# Patient Record
Sex: FEMALE | Race: WHITE | NOT HISPANIC OR LATINO | Employment: OTHER | URBAN - NONMETROPOLITAN AREA
[De-identification: names, ages, dates, MRNs, and addresses within clinical notes are randomized per-mention and may not be internally consistent; named-entity substitution may affect disease eponyms.]

---

## 2017-04-19 ENCOUNTER — OFFICE VISIT (OUTPATIENT)
Dept: GASTROENTEROLOGY | Facility: CLINIC | Age: 72
End: 2017-04-19

## 2017-04-19 VITALS
BODY MASS INDEX: 26.8 KG/M2 | DIASTOLIC BLOOD PRESSURE: 80 MMHG | WEIGHT: 157 LBS | OXYGEN SATURATION: 95 % | HEART RATE: 75 BPM | HEIGHT: 64 IN | SYSTOLIC BLOOD PRESSURE: 122 MMHG

## 2017-04-19 DIAGNOSIS — E11.9 CONTROLLED TYPE 2 DIABETES MELLITUS WITHOUT COMPLICATION, WITHOUT LONG-TERM CURRENT USE OF INSULIN (HCC): ICD-10-CM

## 2017-04-19 DIAGNOSIS — Z12.11 ENCOUNTER FOR SCREENING FOR MALIGNANT NEOPLASM OF COLON: ICD-10-CM

## 2017-04-19 DIAGNOSIS — Z86.010 HX OF COLONIC POLYPS: Primary | ICD-10-CM

## 2017-04-19 DIAGNOSIS — K21.9 GASTROESOPHAGEAL REFLUX DISEASE, ESOPHAGITIS PRESENCE NOT SPECIFIED: ICD-10-CM

## 2017-04-19 PROBLEM — Z86.0100 HX OF COLONIC POLYPS: Status: ACTIVE | Noted: 2017-04-19

## 2017-04-19 PROCEDURE — 99202 OFFICE O/P NEW SF 15 MIN: CPT | Performed by: CLINICAL NURSE SPECIALIST

## 2017-04-19 NOTE — PROGRESS NOTES
Becky Smiley  1945 4/19/2017  Chief Complaint   Patient presents with   • Colonoscopy     New patient ref by Dr. SANGEETA Holm former Dr. Sommer patient     Subjective   HPI  Becky Smiley is a 71 y.o. female who presents as a referral for preventative maintenance. She has no complaints of nausea or vomiting. No change in bowels. No wt loss. No BRBPR. No melena. There is a negative family hx for colon cancer. No abdominal pain.She has a long hx of reflux for years stable with protonix. She has no nausea or vomiting. She takes 2 TUMS per day for calcium. She has a hx of polyp at the cardia noted by Kemal with 3 yr recall.   Past Medical History:   Diagnosis Date   • Asthma    • Diabetes mellitus     Type 2   • GERD (gastroesophageal reflux disease)    • HH (hiatus hernia)    • Hx of colonic polyps    • Insomnia    • Vitamin D deficiency      Past Surgical History:   Procedure Laterality Date   • CATARACT EXTRACTION     • CHOLECYSTECTOMY     • COLONOSCOPY  05/26/2011    Isolated area of ischemic colitis, Diverticulosis repeat exam in 5 years Dr. Sommer   • ENDOSCOPY  09/18/2013    Benign hyperplastic polyp with moderate chronic active inflammation stomach, Mild gastritis repeat exam 3 years Dr. Christie   • HYSTERECTOMY     • TUMOR EXCISION      Throat benign   • UPPER GASTROINTESTINAL ENDOSCOPY  09/17/2013    small cardia polyp (benign hyperplastic),mild gastritis     Outpatient Prescriptions Marked as Taking for the 4/19/17 encounter (Office Visit) with CAROL Ramirez   Medication Sig Dispense Refill   • albuterol (PROVENTIL HFA;VENTOLIN HFA) 108 (90 BASE) MCG/ACT inhaler Inhale 2 puffs Every 4 (Four) Hours As Needed for Wheezing.     • aspirin 81 MG EC tablet Take 81 mg by mouth Daily.     • atorvastatin (LIPITOR) 80 MG tablet Take 80 mg by mouth Daily.     • ezetimibe (ZETIA) 10 MG tablet Take 10 mg by mouth Daily.     • Fluticasone Propionate, Inhal, (FLOVENT DISKUS)  100 MCG/BLIST aerosol powder  Inhale.     • glyBURIDE (DIAbeta) 5 MG tablet Take 5 mg by mouth 2 (Two) Times a Day With Meals.     • griseofulvin (GRIFULVIN V) 500 MG tablet Take 500 mg by mouth Daily.     • mometasone-formoterol (DULERA 100) 100-5 MCG/ACT inhaler Inhale 2 puffs 2 (Two) Times a Day.     • pantoprazole (PROTONIX) 40 MG EC tablet Take 40 mg by mouth Daily.     • tiotropium (SPIRIVA) 18 MCG per inhalation capsule Place 1 capsule into inhaler and inhale Daily.     • vitamin D (ERGOCALCIFEROL) 09946 UNITS capsule capsule Take 50,000 Units by mouth 1 (One) Time Per Week.       Allergies   Allergen Reactions   • Augmentin [Amoxicillin-Pot Clavulanate]    • Ciprofloxacin Hcl    • Codeine    • Metformin And Related    • Sulfa Antibiotics      Social History     Social History   • Marital status:      Spouse name: N/A   • Number of children: N/A   • Years of education: N/A     Occupational History   • Not on file.     Social History Main Topics   • Smoking status: Never Smoker   • Smokeless tobacco: Not on file   • Alcohol use No   • Drug use: Not on file   • Sexual activity: Not on file     Other Topics Concern   • Not on file     Social History Narrative     Family History   Problem Relation Age of Onset   • Colon cancer Neg Hx    • Colon polyps Neg Hx      Health Maintenance   Topic Date Due   • TDAP/TD VACCINES (1 - Tdap) 11/12/1964   • PNEUMOCOCCAL VACCINES (65+ LOW/MEDIUM RISK) (1 of 2 - PCV13) 11/12/2010   • INFLUENZA VACCINE  08/01/2016   • HEPATITIS C SCREENING  04/07/2017   • COLONOSCOPY  04/07/2017   • ZOSTER VACCINE  04/07/2017   • MAMMOGRAM  04/19/2017   • HEMOGLOBIN A1C  04/19/2017   • URINE MICROALBUMIN  04/19/2017   • DIABETIC FOOT EXAM  04/19/2017   • DIABETIC EYE EXAM  04/19/2017       REVIEW OF SYSTEMS  General: well appearing, no fever chills or sweats, no unexplained wt loss  HEENT: no acute visual or hearing disturbances  Cardiovascular: No chest pain or palpitations  Pulmonary:  "No shortness of breath, coughing, wheezing or hemoptysis  : No burning, urgency, hematuria, or dysuria  Musculoskeletal: No joint pain or stiffness  Peripheral: no edema  Skin: No lesions or rashes  Neuro: No dizziness, headaches, stroke, syncope  Endocrine: No hot or cold intolerances  Hematological: No blood dyscrasias    Objective   Vitals:    04/19/17 1026   BP: 122/80   Pulse: 75   SpO2: 95%   Weight: 157 lb (71.2 kg)   Height: 64\" (162.6 cm)     Body mass index is 26.95 kg/(m^2).    PHYSICAL EXAM  General: age appropriate well nourished well appearing, no acute distress  Head: normocephalic and atraumatic  Global assessment-supple  Neck-No JVD noted, no lymphadenopathy  Pulmonary-clear to auscultation bilaterally, normal respiratory effort  Cardiovascular-normal rate and rhythm, normal heart sounds, S1 and S2 noted  Abdomen-soft, non tender, non distended, normal bowel sounds all 4 quadrants, no hepatosplenomegaly noted  Extremities-No clubbing cyanosis or edema  Neuro-Non focal, converses appropriately, awake, alert, oriented    Assessment/Plan     Becky was seen today for colonoscopy.    Diagnoses and all orders for this visit:    Hx of colonic polyps  Comments:  2003 hyperplastic ascending    Controlled type 2 diabetes mellitus without complication, without long-term current use of insulin    Encounter for screening for malignant neoplasm of colon  -     Case Request; Standing  -     Implement Anesthesia Orders Day of Procedure; Standing  -     Obtain Informed Consent; Standing  -     Verify Informed Consent; Standing  -     Verify bowel prep was successful; Standing  -     Case Request    Gastroesophageal reflux disease, esophagitis presence not specified  Comments:  polyp in the cardia last Endo 9/17/2013 Kemal suggested 3 year recall.   Orders:  -     Case Request; Standing  -     Implement Anesthesia Orders Day of Procedure; Standing  -     Obtain Informed Consent; Standing  -     Verify " Informed Consent; Standing  -     Case Request        COLONOSCOPY WITH ANESTHESIA (N/A)  Body mass index is 26.95 kg/(m^2).    Patient instructions on prep prior to procedure provided to the patient.    All risks, benefits, alternatives, and indications of colonoscopy procedure have been discussed with the patient. Risks to include perforation of the colon requiring possible surgery or colostomy, risk of bleeding from biopsies or removal of colon tissue, possibility of missing a colon polyp or cancer, or adverse drug reaction.  Benefits to include the diagnosis and management of disease of the colon and rectum. Alternatives to include barium enema, radiographic evaluation, lab testing or no intervention. Pt verbalizes understanding and agrees.

## 2017-06-01 ENCOUNTER — ANESTHESIA EVENT (OUTPATIENT)
Dept: GASTROENTEROLOGY | Facility: HOSPITAL | Age: 72
End: 2017-06-01

## 2017-06-01 ENCOUNTER — HOSPITAL ENCOUNTER (OUTPATIENT)
Facility: HOSPITAL | Age: 72
Setting detail: HOSPITAL OUTPATIENT SURGERY
Discharge: HOME | End: 2017-06-01
Attending: INTERNAL MEDICINE | Admitting: INTERNAL MEDICINE

## 2017-06-01 ENCOUNTER — ANESTHESIA (OUTPATIENT)
Dept: GASTROENTEROLOGY | Facility: HOSPITAL | Age: 72
End: 2017-06-01

## 2017-06-01 VITALS
DIASTOLIC BLOOD PRESSURE: 66 MMHG | SYSTOLIC BLOOD PRESSURE: 129 MMHG | WEIGHT: 156 LBS | OXYGEN SATURATION: 93 % | RESPIRATION RATE: 16 BRPM | BODY MASS INDEX: 26.63 KG/M2 | HEIGHT: 64 IN | HEART RATE: 70 BPM | TEMPERATURE: 97.9 F

## 2017-06-01 DIAGNOSIS — K21.9 GASTROESOPHAGEAL REFLUX DISEASE, ESOPHAGITIS PRESENCE NOT SPECIFIED: ICD-10-CM

## 2017-06-01 PROCEDURE — 43239 EGD BIOPSY SINGLE/MULTIPLE: CPT | Performed by: INTERNAL MEDICINE

## 2017-06-01 PROCEDURE — 88305 TISSUE EXAM BY PATHOLOGIST: CPT | Performed by: INTERNAL MEDICINE

## 2017-06-01 PROCEDURE — 25010000002 PROPOFOL 10 MG/ML EMULSION: Performed by: NURSE ANESTHETIST, CERTIFIED REGISTERED

## 2017-06-01 RX ORDER — LIDOCAINE HYDROCHLORIDE 20 MG/ML
INJECTION, SOLUTION INFILTRATION; PERINEURAL AS NEEDED
Status: DISCONTINUED | OUTPATIENT
Start: 2017-06-01 | End: 2017-06-01 | Stop reason: SURG

## 2017-06-01 RX ORDER — SODIUM CHLORIDE 9 MG/ML
100 INJECTION, SOLUTION INTRAVENOUS CONTINUOUS
Status: DISCONTINUED | OUTPATIENT
Start: 2017-06-01 | End: 2017-06-01 | Stop reason: HOSPADM

## 2017-06-01 RX ORDER — PROPOFOL 10 MG/ML
VIAL (ML) INTRAVENOUS AS NEEDED
Status: DISCONTINUED | OUTPATIENT
Start: 2017-06-01 | End: 2017-06-01 | Stop reason: SURG

## 2017-06-01 RX ORDER — CETIRIZINE HYDROCHLORIDE 10 MG/1
10 TABLET ORAL DAILY
COMMUNITY

## 2017-06-01 RX ORDER — SODIUM CHLORIDE 0.9 % (FLUSH) 0.9 %
1-10 SYRINGE (ML) INJECTION AS NEEDED
Status: DISCONTINUED | OUTPATIENT
Start: 2017-06-01 | End: 2017-06-01 | Stop reason: HOSPADM

## 2017-06-01 RX ADMIN — PROPOFOL 100 MG: 10 INJECTION, EMULSION INTRAVENOUS at 09:29

## 2017-06-01 RX ADMIN — LIDOCAINE HYDROCHLORIDE 100 MG: 20 INJECTION, SOLUTION INFILTRATION; PERINEURAL at 09:19

## 2017-06-01 RX ADMIN — LIDOCAINE HYDROCHLORIDE 1 ML: 10 INJECTION, SOLUTION EPIDURAL; INFILTRATION; INTRACAUDAL; PERINEURAL at 08:54

## 2017-06-01 RX ADMIN — SODIUM CHLORIDE 100 ML/HR: 9 INJECTION, SOLUTION INTRAVENOUS at 08:54

## 2017-06-01 NOTE — ANESTHESIA POSTPROCEDURE EVALUATION
Patient: Becky HOUSTON Whipple    Procedure Summary     Date Anesthesia Start Anesthesia Stop Room / Location    06/01/17 0917 0937 [facility] ENDOSCOPY 4 / [facility]       Procedure Diagnosis Surgeon Provider    ESOPHAGOGASTRODUODENOSCOPY WITH ANESTHESIA (N/A Esophagus) Gastroesophageal reflux disease, esophagitis presence not specified  (Gastroesophageal reflux disease, esophagitis presence not specified [K21.9]) MD Blanco Chávez CRNA          Anesthesia Type: general  Last vitals  BP      Temp      Pulse     Resp      SpO2        Post Anesthesia Care and Evaluation    Patient location during evaluation: PHASE II  Patient participation: complete - patient participated  Level of consciousness: awake and alert  Pain score: 0  Pain management: adequate  Airway patency: patent  Anesthetic complications: No anesthetic complications    Cardiovascular status: acceptable, hemodynamically stable and stable  Respiratory status: acceptable  Hydration status: acceptable

## 2017-06-01 NOTE — ANESTHESIA PREPROCEDURE EVALUATION
Anesthesia Evaluation     Patient summary reviewed and Nursing notes reviewed   NPO Solid Status: > 8 hours  NPO Liquid Status: > 8 hours     Airway   Mallampati: II  TM distance: >3 FB  Neck ROM: full  no difficulty expected  Dental - normal exam     Pulmonary - normal exam   (+) asthma,   Cardiovascular - negative cardio ROS and normal exam        Neuro/Psych  (+) TIA,    GI/Hepatic/Renal/Endo    (+)  hiatal hernia, GERD well controlled, diabetes mellitus type 2 well controlled,     Musculoskeletal     Abdominal    Substance History      OB/GYN          Other                                        Anesthesia Plan    ASA 2     intravenous induction   Anesthetic plan and risks discussed with patient.    Plan discussed with CRNA.

## 2017-06-01 NOTE — H&P
[facility]  Pre Procedure History & Physical    Chief Complaint:   Gastric polyps    Subjective     HPI:   Here for upper endoscopy.  Apparently had hyperplastic gastric polyp on her last exam was suggested 3 year follow-up.  Patient takes Protonix daily    Past Medical History:   Past Medical History:   Diagnosis Date   • Asthma    • Diabetes mellitus     Type 2   • GERD (gastroesophageal reflux disease)    • HH (hiatus hernia)    • Hx of colonic polyps    • Insomnia    • Vitamin D deficiency        Past Surgical History:  [unfilled]    Family History:  Family History   Problem Relation Age of Onset   • Colon cancer Neg Hx    • Colon polyps Neg Hx        Social History:   reports that she has never smoked. She does not have any smokeless tobacco history on file. She reports that she does not drink alcohol.    Medications:   Prior to Admission medications    Medication Sig Start Date End Date Taking? Authorizing Provider   aspirin 81 MG EC tablet Take 81 mg by mouth Daily.   Yes Historical Provider, MD   atorvastatin (LIPITOR) 80 MG tablet Take 80 mg by mouth Daily.   Yes Historical Provider, MD   cetirizine (zyrTEC) 10 MG tablet Take 10 mg by mouth Daily.   Yes Historical Provider, MD   ezetimibe (ZETIA) 10 MG tablet Take 10 mg by mouth Daily.   Yes Historical Provider, MD   glyBURIDE (DIAbeta) 5 MG tablet Take 5 mg by mouth 2 (Two) Times a Day With Meals.   Yes Historical Provider, MD   Metaxalone (SKELAXIN PO) Take  by mouth.   Yes Historical Provider, MD   pantoprazole (PROTONIX) 40 MG EC tablet Take 40 mg by mouth Daily.   Yes Historical Provider, MD   vitamin D (ERGOCALCIFEROL) 90378 UNITS capsule capsule Take 50,000 Units by mouth 1 (One) Time Per Week.    Historical Provider, MD   albuterol (PROVENTIL HFA;VENTOLIN HFA) 108 (90 BASE) MCG/ACT inhaler Inhale 2 puffs Every 4 (Four) Hours As Needed for Wheezing.  6/1/17  Historical Provider, MD   Fluticasone Propionate, Inhal, (FLOVENT DISKUS) 100  "MCG/BLIST aerosol powder  Inhale.  6/1/17  Historical Provider, MD   griseofulvin (GRIFULVIN V) 500 MG tablet Take 500 mg by mouth Daily.  6/1/17  Historical Provider, MD   mometasone-formoterol (DULERA 100) 100-5 MCG/ACT inhaler Inhale 2 puffs 2 (Two) Times a Day.  6/1/17  Historical Provider, MD   tiotropium (SPIRIVA) 18 MCG per inhalation capsule Place 1 capsule into inhaler and inhale Daily.  6/1/17  Historical Provider, MD       Allergies:  Ciprofloxacin hcl; Codeine; Metformin and related; Augmentin [amoxicillin-pot clavulanate]; and Sulfa antibiotics    Objective     Blood pressure 156/83, pulse 65, temperature 97.9 °F (36.6 °C), temperature source Temporal Artery , resp. rate 18, height 64\" (162.6 cm), weight 156 lb (70.8 kg), SpO2 98 %.    Physical Exam   Constitutional: Pt is oriented to person, place, and in no distress.   HENT: Mouth/Throat: Oropharynx is clear.   Cardiovascular: Normal rate, regular rhythm.    Pulmonary/Chest: Effort normal. No respiratory distress. No  wheezes.   Abdominal: Soft. Non-distended.  Skin: Skin is warm and dry.   Psychiatric: Mood, memory, affect and judgment appear normal.     Assessment/Plan     Diagnosis:  Gastric polyp    Anticipated Surgical Procedure:    Proceed with endoscopy as scheduled    The risks, benefits, and alternatives of this procedure have been discussed with the patient or the responsible party- the patient understands and agrees to proceed.    EMR Dragon/transcription disclaimer: Much of this encounter note is an electronic transcription/translation of spoken language to printed text.  The electronic translation of spoken language may permit erroneous, or at times, nonsensical words or phrases to be inadvertently transcribed.  Although I have reviewed the note for such errors, some may still exist.    Faraz Cardenas MD  9:22 AM  6/1/2017    "

## 2017-06-01 NOTE — PLAN OF CARE
Problem: Patient Care Overview (Adult)  Goal: Plan of Care Review  Outcome: Outcome(s) achieved Date Met:  06/01/17 06/01/17 0940   Coping/Psychosocial Response Interventions   Plan Of Care Reviewed With patient   Patient Care Overview   Progress improving   Outcome Evaluation   Outcome Summary/Follow up Plan D/C CRITERIA MET

## 2017-06-01 NOTE — PLAN OF CARE
Problem: Patient Care Overview (Adult)  Goal: Plan of Care Review  Outcome: Ongoing (interventions implemented as appropriate)    06/01/17 0982   Coping/Psychosocial Response Interventions   Plan Of Care Reviewed With patient   Patient Care Overview   Progress no change   Outcome Evaluation   Outcome Summary/Follow up Plan pt tolerated procedure well.

## 2017-06-01 NOTE — PLAN OF CARE
Problem: Patient Care Overview (Adult)  Goal: Adult Individualization and Mutuality  Outcome: Ongoing (interventions implemented as appropriate)    06/01/17 0831   Individualization   Patient Specific Preferences none

## 2017-06-02 LAB
CYTO UR: NORMAL
LAB AP CASE REPORT: NORMAL
LAB AP CLINICAL INFORMATION: NORMAL
Lab: NORMAL
PATH REPORT.FINAL DX SPEC: NORMAL
PATH REPORT.GROSS SPEC: NORMAL

## 2017-06-07 ENCOUNTER — TELEPHONE (OUTPATIENT)
Dept: GASTROENTEROLOGY | Facility: CLINIC | Age: 72
End: 2017-06-07

## 2017-06-15 ENCOUNTER — ANESTHESIA EVENT (OUTPATIENT)
Dept: GASTROENTEROLOGY | Facility: HOSPITAL | Age: 72
End: 2017-06-15

## 2017-06-16 ENCOUNTER — HOSPITAL ENCOUNTER (OUTPATIENT)
Facility: HOSPITAL | Age: 72
Setting detail: HOSPITAL OUTPATIENT SURGERY
Discharge: HOME | End: 2017-06-16
Attending: INTERNAL MEDICINE | Admitting: INTERNAL MEDICINE

## 2017-06-16 ENCOUNTER — ANESTHESIA (OUTPATIENT)
Dept: GASTROENTEROLOGY | Facility: HOSPITAL | Age: 72
End: 2017-06-16

## 2017-06-16 VITALS
TEMPERATURE: 98.1 F | OXYGEN SATURATION: 97 % | RESPIRATION RATE: 16 BRPM | DIASTOLIC BLOOD PRESSURE: 69 MMHG | SYSTOLIC BLOOD PRESSURE: 124 MMHG | BODY MASS INDEX: 26.46 KG/M2 | HEIGHT: 64 IN | HEART RATE: 73 BPM | WEIGHT: 155 LBS

## 2017-06-16 DIAGNOSIS — Z12.11 ENCOUNTER FOR SCREENING FOR MALIGNANT NEOPLASM OF COLON: ICD-10-CM

## 2017-06-16 LAB — GLUCOSE BLDC GLUCOMTR-MCNC: 127 MG/DL (ref 70–130)

## 2017-06-16 PROCEDURE — 43239 EGD BIOPSY SINGLE/MULTIPLE: CPT | Performed by: INTERNAL MEDICINE

## 2017-06-16 PROCEDURE — 88305 TISSUE EXAM BY PATHOLOGIST: CPT | Performed by: INTERNAL MEDICINE

## 2017-06-16 PROCEDURE — 45385 COLONOSCOPY W/LESION REMOVAL: CPT | Performed by: INTERNAL MEDICINE

## 2017-06-16 PROCEDURE — 25010000002 PROPOFOL 10 MG/ML EMULSION: Performed by: NURSE ANESTHETIST, CERTIFIED REGISTERED

## 2017-06-16 PROCEDURE — 82962 GLUCOSE BLOOD TEST: CPT

## 2017-06-16 RX ORDER — SODIUM CHLORIDE 0.9 % (FLUSH) 0.9 %
1-10 SYRINGE (ML) INJECTION AS NEEDED
Status: DISCONTINUED | OUTPATIENT
Start: 2017-06-16 | End: 2017-06-16 | Stop reason: HOSPADM

## 2017-06-16 RX ORDER — SODIUM CHLORIDE 9 MG/ML
9 INJECTION, SOLUTION INTRAVENOUS CONTINUOUS PRN
Status: DISCONTINUED | OUTPATIENT
Start: 2017-06-16 | End: 2017-06-16 | Stop reason: HOSPADM

## 2017-06-16 RX ORDER — PROPOFOL 10 MG/ML
VIAL (ML) INTRAVENOUS AS NEEDED
Status: DISCONTINUED | OUTPATIENT
Start: 2017-06-16 | End: 2017-06-16 | Stop reason: SURG

## 2017-06-16 RX ADMIN — PROPOFOL 100 MG: 10 INJECTION, EMULSION INTRAVENOUS at 09:38

## 2017-06-16 RX ADMIN — PROPOFOL 100 MG: 10 INJECTION, EMULSION INTRAVENOUS at 09:28

## 2017-06-16 RX ADMIN — LIDOCAINE HYDROCHLORIDE 0.5 ML: 10 INJECTION, SOLUTION EPIDURAL; INFILTRATION; INTRACAUDAL; PERINEURAL at 07:51

## 2017-06-16 RX ADMIN — PROPOFOL 100 MG: 10 INJECTION, EMULSION INTRAVENOUS at 09:33

## 2017-06-16 RX ADMIN — SODIUM CHLORIDE 9 ML/HR: 9 INJECTION, SOLUTION INTRAVENOUS at 07:51

## 2017-06-16 NOTE — PLAN OF CARE
Problem: GI Endoscopy (Adult)  Goal: Signs and Symptoms of Listed Potential Problems Will be Absent or Manageable (GI Endoscopy)  Outcome: Outcome(s) achieved Date Met:  06/16/17

## 2017-06-16 NOTE — PLAN OF CARE
Problem: Patient Care Overview (Adult)  Goal: Plan of Care Review    06/16/17 0953   Coping/Psychosocial Response Interventions   Plan Of Care Reviewed With patient   Patient Care Overview   Progress no change   Outcome Evaluation   Outcome Summary/Follow up Plan pt tolerated procedure well.

## 2017-06-16 NOTE — ANESTHESIA POSTPROCEDURE EVALUATION
Patient: Becky Malikipple    Procedure Summary     Date Anesthesia Start Anesthesia Stop Room / Location    06/16/17 0926 0949  [facility] 6 /  [facility]       Procedure Diagnosis Surgeon Provider    COLONOSCOPY WITH ANESTHESIA (N/A ) Encounter for screening for malignant neoplasm of colon  (Encounter for screening for malignant neoplasm of colon [Z12.11]) MD Beltran Chávez CRNA          Anesthesia Type: MAC  Last vitals  /80 (06/16/17 0952)    Temp      Pulse 73 (06/16/17 0952)   Resp 10 (06/16/17 0952)    SpO2 97 % (06/16/17 0952)      Post Anesthesia Care and Evaluation    Patient location during evaluation: PHASE II  Patient participation: complete - patient participated  Level of consciousness: awake and alert  Pain management: adequate  Airway patency: patent  Anesthetic complications: No anesthetic complications    Cardiovascular status: acceptable  Respiratory status: acceptable  Hydration status: acceptable

## 2017-06-16 NOTE — PLAN OF CARE
Problem: Patient Care Overview (Adult)  Goal: Plan of Care Review  Outcome: Outcome(s) achieved Date Met:  06/16/17 06/16/17 1009   Coping/Psychosocial Response Interventions   Plan Of Care Reviewed With patient;spouse   Patient Care Overview   Progress improving   Outcome Evaluation   Outcome Summary/Follow up Plan D/C CRITERIA MET

## 2017-06-16 NOTE — ANESTHESIA PREPROCEDURE EVALUATION
Anesthesia Evaluation     Patient summary reviewed and Nursing notes reviewed   NPO Solid Status: > 8 hours  NPO Liquid Status: > 8 hours     Airway   Mallampati: II  TM distance: >3 FB  Neck ROM: full  Dental - normal exam     Pulmonary - normal exam   (+) asthma,   Cardiovascular - negative cardio ROS and normal exam        Neuro/Psych  (+) TIA,    GI/Hepatic/Renal/Endo    (+)  hiatal hernia, GERD well controlled, diabetes mellitus type 2 well controlled,     Musculoskeletal     Abdominal    Substance History      OB/GYN          Other                                      Anesthesia Plan    ASA 3     general     intravenous induction     Plan discussed with CRNA.

## 2018-03-13 ENCOUNTER — OFFICE VISIT (OUTPATIENT)
Dept: UROLOGY | Facility: CLINIC | Age: 73
End: 2018-03-13

## 2018-03-13 VITALS — WEIGHT: 157 LBS | HEIGHT: 66 IN | TEMPERATURE: 97.3 F | BODY MASS INDEX: 25.23 KG/M2

## 2018-03-13 DIAGNOSIS — R30.0 DYSURIA: ICD-10-CM

## 2018-03-13 DIAGNOSIS — N30.10 INTERSTITIAL CYSTITIS: ICD-10-CM

## 2018-03-13 DIAGNOSIS — N39.0 RECURRENT UTI: Primary | ICD-10-CM

## 2018-03-13 LAB
BILIRUB BLD-MCNC: NEGATIVE MG/DL
CLARITY, POC: CLEAR
COLOR UR: YELLOW
GLUCOSE UR STRIP-MCNC: ABNORMAL MG/DL
KETONES UR QL: NEGATIVE
LEUKOCYTE EST, POC: NEGATIVE
NITRITE UR-MCNC: NEGATIVE MG/ML
PH UR: 5 [PH] (ref 5–8)
PROT UR STRIP-MCNC: NEGATIVE MG/DL
RBC # UR STRIP: NEGATIVE /UL
SP GR UR: 1.02 (ref 1–1.03)
UROBILINOGEN UR QL: NORMAL

## 2018-03-13 PROCEDURE — 81001 URINALYSIS AUTO W/SCOPE: CPT | Performed by: UROLOGY

## 2018-03-13 PROCEDURE — 99204 OFFICE O/P NEW MOD 45 MIN: CPT | Performed by: UROLOGY

## 2018-03-13 RX ORDER — TRIMETHOPRIM 100 MG/1
100 TABLET ORAL ONCE
Qty: 90 TABLET | Refills: 5 | Status: SHIPPED | OUTPATIENT
Start: 2018-03-13 | End: 2018-03-13

## 2018-05-09 ENCOUNTER — PROCEDURE VISIT (OUTPATIENT)
Dept: UROLOGY | Facility: CLINIC | Age: 73
End: 2018-05-09

## 2018-05-09 VITALS
WEIGHT: 148.2 LBS | HEIGHT: 65 IN | TEMPERATURE: 98.3 F | SYSTOLIC BLOOD PRESSURE: 118 MMHG | BODY MASS INDEX: 24.69 KG/M2 | DIASTOLIC BLOOD PRESSURE: 64 MMHG

## 2018-05-09 DIAGNOSIS — N39.0 RECURRENT UTI: Primary | ICD-10-CM

## 2018-05-09 LAB
BILIRUB BLD-MCNC: NEGATIVE MG/DL
CLARITY, POC: CLEAR
COLOR UR: YELLOW
GLUCOSE UR STRIP-MCNC: ABNORMAL MG/DL
KETONES UR QL: ABNORMAL
LEUKOCYTE EST, POC: ABNORMAL
NITRITE UR-MCNC: POSITIVE MG/ML
PH UR: 5 [PH] (ref 5–8)
PROT UR STRIP-MCNC: ABNORMAL MG/DL
RBC # UR STRIP: ABNORMAL /UL
SP GR UR: 1 (ref 1–1.03)
UROBILINOGEN UR QL: ABNORMAL

## 2018-05-09 PROCEDURE — 87077 CULTURE AEROBIC IDENTIFY: CPT | Performed by: UROLOGY

## 2018-05-09 PROCEDURE — 81003 URINALYSIS AUTO W/O SCOPE: CPT | Performed by: UROLOGY

## 2018-05-09 PROCEDURE — 51701 INSERT BLADDER CATHETER: CPT | Performed by: UROLOGY

## 2018-05-09 PROCEDURE — 87186 SC STD MICRODIL/AGAR DIL: CPT | Performed by: UROLOGY

## 2018-05-09 PROCEDURE — 87086 URINE CULTURE/COLONY COUNT: CPT | Performed by: UROLOGY

## 2018-05-09 NOTE — PROGRESS NOTES
"Patient of Dr. Ferrell is here today complaining of burning when urinating. The patient denies any fever, nausea,vomiting, or chills. Catheterized urine specimen obtained using sterile technique and a Female Speci-Cath Kit * FR 6.5\". UA Auto Dip ran and urine sent for C&S. The patient tolerated the procedure well with no complications.     Dr. Casarez was in the office at the time of procedure. Patient was advised that we will call with results. Patient verbalized understanding.     "

## 2018-05-11 ENCOUNTER — TELEPHONE (OUTPATIENT)
Dept: UROLOGY | Facility: CLINIC | Age: 73
End: 2018-05-11

## 2018-05-11 LAB
BACTERIA SPEC AEROBE CULT: ABNORMAL
BACTERIA SPEC AEROBE CULT: ABNORMAL

## 2018-05-11 NOTE — TELEPHONE ENCOUNTER
Patient called about culture. Per Dr Vance'rs note on patient's urine culture he needs this addressed by physician. Patient would like to know today so she can start medication.

## 2018-05-14 ENCOUNTER — INFUSION (OUTPATIENT)
Dept: ONCOLOGY | Facility: HOSPITAL | Age: 73
End: 2018-05-14

## 2018-05-14 VITALS
WEIGHT: 151 LBS | OXYGEN SATURATION: 100 % | HEIGHT: 65 IN | TEMPERATURE: 98 F | RESPIRATION RATE: 18 BRPM | BODY MASS INDEX: 25.16 KG/M2 | SYSTOLIC BLOOD PRESSURE: 153 MMHG | HEART RATE: 95 BPM | DIASTOLIC BLOOD PRESSURE: 79 MMHG

## 2018-05-14 DIAGNOSIS — N39.0 RECURRENT UTI: Primary | ICD-10-CM

## 2018-05-14 PROCEDURE — 96374 THER/PROPH/DIAG INJ IV PUSH: CPT

## 2018-05-14 PROCEDURE — 25010000002 CEFTRIAXONE PER 250 MG: Performed by: UROLOGY

## 2018-05-14 RX ORDER — 0.9 % SODIUM CHLORIDE 0.9 %
10 VIAL (ML) INJECTION EVERY 24 HOURS
Status: CANCELLED
Start: 2018-05-14

## 2018-05-14 RX ORDER — SALIVA STIMULANT COMB. NO.7
GEL (GRAM) MUCOUS MEMBRANE AS NEEDED
COMMUNITY
End: 2018-07-10 | Stop reason: ALTCHOICE

## 2018-05-14 RX ORDER — 0.9 % SODIUM CHLORIDE 0.9 %
10 VIAL (ML) INJECTION EVERY 24 HOURS
Status: DISCONTINUED | OUTPATIENT
Start: 2018-05-14 | End: 2018-05-14 | Stop reason: HOSPADM

## 2018-05-14 RX ADMIN — CEFTRIAXONE SODIUM 1 G: 1 INJECTION, POWDER, FOR SOLUTION INTRAMUSCULAR; INTRAVENOUS at 14:04

## 2018-05-15 ENCOUNTER — INFUSION (OUTPATIENT)
Dept: ONCOLOGY | Facility: HOSPITAL | Age: 73
End: 2018-05-15

## 2018-05-15 VITALS
BODY MASS INDEX: 24.99 KG/M2 | WEIGHT: 150 LBS | HEART RATE: 71 BPM | DIASTOLIC BLOOD PRESSURE: 78 MMHG | RESPIRATION RATE: 20 BRPM | TEMPERATURE: 98.3 F | HEIGHT: 65 IN | OXYGEN SATURATION: 98 % | SYSTOLIC BLOOD PRESSURE: 157 MMHG

## 2018-05-15 DIAGNOSIS — N39.0 RECURRENT UTI: Primary | ICD-10-CM

## 2018-05-15 PROCEDURE — 25010000002 CEFTRIAXONE PER 250 MG: Performed by: UROLOGY

## 2018-05-15 PROCEDURE — 96374 THER/PROPH/DIAG INJ IV PUSH: CPT

## 2018-05-15 RX ORDER — 0.9 % SODIUM CHLORIDE 0.9 %
10 VIAL (ML) INJECTION EVERY 24 HOURS
Status: DISCONTINUED | OUTPATIENT
Start: 2018-05-15 | End: 2018-05-15 | Stop reason: HOSPADM

## 2018-05-15 RX ORDER — 0.9 % SODIUM CHLORIDE 0.9 %
10 VIAL (ML) INJECTION EVERY 24 HOURS
Status: CANCELLED
Start: 2018-05-15

## 2018-05-15 RX ADMIN — CEFTRIAXONE SODIUM 1 G: 1 INJECTION, POWDER, FOR SOLUTION INTRAMUSCULAR; INTRAVENOUS at 10:14

## 2018-05-16 ENCOUNTER — INFUSION (OUTPATIENT)
Dept: ONCOLOGY | Facility: HOSPITAL | Age: 73
End: 2018-05-16

## 2018-05-16 VITALS
SYSTOLIC BLOOD PRESSURE: 128 MMHG | BODY MASS INDEX: 25.33 KG/M2 | DIASTOLIC BLOOD PRESSURE: 86 MMHG | OXYGEN SATURATION: 99 % | HEIGHT: 65 IN | HEART RATE: 87 BPM | RESPIRATION RATE: 20 BRPM | WEIGHT: 152 LBS | TEMPERATURE: 97.7 F

## 2018-05-16 DIAGNOSIS — N39.0 RECURRENT UTI: Primary | ICD-10-CM

## 2018-05-16 PROCEDURE — 96374 THER/PROPH/DIAG INJ IV PUSH: CPT

## 2018-05-16 PROCEDURE — 25010000002 CEFTRIAXONE PER 250 MG: Performed by: UROLOGY

## 2018-05-16 RX ORDER — 0.9 % SODIUM CHLORIDE 0.9 %
10 VIAL (ML) INJECTION EVERY 24 HOURS
Status: CANCELLED
Start: 2018-05-16

## 2018-05-16 RX ADMIN — CEFTRIAXONE SODIUM 1 G: 1 INJECTION, POWDER, FOR SOLUTION INTRAMUSCULAR; INTRAVENOUS at 10:02

## 2018-05-17 ENCOUNTER — INFUSION (OUTPATIENT)
Dept: ONCOLOGY | Facility: HOSPITAL | Age: 73
End: 2018-05-17

## 2018-05-17 VITALS
RESPIRATION RATE: 20 BRPM | WEIGHT: 150 LBS | TEMPERATURE: 97.9 F | DIASTOLIC BLOOD PRESSURE: 88 MMHG | SYSTOLIC BLOOD PRESSURE: 140 MMHG | HEIGHT: 65 IN | OXYGEN SATURATION: 98 % | HEART RATE: 78 BPM | BODY MASS INDEX: 24.99 KG/M2

## 2018-05-17 DIAGNOSIS — N39.0 RECURRENT UTI: Primary | ICD-10-CM

## 2018-05-17 PROCEDURE — 25010000002 CEFTRIAXONE PER 250 MG: Performed by: UROLOGY

## 2018-05-17 PROCEDURE — 96374 THER/PROPH/DIAG INJ IV PUSH: CPT

## 2018-05-17 RX ORDER — 0.9 % SODIUM CHLORIDE 0.9 %
10 VIAL (ML) INJECTION EVERY 24 HOURS
Status: CANCELLED
Start: 2018-05-17

## 2018-05-17 RX ORDER — 0.9 % SODIUM CHLORIDE 0.9 %
10 VIAL (ML) INJECTION EVERY 24 HOURS
Status: DISCONTINUED | OUTPATIENT
Start: 2018-05-17 | End: 2018-05-17 | Stop reason: HOSPADM

## 2018-05-17 RX ADMIN — CEFTRIAXONE SODIUM 1 G: 1 INJECTION, POWDER, FOR SOLUTION INTRAMUSCULAR; INTRAVENOUS at 10:30

## 2018-07-10 ENCOUNTER — OFFICE VISIT (OUTPATIENT)
Dept: UROLOGY | Facility: CLINIC | Age: 73
End: 2018-07-10

## 2018-07-10 VITALS — WEIGHT: 148.4 LBS | TEMPERATURE: 97.9 F | HEIGHT: 65 IN | BODY MASS INDEX: 24.72 KG/M2

## 2018-07-10 DIAGNOSIS — E11.9 CONTROLLED TYPE 2 DIABETES MELLITUS WITHOUT COMPLICATION, WITHOUT LONG-TERM CURRENT USE OF INSULIN (HCC): ICD-10-CM

## 2018-07-10 DIAGNOSIS — N39.0 RECURRENT UTI: Primary | ICD-10-CM

## 2018-07-10 DIAGNOSIS — N30.10 INTERSTITIAL CYSTITIS: ICD-10-CM

## 2018-07-10 DIAGNOSIS — R30.0 DYSURIA: ICD-10-CM

## 2018-07-10 PROCEDURE — 99214 OFFICE O/P EST MOD 30 MIN: CPT | Performed by: UROLOGY

## 2018-07-10 RX ORDER — SITAGLIPTIN 25 MG/1
25 TABLET, FILM COATED ORAL 2 TIMES DAILY
COMMUNITY
Start: 2018-06-29 | End: 2020-07-01 | Stop reason: DRUGHIGH

## 2018-07-10 RX ORDER — NITROFURANTOIN 25; 75 MG/1; MG/1
100 CAPSULE ORAL NIGHTLY
Qty: 90 CAPSULE | Refills: 5 | Status: SHIPPED | OUTPATIENT
Start: 2018-07-10 | End: 2020-04-02 | Stop reason: SDUPTHER

## 2018-07-10 RX ORDER — NITROFURANTOIN 25; 75 MG/1; MG/1
CAPSULE ORAL
Refills: 0 | COMMUNITY
Start: 2018-07-06 | End: 2018-07-10 | Stop reason: SDUPTHER

## 2018-07-10 RX ORDER — PHENAZOPYRIDINE HYDROCHLORIDE 200 MG/1
200 TABLET, FILM COATED ORAL 2 TIMES DAILY PRN
COMMUNITY
End: 2022-11-28

## 2018-07-10 NOTE — PROGRESS NOTES
Subjective    Ms. Smiley is 72 y.o. female    CHIEF COMPLAINT: Recurrent urinary tract infections    The patient came over today for follow-up apparently recently she been on some recurring she had a recurrent urinary tract infection a culture was obtained by Dr. Darwin Holm we called over to his office and got the result and that showed enterococcus that was sensitive to Macrobid and she was placed on Macrobid 100 twice a day.    Since we have seen her she had been on low-dose Bactrim and is actually had taking care of thanks for until this most recent infection.    She denies any gross materia there is no flank pain she did have some dysuria associated with it but nothing at this point in time and her symptoms pretty much have resolved her urinalysis today is clear    Once again her interstitial cystitis symptoms seem to be pretty much stable in between these infections      History of Present Illness      The following portions of the patient's history were reviewed and updated as appropriate: allergies, current medications, past family history, past medical history, past social history, past surgical history and problem list.    Review of Systems   Constitutional: Negative for chills and fever.   Gastrointestinal: Negative for abdominal pain, anal bleeding and blood in stool.   Genitourinary: Positive for frequency. Negative for difficulty urinating, flank pain, hematuria and urgency.   Psychiatric/Behavioral: Negative for agitation and behavioral problems.         Current Outpatient Prescriptions:   •  aspirin 81 MG EC tablet, Take 81 mg by mouth Daily., Disp: , Rfl:   •  atorvastatin (LIPITOR) 80 MG tablet, Take 80 mg by mouth Daily., Disp: , Rfl:   •  cetirizine (zyrTEC) 10 MG tablet, Take 10 mg by mouth Daily., Disp: , Rfl:   •  ezetimibe (ZETIA) 10 MG tablet, Take 10 mg by mouth Daily., Disp: , Rfl:   •  glyBURIDE (DIAbeta) 5 MG tablet, Take 10 mg by mouth 2 (Two) Times a Day With Meals., Disp: , Rfl:   •   JANUVIA 25 MG tablet, , Disp: , Rfl:   •  nitrofurantoin, macrocrystal-monohydrate, (MACROBID) 100 MG capsule, Take 1 capsule by mouth Every Night., Disp: 90 capsule, Rfl: 5  •  pantoprazole (PROTONIX) 40 MG EC tablet, Take 40 mg by mouth Daily., Disp: , Rfl:   •  phenazopyridine (PYRIDIUM) 200 MG tablet, Take 200 mg by mouth 2 (Two) Times a Day As Needed for bladder spasms., Disp: , Rfl:   •  vitamin D (ERGOCALCIFEROL) 92801 UNITS capsule capsule, Take 50,000 Units by mouth 1 (One) Time Per Week., Disp: , Rfl:     Past Medical History:   Diagnosis Date   • Asthma    • Diabetes mellitus (CMS/HCC)     Type 2   • GERD (gastroesophageal reflux disease)    • HH (hiatus hernia)    • Hx of colonic polyps    • Insomnia    • Vitamin D deficiency        Past Surgical History:   Procedure Laterality Date   • CATARACT EXTRACTION     • CHOLECYSTECTOMY     • COLONOSCOPY  05/26/2011    Isolated area of ischemic colitis, Diverticulosis repeat exam in 5 years Dr. Sommer   • COLONOSCOPY N/A 6/16/2017    Procedure: COLONOSCOPY WITH ANESTHESIA;  Surgeon: Faraz Cardenas MD;  Location: [facility] ENDOSCOPY;  Service:    • ENDOSCOPY  09/18/2013    Benign hyperplastic polyp with moderate chronic active inflammation stomach, Mild gastritis repeat exam 3 years Dr. Christie   • ENDOSCOPY N/A 6/1/2017    Procedure: ESOPHAGOGASTRODUODENOSCOPY WITH ANESTHESIA;  Surgeon: Faraz Cardenas MD;  Location: [facility] ENDOSCOPY;  Service:    • HYSTERECTOMY     • KNEE SURGERY Left     baker's cyst    • TUMOR EXCISION      Throat benign   • UPPER GASTROINTESTINAL ENDOSCOPY  09/17/2013    small cardia polyp (benign hyperplastic),mild gastritis       Social History     Social History   • Marital status:      Social History Main Topics   • Smoking status: Never Smoker   • Smokeless tobacco: Never Used   • Alcohol use No   • Drug use: No   • Sexual activity: Defer     Other Topics Concern   • Not on file       Family History   Problem Relation Age of  "Onset   • Colon cancer Neg Hx    • Colon polyps Neg Hx        Objective    Temp 97.9 °F (36.6 °C)   Ht 165.1 cm (65\")   Wt 67.3 kg (148 lb 6.4 oz)   BMI 24.70 kg/m²     Physical Exam      Procedure visit on 05/09/2018   Component Date Value Ref Range Status   • Color 05/09/2018 Yellow  Yellow, Straw, Dark Yellow, Debra Final   • Clarity, UA 05/09/2018 Clear  Clear Final   • Glucose, UA 05/09/2018 250 mg/dL* Negative, 1000 mg/dL (3+) mg/dL Final   • Bilirubin 05/09/2018 Negative  Negative Final   • Ketones, UA 05/09/2018 Trace* Negative Final   • Specific Gravity  05/09/2018 1.005  1.005 - 1.030 Final   • Blood, UA 05/09/2018 Moderate* Negative Final   • pH, Urine 05/09/2018 5.0  5.0 - 8.0 Final   • Protein, POC 05/09/2018 100 mg/dL* Negative mg/dL Final   • Urobilinogen, UA 05/09/2018 4 E.U./dL * Normal Final   • Leukocytes 05/09/2018 Large (3+)* Negative Final   • Nitrite, UA 05/09/2018 Positive* Negative Final   • Urine Culture 05/09/2018 >100,000 CFU/mL Enterobacter cloacae complex*  Final       Results for orders placed or performed in visit on 05/09/18   Urine Culture - Urine, Urine, Clean Catch   Result Value Ref Range    Urine Culture      Urine Culture >100,000 CFU/mL Enterobacter cloacae complex (A)        Susceptibility    Enterobacter cloacae complex - BROCK     Cefazolin >=64 Resistant ug/ml     Cefepime <=1 Susceptible ug/ml     Ceftriaxone <=1 Susceptible ug/ml     Gentamicin <=1 Susceptible ug/ml     Levofloxacin <=0.12 Susceptible ug/ml     Meropenem <=0.25 Susceptible ug/ml     Nitrofurantoin 128 Resistant ug/ml     Trimethoprim + Sulfamethoxazole >=320 Resistant ug/ml   POC Urinalysis Dipstick, Automated   Result Value Ref Range    Color Yellow Yellow, Straw, Dark Yellow, Debra    Clarity, UA Clear Clear    Glucose,  mg/dL (A) Negative, 1000 mg/dL (3+) mg/dL    Bilirubin Negative Negative    Ketones, UA Trace (A) Negative    Specific Gravity  1.005 1.005 - 1.030    Blood, UA Moderate (A) " Negative    pH, Urine 5.0 5.0 - 8.0    Protein,  mg/dL (A) Negative mg/dL    Urobilinogen, UA 4 E.U./dL  (A) Normal    Leukocytes Large (3+) (A) Negative    Nitrite, UA Positive (A) Negative       Assessment and Plan    Becky was seen today for urinary tract infection.    Diagnoses and all orders for this visit:    Recurrent UTI  -     Cancel: POC Urinalysis Dipstick, Multipro  -     nitrofurantoin, macrocrystal-monohydrate, (MACROBID) 100 MG capsule; Take 1 capsule by mouth Every Night.    Interstitial cystitis    Dysuria    Controlled type 2 diabetes mellitus without complication, without long-term current use of insulin (CMS/[facility])    Plan--the patient has had a recurrent urinary tract infection worsening again with enterococcus.    What we decided to do was to go ahead and place her on low-dose Macrobid 100 once a day now for about 3-4 months if she continues to have breakthrough infections and then we will need to schedule her for cystoscopy.    Once again if she has no breakthrough infection due to need to do a culture but we can do a clean catch culture and not a culture at her request

## 2018-10-18 ENCOUNTER — NURSE TRIAGE (OUTPATIENT)
Dept: CALL CENTER | Facility: HOSPITAL | Age: 73
End: 2018-10-18

## 2018-10-18 NOTE — TELEPHONE ENCOUNTER
"Asking about information and recipes for diabetes. Discussed diabetes.org website. No further questions. Instructed to call with any questions or further needs. Discussed speaking with PCP regarding order for diabetes class.     Reason for Disposition  • Health Information question, no triage required and triager able to answer question    Additional Information  • Negative: [1] Caller is not with the adult (patient) AND [2] reporting urgent symptoms  • Negative: Lab result questions  • Negative: Medication questions  • Negative: Caller cannot be reached by phone  • Negative: Caller has already spoken to PCP or another triager  • Negative: RN needs further essential information from caller in order to complete triage  • Negative: Requesting regular office appointment  • Negative: [1] Caller requesting NON-URGENT health information AND [2] PCP's office is the best resource    Answer Assessment - Initial Assessment Questions  1. REASON FOR CALL or QUESTION: \"What is your reason for calling today?\" or \"How can I best help you?\" or \"What question do you have that I can help answer?\"      Diabetes questions    Protocols used: INFORMATION ONLY CALL-ADULT-      "

## 2018-11-13 ENCOUNTER — OFFICE VISIT (OUTPATIENT)
Dept: UROLOGY | Facility: CLINIC | Age: 73
End: 2018-11-13

## 2018-11-13 VITALS — WEIGHT: 150 LBS | HEIGHT: 66 IN | TEMPERATURE: 97.8 F | BODY MASS INDEX: 24.11 KG/M2

## 2018-11-13 DIAGNOSIS — N39.0 RECURRENT UTI: ICD-10-CM

## 2018-11-13 DIAGNOSIS — N30.10 INTERSTITIAL CYSTITIS: Primary | ICD-10-CM

## 2018-11-13 PROCEDURE — 99213 OFFICE O/P EST LOW 20 MIN: CPT | Performed by: UROLOGY

## 2018-11-13 NOTE — PROGRESS NOTES
Subjective    Ms. Smiley is 73 y.o. female    CHIEF COMPLAINT: Acute chronic cystitis    Patient comes back today for follow-up of chronic cystitis last time we saw her report on low-dose Macrobid active done very well with that she is had no breakthrough infection she is had no gross materia no flank pain no real burning stinging urgency etc.    She also has a history of interstitial cystitis but this is not seen bothering her now at all she is having no side effects from Macrobid whatsoever      History of Present Illness      The following portions of the patient's history were reviewed and updated as appropriate: allergies, current medications, past family history, past medical history, past social history, past surgical history and problem list.    Review of Systems   Constitutional: Negative for chills and fever.   Gastrointestinal: Negative for abdominal pain, anal bleeding and blood in stool.   Genitourinary: Negative for decreased urine volume, difficulty urinating, dyspareunia, dysuria, enuresis, flank pain, frequency, genital sores, hematuria, menstrual problem, pelvic pain, urgency, vaginal bleeding, vaginal discharge and vaginal pain.   Psychiatric/Behavioral: Negative for agitation and behavioral problems.         Current Outpatient Medications:   •  aspirin 81 MG EC tablet, Take 81 mg by mouth Daily., Disp: , Rfl:   •  atorvastatin (LIPITOR) 80 MG tablet, Take 80 mg by mouth Daily., Disp: , Rfl:   •  cetirizine (zyrTEC) 10 MG tablet, Take 10 mg by mouth Daily., Disp: , Rfl:   •  ezetimibe (ZETIA) 10 MG tablet, Take 10 mg by mouth Daily., Disp: , Rfl:   •  glyBURIDE (DIAbeta) 5 MG tablet, Take 10 mg by mouth 2 (Two) Times a Day With Meals., Disp: , Rfl:   •  JANUVIA 25 MG tablet, , Disp: , Rfl:   •  nitrofurantoin, macrocrystal-monohydrate, (MACROBID) 100 MG capsule, Take 1 capsule by mouth Every Night., Disp: 90 capsule, Rfl: 5  •  pantoprazole (PROTONIX) 40 MG EC tablet, Take 40 mg by mouth Daily.,  "Disp: , Rfl:   •  phenazopyridine (PYRIDIUM) 200 MG tablet, Take 200 mg by mouth 2 (Two) Times a Day As Needed for bladder spasms., Disp: , Rfl:   •  vitamin D (ERGOCALCIFEROL) 97726 UNITS capsule capsule, Take 50,000 Units by mouth 1 (One) Time Per Week., Disp: , Rfl:     Past Medical History:   Diagnosis Date   • Asthma    • Diabetes mellitus (CMS/HCC)     Type 2   • GERD (gastroesophageal reflux disease)    • HH (hiatus hernia)    • Hx of colonic polyps    • Insomnia    • Vitamin D deficiency        Past Surgical History:   Procedure Laterality Date   • CATARACT EXTRACTION     • CHOLECYSTECTOMY     • COLONOSCOPY  05/26/2011    Isolated area of ischemic colitis, Diverticulosis repeat exam in 5 years Dr. Sommer   • ENDOSCOPY  09/18/2013    Benign hyperplastic polyp with moderate chronic active inflammation stomach, Mild gastritis repeat exam 3 years Dr. Christie   • HYSTERECTOMY     • KNEE SURGERY Left     baker's cyst    • TUMOR EXCISION      Throat benign   • UPPER GASTROINTESTINAL ENDOSCOPY  09/17/2013    small cardia polyp (benign hyperplastic),mild gastritis       Social History     Socioeconomic History   • Marital status:      Spouse name: Not on file   • Number of children: Not on file   • Years of education: Not on file   • Highest education level: Not on file   Tobacco Use   • Smoking status: Never Smoker   • Smokeless tobacco: Never Used   Substance and Sexual Activity   • Alcohol use: No   • Drug use: No   • Sexual activity: Defer       Family History   Problem Relation Age of Onset   • Colon cancer Neg Hx    • Colon polyps Neg Hx        Objective    Temp 97.8 °F (36.6 °C)   Ht 167.6 cm (66\")   Wt 68 kg (150 lb)   BMI 24.21 kg/m²     Physical Exam      Procedure visit on 05/09/2018   Component Date Value Ref Range Status   • Color 05/09/2018 Yellow  Yellow, Straw, Dark Yellow, Debra Final   • Clarity, UA 05/09/2018 Clear  Clear Final   • Glucose, UA 05/09/2018 250 mg/dL* Negative, 1000 " mg/dL (3+) mg/dL Final   • Bilirubin 05/09/2018 Negative  Negative Final   • Ketones, UA 05/09/2018 Trace* Negative Final   • Specific Gravity  05/09/2018 1.005  1.005 - 1.030 Final   • Blood, UA 05/09/2018 Moderate* Negative Final   • pH, Urine 05/09/2018 5.0  5.0 - 8.0 Final   • Protein, POC 05/09/2018 100 mg/dL* Negative mg/dL Final   • Urobilinogen, UA 05/09/2018 4 E.U./dL * Normal Final   • Leukocytes 05/09/2018 Large (3+)* Negative Final   • Nitrite, UA 05/09/2018 Positive* Negative Final   • Urine Culture 05/09/2018 >100,000 CFU/mL Enterobacter cloacae complex*  Final       Results for orders placed or performed in visit on 05/09/18   Urine Culture - Urine, Urine, Clean Catch   Result Value Ref Range    Urine Culture      Urine Culture >100,000 CFU/mL Enterobacter cloacae complex (A)        Susceptibility    Enterobacter cloacae complex - BROCK     Cefazolin >=64 Resistant ug/ml     Cefepime <=1 Susceptible ug/ml     Ceftriaxone <=1 Susceptible ug/ml     Gentamicin <=1 Susceptible ug/ml     Levofloxacin <=0.12 Susceptible ug/ml     Meropenem <=0.25 Susceptible ug/ml     Nitrofurantoin 128 Resistant ug/ml     Trimethoprim + Sulfamethoxazole >=320 Resistant ug/ml   POC Urinalysis Dipstick, Automated   Result Value Ref Range    Color Yellow Yellow, Straw, Dark Yellow, Debra    Clarity, UA Clear Clear    Glucose,  mg/dL (A) Negative, 1000 mg/dL (3+) mg/dL    Bilirubin Negative Negative    Ketones, UA Trace (A) Negative    Specific Gravity  1.005 1.005 - 1.030    Blood, UA Moderate (A) Negative    pH, Urine 5.0 5.0 - 8.0    Protein,  mg/dL (A) Negative mg/dL    Urobilinogen, UA 4 E.U./dL  (A) Normal    Leukocytes Large (3+) (A) Negative    Nitrite, UA Positive (A) Negative       Assessment and Plan    Diagnoses and all orders for this visit:    Interstitial cystitis  -     Cancel: POC Urinalysis Dipstick, Multipro    Recurrent UTI    Plan--the patient is doing well most Macrobid we discussed the right  or wrong when to stop I told him really no absolute.    However since she is doing well with this were going to go ahead and have her start weaning herself off may be taken every other day for a little bit and then once or twice a week and then stop if she should have recurrent infection she will start again.    Risk of the see her back on a when necessary basis unless she starts having recurrent problems

## 2020-01-02 ENCOUNTER — HOSPITAL ENCOUNTER (OUTPATIENT)
Dept: GENERAL RADIOLOGY | Facility: HOSPITAL | Age: 75
Discharge: HOME | End: 2020-01-02
Admitting: INTERNAL MEDICINE

## 2020-01-02 ENCOUNTER — TRANSCRIBE ORDERS (OUTPATIENT)
Dept: GENERAL RADIOLOGY | Facility: HOSPITAL | Age: 75
End: 2020-01-02

## 2020-01-02 DIAGNOSIS — J45.901 CHRONIC OBSTRUCTIVE ASTHMA WITH EXACERBATION (HCC): ICD-10-CM

## 2020-01-02 DIAGNOSIS — J45.901 CHRONIC OBSTRUCTIVE ASTHMA WITH EXACERBATION (HCC): Primary | ICD-10-CM

## 2020-01-02 DIAGNOSIS — J44.1 CHRONIC OBSTRUCTIVE ASTHMA WITH EXACERBATION (HCC): Primary | ICD-10-CM

## 2020-01-02 DIAGNOSIS — J44.1 CHRONIC OBSTRUCTIVE ASTHMA WITH EXACERBATION (HCC): ICD-10-CM

## 2020-01-02 PROCEDURE — 71046 X-RAY EXAM CHEST 2 VIEWS: CPT

## 2020-02-13 NOTE — TELEPHONE ENCOUNTER
Pt needs refill on Vitamin D 50,000iu q/week  Sent to [facility].  They said they faxed this yesterday and today.  I accidentally put under Edward Holm but she is going to be seeing Dr Kern

## 2020-02-14 RX ORDER — ERGOCALCIFEROL 1.25 MG/1
50000 CAPSULE ORAL WEEKLY
Qty: 12 CAPSULE | Refills: 3 | Status: SHIPPED | OUTPATIENT
Start: 2020-02-14 | End: 2021-01-18

## 2020-02-24 RX ORDER — PANTOPRAZOLE SODIUM 40 MG/1
40 TABLET, DELAYED RELEASE ORAL DAILY
Qty: 90 TABLET | Refills: 3 | Status: SHIPPED | OUTPATIENT
Start: 2020-02-24

## 2020-02-25 ENCOUNTER — TELEPHONE (OUTPATIENT)
Dept: INTERNAL MEDICINE | Facility: CLINIC | Age: 75
End: 2020-02-25

## 2020-02-25 RX ORDER — ATORVASTATIN CALCIUM 80 MG/1
80 TABLET, FILM COATED ORAL DAILY
Qty: 90 TABLET | Refills: 3 | Status: SHIPPED | OUTPATIENT
Start: 2020-02-25

## 2020-03-04 ENCOUNTER — LAB (OUTPATIENT)
Dept: INTERNAL MEDICINE | Facility: CLINIC | Age: 75
End: 2020-03-04

## 2020-03-04 DIAGNOSIS — E11.9 CONTROLLED TYPE 2 DIABETES MELLITUS WITHOUT COMPLICATION, WITHOUT LONG-TERM CURRENT USE OF INSULIN (HCC): Primary | ICD-10-CM

## 2020-03-04 DIAGNOSIS — I10 ESSENTIAL HYPERTENSION: ICD-10-CM

## 2020-03-04 DIAGNOSIS — Z79.899 DRUG THERAPY: ICD-10-CM

## 2020-03-04 DIAGNOSIS — E78.00 HYPERCHOLESTEREMIA: ICD-10-CM

## 2020-03-04 DIAGNOSIS — E55.9 VITAMIN D DEFICIENCY: ICD-10-CM

## 2020-03-05 LAB
25(OH)D3+25(OH)D2 SERPL-MCNC: 48.9 NG/ML (ref 30–100)
ALBUMIN SERPL-MCNC: 4.4 G/DL (ref 3.5–5.2)
ALBUMIN/GLOB SERPL: 2.4 G/DL
ALP SERPL-CCNC: 96 U/L (ref 39–117)
ALT SERPL-CCNC: 31 U/L (ref 1–33)
APPEARANCE UR: ABNORMAL
AST SERPL-CCNC: 21 U/L (ref 1–32)
BACTERIA #/AREA URNS HPF: ABNORMAL /HPF
BASOPHILS # BLD AUTO: 0.03 10*3/MM3 (ref 0–0.2)
BASOPHILS NFR BLD AUTO: 0.6 % (ref 0–1.5)
BILIRUB SERPL-MCNC: 0.6 MG/DL (ref 0.2–1.2)
BILIRUB UR QL STRIP: NEGATIVE
BUN SERPL-MCNC: 22 MG/DL (ref 8–23)
BUN/CREAT SERPL: 21 (ref 7–25)
CALCIUM SERPL-MCNC: 9.3 MG/DL (ref 8.6–10.5)
CASTS URNS MICRO: ABNORMAL
CHLORIDE SERPL-SCNC: 99 MMOL/L (ref 98–107)
CHOLEST SERPL-MCNC: 146 MG/DL (ref 0–200)
CO2 SERPL-SCNC: 24.5 MMOL/L (ref 22–29)
COLOR UR: YELLOW
CREAT SERPL-MCNC: 1.05 MG/DL (ref 0.57–1)
EOSINOPHIL # BLD AUTO: 0.13 10*3/MM3 (ref 0–0.4)
EOSINOPHIL NFR BLD AUTO: 2.7 % (ref 0.3–6.2)
EPI CELLS #/AREA URNS HPF: ABNORMAL /HPF
ERYTHROCYTE [DISTWIDTH] IN BLOOD BY AUTOMATED COUNT: 12.6 % (ref 12.3–15.4)
GLOBULIN SER CALC-MCNC: 1.8 GM/DL
GLUCOSE SERPL-MCNC: 201 MG/DL (ref 65–99)
GLUCOSE UR QL: ABNORMAL
HBA1C MFR BLD: 8.7 % (ref 4.8–5.6)
HCT VFR BLD AUTO: 40 % (ref 34–46.6)
HDLC SERPL-MCNC: 35 MG/DL (ref 40–60)
HGB BLD-MCNC: 13.7 G/DL (ref 12–15.9)
HGB UR QL STRIP: NEGATIVE
IMM GRANULOCYTES # BLD AUTO: 0.01 10*3/MM3 (ref 0–0.05)
IMM GRANULOCYTES NFR BLD AUTO: 0.2 % (ref 0–0.5)
KETONES UR QL STRIP: NEGATIVE
LDLC SERPL CALC-MCNC: 82 MG/DL (ref 0–100)
LEUKOCYTE ESTERASE UR QL STRIP: ABNORMAL
LYMPHOCYTES # BLD AUTO: 1.57 10*3/MM3 (ref 0.7–3.1)
LYMPHOCYTES NFR BLD AUTO: 33.1 % (ref 19.6–45.3)
MCH RBC QN AUTO: 29.5 PG (ref 26.6–33)
MCHC RBC AUTO-ENTMCNC: 34.3 G/DL (ref 31.5–35.7)
MCV RBC AUTO: 86 FL (ref 79–97)
MONOCYTES # BLD AUTO: 0.42 10*3/MM3 (ref 0.1–0.9)
MONOCYTES NFR BLD AUTO: 8.9 % (ref 5–12)
NEUTROPHILS # BLD AUTO: 2.58 10*3/MM3 (ref 1.7–7)
NEUTROPHILS NFR BLD AUTO: 54.5 % (ref 42.7–76)
NITRITE UR QL STRIP: NEGATIVE
NRBC BLD AUTO-RTO: 0 /100 WBC (ref 0–0.2)
PH UR STRIP: <=5 [PH] (ref 5–8)
PLATELET # BLD AUTO: 186 10*3/MM3 (ref 140–450)
POTASSIUM SERPL-SCNC: 4.7 MMOL/L (ref 3.5–5.2)
PROT SERPL-MCNC: 6.2 G/DL (ref 6–8.5)
PROT UR QL STRIP: NEGATIVE
RBC # BLD AUTO: 4.65 10*6/MM3 (ref 3.77–5.28)
RBC #/AREA URNS HPF: ABNORMAL /HPF
SODIUM SERPL-SCNC: 136 MMOL/L (ref 136–145)
SP GR UR: ABNORMAL (ref 1–1.03)
TRIGL SERPL-MCNC: 143 MG/DL (ref 0–150)
TSH SERPL DL<=0.005 MIU/L-ACNC: 0.88 UIU/ML (ref 0.27–4.2)
URATE SERPL-MCNC: 4.6 MG/DL (ref 2.4–5.7)
UROBILINOGEN UR STRIP-MCNC: ABNORMAL MG/DL
VLDLC SERPL CALC-MCNC: 28.6 MG/DL
WBC # BLD AUTO: 4.74 10*3/MM3 (ref 3.4–10.8)
WBC #/AREA URNS HPF: ABNORMAL /HPF

## 2020-03-09 ENCOUNTER — OFFICE VISIT (OUTPATIENT)
Dept: INTERNAL MEDICINE | Facility: CLINIC | Age: 75
End: 2020-03-09

## 2020-03-09 ENCOUNTER — TELEPHONE (OUTPATIENT)
Dept: INTERNAL MEDICINE | Facility: CLINIC | Age: 75
End: 2020-03-09

## 2020-03-09 VITALS
DIASTOLIC BLOOD PRESSURE: 74 MMHG | OXYGEN SATURATION: 96 % | WEIGHT: 149 LBS | HEIGHT: 65 IN | RESPIRATION RATE: 18 BRPM | SYSTOLIC BLOOD PRESSURE: 112 MMHG | BODY MASS INDEX: 24.83 KG/M2 | HEART RATE: 80 BPM

## 2020-03-09 DIAGNOSIS — B35.1 ONYCHOMYCOSIS: ICD-10-CM

## 2020-03-09 DIAGNOSIS — E11.65 TYPE 2 DIABETES MELLITUS WITH HYPERGLYCEMIA, WITHOUT LONG-TERM CURRENT USE OF INSULIN (HCC): Primary | ICD-10-CM

## 2020-03-09 PROCEDURE — 99214 OFFICE O/P EST MOD 30 MIN: CPT | Performed by: NURSE PRACTITIONER

## 2020-03-09 RX ORDER — ALBUTEROL SULFATE 1.25 MG/3ML
3 SOLUTION RESPIRATORY (INHALATION) AS NEEDED
COMMUNITY
Start: 2017-11-28

## 2020-03-09 RX ORDER — GRISEOFULVIN 500 MG/1
500 TABLET ORAL DAILY
Qty: 14 TABLET | Refills: 0 | Status: SHIPPED | OUTPATIENT
Start: 2020-03-09 | End: 2020-03-23

## 2020-03-09 NOTE — PROGRESS NOTES
"Subjective   Becky Smiley is a 74 y.o. female.   Chief Complaint   Patient presents with   • Annual Exam     Yearly exam and pt denies any problems.  Labs done.       Patient presents to the clinic today for yearly exam and review of lab results. We discussed in depth on lab results today. Patient's diabetes management is moderate in severity and requiring extra investigation of diet, exercise, and compliance with medical therapy. Today her HgbA1C is elevated from 7.2 last visit to 8.7 this visit. She reports that she had to take a large dose of steroid pack in January related to acute bronchitis with exacerbation of asthma. However, she also admits \" my diet could be better\",  \" I do love to eat mashed potatoes\", and \" when my family comes over, I cook food they like to eat and I eat it as well, but it is not everyday\". The patient states that per her glucometer at home, she is running between 140-190. However, her A1C reveals a different control. Patient denies symptoms of hyperglycemia and denies episodes of hypoglycemia. She is concerned about her blood sugar and is open to trying additional methods to reduce her blood sugar as appropriate. Patient denies skipping or missing glyburide and januvia dosing, indicating her compliance with medicine therapy is compliant, but non compliant with diet and exercise requirements.  In addition, patient complains of Onychomycosis in bilateral toes, was only able to observe fungus on left foot related to walking boot present on the right foot related to stress fracture. She states that the severity is moderate as of the last couple of months. Patient had tried multiple topical antifungals and Lamisil oral without successful resolution of symptoms. She is requesting to restart previous prescription by Dr. Carpio.       The following portions of the patient's history were reviewed and updated as appropriate: allergies, current medications, past family history, past medical " history, past social history, past surgical history and problem list.    Review of Systems   Constitutional: Negative for activity change, appetite change, fatigue, fever, unexpected weight gain and unexpected weight loss.   HENT: Negative for swollen glands, trouble swallowing and voice change.    Eyes: Negative for blurred vision and visual disturbance.   Respiratory: Negative for cough and shortness of breath.    Cardiovascular: Negative for chest pain, palpitations and leg swelling.   Gastrointestinal: Negative for abdominal pain, constipation, diarrhea, nausea, vomiting and indigestion.   Endocrine: Negative for cold intolerance, heat intolerance, polydipsia, polyphagia and polyuria.   Genitourinary: Negative for decreased urine volume, dysuria, frequency, hematuria, urgency and urinary incontinence.   Musculoskeletal: Negative for arthralgias, back pain, joint swelling and neck pain.   Skin: Negative for color change, rash and skin lesions.   Neurological: Negative for dizziness, weakness, headache, memory problem and confusion.   Hematological: Does not bruise/bleed easily.   Psychiatric/Behavioral: Positive for stress. Negative for agitation, hallucinations, sleep disturbance, suicidal ideas and depressed mood. The patient is not nervous/anxious.        Objective   Past Medical History:   Diagnosis Date   • Asthma    • Diabetes mellitus (CMS/HCC)     Type 2   • GERD (gastroesophageal reflux disease)    • HH (hiatus hernia)    • Hx of colonic polyps    • Insomnia    • Vitamin D deficiency       Past Surgical History:   Procedure Laterality Date   • CATARACT EXTRACTION     • CHOLECYSTECTOMY     • COLONOSCOPY  05/26/2011    Isolated area of ischemic colitis, Diverticulosis repeat exam in 5 years Dr. Sommer   • COLONOSCOPY N/A 6/16/2017    Procedure: COLONOSCOPY WITH ANESTHESIA;  Surgeon: Faraz Cardenas MD;  Location: [facility] ENDOSCOPY;  Service:    • ENDOSCOPY  09/18/2013    Benign hyperplastic polyp with  moderate chronic active inflammation stomach, Mild gastritis repeat exam 3 years Dr. Christie   • ENDOSCOPY N/A 6/1/2017    Procedure: ESOPHAGOGASTRODUODENOSCOPY WITH ANESTHESIA;  Surgeon: Faraz Cardenas MD;  Location: [facility] ENDOSCOPY;  Service:    • HYSTERECTOMY     • KNEE SURGERY Left     baker's cyst    • TUMOR EXCISION      Throat benign   • UPPER GASTROINTESTINAL ENDOSCOPY  09/17/2013    small cardia polyp (benign hyperplastic),mild gastritis        Current Outpatient Medications:   •  albuterol (ACCUNEB) 1.25 MG/3ML nebulizer solution, Inhale 3 mL As Needed., Disp: , Rfl:   •  aspirin 81 MG EC tablet, Take 81 mg by mouth Daily., Disp: , Rfl:   •  atorvastatin (LIPITOR) 80 MG tablet, Take 1 tablet by mouth Daily., Disp: 90 tablet, Rfl: 3  •  cetirizine (zyrTEC) 10 MG tablet, Take 10 mg by mouth Daily., Disp: , Rfl:   •  ezetimibe (ZETIA) 10 MG tablet, Take 10 mg by mouth Daily., Disp: , Rfl:   •  glyBURIDE (DIAbeta) 5 MG tablet, Take 10 mg by mouth 2 (Two) Times a Day With Meals. Takes two tablets 2 times daily., Disp: , Rfl:   •  JANUVIA 25 MG tablet, 25 mg 2 (Two) Times a Day., Disp: , Rfl:   •  nitrofurantoin, macrocrystal-monohydrate, (MACROBID) 100 MG capsule, Take 1 capsule by mouth Every Night. (Patient taking differently: Take 100 mg by mouth 2 (Two) Times a Week.), Disp: 90 capsule, Rfl: 5  •  pantoprazole (PROTONIX) 40 MG EC tablet, Take 1 tablet by mouth Daily., Disp: 90 tablet, Rfl: 3  •  phenazopyridine (PYRIDIUM) 200 MG tablet, Take 200 mg by mouth 2 (Two) Times a Day As Needed for bladder spasms., Disp: , Rfl:   •  vitamin D (ERGOCALCIFEROL) 1.25 MG (23759 UT) capsule capsule, Take 1 capsule by mouth 1 (One) Time Per Week., Disp: 12 capsule, Rfl: 3  •  Dulaglutide (TRULICITY) 0.75 MG/0.5ML solution pen-injector, Inject 0.75 mg under the skin into the appropriate area as directed 1 (One) Time Per Week., Disp: 4 pen, Rfl: 5  •  griseofulvin (GRIFULVIN V) 500 MG tablet, Take 1 tablet by mouth  Daily for 14 days., Disp: 14 tablet, Rfl: 0     Vitals:    03/09/20 0841   BP: 112/74   Pulse: 80   Resp: 18   SpO2: 96%         03/09/20  0841   Weight: 67.6 kg (149 lb)     Patient's Body mass index is 24.79 kg/m². BMI is within normal parameters. No follow-up required..      Physical Exam   Constitutional: She is oriented to person, place, and time. She appears well-developed and well-nourished.   HENT:   Head: Normocephalic and atraumatic.   Right Ear: Hearing, tympanic membrane, external ear and ear canal normal.   Left Ear: Hearing, tympanic membrane, external ear and ear canal normal.   Nose: No mucosal edema, rhinorrhea, sinus tenderness or congestion. Right sinus exhibits no maxillary sinus tenderness and no frontal sinus tenderness. Left sinus exhibits no maxillary sinus tenderness and no frontal sinus tenderness.   Mouth/Throat: Uvula is midline, oropharynx is clear and moist and mucous membranes are normal. Tonsils are 0 on the right. Tonsils are 0 on the left.   Eyes: Pupils are equal, round, and reactive to light. Conjunctivae, EOM and lids are normal. Lids are everted and swept, no foreign bodies found.   Neck: Normal range of motion. Neck supple. Carotid bruit is not present. No thyroid mass and no thyromegaly present.   Cardiovascular: Normal rate, regular rhythm, normal heart sounds and intact distal pulses.   Pulses:       Carotid pulses are 2+ on the right side, and 2+ on the left side.       Radial pulses are 2+ on the right side, and 2+ on the left side.        Popliteal pulses are 2+ on the right side, and 2+ on the left side.        Dorsalis pedis pulses are 2+ on the left side.        Posterior tibial pulses are 2+ on the left side.   Patient had a walking boot on right foot and did not want to take it off to assess pulses (DP and PT)   Pulmonary/Chest: Effort normal and breath sounds normal. No accessory muscle usage. No tachypnea. No respiratory distress. She has no decreased breath  sounds. She exhibits no mass and no tenderness. Right breast exhibits no inverted nipple, no mass, no nipple discharge, no skin change and no tenderness. Left breast exhibits no inverted nipple, no mass, no nipple discharge, no skin change and no tenderness.   Abdominal: Soft. Bowel sounds are normal. There is no tenderness.   Genitourinary:   Genitourinary Comments: Deferred exam   Musculoskeletal:        Cervical back: She exhibits decreased range of motion. She exhibits no tenderness.        Thoracic back: She exhibits normal range of motion and no tenderness.        Lumbar back: She exhibits normal range of motion and no tenderness.        Lymphadenopathy:        Head (right side): No submental, no submandibular, no tonsillar, no preauricular, no posterior auricular and no occipital adenopathy present.        Head (left side): No submental, no submandibular, no tonsillar, no preauricular, no posterior auricular and no occipital adenopathy present.     She has no cervical adenopathy.     She has no axillary adenopathy.   Neurological: She is alert and oriented to person, place, and time. She has normal strength. No cranial nerve deficit or sensory deficit. She displays a negative Romberg sign.   Reflex Scores:       Tricep reflexes are 2+ on the right side and 2+ on the left side.       Bicep reflexes are 2+ on the right side and 2+ on the left side.       Patellar reflexes are 2+ on the right side and 2+ on the left side.  Skin: Skin is warm and dry. No lesion and no rash noted.   Psychiatric: She has a normal mood and affect. Her speech is normal and behavior is normal. Judgment and thought content normal. Cognition and memory are normal.             Assessment/Plan   Diagnoses and all orders for this visit:    1. Type 2 diabetes mellitus with hyperglycemia, without long-term current use of insulin (CMS/[facility]) (Primary)  -     Dulaglutide (TRULICITY) 0.75 MG/0.5ML solution pen-injector; Inject 0.75 mg under the  skin into the appropriate area as directed 1 (One) Time Per Week.  Dispense: 4 pen; Refill: 5    2. Onychomycosis  -     griseofulvin (GRIFULVIN V) 500 MG tablet; Take 1 tablet by mouth Daily for 14 days.  Dispense: 14 tablet; Refill: 0        Patient HgbA1C was elevated from 7.2 to 8.7. Patient advised to increase frequency of blood sugar monitoring to before breakfast and before supper. Patient advised to bring in readings to next visit in 1 month. Patient advised on the diet and exercise recommendations from the ADA guidelines and recommendation to see a nutritionist at [facility] to help create meal plan for her that would work well with cooking for her family as well. Patient refused referral at this time. Will add Trulicity once weekly to regimen. We will see the patient in 1 month and complete a POCT glucose and review over her results. If glucose is elevated with increase trulicity dosing and continue to encourage weight loss and diet management.    Patient requesting treatment of onychomycosis of bilateral toes. Patient was previously on griseofulvin for 1 month, will trial 14 day, 500 mg and see if nails improve. Educated the patient on the side effects of continued use of this medicine,she states that she understands the risks and would like to proceed with treatment.

## 2020-03-09 NOTE — TELEPHONE ENCOUNTER
Pt requested a rx for Acu-check glucose check monitor sent to:    [facility] - [place], [place] - [address] - [phone]  - [phone]   [phone]    Becky stated that she currently uses and would like a script after her establish care appt with Zoila Kern today but forgot to mention.

## 2020-03-09 NOTE — TELEPHONE ENCOUNTER
Patient was informed to continue current medication regimen per provider. Due to Trulicity being high will work on getting PA.

## 2020-03-09 NOTE — TELEPHONE ENCOUNTER
PT CALLED IN REGARDS TO Dulaglutide (TRULICITY) 0.75 MG/0.5ML solution pen-injector    PT STATED THAT AFTER INSURANCE, THIS MEDICATION WOULD COST $300 AND SHE CANNOT AFFORD THAT.    PT WOULD LIKE TO KNOW IF THERE IS A CHEAPER ALTERNATIVE.    PT CAN BE REACHED [phone]

## 2020-03-09 NOTE — PATIENT INSTRUCTIONS
Diabetes Mellitus and Nutrition, Adult  When you have diabetes (diabetes mellitus), it is very important to have healthy eating habits because your blood sugar (glucose) levels are greatly affected by what you eat and drink. Eating healthy foods in the appropriate amounts, at about the same times every day, can help you:  · Control your blood glucose.  · Lower your risk of heart disease.  · Improve your blood pressure.  · Reach or maintain a healthy weight.  Every person with diabetes is different, and each person has different needs for a meal plan. Your health care provider may recommend that you work with a diet and nutrition specialist (dietitian) to make a meal plan that is best for you. Your meal plan may vary depending on factors such as:  · The calories you need.  · The medicines you take.  · Your weight.  · Your blood glucose, blood pressure, and cholesterol levels.  · Your activity level.  · Other health conditions you have, such as heart or kidney disease.  How do carbohydrates affect me?  Carbohydrates, also called carbs, affect your blood glucose level more than any other type of food. Eating carbs naturally raises the amount of glucose in your blood. Carb counting is a method for keeping track of how many carbs you eat. Counting carbs is important to keep your blood glucose at a healthy level, especially if you use insulin or take certain oral diabetes medicines.  It is important to know how many carbs you can safely have in each meal. This is different for every person. Your dietitian can help you calculate how many carbs you should have at each meal and for each snack.  Foods that contain carbs include:  · Bread, cereal, rice, pasta, and crackers.  · Potatoes and corn.  · Peas, beans, and lentils.  · Milk and yogurt.  · Fruit and juice.  · Desserts, such as cakes, cookies, ice cream, and candy.  How does alcohol affect me?  Alcohol can cause a sudden decrease in blood glucose (hypoglycemia),  "especially if you use insulin or take certain oral diabetes medicines. Hypoglycemia can be a life-threatening condition. Symptoms of hypoglycemia (sleepiness, dizziness, and confusion) are similar to symptoms of having too much alcohol.  If your health care provider says that alcohol is safe for you, follow these guidelines:  · Limit alcohol intake to no more than 1 drink per day for nonpregnant women and 2 drinks per day for men. One drink equals 12 oz of beer, 5 oz of wine, or 1½ oz of hard liquor.  · Do not drink on an empty stomach.  · Keep yourself hydrated with water, diet soda, or unsweetened iced tea.  · Keep in mind that regular soda, juice, and other mixers may contain a lot of sugar and must be counted as carbs.  What are tips for following this plan?    Reading food labels  · Start by checking the serving size on the \"Nutrition Facts\" label of packaged foods and drinks. The amount of calories, carbs, fats, and other nutrients listed on the label is based on one serving of the item. Many items contain more than one serving per package.  · Check the total grams (g) of carbs in one serving. You can calculate the number of servings of carbs in one serving by dividing the total carbs by 15. For example, if a food has 30 g of total carbs, it would be equal to 2 servings of carbs.  · Check the number of grams (g) of saturated and trans fats in one serving. Choose foods that have low or no amount of these fats.  · Check the number of milligrams (mg) of salt (sodium) in one serving. Most people should limit total sodium intake to less than 2,300 mg per day.  · Always check the nutrition information of foods labeled as \"low-fat\" or \"nonfat\". These foods may be higher in added sugar or refined carbs and should be avoided.  · Talk to your dietitian to identify your daily goals for nutrients listed on the label.  Shopping  · Avoid buying canned, premade, or processed foods. These foods tend to be high in fat, sodium, " and added sugar.  · Shop around the outside edge of the grocery store. This includes fresh fruits and vegetables, bulk grains, fresh meats, and fresh dairy.  Cooking  · Use low-heat cooking methods, such as baking, instead of high-heat cooking methods like deep frying.  · Cook using healthy oils, such as olive, canola, or sunflower oil.  · Avoid cooking with butter, cream, or high-fat meats.  Meal planning  · Eat meals and snacks regularly, preferably at the same times every day. Avoid going long periods of time without eating.  · Eat foods high in fiber, such as fresh fruits, vegetables, beans, and whole grains. Talk to your dietitian about how many servings of carbs you can eat at each meal.  · Eat 4-6 ounces (oz) of lean protein each day, such as lean meat, chicken, fish, eggs, or tofu. One oz of lean protein is equal to:  ? 1 oz of meat, chicken, or fish.  ? 1 egg.  ? ¼ cup of tofu.  · Eat some foods each day that contain healthy fats, such as avocado, nuts, seeds, and fish.  Lifestyle  · Check your blood glucose regularly.  · Exercise regularly as told by your health care provider. This may include:  ? 150 minutes of moderate-intensity or vigorous-intensity exercise each week. This could be brisk walking, biking, or water aerobics.  ? Stretching and doing strength exercises, such as yoga or weightlifting, at least 2 times a week.  · Take medicines as told by your health care provider.  · Do not use any products that contain nicotine or tobacco, such as cigarettes and e-cigarettes. If you need help quitting, ask your health care provider.  · Work with a counselor or diabetes educator to identify strategies to manage stress and any emotional and social challenges.  Questions to ask a health care provider  · Do I need to meet with a diabetes educator?  · Do I need to meet with a dietitian?  · What number can I call if I have questions?  · When are the best times to check my blood glucose?  Where to find more  information:  · American Diabetes Association: diabetes.org  · Academy of Nutrition and Dietetics: www.eatright.org  · National Pierce of Diabetes and Digestive and Kidney Diseases (NIH): www.niddk.nih.gov  Summary  · A healthy meal plan will help you control your blood glucose and maintain a healthy lifestyle.  · Working with a diet and nutrition specialist (dietitian) can help you make a meal plan that is best for you.  · Keep in mind that carbohydrates (carbs) and alcohol have immediate effects on your blood glucose levels. It is important to count carbs and to use alcohol carefully.  This information is not intended to replace advice given to you by your health care provider. Make sure you discuss any questions you have with your health care provider.  Document Released: 09/14/2006 Document Revised: 01/14/2020 Document Reviewed: 01/22/2018  [employer] Interactive Patient Education © 2020 [employer]      Carbohydrate Counting for Diabetes Mellitus, Adult    Carbohydrate counting is a method of keeping track of how many carbohydrates you eat. Eating carbohydrates naturally increases the amount of sugar (glucose) in the blood. Counting how many carbohydrates you eat helps keep your blood glucose within normal limits, which helps you manage your diabetes (diabetes mellitus).  It is important to know how many carbohydrates you can safely have in each meal. This is different for every person. A diet and nutrition specialist (registered dietitian) can help you make a meal plan and calculate how many carbohydrates you should have at each meal and snack.  Carbohydrates are found in the following foods:  · Grains, such as breads and cereals.  · Dried beans and soy products.  · Starchy vegetables, such as potatoes, peas, and corn.  · Fruit and fruit juices.  · Milk and yogurt.  · Sweets and snack foods, such as cake, cookies, candy, chips, and soft drinks.  How do I count carbohydrates?  There are two ways to count  "carbohydrates in food. You can use either of the methods or a combination of both.  Reading \"Nutrition Facts\" on packaged food  The \"Nutrition Facts\" list is included on the labels of almost all packaged foods and beverages in the [place] It includes:  · The serving size.  · Information about nutrients in each serving, including the grams (g) of carbohydrate per serving.  To use the “Nutrition Facts\":  · Decide how many servings you will have.  · Multiply the number of servings by the number of carbohydrates per serving.  · The resulting number is the total amount of carbohydrates that you will be having.  Learning standard serving sizes of other foods  When you eat carbohydrate foods that are not packaged or do not include \"Nutrition Facts\" on the label, you need to measure the servings in order to count the amount of carbohydrates:  · Measure the foods that you will eat with a food scale or measuring cup, if needed.  · Decide how many standard-size servings you will eat.  · Multiply the number of servings by 15. Most carbohydrate-rich foods have about 15 g of carbohydrates per serving.  ? For example, if you eat 8 oz (170 g) of strawberries, you will have eaten 2 servings and 30 g of carbohydrates (2 servings x 15 g = 30 g).  · For foods that have more than one food mixed, such as soups and casseroles, you must count the carbohydrates in each food that is included.  The following list contains standard serving sizes of common carbohydrate-rich foods. Each of these servings has about 15 g of carbohydrates:  · ½ hamburger bun or ½ English muffin.  · ½ oz (15 mL) syrup.  · ½ oz (14 g) jelly.  · 1 slice of bread.  · 1 six-inch tortilla.  · 3 oz (85 g) cooked rice or pasta.  · 4 oz (113 g) cooked dried beans.  · 4 oz (113 g) starchy vegetable, such as peas, corn, or potatoes.  · 4 oz (113 g) hot cereal.  · 4 oz (113 g) mashed potatoes or ¼ of a large baked potato.  · 4 oz (113 g) canned or frozen fruit.  · 4 oz (120 mL) " fruit juice.  · 4-6 crackers.  · 6 chicken nuggets.  · 6 oz (170 g) unsweetened dry cereal.  · 6 oz (170 g) plain fat-free yogurt or yogurt sweetened with artificial sweeteners.  · 8 oz (240 mL) milk.  · 8 oz (170 g) fresh fruit or one small piece of fruit.  · 24 oz (680 g) popped popcorn.  Example of carbohydrate counting  Sample meal  · 3 oz (85 g) chicken breast.  · 6 oz (170 g) brown rice.  · 4 oz (113 g) corn.  · 8 oz (240 mL) milk.  · 8 oz (170 g) strawberries with sugar-free whipped topping.  Carbohydrate calculation  1. Identify the foods that contain carbohydrates:  ? Rice.  ? Corn.  ? Milk.  ? Strawberries.  2. Calculate how many servings you have of each food:  ? 2 servings rice.  ? 1 serving corn.  ? 1 serving milk.  ? 1 serving strawberries.  3. Multiply each number of servings by 15 g:  ? 2 servings rice x 15 g = 30 g.  ? 1 serving corn x 15 g = 15 g.  ? 1 serving milk x 15 g = 15 g.  ? 1 serving strawberries x 15 g = 15 g.  4. Add together all of the amounts to find the total grams of carbohydrates eaten:  ? 30 g + 15 g + 15 g + 15 g = 75 g of carbohydrates total.  Summary  · Carbohydrate counting is a method of keeping track of how many carbohydrates you eat.  · Eating carbohydrates naturally increases the amount of sugar (glucose) in the blood.  · Counting how many carbohydrates you eat helps keep your blood glucose within normal limits, which helps you manage your diabetes.  · A diet and nutrition specialist (registered dietitian) can help you make a meal plan and calculate how many carbohydrates you should have at each meal and snack.  This information is not intended to replace advice given to you by your health care provider. Make sure you discuss any questions you have with your health care provider.  Document Released: 12/18/2006 Document Revised: 01/14/2020 Document Reviewed: 05/31/2017  [employer] Interactive Patient Education © 2020 [employer]

## 2020-03-10 ENCOUNTER — TELEPHONE (OUTPATIENT)
Dept: INTERNAL MEDICINE | Facility: CLINIC | Age: 75
End: 2020-03-10

## 2020-03-10 DIAGNOSIS — E11.65 TYPE 2 DIABETES MELLITUS WITH HYPERGLYCEMIA, WITHOUT LONG-TERM CURRENT USE OF INSULIN (HCC): ICD-10-CM

## 2020-03-10 NOTE — TELEPHONE ENCOUNTER
Patient called requesting medication be sent to [facility] to see if its cheaper. Jyoti sent this medication in.

## 2020-03-10 NOTE — TELEPHONE ENCOUNTER
Pt stopped by ofc requesting RX for Trulicity 0.75MG/0.5ML  to be sent to [facility].       Pre pt, the medicine at [facility] was over $300.

## 2020-04-02 ENCOUNTER — TELEPHONE (OUTPATIENT)
Dept: UROLOGY | Facility: CLINIC | Age: 75
End: 2020-04-02

## 2020-04-02 DIAGNOSIS — N39.0 RECURRENT UTI: ICD-10-CM

## 2020-04-02 RX ORDER — NITROFURANTOIN 25; 75 MG/1; MG/1
100 CAPSULE ORAL NIGHTLY
Qty: 90 CAPSULE | Refills: 0 | Status: SHIPPED | OUTPATIENT
Start: 2020-04-02 | End: 2021-02-08 | Stop reason: SDUPTHER

## 2020-04-03 ENCOUNTER — TELEPHONE (OUTPATIENT)
Dept: INTERNAL MEDICINE | Facility: CLINIC | Age: 75
End: 2020-04-03

## 2020-04-03 DIAGNOSIS — E11.65 TYPE 2 DIABETES MELLITUS WITH HYPERGLYCEMIA, WITHOUT LONG-TERM CURRENT USE OF INSULIN (HCC): ICD-10-CM

## 2020-04-03 NOTE — TELEPHONE ENCOUNTER
PT CALLED TO REQUEST A REFILL FOR Dulaglutide (TRULICITY) 0.75 MG/0.5ML solution pen-injector. PLEASE SEND TO [facility]. PLEASE ADVISE.

## 2020-05-10 NOTE — H&P (VIEW-ONLY)
40yr old male arrived to ED with c/o of dizziness, headache, vomiting, diarrhea and lower left back pain. Patient states that they have thrown up 7 times today and unable to keep medication down. Patient states that they have been having symptoms for approximately 3 days with increased worsening symptoms. Patient does not work and has no known interaction with Covid positive patients. No acute distress at this time.   [facility]  Pre Procedure History & Physical    Chief Complaint:   Gastric polyps    Subjective     HPI:   Here for upper endoscopy.  Apparently had hyperplastic gastric polyp on her last exam was suggested 3 year follow-up.  Patient takes Protonix daily    Past Medical History:   Past Medical History:   Diagnosis Date   • Asthma    • Diabetes mellitus     Type 2   • GERD (gastroesophageal reflux disease)    • HH (hiatus hernia)    • Hx of colonic polyps    • Insomnia    • Vitamin D deficiency        Past Surgical History:  [unfilled]    Family History:  Family History   Problem Relation Age of Onset   • Colon cancer Neg Hx    • Colon polyps Neg Hx        Social History:   reports that she has never smoked. She does not have any smokeless tobacco history on file. She reports that she does not drink alcohol.    Medications:   Prior to Admission medications    Medication Sig Start Date End Date Taking? Authorizing Provider   aspirin 81 MG EC tablet Take 81 mg by mouth Daily.   Yes Historical Provider, MD   atorvastatin (LIPITOR) 80 MG tablet Take 80 mg by mouth Daily.   Yes Historical Provider, MD   cetirizine (zyrTEC) 10 MG tablet Take 10 mg by mouth Daily.   Yes Historical Provider, MD   ezetimibe (ZETIA) 10 MG tablet Take 10 mg by mouth Daily.   Yes Historical Provider, MD   glyBURIDE (DIAbeta) 5 MG tablet Take 5 mg by mouth 2 (Two) Times a Day With Meals.   Yes Historical Provider, MD   Metaxalone (SKELAXIN PO) Take  by mouth.   Yes Historical Provider, MD   pantoprazole (PROTONIX) 40 MG EC tablet Take 40 mg by mouth Daily.   Yes Historical Provider, MD   vitamin D (ERGOCALCIFEROL) 83124 UNITS capsule capsule Take 50,000 Units by mouth 1 (One) Time Per Week.    Historical Provider, MD   albuterol (PROVENTIL HFA;VENTOLIN HFA) 108 (90 BASE) MCG/ACT inhaler Inhale 2 puffs Every 4 (Four) Hours As Needed for Wheezing.  6/1/17  Historical Provider, MD   Fluticasone Propionate, Inhal, (FLOVENT DISKUS) 100  "MCG/BLIST aerosol powder  Inhale.  6/1/17  Historical Provider, MD   griseofulvin (GRIFULVIN V) 500 MG tablet Take 500 mg by mouth Daily.  6/1/17  Historical Provider, MD   mometasone-formoterol (DULERA 100) 100-5 MCG/ACT inhaler Inhale 2 puffs 2 (Two) Times a Day.  6/1/17  Historical Provider, MD   tiotropium (SPIRIVA) 18 MCG per inhalation capsule Place 1 capsule into inhaler and inhale Daily.  6/1/17  Historical Provider, MD       Allergies:  Ciprofloxacin hcl; Codeine; Metformin and related; Augmentin [amoxicillin-pot clavulanate]; and Sulfa antibiotics    Objective     Blood pressure 156/83, pulse 65, temperature 97.9 °F (36.6 °C), temperature source Temporal Artery , resp. rate 18, height 64\" (162.6 cm), weight 156 lb (70.8 kg), SpO2 98 %.    Physical Exam   Constitutional: Pt is oriented to person, place, and in no distress.   HENT: Mouth/Throat: Oropharynx is clear.   Cardiovascular: Normal rate, regular rhythm.    Pulmonary/Chest: Effort normal. No respiratory distress. No  wheezes.   Abdominal: Soft. Non-distended.  Skin: Skin is warm and dry.   Psychiatric: Mood, memory, affect and judgment appear normal.     Assessment/Plan     Diagnosis:  Gastric polyp    Anticipated Surgical Procedure:    Proceed with endoscopy as scheduled    The risks, benefits, and alternatives of this procedure have been discussed with the patient or the responsible party- the patient understands and agrees to proceed.    EMR Dragon/transcription disclaimer: Much of this encounter note is an electronic transcription/translation of spoken language to printed text.  The electronic translation of spoken language may permit erroneous, or at times, nonsensical words or phrases to be inadvertently transcribed.  Although I have reviewed the note for such errors, some may still exist.    Faraz Cardenas MD  9:22 AM  6/1/2017    "

## 2020-05-13 RX ORDER — EZETIMIBE 10 MG/1
10 TABLET ORAL DAILY
Qty: 90 TABLET | Refills: 3 | Status: SHIPPED | OUTPATIENT
Start: 2020-05-13 | End: 2020-05-15 | Stop reason: SDUPTHER

## 2020-05-15 ENCOUNTER — OFFICE VISIT (OUTPATIENT)
Dept: INTERNAL MEDICINE | Facility: CLINIC | Age: 75
End: 2020-05-15

## 2020-05-15 VITALS
SYSTOLIC BLOOD PRESSURE: 140 MMHG | TEMPERATURE: 98.5 F | OXYGEN SATURATION: 96 % | WEIGHT: 148 LBS | BODY MASS INDEX: 24.66 KG/M2 | HEIGHT: 65 IN | DIASTOLIC BLOOD PRESSURE: 72 MMHG | HEART RATE: 76 BPM

## 2020-05-15 DIAGNOSIS — I10 HYPERTENSION, BENIGN: ICD-10-CM

## 2020-05-15 DIAGNOSIS — E78.2 MIXED HYPERLIPIDEMIA: ICD-10-CM

## 2020-05-15 DIAGNOSIS — F51.04 PSYCHOPHYSIOLOGICAL INSOMNIA: ICD-10-CM

## 2020-05-15 DIAGNOSIS — E11.9 CONTROLLED TYPE 2 DIABETES MELLITUS WITHOUT COMPLICATION, WITHOUT LONG-TERM CURRENT USE OF INSULIN (HCC): Primary | ICD-10-CM

## 2020-05-15 PROBLEM — Z91.81 AT LOW RISK FOR FALL: Status: ACTIVE | Noted: 2020-05-15

## 2020-05-15 PROBLEM — J45.901 INTRINSIC ASTHMA WITH ACUTE EXACERBATION: Status: ACTIVE | Noted: 2020-05-15

## 2020-05-15 PROBLEM — R61 NIGHT SWEAT: Status: ACTIVE | Noted: 2020-05-15

## 2020-05-15 PROBLEM — S29.019A STRAIN OF THORACIC REGION: Status: ACTIVE | Noted: 2020-05-15

## 2020-05-15 PROBLEM — M54.50 ACUTE BILATERAL LOW BACK PAIN WITHOUT SCIATICA: Status: ACTIVE | Noted: 2020-05-15

## 2020-05-15 PROBLEM — B96.89 BACTERIAL SINUSITIS: Status: ACTIVE | Noted: 2020-05-15

## 2020-05-15 PROBLEM — L65.9 HAIR LOSS: Status: ACTIVE | Noted: 2020-05-15

## 2020-05-15 PROBLEM — G47.9 SLEEP DISTURBANCE: Status: ACTIVE | Noted: 2020-05-15

## 2020-05-15 PROBLEM — E55.9 VITAMIN D DEFICIENCY: Status: ACTIVE | Noted: 2020-05-15

## 2020-05-15 PROBLEM — G47.00 INSOMNIA: Status: ACTIVE | Noted: 2020-05-15

## 2020-05-15 PROBLEM — R50.9 FEVER AND CHILLS: Status: ACTIVE | Noted: 2020-05-15

## 2020-05-15 PROBLEM — E04.2 GOITER, NONTOXIC, MULTINODULAR: Status: ACTIVE | Noted: 2020-05-15

## 2020-05-15 PROBLEM — G25.81 RESTLESS LEG: Status: ACTIVE | Noted: 2020-05-15

## 2020-05-15 PROBLEM — J32.9 BACTERIAL SINUSITIS: Status: ACTIVE | Noted: 2020-05-15

## 2020-05-15 PROBLEM — L72.0 EPIDERMOID CYST OF SKIN: Status: ACTIVE | Noted: 2020-05-15

## 2020-05-15 PROBLEM — J30.9 ALLERGIC RHINITIS, MILD: Status: ACTIVE | Noted: 2020-05-15

## 2020-05-15 PROBLEM — B35.1 ONYCHOMYCOSIS: Status: ACTIVE | Noted: 2020-05-15

## 2020-05-15 PROBLEM — Z79.84 DIABETES MELLITUS TREATED WITH ORAL MEDICATION (HCC): Status: ACTIVE | Noted: 2020-05-15

## 2020-05-15 PROBLEM — R05.9 COUGH: Status: ACTIVE | Noted: 2020-05-15

## 2020-05-15 PROBLEM — E78.00 ELEVATED CHOLESTEROL: Status: ACTIVE | Noted: 2020-05-15

## 2020-05-15 PROBLEM — M19.049 HAND ARTHRITIS: Status: ACTIVE | Noted: 2020-05-15

## 2020-05-15 PROBLEM — R31.9 HEMATURIA: Status: ACTIVE | Noted: 2020-05-15

## 2020-05-15 PROBLEM — Z13.31 NEGATIVE DEPRESSION SCREENING: Status: ACTIVE | Noted: 2020-05-15

## 2020-05-15 PROBLEM — H61.23 BILATERAL IMPACTED CERUMEN: Status: ACTIVE | Noted: 2020-05-15

## 2020-05-15 LAB — HBA1C MFR BLD: 6.9 %

## 2020-05-15 PROCEDURE — 99214 OFFICE O/P EST MOD 30 MIN: CPT | Performed by: NURSE PRACTITIONER

## 2020-05-15 PROCEDURE — 83036 HEMOGLOBIN GLYCOSYLATED A1C: CPT | Performed by: NURSE PRACTITIONER

## 2020-05-15 RX ORDER — HYDROXYZINE HYDROCHLORIDE 25 MG/1
25 TABLET, FILM COATED ORAL
Qty: 45 TABLET | Refills: 3 | Status: SHIPPED | OUTPATIENT
Start: 2020-05-15 | End: 2020-05-19

## 2020-05-15 RX ORDER — EZETIMIBE 10 MG/1
10 TABLET ORAL DAILY
Qty: 90 TABLET | Refills: 3 | Status: SHIPPED | OUTPATIENT
Start: 2020-05-15

## 2020-05-15 NOTE — PATIENT INSTRUCTIONS

## 2020-05-15 NOTE — PROGRESS NOTES
Subjective   Becky Smiley is a 74 y.o. female.   Chief Complaint   Patient presents with   • Diabetes     discuss trulicity, brought in blood sugars    • Insomnia     2-3 times weekly, has tried melatonin and tylenol pm       Mrs. Smiley presented to the clinic today for 1 month follow up for diabetes management since starting Trulicity. Patient brought in blood sugar logs and are dramatically different to her readings prior to Trulicity use. Patient blood sugar logs prior to ranged from 170's-230's and after 90's-139's and she has a lot more energy now.       Diabetes   She presents for her follow-up diabetic visit. She has type 2 diabetes mellitus. No MedicAlert identification noted. Her disease course has been improving. There are no hypoglycemic associated symptoms. Pertinent negatives for hypoglycemia include no confusion, dizziness or nervousness/anxiousness. Associated symptoms include fatigue. Pertinent negatives for diabetes include no blurred vision, no chest pain, no polydipsia, no polyphagia, no weakness and no weight loss. There are no hypoglycemic complications. There are no diabetic complications. Risk factors for coronary artery disease include diabetes mellitus, dyslipidemia and post-menopausal. Current diabetic treatment includes diet and oral agent (dual therapy) (trulicity). She is compliant with treatment all of the time. Her weight is decreasing steadily. She is following a diabetic, generally healthy and high fiber diet. Meal planning includes ADA exchanges and avoidance of concentrated sweets. She has not had a previous visit with a dietitian. She participates in exercise intermittently. Her home blood glucose trend is decreasing rapidly. Her breakfast blood glucose is taken between 7-8 am. Her breakfast blood glucose range is generally 130-140 mg/dl. Her dinner blood glucose is taken between 7-8 pm. Her dinner blood glucose range is generally 110-130 mg/dl. An ACE  inhibitor/angiotensin II receptor blocker is not being taken. She does not see a podiatrist.Eye exam is not current.   Insomnia   This is a recurrent problem. The current episode started 1 to 4 weeks ago. The problem occurs every several days. The problem has been gradually worsening. Associated symptoms include fatigue. Pertinent negatives include no abdominal pain, arthralgias, chest pain, coughing, fever, joint swelling, nausea, neck pain, rash, swollen glands, vomiting or weakness. The symptoms are aggravated by stress. She has tried rest, sleep, position changes, relaxation, NSAIDs and heat (melatonin, reviewed sleep book) for the symptoms. The treatment provided mild relief.        The following portions of the patient's history were reviewed and updated as appropriate: allergies, current medications, past family history, past medical history, past social history, past surgical history and problem list.    Review of Systems   Constitutional: Positive for fatigue. Negative for activity change, appetite change, fever, unexpected weight gain and unexpected weight loss.   HENT: Negative for swollen glands, trouble swallowing and voice change.    Eyes: Negative for blurred vision and visual disturbance.   Respiratory: Negative for cough and shortness of breath.    Cardiovascular: Negative for chest pain, palpitations and leg swelling.   Gastrointestinal: Negative for abdominal pain, constipation, diarrhea, nausea, vomiting and indigestion.   Endocrine: Negative for cold intolerance, heat intolerance, polydipsia and polyphagia.   Genitourinary: Negative for dysuria and frequency.   Musculoskeletal: Negative for arthralgias, back pain, joint swelling and neck pain.   Skin: Negative for color change, rash and skin lesions.   Neurological: Negative for dizziness, weakness, headache, memory problem and confusion.   Hematological: Does not bruise/bleed easily.   Psychiatric/Behavioral: Negative for agitation,  hallucinations and suicidal ideas. The patient has insomnia. The patient is not nervous/anxious.        Objective   Past Medical History:   Diagnosis Date   • Asthma    • Diabetes mellitus (CMS/HCC)     Type 2   • GERD (gastroesophageal reflux disease)    • HH (hiatus hernia)    • Hx of colonic polyps    • Insomnia    • Vitamin D deficiency       Past Surgical History:   Procedure Laterality Date   • CATARACT EXTRACTION     • CHOLECYSTECTOMY     • COLONOSCOPY  05/26/2011    Isolated area of ischemic colitis, Diverticulosis repeat exam in 5 years Dr. Sommer   • COLONOSCOPY N/A 6/16/2017    Procedure: COLONOSCOPY WITH ANESTHESIA;  Surgeon: Faraz Cardenas MD;  Location: [facility] ENDOSCOPY;  Service:    • ENDOSCOPY  09/18/2013    Benign hyperplastic polyp with moderate chronic active inflammation stomach, Mild gastritis repeat exam 3 years Dr. Christie   • ENDOSCOPY N/A 6/1/2017    Procedure: ESOPHAGOGASTRODUODENOSCOPY WITH ANESTHESIA;  Surgeon: Faraz Cardenas MD;  Location: [facility] ENDOSCOPY;  Service:    • HYSTERECTOMY     • KNEE SURGERY Left     baker's cyst    • TUMOR EXCISION      Throat benign   • UPPER GASTROINTESTINAL ENDOSCOPY  09/17/2013    small cardia polyp (benign hyperplastic),mild gastritis        Current Outpatient Medications:   •  albuterol (ACCUNEB) 1.25 MG/3ML nebulizer solution, Inhale 3 mL As Needed., Disp: , Rfl:   •  aspirin 81 MG EC tablet, Take 81 mg by mouth Daily., Disp: , Rfl:   •  atorvastatin (LIPITOR) 80 MG tablet, Take 1 tablet by mouth Daily., Disp: 90 tablet, Rfl: 3  •  cetirizine (zyrTEC) 10 MG tablet, Take 10 mg by mouth Daily., Disp: , Rfl:   •  Dulaglutide (Trulicity) 0.75 MG/0.5ML solution pen-injector, Inject 0.75 mg under the skin into the appropriate area as directed 1 (One) Time Per Week., Disp: 13 pen, Rfl: 3  •  ezetimibe (Zetia) 10 MG tablet, Take 1 tablet by mouth Daily., Disp: 90 tablet, Rfl: 3  •  glyBURIDE (DIAbeta) 5 MG tablet, Take 10 mg by mouth 2 (Two) Times a  Day With Meals. Takes two tablets 2 times daily., Disp: , Rfl:   •  JANUVIA 25 MG tablet, 25 mg 2 (Two) Times a Day., Disp: , Rfl:   •  nitrofurantoin, macrocrystal-monohydrate, (MACROBID) 100 MG capsule, Take 1 capsule by mouth Every Night. (Patient taking differently: Take 100 mg by mouth 2 (Two) Times a Week.), Disp: 90 capsule, Rfl: 0  •  pantoprazole (PROTONIX) 40 MG EC tablet, Take 1 tablet by mouth Daily., Disp: 90 tablet, Rfl: 3  •  phenazopyridine (PYRIDIUM) 200 MG tablet, Take 200 mg by mouth 2 (Two) Times a Day As Needed for bladder spasms., Disp: , Rfl:   •  vitamin D (ERGOCALCIFEROL) 1.25 MG (06045 UT) capsule capsule, Take 1 capsule by mouth 1 (One) Time Per Week., Disp: 12 capsule, Rfl: 3     Vitals:    05/15/20 1004   BP: 140/72   Pulse: 76   Temp: 98.5 °F (36.9 °C)   SpO2: 96%         05/15/20  1004   Weight: 67.1 kg (148 lb)     Patient's Body mass index is 24.63 kg/m². BMI is within normal parameters. No follow-up required..      Physical Exam   Constitutional: She is oriented to person, place, and time. She appears well-developed and well-nourished.   HENT:   Head: Normocephalic and atraumatic.   Eyes: Pupils are equal, round, and reactive to light. Conjunctivae and EOM are normal.   Neck: Normal range of motion. Neck supple.   Cardiovascular: Normal rate, regular rhythm, normal heart sounds and intact distal pulses.   Pulmonary/Chest: Effort normal and breath sounds normal.   Abdominal: Soft. Bowel sounds are normal.   Musculoskeletal: Normal range of motion.   Neurological: She is alert and oriented to person, place, and time.   Skin: Skin is warm and dry.   Psychiatric: She has a normal mood and affect. Her behavior is normal.             Assessment/Plan   Diagnoses and all orders for this visit:    1. Controlled type 2 diabetes mellitus without complication, without long-term current use of insulin (CMS/[facility]) (Primary)  -     POC Glycosylated Hemoglobin (Hb A1C)    2. Mixed hyperlipidemia  -      ezetimibe (Zetia) 10 MG tablet; Take 1 tablet by mouth Daily.  Dispense: 90 tablet; Refill: 3    3. Hypertension, benign    4. Psychophysiological insomnia      Patient will continue on trulicity, diabeta, and januiva. Will consider reducing diabeta as long as blood sugars continue to trend down. Last hgb A1c was 8.7 and 2 months later 6.9. She reports that she is checking her blood sugar daily and has been cutting out mash potatoes and other sugary treats. Will try hydroxyzine for anxiety/insomnia at night. Can increase to 1 1/2 tablets at night as needed

## 2020-05-18 ENCOUNTER — TELEPHONE (OUTPATIENT)
Dept: INTERNAL MEDICINE | Facility: CLINIC | Age: 75
End: 2020-05-18

## 2020-05-18 NOTE — TELEPHONE ENCOUNTER
FYI: patient insurance denied [place] for hydroxyzine 25 mg tablets. I will look to see what meds insurance will cover.

## 2020-05-19 RX ORDER — HYDROXYZINE HYDROCHLORIDE 10 MG/1
TABLET, FILM COATED ORAL
Qty: 90 TABLET | Refills: 1 | Status: SHIPPED | OUTPATIENT
Start: 2020-05-19 | End: 2020-05-22 | Stop reason: SDUPTHER

## 2020-05-19 NOTE — TELEPHONE ENCOUNTER
Macie see if the 10 mg atarax will work. Give her 90 with 1 refill. She can take 2-3 tablets at night as needed for anxiety/sleep.

## 2020-05-21 ENCOUNTER — TELEPHONE (OUTPATIENT)
Dept: INTERNAL MEDICINE | Facility: CLINIC | Age: 75
End: 2020-05-21

## 2020-05-21 DIAGNOSIS — F41.9 ANXIETY: Primary | ICD-10-CM

## 2020-05-22 RX ORDER — HYDROXYZINE HYDROCHLORIDE 25 MG/1
25 TABLET, FILM COATED ORAL
Qty: 30 TABLET | Refills: 1 | Status: SHIPPED | OUTPATIENT
Start: 2020-05-22 | End: 2020-08-17

## 2020-07-01 DIAGNOSIS — E11.9 DIABETES MELLITUS TREATED WITH ORAL MEDICATION (HCC): Primary | ICD-10-CM

## 2020-07-01 DIAGNOSIS — Z79.84 DIABETES MELLITUS TREATED WITH ORAL MEDICATION (HCC): Primary | ICD-10-CM

## 2020-07-01 RX ORDER — GLYBURIDE 5 MG/1
5 TABLET ORAL 2 TIMES DAILY WITH MEALS
Qty: 360 TABLET | Refills: 3 | Status: SHIPPED | OUTPATIENT
Start: 2020-07-01 | End: 2022-11-28 | Stop reason: ALTCHOICE

## 2020-08-17 ENCOUNTER — OFFICE VISIT (OUTPATIENT)
Dept: INTERNAL MEDICINE | Facility: CLINIC | Age: 75
End: 2020-08-17

## 2020-08-17 VITALS
DIASTOLIC BLOOD PRESSURE: 74 MMHG | SYSTOLIC BLOOD PRESSURE: 144 MMHG | HEART RATE: 93 BPM | OXYGEN SATURATION: 95 % | TEMPERATURE: 98.2 F | WEIGHT: 148 LBS | BODY MASS INDEX: 24.66 KG/M2 | HEIGHT: 65 IN

## 2020-08-17 DIAGNOSIS — E78.00 ELEVATED CHOLESTEROL: ICD-10-CM

## 2020-08-17 DIAGNOSIS — R30.0 DYSURIA: ICD-10-CM

## 2020-08-17 DIAGNOSIS — Z11.59 ENCOUNTER FOR HEPATITIS C SCREENING TEST FOR LOW RISK PATIENT: ICD-10-CM

## 2020-08-17 DIAGNOSIS — I10 HYPERTENSION, BENIGN: ICD-10-CM

## 2020-08-17 DIAGNOSIS — Z79.899 ENCOUNTER FOR LONG-TERM (CURRENT) DRUG USE: ICD-10-CM

## 2020-08-17 DIAGNOSIS — F51.04 PSYCHOPHYSIOLOGICAL INSOMNIA: ICD-10-CM

## 2020-08-17 DIAGNOSIS — E11.9 CONTROLLED TYPE 2 DIABETES MELLITUS WITHOUT COMPLICATION, WITHOUT LONG-TERM CURRENT USE OF INSULIN (HCC): Primary | ICD-10-CM

## 2020-08-17 LAB
BILIRUB BLD-MCNC: NEGATIVE MG/DL
CLARITY, POC: CLEAR
COLOR UR: YELLOW
GLUCOSE UR STRIP-MCNC: NEGATIVE MG/DL
HBA1C MFR BLD: 6.8 %
KETONES UR QL: NEGATIVE
LEUKOCYTE EST, POC: NEGATIVE
NITRITE UR-MCNC: NEGATIVE MG/ML
PH UR: 5.5 [PH] (ref 5–8)
PROT UR STRIP-MCNC: NEGATIVE MG/DL
RBC # UR STRIP: NEGATIVE /UL
SP GR UR: 1.03 (ref 1–1.03)
UROBILINOGEN UR QL: NORMAL

## 2020-08-17 PROCEDURE — 99214 OFFICE O/P EST MOD 30 MIN: CPT | Performed by: NURSE PRACTITIONER

## 2020-08-17 PROCEDURE — 83036 HEMOGLOBIN GLYCOSYLATED A1C: CPT | Performed by: NURSE PRACTITIONER

## 2020-08-17 PROCEDURE — 81003 URINALYSIS AUTO W/O SCOPE: CPT | Performed by: NURSE PRACTITIONER

## 2020-08-17 RX ORDER — BUSPIRONE HYDROCHLORIDE 5 MG/1
5 TABLET ORAL NIGHTLY
Qty: 14 TABLET | Refills: 0 | Status: SHIPPED | OUTPATIENT
Start: 2020-08-17 | End: 2022-11-28

## 2020-08-17 NOTE — PROGRESS NOTES
"Subjective   Becky Smiley is a 74 y.o. female.   Chief Complaint   Patient presents with   • Diabetes     A1C-6.8. Patient forgot blood sugar sheets at home.    • Urinary Tract Infection     some burning and itching.       Mrs. Smiley is a 75 yo female who present to the office related to diabetes follow up and possible UTI. Patient had U/A completed and was negative for abnormalities. Patient reports mild, intermittent burning with urination, but also admits that she has not been drinking much water in the last couple of days. Patient reports that sleep pattern has not improved with atarax and stopped taking them 3 days after starting prescription. Patient reports mild relief with benadryl, but it does not \" keep me asleep\". Patient reporting frequently waking in the night and getting 4 hours of restful sleep total. Patient reports some anxiety before bed and excessive worry due to COVID pandemic that is keeping her up. She reports worrying about her family and her health. Patient has not tried Buspar for anxiety.     Diabetes   She presents for her follow-up diabetic visit. She has type 2 diabetes mellitus. No MedicAlert identification noted. Her disease course has been improving. Hypoglycemia symptoms include nervousness/anxiousness. Pertinent negatives for hypoglycemia include no confusion or dizziness. Associated symptoms include fatigue. Pertinent negatives for diabetes include no blurred vision, no chest pain, no polydipsia, no polyphagia, no weakness and no weight loss. There are no hypoglycemic complications. There are no diabetic complications. Risk factors for coronary artery disease include hypertension, stress, dyslipidemia, diabetes mellitus, post-menopausal and sedentary lifestyle. Current diabetic treatment includes diet and oral agent (dual therapy). She is compliant with treatment all of the time. Her weight is stable. She is following a generally healthy diet. Meal planning includes " avoidance of concentrated sweets. She participates in exercise intermittently. Her home blood glucose trend is decreasing steadily. Her overall blood glucose range is 130-140 mg/dl. An ACE inhibitor/angiotensin II receptor blocker is not being taken. She does not see a podiatrist.Eye exam is not current.   Urinary Tract Infection    This is a new problem. The current episode started yesterday. The problem occurs intermittently. The problem has been gradually improving. The quality of the pain is described as burning. The pain is at a severity of 2/10. The pain is mild. There has been no fever. She is not sexually active. There is no history of pyelonephritis. Associated symptoms include frequency. Pertinent negatives include no chills, discharge, flank pain, hematuria, hesitancy, nausea, urgency or vomiting. She has tried increased fluids for the symptoms. The treatment provided moderate relief. There is no history of recurrent UTIs.   Insomnia   This is a recurrent problem. The current episode started more than 1 month ago. Episode frequency: nightly. The problem has been waxing and waning. Associated symptoms include fatigue. Pertinent negatives include no abdominal pain, arthralgias, chest pain, chills, coughing, fever, joint swelling, myalgias, nausea, neck pain, rash, swollen glands, vomiting or weakness. The symptoms are aggravated by stress. She has tried sleep, position changes and relaxation (sleep hyigene, melatonin, atarax, OTC sleep aide) for the symptoms. The treatment provided no relief.        The following portions of the patient's history were reviewed and updated as appropriate: allergies, current medications, past family history, past medical history, past social history, past surgical history and problem list.    Review of Systems   Constitutional: Positive for fatigue. Negative for activity change, appetite change, chills, fever, unexpected weight gain and unexpected weight loss.   HENT:  Negative for swollen glands, trouble swallowing and voice change.    Eyes: Negative for blurred vision and visual disturbance.   Respiratory: Negative for cough and shortness of breath.    Cardiovascular: Negative for chest pain, palpitations and leg swelling.   Gastrointestinal: Negative for abdominal pain, constipation, diarrhea, nausea, vomiting and indigestion.   Endocrine: Negative for cold intolerance, heat intolerance, polydipsia and polyphagia.   Genitourinary: Positive for frequency. Negative for dysuria, flank pain, hematuria, hesitancy and urgency.   Musculoskeletal: Negative for arthralgias, back pain, joint swelling, myalgias and neck pain.   Skin: Negative for color change, rash and skin lesions.   Neurological: Negative for dizziness, weakness, headache, memory problem and confusion.   Hematological: Does not bruise/bleed easily.   Psychiatric/Behavioral: Positive for sleep disturbance and stress. Negative for agitation, hallucinations and suicidal ideas. The patient is nervous/anxious and has insomnia.        Objective   Past Medical History:   Diagnosis Date   • Asthma    • Diabetes mellitus (CMS/HCC)     Type 2   • GERD (gastroesophageal reflux disease)    • HH (hiatus hernia)    • Hx of colonic polyps    • Insomnia    • Vitamin D deficiency       Past Surgical History:   Procedure Laterality Date   • CATARACT EXTRACTION     • CHOLECYSTECTOMY     • COLONOSCOPY  05/26/2011    Isolated area of ischemic colitis, Diverticulosis repeat exam in 5 years Dr. Sommer   • COLONOSCOPY N/A 6/16/2017    Procedure: COLONOSCOPY WITH ANESTHESIA;  Surgeon: Faraz Cardenas MD;  Location: [facility] ENDOSCOPY;  Service:    • ENDOSCOPY  09/18/2013    Benign hyperplastic polyp with moderate chronic active inflammation stomach, Mild gastritis repeat exam 3 years Dr. Christie   • ENDOSCOPY N/A 6/1/2017    Procedure: ESOPHAGOGASTRODUODENOSCOPY WITH ANESTHESIA;  Surgeon: Faraz Cardenas MD;  Location: [facility] ENDOSCOPY;   Service:    • HYSTERECTOMY     • KNEE SURGERY Left     baker's cyst    • TUMOR EXCISION      Throat benign   • UPPER GASTROINTESTINAL ENDOSCOPY  09/17/2013    small cardia polyp (benign hyperplastic),mild gastritis        Current Outpatient Medications:   •  aspirin 81 MG EC tablet, Take 81 mg by mouth Daily., Disp: , Rfl:   •  atorvastatin (LIPITOR) 80 MG tablet, Take 1 tablet by mouth Daily., Disp: 90 tablet, Rfl: 3  •  cetirizine (zyrTEC) 10 MG tablet, Take 10 mg by mouth Daily., Disp: , Rfl:   •  Dulaglutide (Trulicity) 0.75 MG/0.5ML solution pen-injector, Inject 0.75 mg under the skin into the appropriate area as directed 1 (One) Time Per Week., Disp: 13 pen, Rfl: 3  •  ezetimibe (Zetia) 10 MG tablet, Take 1 tablet by mouth Daily., Disp: 90 tablet, Rfl: 3  •  glyburide (DIAbeta) 5 MG tablet, Take 1 tablet by mouth 2 (Two) Times a Day With Meals. Takes two tablets 2 times daily., Disp: 360 tablet, Rfl: 3  •  nitrofurantoin, macrocrystal-monohydrate, (MACROBID) 100 MG capsule, Take 1 capsule by mouth Every Night. (Patient taking differently: Take 100 mg by mouth 2 (Two) Times a Week.), Disp: 90 capsule, Rfl: 0  •  pantoprazole (PROTONIX) 40 MG EC tablet, Take 1 tablet by mouth Daily., Disp: 90 tablet, Rfl: 3  •  phenazopyridine (PYRIDIUM) 200 MG tablet, Take 200 mg by mouth 2 (Two) Times a Day As Needed for bladder spasms., Disp: , Rfl:   •  SITagliptin (Januvia) 50 MG tablet, Take 1 tablet by mouth Daily., Disp: 180 tablet, Rfl: 3  •  vitamin D (ERGOCALCIFEROL) 1.25 MG (21866 UT) capsule capsule, Take 1 capsule by mouth 1 (One) Time Per Week., Disp: 12 capsule, Rfl: 3  •  albuterol (ACCUNEB) 1.25 MG/3ML nebulizer solution, Inhale 3 mL As Needed., Disp: , Rfl:   •  busPIRone (BUSPAR) 5 MG tablet, Take 1 tablet by mouth Every Night., Disp: 14 tablet, Rfl: 0     Vitals:    08/17/20 0854   BP: 144/74   Pulse: 93   Temp: 98.2 °F (36.8 °C)   SpO2: 95%         08/17/20 0854   Weight: 67.1 kg (148 lb)     Patient's  Body mass index is 24.63 kg/m². BMI is within normal parameters. No follow-up required..      Physical Exam   Constitutional: She is oriented to person, place, and time. She appears well-developed and well-nourished.   HENT:   Head: Normocephalic and atraumatic.   Right Ear: Hearing and external ear normal.   Left Ear: Hearing and external ear normal.   Nose: Nose normal.   Mouth/Throat: Uvula is midline and oropharynx is clear and moist.   Eyes: Pupils are equal, round, and reactive to light. Conjunctivae and EOM are normal.   Neck: Normal range of motion. Neck supple.   Cardiovascular: Normal rate, regular rhythm, normal heart sounds and intact distal pulses.   Pulmonary/Chest: Effort normal and breath sounds normal.   Abdominal: Soft. Bowel sounds are normal.   Musculoskeletal: Normal range of motion.   Neurological: She is alert and oriented to person, place, and time. She has normal strength.   Skin: Skin is warm and dry.   Psychiatric: Her speech is normal and behavior is normal. Thought content normal. Her mood appears anxious. Cognition and memory are normal.             Assessment/Plan   Diagnoses and all orders for this visit:    1. Controlled type 2 diabetes mellitus without complication, without long-term current use of insulin (CMS/[facility]) (Primary)  -     POC Glycosylated Hemoglobin (Hb A1C)  -     Hemoglobin A1c; Future  -     MicroAlbumin, Urine, Random - Urine, Clean Catch; Future    2. Dysuria  -     POC Urinalysis Dipstick, Multipro    3. Psychophysiological insomnia  -     busPIRone (BUSPAR) 5 MG tablet; Take 1 tablet by mouth Every Night.  Dispense: 14 tablet; Refill: 0    4. Hypertension, benign    5. Encounter for hepatitis C screening test for low risk patient  -     Hepatitis C Antibody; Future    6. Encounter for long-term (current) drug use  -     AST; Future  -     ALT; Future    7. Elevated cholesterol  -     Lipid Panel; Future        Will trial Buspar for anxious thoughts increased during  times of pandemic. Will have her take every evening for 2 weeks to determine if this will help with sleep. Will consider low number of ambien if needed. Patient's blood pressure remains elevated. She reports lower blood pressure readings at home. Advised her to log blood pressures and bring to next follow up. Will have her return in 3 months for A1C and HTN follow along with Medicare wellness. Will have labs completed prior to follow up. Will discuss eye/foot exam next visit. Will discuss immunizations at next visit.

## 2020-08-17 NOTE — TELEPHONE ENCOUNTER
COVID Pre-Visit Screening     1  Is this a family member screening? No  2  Have you traveled outside of your state in the past 2 weeks? No  3  Do you presently have a fever or flu-like symptoms? No  4  Do you have symptoms of an upper respiratory infection like runny nose, sore throat, or cough? No  5  Are you suffering from new headache that you have not had in the past?  No  6  Do you have/have you experienced any new shortness of breath recently? No  7  Do you have any new diarrhea, nausea or vomiting? No  8  Have you been in contact with anyone who has been sick or diagnosed with COVID-19? No  9  Do you have any new loss of taste or smell? No  10  Are you able to wear a mask without a valve for the entire visit?  Yes How often do you want her checking her sugar?  With Medicare, I don't think they will cover for more than 1 time per day, but since adding Trulicity, even though not insulin, they might cover for bid testing.

## 2020-08-19 ENCOUNTER — TELEPHONE (OUTPATIENT)
Dept: INTERNAL MEDICINE | Facility: CLINIC | Age: 75
End: 2020-08-19

## 2020-08-19 DIAGNOSIS — E11.9 CONTROLLED TYPE 2 DIABETES MELLITUS WITHOUT COMPLICATION, WITHOUT LONG-TERM CURRENT USE OF INSULIN (HCC): Primary | ICD-10-CM

## 2020-08-19 RX ORDER — BLOOD-GLUCOSE METER
1 KIT MISCELLANEOUS TAKE AS DIRECTED
Qty: 1 EACH | Refills: 0 | Status: SHIPPED | OUTPATIENT
Start: 2020-08-19

## 2020-08-19 NOTE — TELEPHONE ENCOUNTER
Caller: Becky Smiley    Relationship: Self    Best call back number: [phone]    What medication are you requesting:  NEW ACCUCHECK MONITOR    What are your current symptoms: N/A    How long have you been experiencing symptoms: SEVERAL DAYS    Have you had these symptoms before:    [] Yes  [] No    Have you been treated for these symptoms before:   [] Yes  [] No    If a prescription is needed, what is your preferred pharmacy and phone number: [facility]  [place], [place] - [address] [facility] [phone] [facility] [phone]      Additional notes: PATIENTS MONITOR IS NOT WORKING CORRECTLY EVEN WITH TRYING NEW BATTERIES.

## 2020-10-12 ENCOUNTER — TELEPHONE (OUTPATIENT)
Dept: INTERNAL MEDICINE | Facility: CLINIC | Age: 75
End: 2020-10-12

## 2020-10-12 ENCOUNTER — OFFICE VISIT (OUTPATIENT)
Dept: INTERNAL MEDICINE | Facility: CLINIC | Age: 75
End: 2020-10-12

## 2020-10-12 DIAGNOSIS — J32.9 BACTERIAL SINUSITIS: ICD-10-CM

## 2020-10-12 DIAGNOSIS — B96.89 BACTERIAL SINUSITIS: ICD-10-CM

## 2020-10-12 DIAGNOSIS — J30.9 ALLERGIC RHINITIS, MILD: Primary | ICD-10-CM

## 2020-10-12 PROCEDURE — 99442 PR PHYS/QHP TELEPHONE EVALUATION 11-20 MIN: CPT | Performed by: NURSE PRACTITIONER

## 2020-10-12 RX ORDER — AZITHROMYCIN 250 MG/1
TABLET, FILM COATED ORAL
Qty: 6 TABLET | Refills: 0 | Status: SHIPPED | OUTPATIENT
Start: 2020-10-12 | End: 2020-11-09

## 2020-10-12 NOTE — TELEPHONE ENCOUNTER
PATIENT IS REQUESTING SOMETHING FOR HER SINUSES SHE STATES SHE HAS A RUNNY NOSE, EARS POPPING, NO FEVER, AND FEELS BAD     GOOD CONTACT NUMBER   [phone] (M)        SHE ALSO WANTED TO LET THE OFFICE KNOW SHE CALLED 15 MIN TILL 9 THIS MORNING AND HAS NOT RECEIVED A CALL BACK AND HERE  IT IS 11:14 AND SHE HAD TO CALL BACK       VERIFIED   [facility] - [place], [place] - [address] - [phone] [facility] [phone]   [phone]

## 2020-10-13 NOTE — PROGRESS NOTES
"Subjective   Becky Smiley is a 74 y.o. female.   Chief Complaint   Patient presents with   • Sinusitis     Hurts above eyes and around eyes. Can feel ears popping and snapping. No fever, no sore throat. Started Friday morning. She has been using nasal spray.       You have chosen to receive care through a telephone visit. Do you consent to use a telephone visit for your medical care today? Yes    Mrs. Smiley is a 73 yo female who present to the office for telephone visit to discuss sinus pressure, headache, and ear congestion. Patient reports no fever, chills, sore throat, or change in taste or smell. She reports that she has not been exposed to positive COVID persons. Patient reports that she had chronic issues with allergies and noticed increased episodes after recent camping trip with immediate family. Patient states Sunday her symptoms were mild and controlled with wearing a mask while outside and taking her allergy medicines daily. Patient states she did notice her symptoms worsening until last Friday. She states that she had continued to use allergy medicine without relief and now has started having pressure behind her eyes resulting in headaches. Patient denies productive cough or chest discomfort. She reports that her symptoms are moderate and in the past her sinus allergies at this point had \" turned into infection and they traveled to my lungs and my asthma exacerbation started\". Patient is requesting antibiotic today.        The following portions of the patient's history were reviewed and updated as appropriate: allergies, current medications, past family history, past medical history, past social history, past surgical history and problem list.    Review of Systems   Constitutional: Negative for activity change, appetite change, fatigue, fever, unexpected weight gain and unexpected weight loss.   HENT: Positive for congestion, postnasal drip, rhinorrhea, sinus pressure and sneezing. Negative for " ear discharge, ear pain, sore throat, swollen glands, trouble swallowing and voice change.    Eyes: Positive for redness. Negative for blurred vision, photophobia and visual disturbance.   Respiratory: Negative for cough, chest tightness, shortness of breath and wheezing.    Cardiovascular: Negative for chest pain, palpitations and leg swelling.   Gastrointestinal: Negative for abdominal pain, constipation, diarrhea, nausea, vomiting and indigestion.   Endocrine: Negative for cold intolerance, heat intolerance, polydipsia and polyphagia.   Genitourinary: Negative for dysuria and frequency.   Musculoskeletal: Negative for arthralgias, back pain, joint swelling and neck pain.   Skin: Negative for color change, rash and skin lesions.   Allergic/Immunologic: Positive for environmental allergies.   Neurological: Negative for dizziness, weakness, headache, memory problem and confusion.   Hematological: Does not bruise/bleed easily.   Psychiatric/Behavioral: Negative for agitation, hallucinations and suicidal ideas. The patient is not nervous/anxious.        Objective   Past Medical History:   Diagnosis Date   • Asthma    • Diabetes mellitus (CMS/HCC)     Type 2   • GERD (gastroesophageal reflux disease)    • HH (hiatus hernia)    • Hx of colonic polyps    • Insomnia    • Vitamin D deficiency       Past Surgical History:   Procedure Laterality Date   • CATARACT EXTRACTION     • CHOLECYSTECTOMY     • COLONOSCOPY  05/26/2011    Isolated area of ischemic colitis, Diverticulosis repeat exam in 5 years Dr. Sommer   • COLONOSCOPY N/A 6/16/2017    Procedure: COLONOSCOPY WITH ANESTHESIA;  Surgeon: Faraz Cardenas MD;  Location: [facility] ENDOSCOPY;  Service:    • ENDOSCOPY  09/18/2013    Benign hyperplastic polyp with moderate chronic active inflammation stomach, Mild gastritis repeat exam 3 years Dr. Christie   • ENDOSCOPY N/A 6/1/2017    Procedure: ESOPHAGOGASTRODUODENOSCOPY WITH ANESTHESIA;  Surgeon: Faraz Cardenas MD;   Location: [facility] ENDOSCOPY;  Service:    • HYSTERECTOMY     • KNEE SURGERY Left     baker's cyst    • TUMOR EXCISION      Throat benign   • UPPER GASTROINTESTINAL ENDOSCOPY  09/17/2013    small cardia polyp (benign hyperplastic),mild gastritis        Current Outpatient Medications:   •  albuterol (ACCUNEB) 1.25 MG/3ML nebulizer solution, Inhale 3 mL As Needed., Disp: , Rfl:   •  aspirin 81 MG EC tablet, Take 81 mg by mouth Daily., Disp: , Rfl:   •  atorvastatin (LIPITOR) 80 MG tablet, Take 1 tablet by mouth Daily., Disp: 90 tablet, Rfl: 3  •  cetirizine (zyrTEC) 10 MG tablet, Take 10 mg by mouth Daily., Disp: , Rfl:   •  Dulaglutide (Trulicity) 0.75 MG/0.5ML solution pen-injector, Inject 0.75 mg under the skin into the appropriate area as directed 1 (One) Time Per Week., Disp: 13 pen, Rfl: 3  •  ezetimibe (Zetia) 10 MG tablet, Take 1 tablet by mouth Daily., Disp: 90 tablet, Rfl: 3  •  glucose monitor monitoring kit, 1 each Take As Directed. Accu-chek monitor, Disp: 1 each, Rfl: 0  •  glyburide (DIAbeta) 5 MG tablet, Take 1 tablet by mouth 2 (Two) Times a Day With Meals. Takes two tablets 2 times daily., Disp: 360 tablet, Rfl: 3  •  nitrofurantoin, macrocrystal-monohydrate, (MACROBID) 100 MG capsule, Take 1 capsule by mouth Every Night. (Patient taking differently: Take 100 mg by mouth 2 (Two) Times a Week.), Disp: 90 capsule, Rfl: 0  •  pantoprazole (PROTONIX) 40 MG EC tablet, Take 1 tablet by mouth Daily., Disp: 90 tablet, Rfl: 3  •  SITagliptin (Januvia) 50 MG tablet, Take 1 tablet by mouth Daily., Disp: 180 tablet, Rfl: 3  •  vitamin D (ERGOCALCIFEROL) 1.25 MG (89221 UT) capsule capsule, Take 1 capsule by mouth 1 (One) Time Per Week., Disp: 12 capsule, Rfl: 3  •  azithromycin (Zithromax Z-N[place]) 250 MG tablet, Take 2 tablets by mouth the first day, then 1 tablet daily for 4 days., Disp: 6 tablet, Rfl: 0  •  busPIRone (BUSPAR) 5 MG tablet, Take 1 tablet by mouth Every Night., Disp: 14 tablet, Rfl: 0  •   phenazopyridine (PYRIDIUM) 200 MG tablet, Take 200 mg by mouth 2 (Two) Times a Day As Needed for bladder spasms., Disp: , Rfl:            Physical Exam (n/a, telephone visit)          Assessment/Plan   Becky was seen today for sinusitis.    Diagnoses and all orders for this visit:    Allergic rhinitis, mild    Bacterial sinusitis  -     azithromycin (Zithromax Z-Neville) 250 MG tablet; Take 2 tablets by mouth the first day, then 1 tablet daily for 4 days.      This visit has been rescheduled as a phone visit to comply with patient safety concerns in accordance with CDC recommendations. Total time of discussion was 11 minutes.    Patient will start a z-pack and return for a telephone visit to check on symptoms in 1 week. Patient advised to call the office if her symptoms progress for further instructions. Patient had recently been tested for COVID and was negative, no need to test at this time. Patient advised if shortness of breath or asthma exacerbation occurs without relief with nebulizer, she will need to go to the ED for further evaluation. Patient reports understanding, will follow up in 1 week.

## 2020-10-19 ENCOUNTER — OFFICE VISIT (OUTPATIENT)
Dept: INTERNAL MEDICINE | Facility: CLINIC | Age: 75
End: 2020-10-19

## 2020-10-19 VITALS
DIASTOLIC BLOOD PRESSURE: 70 MMHG | HEART RATE: 78 BPM | HEIGHT: 65 IN | BODY MASS INDEX: 24.83 KG/M2 | WEIGHT: 149 LBS | SYSTOLIC BLOOD PRESSURE: 128 MMHG | TEMPERATURE: 97.7 F | OXYGEN SATURATION: 95 %

## 2020-10-19 DIAGNOSIS — J32.9 SINUSITIS, UNSPECIFIED CHRONICITY, UNSPECIFIED LOCATION: Primary | ICD-10-CM

## 2020-10-19 PROCEDURE — 96372 THER/PROPH/DIAG INJ SC/IM: CPT | Performed by: NURSE PRACTITIONER

## 2020-10-19 PROCEDURE — 99213 OFFICE O/P EST LOW 20 MIN: CPT | Performed by: NURSE PRACTITIONER

## 2020-10-19 RX ORDER — METHYLPREDNISOLONE ACETATE 80 MG/ML
40 INJECTION, SUSPENSION INTRA-ARTICULAR; INTRALESIONAL; INTRAMUSCULAR; SOFT TISSUE ONCE
Status: COMPLETED | OUTPATIENT
Start: 2020-10-19 | End: 2020-10-19

## 2020-10-19 RX ADMIN — METHYLPREDNISOLONE ACETATE 40 MG: 80 INJECTION, SUSPENSION INTRA-ARTICULAR; INTRALESIONAL; INTRAMUSCULAR; SOFT TISSUE at 16:29

## 2020-10-19 NOTE — PATIENT INSTRUCTIONS
Sinusitis, Adult  Sinusitis is soreness and swelling (inflammation) of your sinuses. Sinuses are hollow spaces in the bones around your face. They are located:  · Around your eyes.  · In the middle of your forehead.  · Behind your nose.  · In your cheekbones.  Your sinuses and nasal passages are lined with a fluid called mucus. Mucus drains out of your sinuses. Swelling can trap mucus in your sinuses. This lets germs (bacteria, virus, or fungus) grow, which leads to infection. Most of the time, this condition is caused by a virus.  What are the causes?  This condition is caused by:  · Allergies.  · Asthma.  · Germs.  · Things that block your nose or sinuses.  · Growths in the nose (nasal polyps).  · Chemicals or irritants in the air.  · Fungus (rare).  What increases the risk?  You are more likely to develop this condition if:  · You have a weak body defense system (immune system).  · You do a lot of swimming or diving.  · You use nasal sprays too much.  · You smoke.  What are the signs or symptoms?  The main symptoms of this condition are pain and a feeling of pressure around the sinuses. Other symptoms include:  · Stuffy nose (congestion).  · Runny nose (drainage).  · Swelling and warmth in the sinuses.  · Headache.  · Toothache.  · A cough that may get worse at night.  · Mucus that collects in the throat or the back of the nose (postnasal drip).  · Being unable to smell and taste.  · Being very tired (fatigue).  · A fever.  · Sore throat.  · Bad breath.  How is this diagnosed?  This condition is diagnosed based on:  · Your symptoms.  · Your medical history.  · A physical exam.  · Tests to find out if your condition is short-term (acute) or long-term (chronic). Your doctor may:  ? Check your nose for growths (polyps).  ? Check your sinuses using a tool that has a light (endoscope).  ? Check for allergies or germs.  ? Do imaging tests, such as an MRI or CT scan.  How is this treated?  Treatment for this condition  depends on the cause and whether it is short-term or long-term.  · If caused by a virus, your symptoms should go away on their own within 10 days. You may be given medicines to relieve symptoms. They include:  ? Medicines that shrink swollen tissue in the nose.  ? Medicines that treat allergies (antihistamines).  ? A spray that treats swelling of the nostrils.   ? Rinses that help get rid of thick mucus in your nose (nasal saline washes).  · If caused by bacteria, your doctor may wait to see if you will get better without treatment. You may be given antibiotic medicine if you have:  ? A very bad infection.  ? A weak body defense system.  · If caused by growths in the nose, you may need to have surgery.  Follow these instructions at home:  Medicines  · Take, use, or apply over-the-counter and prescription medicines only as told by your doctor. These may include nasal sprays.  · If you were prescribed an antibiotic medicine, take it as told by your doctor. Do not stop taking the antibiotic even if you start to feel better.  Hydrate and humidify    · Drink enough water to keep your pee (urine) pale yellow.  · Use a cool mist humidifier to keep the humidity level in your home above 50%.  · Breathe in steam for 10-15 minutes, 3-4 times a day, or as told by your doctor. You can do this in the bathroom while a hot shower is running.  · Try not to spend time in cool or dry air.  Rest  · Rest as much as you can.  · Sleep with your head raised (elevated).  · Make sure you get enough sleep each night.  General instructions    · Put a warm, moist washcloth on your face 3-4 times a day, or as often as told by your doctor. This will help with discomfort.  · Wash your hands often with soap and water. If there is no soap and water, use hand .  · Do not smoke. Avoid being around people who are smoking (secondhand smoke).  · Keep all follow-up visits as told by your doctor. This is important.  Contact a doctor if:  · You  have a fever.  · Your symptoms get worse.  · Your symptoms do not get better within 10 days.  Get help right away if:  · You have a very bad headache.  · You cannot stop throwing up (vomiting).  · You have very bad pain or swelling around your face or eyes.  · You have trouble seeing.  · You feel confused.  · Your neck is stiff.  · You have trouble breathing.  Summary  · Sinusitis is swelling of your sinuses. Sinuses are hollow spaces in the bones around your face.  · This condition is caused by tissues in your nose that become inflamed or swollen. This traps germs. These can lead to infection.  · If you were prescribed an antibiotic medicine, take it as told by your doctor. Do not stop taking it even if you start to feel better.  · Keep all follow-up visits as told by your doctor. This is important.  This information is not intended to replace advice given to you by your health care provider. Make sure you discuss any questions you have with your health care provider.  Document Released: 06/05/2009 Document Revised: 05/20/2019 Document Reviewed: 05/20/2019  [employer] Patient Education © 2020 [employer]

## 2020-10-19 NOTE — PROGRESS NOTES
Subjective   Becky Smiley is a 74 y.o. female.   Chief Complaint   Patient presents with   • Sinusitis       Mrs. Smiley is a pleasant 75 yo female who present to the office related to reoccurrent nasal, ear and sinus congestion and drainage. Patient reports that the antibiotic improved her symptoms, but reports that her sinus congestion and ear pain and congestion has not improved.  Patient reports that last time she had this sinusitis and ear pain she had a steroid shot which cleared her of congestion with then 2 days.  Patient denies shortness of breath, fever, dyspnea with ambulation, or fatigue.  Patient reports a dry cough but denies sore throat and denies productive sputum with cough, however is complaining of moderate rhinorrhea.    Sinusitis  This is a recurrent problem. The current episode started 1 to 4 weeks ago. The problem has been gradually improving since onset. There has been no fever. Her pain is at a severity of 0/10. The pain is mild. Associated symptoms include congestion, coughing, ear pain, sinus pressure and sneezing. Pertinent negatives include no chills, headaches, neck pain, shortness of breath, sore throat or swollen glands. Past treatments include antibiotics, saline sprays and sitting up. The treatment provided mild relief.        The following portions of the patient's history were reviewed and updated as appropriate: allergies, current medications, past family history, past medical history, past social history, past surgical history and problem list.    Review of Systems   Constitutional: Negative for activity change, appetite change, chills, fatigue, fever, unexpected weight gain and unexpected weight loss.   HENT: Positive for congestion, ear pain, sinus pressure and sneezing. Negative for sore throat, swollen glands, trouble swallowing and voice change.    Eyes: Negative for blurred vision and visual disturbance.   Respiratory: Positive for cough. Negative for shortness of  breath.    Cardiovascular: Negative for chest pain, palpitations and leg swelling.   Gastrointestinal: Negative for abdominal pain, constipation, diarrhea, nausea, vomiting and indigestion.   Endocrine: Negative for cold intolerance, heat intolerance, polydipsia and polyphagia.   Genitourinary: Negative for dysuria and frequency.   Musculoskeletal: Negative for arthralgias, back pain, joint swelling and neck pain.   Skin: Negative for color change, rash and skin lesions.   Neurological: Negative for dizziness, weakness, headache, memory problem and confusion.   Hematological: Does not bruise/bleed easily.   Psychiatric/Behavioral: Negative for agitation, hallucinations and suicidal ideas. The patient is not nervous/anxious.        Objective   Past Medical History:   Diagnosis Date   • Asthma    • Diabetes mellitus (CMS/HCC)     Type 2   • GERD (gastroesophageal reflux disease)    • HH (hiatus hernia)    • Hx of colonic polyps    • Insomnia    • Vitamin D deficiency       Past Surgical History:   Procedure Laterality Date   • CATARACT EXTRACTION     • CHOLECYSTECTOMY     • COLONOSCOPY  05/26/2011    Isolated area of ischemic colitis, Diverticulosis repeat exam in 5 years Dr. Sommer   • COLONOSCOPY N/A 6/16/2017    Procedure: COLONOSCOPY WITH ANESTHESIA;  Surgeon: Faraz Cardenas MD;  Location: [facility] ENDOSCOPY;  Service:    • ENDOSCOPY  09/18/2013    Benign hyperplastic polyp with moderate chronic active inflammation stomach, Mild gastritis repeat exam 3 years Dr. Christie   • ENDOSCOPY N/A 6/1/2017    Procedure: ESOPHAGOGASTRODUODENOSCOPY WITH ANESTHESIA;  Surgeon: Faraz Cardenas MD;  Location: [facility] ENDOSCOPY;  Service:    • HYSTERECTOMY     • KNEE SURGERY Left     baker's cyst    • TUMOR EXCISION      Throat benign   • UPPER GASTROINTESTINAL ENDOSCOPY  09/17/2013    small cardia polyp (benign hyperplastic),mild gastritis        Current Outpatient Medications:   •  albuterol (ACCUNEB) 1.25 MG/3ML nebulizer  solution, Inhale 3 mL As Needed., Disp: , Rfl:   •  aspirin 81 MG EC tablet, Take 81 mg by mouth Daily., Disp: , Rfl:   •  atorvastatin (LIPITOR) 80 MG tablet, Take 1 tablet by mouth Daily., Disp: 90 tablet, Rfl: 3  •  busPIRone (BUSPAR) 5 MG tablet, Take 1 tablet by mouth Every Night., Disp: 14 tablet, Rfl: 0  •  cetirizine (zyrTEC) 10 MG tablet, Take 10 mg by mouth Daily., Disp: , Rfl:   •  Dulaglutide (Trulicity) 0.75 MG/0.5ML solution pen-injector, Inject 0.75 mg under the skin into the appropriate area as directed 1 (One) Time Per Week., Disp: 13 pen, Rfl: 3  •  ezetimibe (Zetia) 10 MG tablet, Take 1 tablet by mouth Daily., Disp: 90 tablet, Rfl: 3  •  glucose monitor monitoring kit, 1 each Take As Directed. Accu-chek monitor, Disp: 1 each, Rfl: 0  •  glyburide (DIAbeta) 5 MG tablet, Take 1 tablet by mouth 2 (Two) Times a Day With Meals. Takes two tablets 2 times daily., Disp: 360 tablet, Rfl: 3  •  pantoprazole (PROTONIX) 40 MG EC tablet, Take 1 tablet by mouth Daily., Disp: 90 tablet, Rfl: 3  •  phenazopyridine (PYRIDIUM) 200 MG tablet, Take 200 mg by mouth 2 (Two) Times a Day As Needed for bladder spasms., Disp: , Rfl:   •  SITagliptin (Januvia) 50 MG tablet, Take 1 tablet by mouth Daily., Disp: 180 tablet, Rfl: 3  •  vitamin D (ERGOCALCIFEROL) 1.25 MG (25666 UT) capsule capsule, Take 1 capsule by mouth 1 (One) Time Per Week., Disp: 12 capsule, Rfl: 3  •  Zinc 22.5 MG tablet, Take 1 tablet by mouth Daily., Disp: , Rfl:   •  azithromycin (Zithromax Z-[place]) 250 MG tablet, Take 2 tablets by mouth the first day, then 1 tablet daily for 4 days., Disp: 6 tablet, Rfl: 0  •  nitrofurantoin, macrocrystal-monohydrate, (MACROBID) 100 MG capsule, Take 1 capsule by mouth Every Night. (Patient taking differently: Take 100 mg by mouth 2 (Two) Times a Week.), Disp: 90 capsule, Rfl: 0  No current facility-administered medications for this visit.      Vitals:    10/19/20 1604   BP: 128/70   Pulse: 78   Temp: 97.7 °F (36.5 °C)    SpO2: 95%         10/19/20  1604   Weight: 67.6 kg (149 lb)     Patient's Body mass index is 24.79 kg/m². BMI is within normal parameters. No follow-up required..      Physical Exam  Vitals signs and nursing note reviewed.   Constitutional:       Appearance: Normal appearance. She is well-developed and well-groomed.   HENT:      Head: Normocephalic and atraumatic.      Right Ear: Hearing and external ear normal. Tympanic membrane is erythematous and bulging.      Left Ear: External ear normal. Tympanic membrane is bulging.      Nose: Congestion and rhinorrhea present. No nasal tenderness.      Right Sinus: Maxillary sinus tenderness and frontal sinus tenderness present.      Left Sinus: Maxillary sinus tenderness and frontal sinus tenderness present.      Mouth/Throat:      Lips: Pink.      Mouth: Mucous membranes are moist.      Pharynx: Oropharynx is clear. Uvula midline.   Eyes:      Conjunctiva/sclera: Conjunctivae normal.      Pupils: Pupils are equal, round, and reactive to light.   Neck:      Musculoskeletal: Normal range of motion and neck supple.      Thyroid: No thyromegaly.   Cardiovascular:      Rate and Rhythm: Normal rate and regular rhythm.      Heart sounds: Normal heart sounds.   Pulmonary:      Effort: Pulmonary effort is normal.      Breath sounds: Normal breath sounds.   Abdominal:      General: Bowel sounds are normal.      Palpations: Abdomen is soft.   Lymphadenopathy:      Cervical: No cervical adenopathy.   Skin:     General: Skin is warm and dry.      Capillary Refill: Capillary refill takes less than 2 seconds.   Neurological:      General: No focal deficit present.      Mental Status: She is alert and oriented to person, place, and time.   Psychiatric:         Behavior: Behavior normal. Behavior is cooperative.         Thought Content: Thought content normal.               Assessment/Plan   Diagnoses and all orders for this visit:    1. Sinusitis, unspecified chronicity, unspecified  location (Primary)  -     methylPREDNISolone acetate (DEPO-medrol) injection 40 mg      Will give patient Medrol dose pack if injection does not help with congestion and pain by Friday.  Will consider cefdinir if patient becomes symptomatic of recurrent infection, at this time patient shows signs of acute inflammation without presence of infection.

## 2020-11-09 ENCOUNTER — OFFICE VISIT (OUTPATIENT)
Dept: INTERNAL MEDICINE | Facility: CLINIC | Age: 75
End: 2020-11-09

## 2020-11-09 VITALS
WEIGHT: 148 LBS | SYSTOLIC BLOOD PRESSURE: 124 MMHG | OXYGEN SATURATION: 96 % | DIASTOLIC BLOOD PRESSURE: 70 MMHG | TEMPERATURE: 97.7 F | HEART RATE: 87 BPM | BODY MASS INDEX: 24.66 KG/M2 | HEIGHT: 65 IN

## 2020-11-09 DIAGNOSIS — Z00.00 MEDICARE ANNUAL WELLNESS VISIT, INITIAL: ICD-10-CM

## 2020-11-09 DIAGNOSIS — E11.9 CONTROLLED TYPE 2 DIABETES MELLITUS WITHOUT COMPLICATION, WITHOUT LONG-TERM CURRENT USE OF INSULIN (HCC): Primary | ICD-10-CM

## 2020-11-09 LAB
AVERAGE GLUCOSE: NORMAL
HBA1C MFR BLD: 6.8 %
HBA1C MFR BLD: 6.8 %

## 2020-11-09 PROCEDURE — 83036 HEMOGLOBIN GLYCOSYLATED A1C: CPT | Performed by: NURSE PRACTITIONER

## 2020-11-09 PROCEDURE — G0439 PPPS, SUBSEQ VISIT: HCPCS | Performed by: NURSE PRACTITIONER

## 2020-11-09 RX ORDER — LANOLIN ALCOHOL/MO/W.PET/CERES
1000 CREAM (GRAM) TOPICAL DAILY
COMMUNITY

## 2020-11-09 NOTE — PROGRESS NOTES
"The ABCs of the Annual Wellness Visit  Initial Medicare Wellness Visit    Chief Complaint   Patient presents with   • Medicare Wellness-Initial Visit   • Diabetes     A1C-6.8       Subjective   History of Present Illness:  Becky Smiley is a 74 y.o. female who presents for an Initial Medicare Wellness Visit. She reports some chronic, recurrent sinusitis, nasal congestion, and chronic cough. She denies changes in taste or smell, shortness of breath, fever, muscle aches or chills. Patient reports \"this is a chronic allergy issue for me\". She has been using Nasacort and zyrtec for years now. Patient reports not taking nasal spray daily, only as needed.     She is due for pneumonia vaccination, but refusing today. Diabetic foot exam benign for neuropathy, mild callus bilateral heels. Colonoscopy was completed in 2017, repeat in 2027. Bone density completed in 2019, repeat next January. Patient had mammogram completed January 2020, will reorder closer to time. Order to be sent to preferred location. Patient reports completing eye exam 2 weeks earlier without changes. Hemoglobin A1C was 6.8 again today. No blood sugars noted > 140 per patient report.     HEALTH RISK ASSESSMENT    Recent Hospitalizations:  No hospitalization(s) within the last year.    Current Medical Providers:  Patient Care Team:  Zoila Kern APRN as PCP - General (Nurse Practitioner)  Brennan Ferrell MD as Consulting Physician (Urology)    Smoking Status:  Social History     Tobacco Use   Smoking Status Never Smoker   Smokeless Tobacco Never Used       Alcohol Consumption:  Social History     Substance and Sexual Activity   Alcohol Use No       Depression Screen:   PHQ-2/PHQ-9 Depression Screening 11/9/2020   Little interest or pleasure in doing things 0   Feeling down, depressed, or hopeless 0   Total Score 0       Fall Risk Screen:  STEADI Fall Risk Assessment was completed, and patient is at LOW risk for falls.Assessment " completed on:11/9/2020    Health Habits and Functional and Cognitive Screening:  Functional & Cognitive Status 11/9/2020   Do you have difficulty preparing food and eating? No   Do you have difficulty bathing yourself, getting dressed or grooming yourself? No   Do you have difficulty using the toilet? No   Do you have difficulty moving around from place to place? No   Do you have trouble with steps or getting out of a bed or a chair? No   Current Diet Well Balanced Diet   Dental Exam Up to date   Eye Exam Up to date   Exercise (times per week) 7 times per week   Current Exercise Activities Include Housecleaning   Do you need help using the phone?  No   Are you deaf or do you have serious difficulty hearing?  No   Do you need help with transportation? No   Do you need help shopping? No   Do you need help preparing meals?  No   Do you need help with housework?  No   Do you need help with laundry? No   Do you need help taking your medications? No   Do you need help managing money? No   Do you ever drive or ride in a car without wearing a seat belt? No   Have you felt unusual stress, anger or loneliness in the last month? No   Who do you live with? Spouse   If you need help, do you have trouble finding someone available to you? No   Have you been bothered in the last four weeks by sexual problems? No   Do you have difficulty concentrating, remembering or making decisions? No         Does the patient have evidence of cognitive impairment? No    Asprin use counseling:Taking ASA appropriately as indicated    Age-appropriate Screening Schedule:  Refer to the list below for future screening recommendations based on patient's age, sex and/or medical conditions. Orders for these recommended tests are listed in the plan section. The patient has been provided with a written plan.    Health Maintenance   Topic Date Due   • URINE MICROALBUMIN  1945   • TDAP/TD VACCINES (1 - Tdap) 11/09/2020 (Originally 11/12/1964)   • ZOSTER  VACCINE (1 of 2) 11/09/2020 (Originally 11/12/1995)   • HEMOGLOBIN A1C  02/17/2021   • LIPID PANEL  03/04/2021   • DIABETIC EYE EXAM  10/28/2021   • DIABETIC FOOT EXAM  11/09/2021   • MAMMOGRAM  01/28/2022   • COLONOSCOPY  06/16/2027   • INFLUENZA VACCINE  Completed          The following portions of the patient's history were reviewed and updated as appropriate: allergies, current medications, past family history, past medical history, past social history, past surgical history and problem list.    Outpatient Medications Prior to Visit   Medication Sig Dispense Refill   • albuterol (ACCUNEB) 1.25 MG/3ML nebulizer solution Inhale 3 mL As Needed.     • ascorbic acid (VITAMIN C) 1000 MG tablet Take 1,000 mg by mouth Daily.     • aspirin 81 MG EC tablet Take 81 mg by mouth Daily.     • atorvastatin (LIPITOR) 80 MG tablet Take 1 tablet by mouth Daily. 90 tablet 3   • cetirizine (zyrTEC) 10 MG tablet Take 10 mg by mouth Daily.     • Dulaglutide (Trulicity) 0.75 MG/0.5ML solution pen-injector Inject 0.75 mg under the skin into the appropriate area as directed 1 (One) Time Per Week. 13 pen 3   • ezetimibe (Zetia) 10 MG tablet Take 1 tablet by mouth Daily. 90 tablet 3   • glucose monitor monitoring kit 1 each Take As Directed. Accu-chek monitor 1 each 0   • glyburide (DIAbeta) 5 MG tablet Take 1 tablet by mouth 2 (Two) Times a Day With Meals. Takes two tablets 2 times daily. 360 tablet 3   • nitrofurantoin, macrocrystal-monohydrate, (MACROBID) 100 MG capsule Take 1 capsule by mouth Every Night. (Patient taking differently: Take 100 mg by mouth 2 (Two) Times a Week.) 90 capsule 0   • pantoprazole (PROTONIX) 40 MG EC tablet Take 1 tablet by mouth Daily. 90 tablet 3   • SITagliptin (Januvia) 50 MG tablet Take 1 tablet by mouth Daily. 180 tablet 3   • vitamin B-12 (CYANOCOBALAMIN) 1000 MCG tablet Take 1,000 mcg by mouth Daily.     • vitamin D (ERGOCALCIFEROL) 1.25 MG (49798 UT) capsule capsule Take 1 capsule by mouth 1 (One)  Time Per Week. 12 capsule 3   • busPIRone (BUSPAR) 5 MG tablet Take 1 tablet by mouth Every Night. 14 tablet 0   • phenazopyridine (PYRIDIUM) 200 MG tablet Take 200 mg by mouth 2 (Two) Times a Day As Needed for bladder spasms.     • Zinc 22.5 MG tablet Take 1 tablet by mouth Daily.     • azithromycin (Zithromax Z-[place]) 250 MG tablet Take 2 tablets by mouth the first day, then 1 tablet daily for 4 days. 6 tablet 0     No facility-administered medications prior to visit.        Patient Active Problem List   Diagnosis   • Controlled type 2 diabetes mellitus without complication, without long-term current use of insulin (CMS/[facility])   • Hx of colonic polyps   • Encounter for screening for malignant neoplasm of colon   • Gastroesophageal reflux disease   • Recurrent UTI   • Interstitial cystitis   • Dysuria   • Acute bilateral low back pain without sciatica   • Intrinsic asthma with acute exacerbation   • At low risk for fall   • Bacterial sinusitis   • Bilateral impacted cerumen   • Cough   • Diabetes mellitus treated with oral medication (CMS/[facility])   • Diverticulosis   • Elevated cholesterol   • Epidermoid cyst of skin   • Fever and chills   • Goiter, nontoxic, multinodular   • Hair loss   • Hand arthritis   • Hematuria   • Hiatal hernia   • History of Helicobacter pylori infection   • Hypertension, benign   • Insomnia   • Negative depression screening   • Night sweat   • Onychomycosis   • Allergic rhinitis, mild   • Restless leg   • Sleep disturbance   • Strain of thoracic region   • Vitamin D deficiency       Advanced Care Planning:  ACP discussion was declined by the patient. Patient does not have an advance directive, information provided.    Review of Systems   Constitutional: Negative for activity change, appetite change, fatigue, fever and unexpected weight change.   HENT: Positive for congestion, postnasal drip, rhinorrhea and sneezing. Negative for ear discharge, ear pain, hearing loss, sinus pressure, tinnitus,  "trouble swallowing and voice change.    Eyes: Negative for discharge and visual disturbance.   Respiratory: Positive for cough. Negative for apnea and shortness of breath.         Chronic, worse in the evening   Cardiovascular: Negative for chest pain, palpitations and leg swelling.   Gastrointestinal: Negative for abdominal pain, blood in stool, constipation and rectal pain.   Endocrine: Negative for cold intolerance, heat intolerance, polydipsia and polyphagia.   Genitourinary: Negative for decreased urine volume, difficulty urinating, frequency, hematuria and urgency.   Musculoskeletal: Negative for arthralgias, back pain, myalgias, neck pain and neck stiffness.   Skin: Negative for color change, rash and wound.   Allergic/Immunologic: Positive for environmental allergies.   Neurological: Negative for dizziness, speech difficulty, weakness, numbness and headaches.   Hematological: Negative for adenopathy. Does not bruise/bleed easily.   Psychiatric/Behavioral: Negative for agitation, confusion, decreased concentration and sleep disturbance. The patient is not nervous/anxious.        Compared to one year ago, the patient feels her physical health is the same.  Compared to one year ago, the patient feels her mental health is the same.    Reviewed chart for potential of high risk medication in the elderly: yes  Reviewed chart for potential of harmful drug interactions in the elderly:yes    Objective         Vitals:    11/09/20 0911   BP: 124/70   BP Location: Left arm   Pulse: 87   Temp: 97.7 °F (36.5 °C)   SpO2: 96%   Weight: 67.1 kg (148 lb)   Height: 165.1 cm (65\")   PainSc: 0-No pain       Body mass index is 24.63 kg/m².  Discussed the patient's BMI with her. The BMI is in the acceptable range.    Physical Exam  Vitals signs and nursing note reviewed.   Constitutional:       Appearance: Normal appearance. She is well-developed, well-groomed and normal weight.   HENT:      Head: Normocephalic and atraumatic.      " Right Ear: External ear normal. Tympanic membrane is injected. Tympanic membrane is not retracted or bulging.      Left Ear: External ear normal. Tympanic membrane is injected. Tympanic membrane is not retracted or bulging.      Nose: Congestion and rhinorrhea present. Rhinorrhea is clear.      Mouth/Throat:      Lips: Pink.      Mouth: Mucous membranes are moist.      Pharynx: Oropharynx is clear. Uvula midline.   Eyes:      General: Lids are normal. Lids are everted, no foreign bodies appreciated. Vision grossly intact.      Extraocular Movements: Extraocular movements intact.      Conjunctiva/sclera: Conjunctivae normal.      Pupils: Pupils are equal, round, and reactive to light.   Neck:      Musculoskeletal: Normal range of motion and neck supple.      Thyroid: No thyromegaly.   Cardiovascular:      Rate and Rhythm: Normal rate and regular rhythm.      Heart sounds: Normal heart sounds.   Pulmonary:      Effort: Pulmonary effort is normal.      Breath sounds: Normal breath sounds.   Abdominal:      General: Abdomen is flat. Bowel sounds are normal.      Palpations: Abdomen is soft.   Musculoskeletal:        Feet:    Feet:      Right foot:      Protective Sensation: 5 sites tested. 5 sites sensed.      Skin integrity: Callus present.      Left foot:      Protective Sensation: 5 sites tested. 5 sites sensed.      Skin integrity: Callus present.      Comments: Diabetic foot exam:   Left: Filament test absent   Pulses Dorsalis Pedis:  present   Reflexes 2+    Vibratory sensation normal   Proprioception normal   Sharp/dull discrimination normal       Right: Filament test absent   Pulses Dorsalis Pedis:  present   Reflexes 2+    Vibratory sensation normal   Proprioception normal   Sharp/dull discrimination normal  Lymphadenopathy:      Cervical: No cervical adenopathy.   Skin:     General: Skin is warm and dry.      Capillary Refill: Capillary refill takes less than 2 seconds.   Neurological:      General: No focal  deficit present.      Mental Status: She is alert and oriented to person, place, and time.   Psychiatric:         Attention and Perception: Attention normal.         Mood and Affect: Mood normal.         Speech: Speech normal.         Behavior: Behavior normal. Behavior is cooperative.         Thought Content: Thought content normal.         Cognition and Memory: Cognition and memory normal.         Lab Results   Component Value Date    HGBA1C 6.8 11/09/2020        Assessment/Plan   Medicare Risks and Personalized Health Plan  CMS Preventative Services Quick Reference  Advance Directive Discussion  Breast Cancer/Mammogram Screening  Chronic Pain   Depression/Dysphoria  Diabetic Lab Screening   Immunizations Discussed/Encouraged (specific immunizations; Td, adacel Tdap, Influenza, Pneumococcal 23 and Shingrix )  Inadequate Social Support, Isolation, Loneliness, Lack of Transportation, Financial Difficulties, or Caregiver Stress   Inactivity/Sedentary  Osteoprorosis Risk  Polypharmacy    The above risks/problems have been discussed with the patient.  Pertinent information has been shared with the patient in the After Visit Summary.  Follow up plans and orders are seen below in the Assessment/Plan Section.    Diagnoses and all orders for this visit:    1. Controlled type 2 diabetes mellitus without complication, without long-term current use of insulin (CMS/[facility]) (Primary)  -     POC Glycosylated Hemoglobin (Hb A1C)    2. Medicare annual wellness visit, initial      Follow Up:  Return in about 6 months (around 5/9/2021) for Annual physical. Will send order for mammogram and DEXA bone scan in January. Patient will switch from zyrtec to allegra for allergies. Advised to use saline nasal spray 2 times a day and start using Nasocort daily for symptom relief. Patient denies sore throat or choking on food. She denies voice change as well. She does report intermittent worsening of tinnitus bilaterally. Advise patient I can  send to Dr. Wynn if symptoms persist. She vocalized understanding.  She will follow up in 6 months for annual exam, sooner with acute complaints.    An After Visit Summary and PPPS were given to the patient.

## 2020-11-17 ENCOUNTER — RESULTS ENCOUNTER (OUTPATIENT)
Dept: INTERNAL MEDICINE | Facility: CLINIC | Age: 75
End: 2020-11-17

## 2020-11-17 DIAGNOSIS — E11.9 CONTROLLED TYPE 2 DIABETES MELLITUS WITHOUT COMPLICATION, WITHOUT LONG-TERM CURRENT USE OF INSULIN (HCC): ICD-10-CM

## 2020-11-17 DIAGNOSIS — Z11.59 ENCOUNTER FOR HEPATITIS C SCREENING TEST FOR LOW RISK PATIENT: ICD-10-CM

## 2020-11-17 DIAGNOSIS — E78.00 ELEVATED CHOLESTEROL: ICD-10-CM

## 2020-11-17 DIAGNOSIS — Z79.899 ENCOUNTER FOR LONG-TERM (CURRENT) DRUG USE: ICD-10-CM

## 2020-11-18 ENCOUNTER — TELEPHONE (OUTPATIENT)
Dept: INTERNAL MEDICINE | Facility: CLINIC | Age: 75
End: 2020-11-18

## 2020-11-18 DIAGNOSIS — Z79.84 DIABETES MELLITUS TREATED WITH ORAL MEDICATION (HCC): ICD-10-CM

## 2020-11-18 DIAGNOSIS — E11.9 DIABETES MELLITUS TREATED WITH ORAL MEDICATION (HCC): ICD-10-CM

## 2020-11-18 NOTE — TELEPHONE ENCOUNTER
PATIENT STATES HER MEDICATION FOR SITagliptin (Januvia) 50 MG tabletTWICE A DAY ONE IN MORNING AND ONE AT NIGHT WAS SENT TO [facility] AND WAS ONLY WRITTEN FOR ONCE DAILY       REQUESTING THE MEDICATION TO BE CORRECTED       GOOD CONTACT NUMBER   [phone] (M)        VERIFIED PHARMACY  [facility] - [address] - [phone]  - [phone]   [phone]

## 2020-11-23 ENCOUNTER — OFFICE VISIT (OUTPATIENT)
Dept: INTERNAL MEDICINE | Age: 75
End: 2020-11-23
Payer: COMMERCIAL

## 2020-11-23 VITALS
HEIGHT: 64 IN | WEIGHT: 150 LBS | HEART RATE: 82 BPM | BODY MASS INDEX: 25.61 KG/M2 | SYSTOLIC BLOOD PRESSURE: 142 MMHG | DIASTOLIC BLOOD PRESSURE: 82 MMHG | OXYGEN SATURATION: 98 % | RESPIRATION RATE: 18 BRPM

## 2020-11-23 DIAGNOSIS — M85.852 OSTEOPENIA OF LEFT HIP: ICD-10-CM

## 2020-11-23 DIAGNOSIS — E11.9 TYPE 2 DIABETES MELLITUS WITHOUT COMPLICATION, WITHOUT LONG-TERM CURRENT USE OF INSULIN (HCC): ICD-10-CM

## 2020-11-23 DIAGNOSIS — N30.10 INTERSTITIAL CYSTITIS: ICD-10-CM

## 2020-11-23 DIAGNOSIS — E55.9 VITAMIN D DEFICIENCY: ICD-10-CM

## 2020-11-23 DIAGNOSIS — F51.01 PRIMARY INSOMNIA: ICD-10-CM

## 2020-11-23 DIAGNOSIS — Z12.31 VISIT FOR SCREENING MAMMOGRAM: ICD-10-CM

## 2020-11-23 DIAGNOSIS — E78.00 PURE HYPERCHOLESTEROLEMIA: ICD-10-CM

## 2020-11-23 DIAGNOSIS — G25.81 RLS (RESTLESS LEGS SYNDROME): ICD-10-CM

## 2020-11-23 LAB
ALBUMIN SERPL-MCNC: 4.8 G/DL (ref 3.5–5.2)
ALP BLD-CCNC: 95 U/L (ref 35–104)
ALT SERPL-CCNC: 35 U/L (ref 5–33)
ANION GAP SERPL CALCULATED.3IONS-SCNC: 9 MMOL/L (ref 7–19)
AST SERPL-CCNC: 23 U/L (ref 5–32)
BILIRUB SERPL-MCNC: 0.7 MG/DL (ref 0.2–1.2)
BUN BLDV-MCNC: 17 MG/DL (ref 8–23)
CALCIUM SERPL-MCNC: 9.6 MG/DL (ref 8.8–10.2)
CHLORIDE BLD-SCNC: 103 MMOL/L (ref 98–111)
CO2: 29 MMOL/L (ref 22–29)
CREAT SERPL-MCNC: 0.6 MG/DL (ref 0.5–0.9)
GFR AFRICAN AMERICAN: >59
GFR NON-AFRICAN AMERICAN: >60
GLUCOSE BLD-MCNC: 72 MG/DL (ref 74–109)
POTASSIUM SERPL-SCNC: 3.9 MMOL/L (ref 3.5–5)
SODIUM BLD-SCNC: 141 MMOL/L (ref 136–145)
TOTAL PROTEIN: 6.9 G/DL (ref 6.6–8.7)

## 2020-11-23 PROCEDURE — 3046F HEMOGLOBIN A1C LEVEL >9.0%: CPT | Performed by: INTERNAL MEDICINE

## 2020-11-23 PROCEDURE — 1036F TOBACCO NON-USER: CPT | Performed by: INTERNAL MEDICINE

## 2020-11-23 PROCEDURE — 2022F DILAT RTA XM EVC RTNOPTHY: CPT | Performed by: INTERNAL MEDICINE

## 2020-11-23 PROCEDURE — 4040F PNEUMOC VAC/ADMIN/RCVD: CPT | Performed by: INTERNAL MEDICINE

## 2020-11-23 PROCEDURE — 1123F ACP DISCUSS/DSCN MKR DOCD: CPT | Performed by: INTERNAL MEDICINE

## 2020-11-23 PROCEDURE — 1090F PRES/ABSN URINE INCON ASSESS: CPT | Performed by: INTERNAL MEDICINE

## 2020-11-23 PROCEDURE — G8428 CUR MEDS NOT DOCUMENT: HCPCS | Performed by: INTERNAL MEDICINE

## 2020-11-23 PROCEDURE — 3017F COLORECTAL CA SCREEN DOC REV: CPT | Performed by: INTERNAL MEDICINE

## 2020-11-23 PROCEDURE — G8484 FLU IMMUNIZE NO ADMIN: HCPCS | Performed by: INTERNAL MEDICINE

## 2020-11-23 PROCEDURE — G8417 CALC BMI ABV UP PARAM F/U: HCPCS | Performed by: INTERNAL MEDICINE

## 2020-11-23 PROCEDURE — 99205 OFFICE O/P NEW HI 60 MIN: CPT | Performed by: INTERNAL MEDICINE

## 2020-11-23 PROCEDURE — G8400 PT W/DXA NO RESULTS DOC: HCPCS | Performed by: INTERNAL MEDICINE

## 2020-11-23 RX ORDER — BIOTIN 1 MG
1000 TABLET ORAL DAILY
COMMUNITY

## 2020-11-23 RX ORDER — DULAGLUTIDE 0.75 MG/.5ML
0.75 INJECTION, SOLUTION SUBCUTANEOUS WEEKLY
COMMUNITY
End: 2020-11-23 | Stop reason: DRUGHIGH

## 2020-11-23 RX ORDER — LANOLIN ALCOHOL/MO/W.PET/CERES
1000 CREAM (GRAM) TOPICAL DAILY
COMMUNITY

## 2020-11-23 RX ORDER — ATORVASTATIN CALCIUM 80 MG/1
80 TABLET, FILM COATED ORAL DAILY
COMMUNITY
End: 2021-03-03 | Stop reason: SDUPTHER

## 2020-11-23 RX ORDER — GLYBURIDE 5 MG/1
5 TABLET ORAL 2 TIMES DAILY WITH MEALS
COMMUNITY
End: 2021-07-28 | Stop reason: SDUPTHER

## 2020-11-23 RX ORDER — DULAGLUTIDE 1.5 MG/.5ML
1.5 INJECTION, SOLUTION SUBCUTANEOUS WEEKLY
Qty: 9 ML | Refills: 1 | Status: SHIPPED | OUTPATIENT
Start: 2020-11-23 | End: 2021-03-24 | Stop reason: SDUPTHER

## 2020-11-23 RX ORDER — ERGOCALCIFEROL 1.25 MG/1
50000 CAPSULE ORAL WEEKLY
COMMUNITY
End: 2021-01-18 | Stop reason: SDUPTHER

## 2020-11-23 RX ORDER — POLYETHYLENE GLYCOL 3350 17 G/17G
17 POWDER, FOR SOLUTION ORAL DAILY PRN
COMMUNITY

## 2020-11-23 ASSESSMENT — PATIENT HEALTH QUESTIONNAIRE - PHQ9
SUM OF ALL RESPONSES TO PHQ9 QUESTIONS 1 & 2: 0
1. LITTLE INTEREST OR PLEASURE IN DOING THINGS: 0
SUM OF ALL RESPONSES TO PHQ QUESTIONS 1-9: 0
2. FEELING DOWN, DEPRESSED OR HOPELESS: 0

## 2020-11-23 ASSESSMENT — ENCOUNTER SYMPTOMS
CHEST TIGHTNESS: 0
SINUS PRESSURE: 0
EYE PAIN: 0
VOICE CHANGE: 0
TROUBLE SWALLOWING: 0
EYE REDNESS: 0
CONSTIPATION: 0
COUGH: 0
NAUSEA: 0
VOMITING: 0
ABDOMINAL PAIN: 0
BLOOD IN STOOL: 0
DIARRHEA: 0
WHEEZING: 0
COLOR CHANGE: 0
SORE THROAT: 0

## 2020-11-23 NOTE — PROGRESS NOTES
Chief Complaint   Patient presents with    New Patient     History of presenting illness:  Tana Morales is a74 y.o. female who presents today for    NEW PATIENT VISIT    Together with the patient I have reviewed her past medical history, surgical history, her screening test timings and results, her family history, social history her medications and allergies to medications. Patient is signing medical record release to obtain medical records from her previous primary care physician and specialist physicians    Patient is here today to establish/follow-up following medical problems    Type 2 diabetes mellitus without complication, without long-term current use of insulin ([address]  Most recent hemoglobin A1c 6.8 in November 2020  She has been taking Januvia 50 mg twice daily and Trulicity 1.38 weekly and glyburide 5 mg twice daily    RLS (restless legs syndrome)  Has been well controlled/currently on no medications    Vitamin D deficiency  She is taking vitamin D 50,000 IU weekly    Interstitial cystitis  Deviously followed with Dr. Afshin Andre, prior history of frequent UTIs.   Recently has been doing better    Primary insomnia  Improved/not wanting to take any medication    Pure hypercholesterolemia  Currently taking Zetia 10 mg daily and Lipitor 80 mg daily        Patient Active Problem List    Diagnosis Date Noted    Type 2 diabetes mellitus without complication, without long-term current use of insulin ([address] 11/23/2020    RLS (restless legs syndrome) 11/23/2020    Vitamin D deficiency 11/23/2020    Nausea 08/22/2013    Gastroesophageal reflux disease without esophagitis 08/22/2013    History of Helicobacter pylori infection 08/22/2013    History of colon polyps 08/22/2013    Hiatal hernia 08/22/2013    Diverticulosis 08/22/2013     Past Medical History:   Diagnosis Date    GERD (gastroesophageal reflux disease)     Helicobacter Pylori (H. Pylori) Infection     Hyperlipidemia       Past Surgical History:   Procedure Laterality Date    CHOLECYSTECTOMY      COLONOSCOPY  2017    nanda    HYSTERECTOMY      ALEX BSO    KNEE GANGLION SURGERY      NECK SURGERY      Tumor removal    UPPER GASTROINTESTINAL ENDOSCOPY  ? [facility]     Current Outpatient Medications   Medication Sig Dispense Refill    atorvastatin (LIPITOR) 80 MG tablet Take 80 mg by mouth daily      SITagliptin (JANUVIA) 50 MG tablet Take 50 mg by mouth 2 times daily      glyBURIDE (DIABETA) 5 MG tablet Take 5 mg by mouth 2 times daily (with meals)      vitamin D (ERGOCALCIFEROL) 1.25 MG (97935 UT) CAPS capsule Take 50,000 Units by mouth once a week      vitamin B-12 (CYANOCOBALAMIN) 1000 MCG tablet Take 1,000 mcg by mouth daily      Calcium Carb-Cholecalciferol (CALCIUM 1000 + D PO) Take by mouth      Biotin 1000 MCG TABS Take 1,000 Units by mouth      mometasone-formoterol (DULERA) 100-5 MCG/ACT inhaler Inhale 2 puffs into the lungs as needed      tiotropium (SPIRIVA) 18 MCG inhalation capsule Inhale 18 mcg into the lungs daily      Dulaglutide (TRULICITY) 1.5 LB/1.7DZ SOPN Inject 1.5 mg into the skin once a week 9 mL 1    polyethylene glycol (MIRALAX) 17 g PACK packet Take 17 g by mouth daily      ezetimibe (ZETIA) 10 MG tablet Take by mouth daily       esomeprazole Magnesium (NEXIUM) 40 MG PACK Take 40 mg by mouth daily.  aspirin 81 MG EC tablet Take 81 mg by mouth daily. No current facility-administered medications for this visit.       Allergies   Allergen Reactions    Amoxicillin-Pot Clavulanate     Ciprofloxacin     Codeine     Sulfa Antibiotics      Social History     Tobacco Use    Smoking status: Never Smoker    Smokeless tobacco: Never Used   Substance Use Topics    Alcohol use: No      Family History   Problem Relation Age of Onset    Stroke Mother     Diabetes Mother     Liver Cancer Father     Breast Cancer Sister     Liver Cancer Maternal Grandfather     Colon Cancer Neg Hx     Colon Polyps Neg Hx        Review of Systems   Constitutional: Positive for fatigue. Negative for chills and fever. HENT: Negative for congestion, ear pain, postnasal drip, sinus pressure, sore throat, trouble swallowing and voice change. Eyes: Negative for pain, redness and visual disturbance. Respiratory: Negative for cough, chest tightness and wheezing. Cardiovascular: Negative for chest pain, palpitations and leg swelling. Gastrointestinal: Negative for abdominal pain, blood in stool, constipation, diarrhea, nausea and vomiting. Endocrine: Negative for polydipsia and polyuria. Genitourinary: Negative for dysuria, enuresis, flank pain, frequency and urgency. Musculoskeletal: Negative for arthralgias, gait problem and joint swelling. Skin: Negative for color change and rash. Neurological: Negative for dizziness, tremors, syncope, facial asymmetry, speech difficulty, weakness, numbness and headaches. Psychiatric/Behavioral: Negative for agitation, behavioral problems, confusion, sleep disturbance and suicidal ideas. The patient is not nervous/anxious. Vitals:    11/23/20 1056   BP: (!) 142/82   Site: Left Upper Arm   Position: Sitting   Cuff Size: Large Adult   Pulse: 82   Resp: 18   SpO2: 98%   Weight: 150 lb (68 kg)   Height: 5' 4\" (1.626 m)     Body mass index is 25.75 kg/m². Physical Exam  Constitutional:       Appearance: She is well-developed. HENT:      Head: Normocephalic and atraumatic. Right Ear: External ear normal.      Left Ear: External ear normal.   Eyes:      General: No scleral icterus. Conjunctiva/sclera: Conjunctivae normal.      Pupils: Pupils are equal, round, and reactive to light. Neck:      Musculoskeletal: Normal range of motion and neck supple. Thyroid: No thyromegaly. Vascular: No JVD. Cardiovascular:      Rate and Rhythm: Normal rate and regular rhythm. Heart sounds: Normal heart sounds. No murmur.    Pulmonary:      Effort: Pulmonary effort is normal. No respiratory distress. Breath sounds: Normal breath sounds. No wheezing. Chest:      Chest wall: No tenderness. Abdominal:      General: Bowel sounds are normal.      Palpations: Abdomen is soft. There is no mass. Tenderness: There is no abdominal tenderness. Musculoskeletal: Normal range of motion. General: No tenderness. Lymphadenopathy:      Cervical: No cervical adenopathy. Skin:     General: Skin is warm and dry. Findings: No erythema or rash. Neurological:      Mental Status: She is alert and oriented to person, place, and time. Cranial Nerves: No cranial nerve deficit. Coordination: Coordination normal.      Deep Tendon Reflexes: Reflexes are normal and symmetric. Psychiatric:         Behavior: Behavior normal.         Lab Review   No visits with results within 2 Month(s) from this visit. Latest known visit with results is:   Hospital Outpatient Visit on 09/17/2013   Component Date Value    Glucose Accuchek 09/17/2013 56            ASSESSMENT/PLAN:    Type 2 diabetes mellitus without complication, without long-term current use of insulin ([facility])  Most recent hemoglobin A1c 6.8 in November 2020  She has been taking Januvia 50 mg twice daily and Trulicity 7.28 weekly and glyburide 5 mg twice daily  She states just last week she decreased her glyburide from 10 mg twice daily to 5 mg twice daily since she was experiencing low blood sugars  Recommendations today  1. Continue Januvia 50 mg twice daily  2. Increase Trulicity to 1.5 mg weekly  3.   Glyburide continue decreased dose 5 mg twice daily with plans to further decrease this prescription and hopefully in the future to discontinue sulfonylurea    RLS (restless legs syndrome)  Has been well controlled/currently on no medications  rx made her constipated    Vitamin D deficiency  Recent available vitamin D level 48 in March 2020  Cont vit d 50,000 weekly    Interstitial cystitis  Deviously followed with Dr. Harry Potts, prior history of frequent UTIs. Recently has been doing better    Primary insomnia- better    Pure hypercholesterolemia  Currently taking Zetia 10 mg daily and Lipitor 80 mg daily  Recent available LDL March 20, 20- 82/triglyceride 143  Obtain cmp today  Plan lipid panel March 2021    Asthma  Uses dulera + spiriva ( never smoker)  She states that she uses these medications episodically    Mild blood pressure elevation today without prior history of hypertension  She will be checking/monitoring blood pressures at home and will send me readings in few weeks      Visit for screening mammogram  -     VANESSA DIGITAL SCREEN W OR WO CAD BILATERAL; Future        Health maintenance  1. Colonoscopy 2017, repeat 10 years  2. Bone density 2019 repeat 2021  Mammogram January 2020 repeat January 2021      Orders Placed This Encounter   Procedures    VANESSA DIGITAL SCREEN W OR WO CAD BILATERAL    DEXA BONE DENSITY 2 SITES    CBC Auto Differential    Comprehensive Metabolic Panel    Hemoglobin A1C    Lipid Panel    Urinalysis    TSH without Reflex    Vitamin D 25 Hydroxy    Microalbumin / Creatinine Urine Ratio    Comprehensive Metabolic Panel     New Prescriptions    DULAGLUTIDE (TRULICITY) 1.5 QC/7.9BX SOPN    Inject 1.5 mg into the skin once a week         Return in about 3 months (around 2/23/2021) for Annual Physical.   There are no Patient Instructions on file for this visit. EMR Dragon/transcription disclaimer:Significant part of this  encounter note is electronic transcription/translationof spoken language to printed text. The electronic translation of spoken language may be erroneous, or at times, nonsensical words or phrases may be inadvertently transcribed.  Although I have reviewed the note for sucherrors, some may still exist.

## 2021-01-18 RX ORDER — ERGOCALCIFEROL 1.25 MG/1
50000 CAPSULE ORAL WEEKLY
Qty: 5 CAPSULE | Refills: 1 | Status: SHIPPED | OUTPATIENT
Start: 2021-01-18 | End: 2021-03-24 | Stop reason: SDUPTHER

## 2021-01-18 RX ORDER — ERGOCALCIFEROL 1.25 MG/1
50000 CAPSULE ORAL WEEKLY
Qty: 12 CAPSULE | Refills: 3 | Status: SHIPPED | OUTPATIENT
Start: 2021-01-18

## 2021-01-18 NOTE — TELEPHONE ENCOUNTER
Mare Zena is requesting a refill of their   Requested Prescriptions     Pending Prescriptions Disp Refills    vitamin D (ERGOCALCIFEROL) 1.25 MG (72250 UT) CAPS capsule 5 capsule      Sig: Take 1 capsule by mouth once a week   . Please advise.       Last Appt:  11/23/2020  Next Appt:   3/24/2021  Preferred pharmacy: [facility]

## 2021-01-25 ENCOUNTER — OFFICE VISIT (OUTPATIENT)
Dept: INTERNAL MEDICINE | Age: 76
End: 2021-01-25
Payer: COMMERCIAL

## 2021-01-25 VITALS
BODY MASS INDEX: 25.61 KG/M2 | SYSTOLIC BLOOD PRESSURE: 110 MMHG | DIASTOLIC BLOOD PRESSURE: 78 MMHG | OXYGEN SATURATION: 98 % | WEIGHT: 150 LBS | RESPIRATION RATE: 18 BRPM | HEART RATE: 68 BPM | HEIGHT: 64 IN

## 2021-01-25 DIAGNOSIS — J01.00 ACUTE NON-RECURRENT MAXILLARY SINUSITIS: Primary | ICD-10-CM

## 2021-01-25 DIAGNOSIS — J68.3 REACTIVE AIRWAYS DYSFUNCTION SYNDROME (HCC): ICD-10-CM

## 2021-01-25 DIAGNOSIS — R09.82 POSTNASAL DRIP: ICD-10-CM

## 2021-01-25 PROCEDURE — 99213 OFFICE O/P EST LOW 20 MIN: CPT | Performed by: INTERNAL MEDICINE

## 2021-01-25 PROCEDURE — G8484 FLU IMMUNIZE NO ADMIN: HCPCS | Performed by: INTERNAL MEDICINE

## 2021-01-25 PROCEDURE — 1036F TOBACCO NON-USER: CPT | Performed by: INTERNAL MEDICINE

## 2021-01-25 PROCEDURE — 1090F PRES/ABSN URINE INCON ASSESS: CPT | Performed by: INTERNAL MEDICINE

## 2021-01-25 PROCEDURE — 4040F PNEUMOC VAC/ADMIN/RCVD: CPT | Performed by: INTERNAL MEDICINE

## 2021-01-25 PROCEDURE — 3017F COLORECTAL CA SCREEN DOC REV: CPT | Performed by: INTERNAL MEDICINE

## 2021-01-25 PROCEDURE — 1123F ACP DISCUSS/DSCN MKR DOCD: CPT | Performed by: INTERNAL MEDICINE

## 2021-01-25 PROCEDURE — G8400 PT W/DXA NO RESULTS DOC: HCPCS | Performed by: INTERNAL MEDICINE

## 2021-01-25 PROCEDURE — G8427 DOCREV CUR MEDS BY ELIG CLIN: HCPCS | Performed by: INTERNAL MEDICINE

## 2021-01-25 PROCEDURE — G8417 CALC BMI ABV UP PARAM F/U: HCPCS | Performed by: INTERNAL MEDICINE

## 2021-01-25 RX ORDER — CEFDINIR 300 MG/1
300 CAPSULE ORAL 2 TIMES DAILY
Qty: 14 CAPSULE | Refills: 0 | Status: SHIPPED | OUTPATIENT
Start: 2021-01-25 | End: 2021-02-01

## 2021-01-25 RX ORDER — METHYLPREDNISOLONE 4 MG/1
TABLET ORAL
Qty: 1 KIT | Refills: 0 | Status: SHIPPED | OUTPATIENT
Start: 2021-01-25 | End: 2021-01-31

## 2021-01-25 RX ORDER — ALBUTEROL SULFATE 2.5 MG/3ML
2.5 SOLUTION RESPIRATORY (INHALATION) 3 TIMES DAILY PRN
Qty: 30 EACH | Refills: 3 | Status: SHIPPED | OUTPATIENT
Start: 2021-01-25 | End: 2021-11-08 | Stop reason: SDUPTHER

## 2021-01-25 ASSESSMENT — PATIENT HEALTH QUESTIONNAIRE - PHQ9
1. LITTLE INTEREST OR PLEASURE IN DOING THINGS: 0
SUM OF ALL RESPONSES TO PHQ QUESTIONS 1-9: 0
SUM OF ALL RESPONSES TO PHQ QUESTIONS 1-9: 0

## 2021-01-25 ASSESSMENT — ENCOUNTER SYMPTOMS
CONSTIPATION: 0
ABDOMINAL PAIN: 0
CHEST TIGHTNESS: 0
COUGH: 0
SORE THROAT: 0
WHEEZING: 0

## 2021-01-25 NOTE — PROGRESS NOTES
Chief Complaint   Patient presents with    Allergic Reaction     PT states that she has an allergic reaction to dust. Pt states that she has used some nose spray, but states that it has not really helped. History of presenting illness:  Elaine Lowe is a74 y.o. female who presents today for follow up on her chronic medical conditions as noted below. 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  Now sneezing  Postnasal drip  Facial pressure    Uses steroid nose spray-unable to tell me the name of it today  States has not been using her inhaler Dulera or Spiriva    She states that she is highly allergic to dust and a time when she works with dust similar symptoms happen and if she does not get any antibiotics she will most definitely get bronchitis or pneumonia    Patient Active Problem List    Diagnosis Date Noted    Type 2 diabetes mellitus without complication, without long-term current use of insulin ([facility] [address] 11/23/2020    RLS (restless legs syndrome) 11/23/2020    Vitamin D deficiency 11/23/2020    Nausea 08/22/2013    Gastroesophageal reflux disease without esophagitis 08/22/2013    History of Helicobacter pylori infection 08/22/2013    History of colon polyps 08/22/2013    Hiatal hernia 08/22/2013    Diverticulosis 08/22/2013     Past Medical History:   Diagnosis Date    GERD (gastroesophageal reflux disease)     Helicobacter Pylori (H. Pylori) Infection     Hyperlipidemia       Past Surgical History:   Procedure Laterality Date    CHOLECYSTECTOMY      COLONOSCOPY  2017    nanda    HYSTERECTOMY      ALEX BSO    KNEE GANGLION SURGERY      NECK SURGERY      Tumor removal    UPPER GASTROINTESTINAL ENDOSCOPY  ? [facility]     Current Outpatient Medications   Medication Sig Dispense Refill    methylPREDNISolone (MEDROL DOSEPACK) 4 MG tablet Take by mouth.  1 kit 0    cefdinir (OMNICEF) 300 MG capsule Take 1 capsule by mouth 2 times daily for 7 days 14 capsule 0  albuterol (PROVENTIL) (2.5 MG/3ML) 0.083% nebulizer solution Take 3 mLs by nebulization 3 times daily as needed for Wheezing 30 each 3    vitamin D (ERGOCALCIFEROL) 1.25 MG (22086 UT) CAPS capsule Take 1 capsule by mouth once a week 5 capsule 1    atorvastatin (LIPITOR) 80 MG tablet Take 80 mg by mouth daily      SITagliptin (JANUVIA) 50 MG tablet Take 50 mg by mouth 2 times daily      glyBURIDE (DIABETA) 5 MG tablet Take 5 mg by mouth 2 times daily (with meals)      vitamin B-12 (CYANOCOBALAMIN) 1000 MCG tablet Take 1,000 mcg by mouth daily      Calcium Carb-Cholecalciferol (CALCIUM 1000 + D PO) Take by mouth      Biotin 1000 MCG TABS Take 1,000 Units by mouth      mometasone-formoterol (DULERA) 100-5 MCG/ACT inhaler Inhale 2 puffs into the lungs as needed      tiotropium (SPIRIVA) 18 MCG inhalation capsule Inhale 18 mcg into the lungs daily      Dulaglutide (TRULICITY) 1.5 IF/5.9WR SOPN Inject 1.5 mg into the skin once a week 9 mL 1    polyethylene glycol (MIRALAX) 17 g PACK packet Take 17 g by mouth daily      ezetimibe (ZETIA) 10 MG tablet Take by mouth daily       esomeprazole Magnesium (NEXIUM) 40 MG PACK Take 40 mg by mouth daily.  aspirin 81 MG EC tablet Take 81 mg by mouth daily. No current facility-administered medications for this visit. Allergies   Allergen Reactions    Amoxicillin-Pot Clavulanate     Ciprofloxacin     Codeine     Sulfa Antibiotics      Social History     Tobacco Use    Smoking status: Never Smoker    Smokeless tobacco: Never Used   Substance Use Topics    Alcohol use: No      Family History   Problem Relation Age of Onset    Stroke Mother     Diabetes Mother     Liver Cancer Father     Breast Cancer Sister     Liver Cancer Maternal Grandfather     Colon Cancer Neg Hx     Colon Polyps Neg Hx        Review of Systems   Constitutional: Negative for chills, fatigue and fever. HENT: Positive for congestion.  Negative for ear pain, nosebleeds, postnasal drip and sore throat. Respiratory: Negative for cough, chest tightness and wheezing. Cardiovascular: Negative for chest pain, palpitations and leg swelling. Gastrointestinal: Negative for abdominal pain and constipation. Genitourinary: Negative for dysuria and urgency. Musculoskeletal: Negative. Negative for arthralgias. Skin: Negative for rash. Neurological: Negative for dizziness and headaches. Psychiatric/Behavioral: Negative. Vitals:    01/25/21 1219   BP: 110/78   Site: Left Upper Arm   Position: Sitting   Cuff Size: Large Adult   Pulse: 68   Resp: 18   SpO2: 98%   Weight: 150 lb (68 kg)   Height: 5' 4\" (1.626 m)     Body mass index is 25.75 kg/m². Physical Exam  Constitutional:       Appearance: She is well-developed. HENT:      Right Ear: External ear normal.      Left Ear: External ear normal.      Nose: Congestion present. Mouth/Throat:      Pharynx: No oropharyngeal exudate. Eyes:      Conjunctiva/sclera: Conjunctivae normal.      Pupils: Pupils are equal, round, and reactive to light. Neck:      Musculoskeletal: Neck supple. Thyroid: No thyromegaly. Vascular: No JVD. Cardiovascular:      Rate and Rhythm: Normal rate. Heart sounds: Normal heart sounds. No murmur. Pulmonary:      Effort: No respiratory distress. Breath sounds: Rales present. No wheezing. Chest:      Chest wall: No tenderness. Abdominal:      General: Bowel sounds are normal.      Palpations: Abdomen is soft. Musculoskeletal: Normal range of motion. Lymphadenopathy:      Cervical: No cervical adenopathy. Skin:     General: Skin is warm. Findings: No rash. Neurological:      Mental Status: She is oriented to person, place, and time.          Lab Review   Abstract on 12/14/2020   Component Date Value    Hemoglobin A1C 11/09/2020 6.8            ASSESSMENT/PLAN:  Acute non-recurrent maxillary sinusitis    [facility]    Postnasal drip    rX  -     methylPREDNISolone (MEDROL DOSEPACK) 4 MG tablet; Take by mouth. -     cefdinir (OMNICEF) 300 MG capsule; Take 1 capsule by mouth 2 times daily for 7 days  -     albuterol (PROVENTIL) (2.5 MG/3ML) 0.083% nebulizer solution; Take 3 mLs by nebulization 3 times daily as needed for Wheezing  Also following recommendation  Take antihistamine daily  Restart using daily Dulera and Spiriva  Use steroid nose spray daily    If sx not improving and resolving , if sx continue or re-occur pt has been instructed to call us and / or return here for follow- up evaluation              No orders of the defined types were placed in this encounter. New Prescriptions    ALBUTEROL (PROVENTIL) (2.5 MG/3ML) 0.083% NEBULIZER SOLUTION    Take 3 mLs by nebulization 3 times daily as needed for Wheezing    CEFDINIR (OMNICEF) 300 MG CAPSULE    Take 1 capsule by mouth 2 times daily for 7 days    METHYLPREDNISOLONE (MEDROL DOSEPACK) 4 MG TABLET    Take by mouth. No follow-ups on file. There are no Patient Instructions on file for this visit. EMR Dragon/transcription disclaimer:Significant part of this  encounter note is electronic transcription/translationof spoken language to printed text. The electronic translation of spoken language may be erroneous, or at times, nonsensical words or phrases may be inadvertently transcribed.  Although I have reviewed the note for sucherrors, some may still exist.

## 2021-02-01 ENCOUNTER — VIRTUAL VISIT (OUTPATIENT)
Dept: INTERNAL MEDICINE | Age: 76
End: 2021-02-01
Payer: COMMERCIAL

## 2021-02-01 DIAGNOSIS — J21.9 ACUTE BRONCHITIS AND BRONCHIOLITIS: Primary | ICD-10-CM

## 2021-02-01 DIAGNOSIS — J20.9 ACUTE BRONCHITIS AND BRONCHIOLITIS: Primary | ICD-10-CM

## 2021-02-01 DIAGNOSIS — R09.82 POSTNASAL DRIP: ICD-10-CM

## 2021-02-01 DIAGNOSIS — R05.9 COUGH: ICD-10-CM

## 2021-02-01 DIAGNOSIS — R06.2 WHEEZING: ICD-10-CM

## 2021-02-01 DIAGNOSIS — N39.0 RECURRENT UTI: ICD-10-CM

## 2021-02-01 PROCEDURE — G8427 DOCREV CUR MEDS BY ELIG CLIN: HCPCS | Performed by: INTERNAL MEDICINE

## 2021-02-01 PROCEDURE — G8400 PT W/DXA NO RESULTS DOC: HCPCS | Performed by: INTERNAL MEDICINE

## 2021-02-01 PROCEDURE — 1123F ACP DISCUSS/DSCN MKR DOCD: CPT | Performed by: INTERNAL MEDICINE

## 2021-02-01 PROCEDURE — 4040F PNEUMOC VAC/ADMIN/RCVD: CPT | Performed by: INTERNAL MEDICINE

## 2021-02-01 PROCEDURE — 1036F TOBACCO NON-USER: CPT | Performed by: INTERNAL MEDICINE

## 2021-02-01 PROCEDURE — 3017F COLORECTAL CA SCREEN DOC REV: CPT | Performed by: INTERNAL MEDICINE

## 2021-02-01 PROCEDURE — 99213 OFFICE O/P EST LOW 20 MIN: CPT | Performed by: INTERNAL MEDICINE

## 2021-02-01 PROCEDURE — 1090F PRES/ABSN URINE INCON ASSESS: CPT | Performed by: INTERNAL MEDICINE

## 2021-02-01 PROCEDURE — G8417 CALC BMI ABV UP PARAM F/U: HCPCS | Performed by: INTERNAL MEDICINE

## 2021-02-01 PROCEDURE — G8484 FLU IMMUNIZE NO ADMIN: HCPCS | Performed by: INTERNAL MEDICINE

## 2021-02-01 RX ORDER — PREDNISONE 10 MG/1
10 TABLET ORAL 2 TIMES DAILY
Qty: 8 TABLET | Refills: 0 | Status: SHIPPED | OUTPATIENT
Start: 2021-02-01 | End: 2021-02-05

## 2021-02-01 RX ORDER — AZITHROMYCIN 250 MG/1
250 TABLET, FILM COATED ORAL SEE ADMIN INSTRUCTIONS
Qty: 6 TABLET | Refills: 0 | Status: SHIPPED | OUTPATIENT
Start: 2021-02-01 | End: 2021-02-06

## 2021-02-01 RX ORDER — NITROFURANTOIN 25; 75 MG/1; MG/1
100 CAPSULE ORAL NIGHTLY
Qty: 90 CAPSULE | Refills: 3 | Status: CANCELLED | OUTPATIENT
Start: 2021-02-01

## 2021-02-01 ASSESSMENT — ENCOUNTER SYMPTOMS
CONSTIPATION: 0
SORE THROAT: 0
ABDOMINAL PAIN: 0
WHEEZING: 0
SINUS PAIN: 1
COUGH: 1
SINUS PRESSURE: 1
CHEST TIGHTNESS: 0

## 2021-02-01 NOTE — PROGRESS NOTES
Chief Complaint   Patient presents with    Cough     on-going for 2 weeks     History of presenting illness:  Joe Christina is a74 y.o. female     TELEHEALTH EVALUATION -- Audio/Visual (During LZFWU-17 public health emergency)  Patient location-home  Pursuant to the emergency declaration under the [facility] [address] authority and the [employer] Act, this Virtual  Visit was conducted, with patient's consent, to reduce the patient's risk of exposure to COVID-19 and provide continuity of care for an established patient. Services were provided through a video synchronous discussion virtually to substitute for in-person clinic visit. 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/she is allergic to dust and developed sneezing, postnasal drip and facial pressure  Had  rapid Covid test done locally on Saturday 1/23/21 which was January 23  At that time since patient stated that she always develops sinus infections with similar symptoms antibiotic Omnicef was prescribed for 8 days and patient was given prescription for Medrol Dosepak    Initially when I am talking to her today regarding her symptoms compared to a week ago she says \"nothing is better\"  She still has sinus pressure and coughing  She states that this is \"all allergies\" and that \"all I need is more steroids\"  Patient seems very irritated that she has to have this discussion and virtual visit with me ask questions.   She states \"usually it takes me at least 3 different antibiotics and steroids because they never give me enough to get me over this and I have to call back for multiple courses of steroids before this is over\"  When I am questioning her over the fact that she is not better at all since for her allergies and past steroids as per her own description have always worked, after that she  stated that \"so I guess I may be 40% better but I know I need more steroids\"  During conversation patient is being demanding, unkind and unreasonable, she states that she needs to be given treatment in the form of steroids as per her request      Patient Active Problem List    Diagnosis Date Noted    Type 2 diabetes mellitus without complication, without long-term current use of insulin ([facility] [address] 11/23/2020    RLS (restless legs syndrome) 11/23/2020    Vitamin D deficiency 11/23/2020    Nausea 08/22/2013    Gastroesophageal reflux disease without esophagitis 08/22/2013    History of Helicobacter pylori infection 08/22/2013    History of colon polyps 08/22/2013    Hiatal hernia 08/22/2013    Diverticulosis 08/22/2013     Past Medical History:   Diagnosis Date    GERD (gastroesophageal reflux disease)     Helicobacter Pylori (H. Pylori) Infection     Hyperlipidemia       Past Surgical History:   Procedure Laterality Date    CHOLECYSTECTOMY      COLONOSCOPY  2017    nanda    HYSTERECTOMY      ALEX BSO    KNEE GANGLION SURGERY      NECK SURGERY      Tumor removal    [facility]     Current Outpatient Medications   Medication Sig Dispense Refill    azithromycin (ZITHROMAX) 250 MG tablet Take 1 tablet by mouth See Admin Instructions for 5 days 500mg on day 1 followed by 250mg on days 2 - 5 6 tablet 0    predniSONE (DELTASONE) 10 MG tablet Take 1 tablet by mouth 2 times daily for 4 days 8 tablet 0    albuterol (PROVENTIL) (2.5 MG/3ML) 0.083% nebulizer solution Take 3 mLs by nebulization 3 times daily as needed for Wheezing 30 each 3    vitamin D (ERGOCALCIFEROL) 1.25 MG (14058 UT) CAPS capsule Take 1 capsule by mouth once a week 5 capsule 1    atorvastatin (LIPITOR) 80 MG tablet Take 80 mg by mouth daily      SITagliptin (JANUVIA) 50 MG tablet Take 50 mg by mouth 2 times daily      glyBURIDE (DIABETA) 5 MG tablet Take 5 mg by mouth 2 times daily (with meals)      vitamin B-12 (CYANOCOBALAMIN) 1000 MCG tablet Take 1,000 mcg by mouth daily      Calcium Carb-Cholecalciferol (CALCIUM 1000 + D PO) Take by mouth      Biotin 1000 MCG TABS Take 1,000 Units by mouth      mometasone-formoterol (DULERA) 100-5 MCG/ACT inhaler Inhale 2 puffs into the lungs as needed      tiotropium (SPIRIVA) 18 MCG inhalation capsule Inhale 18 mcg into the lungs daily      Dulaglutide (TRULICITY) 1.5 VC/6.7DY SOPN Inject 1.5 mg into the skin once a week 9 mL 1    polyethylene glycol (MIRALAX) 17 g PACK packet Take 17 g by mouth daily      ezetimibe (ZETIA) 10 MG tablet Take by mouth daily       esomeprazole Magnesium (NEXIUM) 40 MG PACK Take 40 mg by mouth daily.  aspirin 81 MG EC tablet Take 81 mg by mouth daily. No current facility-administered medications for this visit. Allergies   Allergen Reactions    Amoxicillin-Pot Clavulanate     Ciprofloxacin     Codeine     Sulfa Antibiotics      Social History     Tobacco Use    Smoking status: Never Smoker    Smokeless tobacco: Never Used   Substance Use Topics    Alcohol use: No      Family History   Problem Relation Age of Onset    Stroke Mother     Diabetes Mother     Liver Cancer Father     Breast Cancer Sister     Liver Cancer Maternal Grandfather     Colon Cancer Neg Hx     Colon Polyps Neg Hx        Review of Systems   Constitutional: Negative for chills, fatigue and fever. HENT: Positive for congestion, sinus pressure, sinus pain and sneezing. Negative for ear pain, nosebleeds, postnasal drip and sore throat. Respiratory: Positive for cough. Negative for chest tightness and wheezing. Cardiovascular: Negative for chest pain, palpitations and leg swelling. Gastrointestinal: Negative for abdominal pain and constipation. Genitourinary: Negative for dysuria and urgency. Musculoskeletal: Negative. Negative for arthralgias.    Skin: Negative for rash. Neurological: Negative for dizziness and headaches. Psychiatric/Behavioral: Negative. There were no vitals filed for this visit. There is no height or weight on file to calculate BMI. Patient-Reported Vitals 2/1/2021   Patient-Reported Systolic 525   Patient-Reported Diastolic 80   Patient-Reported Temperature 98.4          Physical Exam  PHYSICAL EXAMINATION:  [ INSTRUCTIONS:  \"[x]\" Indicates a positive item  \"[]\" Indicates a negative item  -- DELETE ALL ITEMS NOT EXAMINED]  [x] Alert  [x] Oriented to person/place/time    [x] No apparent distress  [] Toxic appearing    [] Face flushed appearing [] Sclera clear  [] Lips are cyanotic      [x] Breathing appears normal  [] Appears tachypneic      [] Rash on visible skin    [x] Cranial Nerves II-XII grossly intact    [x] Motor grossly intact in visible upper extremities    [x] Motor grossly intact in visible lower extremities    [x] Normal Mood  [] Anxious appearing    [] Depressed appearing  [] Confused appearing      [] Poor short term memory  [] Poor long term memory    [] OTHER:      Due to this being a TeleHealth encounter, evaluation of the following organ systems is limited: Vitals/Constitutional/EENT/Resp/CV/GI//MS/Neuro/Skin/Heme-Lymph-Imm.     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/she is allergic to dust and developed sneezing, postnasal drip and facial pressure  Had  rapid Covid test done locally on Saturday 1/23/21 which was January 23  At that time since patient stated that she always develops sinus infections with similar symptoms antibiotic Omnicef was prescribed for 8 days and patient was given prescription for Medrol Chasity Vinicio    Initially when I am talking to her today regarding her symptoms compared to a week ago she says \"nothing is better\"  She still has sinus pressure and coughing  She states that this is \"all allergies\" and that \"all I need is more steroids\"  Patient seems very irritated that she has to have this discussion and virtual visit with me ask questions. She states \"usually it takes me at least 3 different antibiotics and steroids because they never give me enough to get me over this and I have to call back for multiple courses of steroids before this is over\"  When I am questioning her over the fact that she is not better at all since for her allergies and past steroids as per her own description have always worked, after that she  stated that \"so I guess I may be 40% better but I know I need more steroids\"  During conversation patient is being demanding, unkind and unreasonable, she states that she needs to be given treatment in the form of steroids as per her request        Recommendations today  1. Multiple courses of antibiotics and steroids for allergies are not recommended. Our common goal here is to do no harm. Treating allergies with multiple courses of antibiotics and steroids has not shown to be reasonable and can cause significant side effects  2. Since patient now is complaining of continued coughing and wheezing + sinus pressure in setting this is allergies but requesting antibiotics I do give her prescription for Zithromax to cover possible atypical bacteria since patient already has received course of Omnicef  Rx today  Zithromax [id number]  Prednisone 10 twice daily x4 days  3. Covid testing is recommended, rapid Covid test especially if done very early during onset of the symptom can be false negative. I have placed orders for Covid testing since patient continues to cough  4. Suggest patient uses her nebulizer treatments, her Spiriva and her Dulera regularly  5.   Patient needs to consult with

## 2021-02-05 NOTE — PROGRESS NOTES
Subjective    Ms. Simley is 75 y.o. female    Chief Complaint: Medication Refill    History of Present Illness  HPI:  Patient with chronic ongoing diagnosis of chronic cystitis and recurrent urinary tract infections.  This has been occurring for several years was gradual in onset.  Patient was last seen 11/13/2018 by Dr. Ferrell so she is now considered a new patient for today's visit.  UTIs have been well controlled on taking Macrobid twice weekly.  She has not had an infection for several years she had no infections this past year her urine is clear today.  He is currently asymptomatic.  She does need refill on her Macrobid.     The following portions of the patient's history were reviewed and updated as appropriate: allergies, current medications, past family history, past medical history, past social history, past surgical history and problem list.    Review of Systems   Constitutional: Negative for appetite change, chills, fatigue, fever and unexpected weight change.   HENT: Negative for congestion, dental problem, ear pain, hearing loss, nosebleeds, sinus pressure and trouble swallowing.    Eyes: Negative for pain, discharge, redness and itching.   Respiratory: Negative for apnea, cough, choking and shortness of breath.    Cardiovascular: Negative for chest pain and palpitations.   Gastrointestinal: Negative for abdominal distention, abdominal pain, blood in stool, constipation, diarrhea, nausea and vomiting.   Endocrine: Negative for cold intolerance and heat intolerance.   Genitourinary: Negative for dysuria, flank pain, frequency, hematuria and urgency.   Musculoskeletal: Negative for arthralgias, back pain and gait problem.   Skin: Negative for pallor, rash and wound.   Allergic/Immunologic: Negative for immunocompromised state.   Neurological: Negative for dizziness, tremors, seizures, weakness, numbness and headaches.   Hematological: Negative for adenopathy. Does not bruise/bleed easily.    Psychiatric/Behavioral: Negative for agitation, behavioral problems, hallucinations, self-injury and suicidal ideas.         Current Outpatient Medications:   •  albuterol (ACCUNEB) 1.25 MG/3ML nebulizer solution, Inhale 3 mL As Needed., Disp: , Rfl:   •  ascorbic acid (VITAMIN C) 1000 MG tablet, Take 1,000 mg by mouth Daily., Disp: , Rfl:   •  aspirin 81 MG EC tablet, Take 81 mg by mouth Daily., Disp: , Rfl:   •  atorvastatin (LIPITOR) 80 MG tablet, Take 1 tablet by mouth Daily., Disp: 90 tablet, Rfl: 3  •  Biotin 1000 MCG tablet, Take 1,000 Units by mouth., Disp: , Rfl:   •  cetirizine (zyrTEC) 10 MG tablet, Take 10 mg by mouth Daily., Disp: , Rfl:   •  Dulaglutide (Trulicity) 0.75 MG/0.5ML solution pen-injector, Inject 0.75 mg under the skin into the appropriate area as directed 1 (One) Time Per Week., Disp: 13 pen, Rfl: 3  •  ezetimibe (Zetia) 10 MG tablet, Take 1 tablet by mouth Daily., Disp: 90 tablet, Rfl: 3  •  glucose monitor monitoring kit, 1 each Take As Directed. Accu-chek monitor, Disp: 1 each, Rfl: 0  •  glyburide (DIAbeta) 5 MG tablet, Take 1 tablet by mouth 2 (Two) Times a Day With Meals. Takes two tablets 2 times daily., Disp: 360 tablet, Rfl: 3  •  mometasone-formoterol (DULERA 100) 100-5 MCG/ACT inhaler, Inhale 2 puffs., Disp: , Rfl:   •  nitrofurantoin, macrocrystal-monohydrate, (MACROBID) 100 MG capsule, Take 1 capsule by mouth 2 (Two) Times a Week for 84 days., Disp: 8 capsule, Rfl: 2  •  pantoprazole (PROTONIX) 40 MG EC tablet, Take 1 tablet by mouth Daily., Disp: 90 tablet, Rfl: 3  •  phenazopyridine (PYRIDIUM) 200 MG tablet, Take 200 mg by mouth 2 (Two) Times a Day As Needed for bladder spasms., Disp: , Rfl:   •  polyethylene glycol (polyethylene glycol) 17 g packet, Take 17 g by mouth., Disp: , Rfl:   •  SITagliptin (Januvia) 50 MG tablet, Take 1 tablet by mouth 2 (two) times a day., Disp: 180 tablet, Rfl: 3  •  tiotropium (SPIRIVA) 18 MCG per inhalation capsule, Place 18 mcg into inhaler  and inhale., Disp: , Rfl:   •  vitamin B-12 (CYANOCOBALAMIN) 1000 MCG tablet, Take 1,000 mcg by mouth Daily., Disp: , Rfl:   •  vitamin D (ERGOCALCIFEROL) 1.25 MG (44289 UT) capsule capsule, TAKE 1 CAPSULE BY MOUTH 1 (ONE) TIME PER WEEK., Disp: 12 capsule, Rfl: 3  •  busPIRone (BUSPAR) 5 MG tablet, Take 1 tablet by mouth Every Night., Disp: 14 tablet, Rfl: 0    Past Medical History:   Diagnosis Date   • Asthma    • Diabetes mellitus (CMS/HCC)     Type 2   • GERD (gastroesophageal reflux disease)    • HH (hiatus hernia)    • Hx of colonic polyps    • Insomnia    • Vitamin D deficiency        Past Surgical History:   Procedure Laterality Date   • CATARACT EXTRACTION     • CHOLECYSTECTOMY     • COLONOSCOPY  05/26/2011    Isolated area of ischemic colitis, Diverticulosis repeat exam in 5 years Dr. Sommer   • COLONOSCOPY N/A 6/16/2017    Procedure: COLONOSCOPY WITH ANESTHESIA;  Surgeon: Faraz Cardenas MD;  Location: [facility] ENDOSCOPY;  Service:    • ENDOSCOPY  09/18/2013    Benign hyperplastic polyp with moderate chronic active inflammation stomach, Mild gastritis repeat exam 3 years Dr. Christie   • ENDOSCOPY N/A 6/1/2017    Procedure: ESOPHAGOGASTRODUODENOSCOPY WITH ANESTHESIA;  Surgeon: Faraz Cardenas MD;  Location: [facility] ENDOSCOPY;  Service:    • HYSTERECTOMY     • KNEE SURGERY Left     baker's cyst    • TUMOR EXCISION      Throat benign   • UPPER GASTROINTESTINAL ENDOSCOPY  09/17/2013    small cardia polyp (benign hyperplastic),mild gastritis       Social History     Socioeconomic History   • Marital status:      Spouse name: Not on file   • Number of children: Not on file   • Years of education: Not on file   • Highest education level: Not on file   Tobacco Use   • Smoking status: Never Smoker   • Smokeless tobacco: Never Used   Substance and Sexual Activity   • Alcohol use: No   • Drug use: No   • Sexual activity: Defer       Family History   Problem Relation Age of Onset   • Stroke Mother    •  "Cancer Father    • Colon cancer Neg Hx    • Colon polyps Neg Hx        Objective    Temp 97.1 °F (36.2 °C) (Temporal)   Ht 165.1 cm (65\")   Wt 67.6 kg (149 lb)   BMI 24.79 kg/m²     Physical Exam  Vitals signs reviewed.   Constitutional:       Appearance: Normal appearance.   HENT:      Head: Normocephalic and atraumatic.      Right Ear: External ear normal.      Left Ear: External ear normal.      Nose: No congestion.   Eyes:      Conjunctiva/sclera: Conjunctivae normal.   Neck:      Comments: I observed no obvious neck masses  Pulmonary:      Effort: Pulmonary effort is normal.   Musculoskeletal:      Comments: Patient ambulates without difficulty   Skin:     Coloration: Skin is not pale.      Findings: No rash.      Comments: No obvious facial rash noted   Neurological:      General: No focal deficit present.      Mental Status: She is alert and oriented to person, place, and time.   Psychiatric:         Mood and Affect: Mood normal.         Behavior: Behavior normal.             Results for orders placed or performed in visit on 02/08/21   POC Urinalysis Dipstick, Multipro    Specimen: Urine   Result Value Ref Range    Color Yellow Yellow, Straw, Dark Yellow, Debra    Clarity, UA Clear Clear    Glucose, UA Negative Negative, 1000 mg/dL (3+) mg/dL    Bilirubin Negative Negative    Ketones, UA Negative Negative    Specific Gravity  1.025 1.005 - 1.030    Blood, UA Negative Negative    pH, Urine 5.5 5.0 - 8.0    Protein, POC Negative Negative mg/dL    Urobilinogen, UA Normal Normal    Nitrite, UA Negative Negative    Leukocytes Trace (A) Negative   I personally reviewed the urinalysis results.  There is no indication for microscopic evaluation.  Assessment and Plan    Diagnoses and all orders for this visit:    1. Recurrent UTI (Primary)  -     POC Urinalysis Dipstick, Multipro  -     nitrofurantoin, macrocrystal-monohydrate, (MACROBID) 100 MG capsule; Take 1 capsule by mouth 2 (Two) Times a Week for 84 days.  " Dispense: 8 capsule; Refill: 2  Patient with several year history of recurrent urinary tract infections she has taken Macrobid for several years.  She takes Macrobid 2 times a week.  And this has been an effective treatment for her to prevent infection she is not had any infections this past year it has been several years since her last infection.  Previous to treatment she had infections multiple times.  Her urine is clear today follow-up 1 year.

## 2021-02-08 ENCOUNTER — OFFICE VISIT (OUTPATIENT)
Dept: UROLOGY | Facility: CLINIC | Age: 76
End: 2021-02-08

## 2021-02-08 VITALS — WEIGHT: 149 LBS | BODY MASS INDEX: 24.83 KG/M2 | HEIGHT: 65 IN | TEMPERATURE: 97.1 F

## 2021-02-08 DIAGNOSIS — M85.852 OSTEOPENIA OF LEFT HIP: ICD-10-CM

## 2021-02-08 DIAGNOSIS — N39.0 RECURRENT UTI: Primary | ICD-10-CM

## 2021-02-08 DIAGNOSIS — Z12.31 VISIT FOR SCREENING MAMMOGRAM: ICD-10-CM

## 2021-02-08 LAB
BILIRUB BLD-MCNC: NEGATIVE MG/DL
CLARITY, POC: CLEAR
COLOR UR: YELLOW
GLUCOSE UR STRIP-MCNC: NEGATIVE MG/DL
KETONES UR QL: NEGATIVE
LEUKOCYTE EST, POC: ABNORMAL
NITRITE UR-MCNC: NEGATIVE MG/ML
PH UR: 5.5 [PH] (ref 5–8)
PROT UR STRIP-MCNC: NEGATIVE MG/DL
RBC # UR STRIP: NEGATIVE /UL
SP GR UR: 1.02 (ref 1–1.03)
UROBILINOGEN UR QL: NORMAL

## 2021-02-08 PROCEDURE — 81003 URINALYSIS AUTO W/O SCOPE: CPT | Performed by: PHYSICIAN ASSISTANT

## 2021-02-08 PROCEDURE — 99212 OFFICE O/P EST SF 10 MIN: CPT | Performed by: PHYSICIAN ASSISTANT

## 2021-02-08 RX ORDER — POLYETHYLENE GLYCOL 3350 17 G/17G
17 POWDER, FOR SOLUTION ORAL
COMMUNITY
End: 2022-11-28

## 2021-02-08 RX ORDER — NITROFURANTOIN 25; 75 MG/1; MG/1
100 CAPSULE ORAL 2 TIMES WEEKLY
Qty: 8 CAPSULE | Refills: 2 | Status: SHIPPED | OUTPATIENT
Start: 2021-02-08 | End: 2021-04-29 | Stop reason: SDUPTHER

## 2021-02-08 RX ORDER — BIOTIN 1 MG
1000 TABLET ORAL
COMMUNITY

## 2021-02-09 PROBLEM — M85.852 OSTEOPENIA OF LEFT HIP: Status: ACTIVE | Noted: 2021-02-09

## 2021-02-18 RX ORDER — ESOMEPRAZOLE MAGNESIUM 40 MG/1
40 CAPSULE, DELAYED RELEASE ORAL
Qty: 90 CAPSULE | Refills: 1 | Status: SHIPPED | OUTPATIENT
Start: 2021-02-18 | End: 2021-03-24 | Stop reason: SDUPTHER

## 2021-02-18 NOTE — TELEPHONE ENCOUNTER
Larissa Lopez called requesting a refill of the below medication which has been pended for you:     Requested Prescriptions     Pending Prescriptions Disp Refills    esomeprazole (NEXIUM) 40 MG delayed release capsule 90 capsule 1     Sig: Take 1 capsule by mouth every morning (before breakfast)       Last Appointment Date: 2/1/2021  Next Appointment Date: 3/24/2021    Allergies   Allergen Reactions    Amoxicillin-Pot Clavulanate     Ciprofloxacin     Codeine     Sulfa Antibiotics

## 2021-03-03 RX ORDER — ATORVASTATIN CALCIUM 80 MG/1
80 TABLET, FILM COATED ORAL DAILY
Qty: 90 TABLET | Refills: 1 | Status: SHIPPED | OUTPATIENT
Start: 2021-03-03 | End: 2021-03-24 | Stop reason: SDUPTHER

## 2021-03-03 NOTE — TELEPHONE ENCOUNTER
Steven Garnett called requesting a refill of the below medication which has been pended for you:     Requested Prescriptions     Pending Prescriptions Disp Refills    atorvastatin (LIPITOR) 80 MG tablet 90 tablet 1     Sig: Take 1 tablet by mouth daily       Last Appointment Date: 2/1/2021  Next Appointment Date: 3/24/2021    Allergies   Allergen Reactions    Amoxicillin-Pot Clavulanate     Ciprofloxacin     Codeine     Sulfa Antibiotics

## 2021-03-18 DIAGNOSIS — E78.00 PURE HYPERCHOLESTEROLEMIA: ICD-10-CM

## 2021-03-18 DIAGNOSIS — U07.1 COVID-19: Primary | ICD-10-CM

## 2021-03-18 DIAGNOSIS — E11.9 TYPE 2 DIABETES MELLITUS WITHOUT COMPLICATION, WITHOUT LONG-TERM CURRENT USE OF INSULIN (HCC): ICD-10-CM

## 2021-03-18 DIAGNOSIS — F51.01 PRIMARY INSOMNIA: ICD-10-CM

## 2021-03-18 DIAGNOSIS — Z01.84 COVID-19 VIRUS IGG ANTIBODY TEST RESULT UNKNOWN: ICD-10-CM

## 2021-03-18 DIAGNOSIS — N30.10 INTERSTITIAL CYSTITIS: ICD-10-CM

## 2021-03-18 DIAGNOSIS — E55.9 VITAMIN D DEFICIENCY: ICD-10-CM

## 2021-03-18 DIAGNOSIS — G25.81 RLS (RESTLESS LEGS SYNDROME): ICD-10-CM

## 2021-03-18 LAB
ALBUMIN SERPL-MCNC: 4.4 G/DL (ref 3.5–5.2)
ALP BLD-CCNC: 104 U/L (ref 35–104)
ALT SERPL-CCNC: 34 U/L (ref 5–33)
ANION GAP SERPL CALCULATED.3IONS-SCNC: 10 MMOL/L (ref 7–19)
AST SERPL-CCNC: 28 U/L (ref 5–32)
BACTERIA: NEGATIVE /HPF
BASOPHILS ABSOLUTE: 0 K/UL (ref 0–0.2)
BASOPHILS RELATIVE PERCENT: 0.9 % (ref 0–1)
BILIRUB SERPL-MCNC: 0.6 MG/DL (ref 0.2–1.2)
BILIRUBIN URINE: NEGATIVE
BLOOD, URINE: NEGATIVE
BUN BLDV-MCNC: 19 MG/DL (ref 8–23)
CALCIUM SERPL-MCNC: 9.2 MG/DL (ref 8.8–10.2)
CHLORIDE BLD-SCNC: 104 MMOL/L (ref 98–111)
CHOLESTEROL, TOTAL: 129 MG/DL (ref 160–199)
CLARITY: CLEAR
CO2: 27 MMOL/L (ref 22–29)
COLOR: YELLOW
CREAT SERPL-MCNC: 0.7 MG/DL (ref 0.5–0.9)
CREATININE URINE: 172.8 MG/DL (ref 4.2–622)
CRYSTALS, UA: ABNORMAL /HPF
EOSINOPHILS ABSOLUTE: 0.1 K/UL (ref 0–0.6)
EOSINOPHILS RELATIVE PERCENT: 2.6 % (ref 0–5)
EPITHELIAL CELLS, UA: 2 /HPF (ref 0–5)
GFR AFRICAN AMERICAN: >59
GFR NON-AFRICAN AMERICAN: >60
GLUCOSE BLD-MCNC: 157 MG/DL (ref 74–109)
GLUCOSE URINE: NEGATIVE MG/DL
HBA1C MFR BLD: 7.1 % (ref 4–6)
HCT VFR BLD CALC: 40.7 % (ref 37–47)
HDLC SERPL-MCNC: 32 MG/DL (ref 65–121)
HEMOGLOBIN: 13.6 G/DL (ref 12–16)
HYALINE CASTS: 3 /HPF (ref 0–8)
IMMATURE GRANULOCYTES #: 0 K/UL
KETONES, URINE: NEGATIVE MG/DL
LDL CHOLESTEROL CALCULATED: 74 MG/DL
LEUKOCYTE ESTERASE, URINE: ABNORMAL
LYMPHOCYTES ABSOLUTE: 1.4 K/UL (ref 1.1–4.5)
LYMPHOCYTES RELATIVE PERCENT: 30.2 % (ref 20–40)
MCH RBC QN AUTO: 29.8 PG (ref 27–31)
MCHC RBC AUTO-ENTMCNC: 33.4 G/DL (ref 33–37)
MCV RBC AUTO: 89.1 FL (ref 81–99)
MICROALBUMIN UR-MCNC: <1.2 MG/DL (ref 0–19)
MICROALBUMIN/CREAT UR-RTO: NORMAL MG/G
MONOCYTES ABSOLUTE: 0.4 K/UL (ref 0–0.9)
MONOCYTES RELATIVE PERCENT: 8.8 % (ref 0–10)
NEUTROPHILS ABSOLUTE: 2.6 K/UL (ref 1.5–7.5)
NEUTROPHILS RELATIVE PERCENT: 57.3 % (ref 50–65)
NITRITE, URINE: NEGATIVE
PDW BLD-RTO: 12.2 % (ref 11.5–14.5)
PH UA: 5 (ref 5–8)
PLATELET # BLD: 182 K/UL (ref 130–400)
PMV BLD AUTO: 9.6 FL (ref 9.4–12.3)
POTASSIUM SERPL-SCNC: 4.1 MMOL/L (ref 3.5–5)
PROTEIN UA: NEGATIVE MG/DL
RBC # BLD: 4.57 M/UL (ref 4.2–5.4)
RBC UA: 1 /HPF (ref 0–4)
SARS-COV-2 ANTIBODY, TOTAL: NEGATIVE
SODIUM BLD-SCNC: 141 MMOL/L (ref 136–145)
SPECIFIC GRAVITY UA: 1.03 (ref 1–1.03)
TOTAL PROTEIN: 6.6 G/DL (ref 6.6–8.7)
TRIGL SERPL-MCNC: 117 MG/DL (ref 0–149)
TSH SERPL DL<=0.05 MIU/L-ACNC: 0.77 UIU/ML (ref 0.27–4.2)
UROBILINOGEN, URINE: 1 E.U./DL
VITAMIN D 25-HYDROXY: 81.1 NG/ML
WBC # BLD: 4.6 K/UL (ref 4.8–10.8)
WBC UA: 8 /HPF (ref 0–5)

## 2021-03-24 ENCOUNTER — OFFICE VISIT (OUTPATIENT)
Dept: INTERNAL MEDICINE | Age: 76
End: 2021-03-24
Payer: COMMERCIAL

## 2021-03-24 VITALS
SYSTOLIC BLOOD PRESSURE: 120 MMHG | RESPIRATION RATE: 18 BRPM | OXYGEN SATURATION: 98 % | DIASTOLIC BLOOD PRESSURE: 72 MMHG | WEIGHT: 150 LBS | BODY MASS INDEX: 25.61 KG/M2 | HEIGHT: 64 IN | HEART RATE: 82 BPM

## 2021-03-24 DIAGNOSIS — E11.9 TYPE 2 DIABETES MELLITUS WITHOUT COMPLICATION, WITHOUT LONG-TERM CURRENT USE OF INSULIN (HCC): ICD-10-CM

## 2021-03-24 DIAGNOSIS — F51.01 PRIMARY INSOMNIA: ICD-10-CM

## 2021-03-24 DIAGNOSIS — N30.10 INTERSTITIAL CYSTITIS: ICD-10-CM

## 2021-03-24 DIAGNOSIS — E55.9 VITAMIN D DEFICIENCY: ICD-10-CM

## 2021-03-24 DIAGNOSIS — E78.00 PURE HYPERCHOLESTEROLEMIA: ICD-10-CM

## 2021-03-24 DIAGNOSIS — M85.852 OSTEOPENIA OF LEFT HIP: ICD-10-CM

## 2021-03-24 DIAGNOSIS — Z11.59 NEED FOR HEPATITIS C SCREENING TEST: ICD-10-CM

## 2021-03-24 DIAGNOSIS — Z00.00 ROUTINE GENERAL MEDICAL EXAMINATION AT A HEALTH CARE FACILITY: ICD-10-CM

## 2021-03-24 DIAGNOSIS — Z12.31 ENCOUNTER FOR SCREENING MAMMOGRAM FOR BREAST CANCER: ICD-10-CM

## 2021-03-24 DIAGNOSIS — G25.81 RLS (RESTLESS LEGS SYNDROME): ICD-10-CM

## 2021-03-24 DIAGNOSIS — Z00.00 MEDICARE ANNUAL WELLNESS VISIT, SUBSEQUENT: Primary | ICD-10-CM

## 2021-03-24 PROCEDURE — G8417 CALC BMI ABV UP PARAM F/U: HCPCS | Performed by: INTERNAL MEDICINE

## 2021-03-24 PROCEDURE — 1036F TOBACCO NON-USER: CPT | Performed by: INTERNAL MEDICINE

## 2021-03-24 PROCEDURE — G8427 DOCREV CUR MEDS BY ELIG CLIN: HCPCS | Performed by: INTERNAL MEDICINE

## 2021-03-24 PROCEDURE — 3051F HG A1C>EQUAL 7.0%<8.0%: CPT | Performed by: INTERNAL MEDICINE

## 2021-03-24 PROCEDURE — 3017F COLORECTAL CA SCREEN DOC REV: CPT | Performed by: INTERNAL MEDICINE

## 2021-03-24 PROCEDURE — G0439 PPPS, SUBSEQ VISIT: HCPCS | Performed by: INTERNAL MEDICINE

## 2021-03-24 PROCEDURE — G8399 PT W/DXA RESULTS DOCUMENT: HCPCS | Performed by: INTERNAL MEDICINE

## 2021-03-24 PROCEDURE — G8484 FLU IMMUNIZE NO ADMIN: HCPCS | Performed by: INTERNAL MEDICINE

## 2021-03-24 PROCEDURE — 99214 OFFICE O/P EST MOD 30 MIN: CPT | Performed by: INTERNAL MEDICINE

## 2021-03-24 PROCEDURE — 2022F DILAT RTA XM EVC RTNOPTHY: CPT | Performed by: INTERNAL MEDICINE

## 2021-03-24 PROCEDURE — 1090F PRES/ABSN URINE INCON ASSESS: CPT | Performed by: INTERNAL MEDICINE

## 2021-03-24 PROCEDURE — 1123F ACP DISCUSS/DSCN MKR DOCD: CPT | Performed by: INTERNAL MEDICINE

## 2021-03-24 PROCEDURE — 4040F PNEUMOC VAC/ADMIN/RCVD: CPT | Performed by: INTERNAL MEDICINE

## 2021-03-24 RX ORDER — ZOSTER VACCINE RECOMBINANT, ADJUVANTED 50 MCG/0.5
0.5 KIT INTRAMUSCULAR SEE ADMIN INSTRUCTIONS
Qty: 0.5 ML | Refills: 0 | Status: SHIPPED | OUTPATIENT
Start: 2021-03-24 | End: 2021-09-20

## 2021-03-24 RX ORDER — ESOMEPRAZOLE MAGNESIUM 40 MG/1
40 CAPSULE, DELAYED RELEASE ORAL
Qty: 90 CAPSULE | Refills: 1 | Status: SHIPPED | OUTPATIENT
Start: 2021-03-24 | End: 2021-12-21 | Stop reason: SDUPTHER

## 2021-03-24 RX ORDER — ERGOCALCIFEROL 1.25 MG/1
50000 CAPSULE ORAL WEEKLY
Qty: 12 CAPSULE | Refills: 1 | Status: SHIPPED | OUTPATIENT
Start: 2021-03-24 | End: 2021-09-28 | Stop reason: SDUPTHER

## 2021-03-24 RX ORDER — DULAGLUTIDE 1.5 MG/.5ML
1.5 INJECTION, SOLUTION SUBCUTANEOUS WEEKLY
Qty: 9 ML | Refills: 1 | Status: SHIPPED | OUTPATIENT
Start: 2021-03-24 | End: 2021-06-22

## 2021-03-24 RX ORDER — ATORVASTATIN CALCIUM 80 MG/1
80 TABLET, FILM COATED ORAL DAILY
Qty: 90 TABLET | Refills: 1 | Status: SHIPPED | OUTPATIENT
Start: 2021-03-24 | End: 2021-10-26

## 2021-03-24 ASSESSMENT — ENCOUNTER SYMPTOMS
COLOR CHANGE: 0
EYE REDNESS: 0
VOICE CHANGE: 0
CONSTIPATION: 0
NAUSEA: 0
EYE PAIN: 0
ABDOMINAL PAIN: 0
TROUBLE SWALLOWING: 0
WHEEZING: 0
DIARRHEA: 0
COUGH: 0
VOMITING: 0
BLOOD IN STOOL: 0
CHEST TIGHTNESS: 0
SORE THROAT: 0
SINUS PRESSURE: 0

## 2021-03-24 ASSESSMENT — LIFESTYLE VARIABLES: HOW OFTEN DO YOU HAVE A DRINK CONTAINING ALCOHOL: 0

## 2021-03-24 NOTE — PROGRESS NOTES
once a week 12 capsule 1    esomeprazole (NEXIUM) 40 MG delayed release capsule Take 1 capsule by mouth every morning (before breakfast) 90 capsule 1    atorvastatin (LIPITOR) 80 MG tablet Take 1 tablet by mouth daily 90 tablet 1    Dulaglutide (TRULICITY) 1.5 WE/7.0QK SOPN Inject 1.5 mg into the skin once a week 9 mL 1    albuterol (PROVENTIL) (2.5 MG/3ML) 0.083% nebulizer solution Take 3 mLs by nebulization 3 times daily as needed for Wheezing 30 each 3    SITagliptin (JANUVIA) 50 MG tablet Take 50 mg by mouth 2 times daily      glyBURIDE (DIABETA) 5 MG tablet Take 5 mg by mouth 2 times daily (with meals)      vitamin B-12 (CYANOCOBALAMIN) 1000 MCG tablet Take 1,000 mcg by mouth daily      Calcium Carb-Cholecalciferol (CALCIUM 1000 + D PO) Take by mouth      Biotin 1000 MCG TABS Take 1,000 Units by mouth      tiotropium (SPIRIVA) 18 MCG inhalation capsule Inhale 18 mcg into the lungs daily      polyethylene glycol (MIRALAX) 17 g PACK packet Take 17 g by mouth daily      ezetimibe (ZETIA) 10 MG tablet Take by mouth daily       esomeprazole Magnesium (NEXIUM) 40 MG PACK Take 40 mg by mouth daily.  aspirin 81 MG EC tablet Take 81 mg by mouth daily. No current facility-administered medications for this visit. Allergies   Allergen Reactions    Amoxicillin-Pot Clavulanate     Ciprofloxacin     Codeine     Sulfa Antibiotics      Social History     Tobacco Use    Smoking status: Never Smoker    Smokeless tobacco: Never Used   Substance Use Topics    Alcohol use: No      Family History   Problem Relation Age of Onset    Stroke Mother     Diabetes Mother     Liver Cancer Father     Breast Cancer Sister     Liver Cancer Maternal Grandfather     Colon Cancer Neg Hx     Colon Polyps Neg Hx        Review of Systems   Constitutional: Positive for fatigue. Negative for chills and fever.    HENT: Negative for congestion, ear pain, postnasal drip, sinus pressure, sore throat, trouble swallowing and voice change. Eyes: Negative for pain, redness and visual disturbance. Respiratory: Negative for cough, chest tightness and wheezing. Cardiovascular: Negative for chest pain, palpitations and leg swelling. Gastrointestinal: Negative for abdominal pain, blood in stool, constipation, diarrhea, nausea and vomiting. Endocrine: Negative for polydipsia and polyuria. Genitourinary: Negative for dysuria, enuresis, flank pain, frequency and urgency. Musculoskeletal: Negative for arthralgias, gait problem and joint swelling. Skin: Negative for color change and rash. Neurological: Negative for dizziness, tremors, syncope, facial asymmetry, speech difficulty, weakness, numbness and headaches. Psychiatric/Behavioral: Negative for agitation, behavioral problems, confusion, sleep disturbance and suicidal ideas. The patient is not nervous/anxious. Vitals:    03/24/21 1206   BP: 120/72   Site: Left Upper Arm   Position: Sitting   Cuff Size: Large Adult   Pulse: 82   Resp: 18   SpO2: 98%   Weight: 150 lb (68 kg)   Height: 5' 4\" (1.626 m)     Body mass index is 25.75 kg/m². Physical Exam  Constitutional:       Appearance: She is well-developed. HENT:      Right Ear: External ear normal.      Left Ear: External ear normal.      Mouth/Throat:      Pharynx: No oropharyngeal exudate. Eyes:      Conjunctiva/sclera: Conjunctivae normal.      Pupils: Pupils are equal, round, and reactive to light. Neck:      Musculoskeletal: Neck supple. Thyroid: No thyromegaly. Vascular: No JVD. Cardiovascular:      Rate and Rhythm: Normal rate. Heart sounds: Normal heart sounds. No murmur. Pulmonary:      Effort: No respiratory distress. Breath sounds: Normal breath sounds. No wheezing or rales. Chest:      Chest wall: No tenderness. Abdominal:      General: Bowel sounds are normal.      Palpations: Abdomen is soft. Lymphadenopathy:      Cervical: No cervical adenopathy. Absolute 03/18/2021 2.6     Immature Granulocytes # 03/18/2021 0.0     Lymphocytes Absolute 03/18/2021 1.4     Monocytes Absolute 03/18/2021 0.40     Eosinophils Absolute 03/18/2021 0.10     Basophils Absolute 03/18/2021 0.00     Microalbumin, Random Uri* 03/18/2021 <1.20     Creatinine, Ur 03/18/2021 172.8     Microalbumin Creatinine * 03/18/2021 see below     Bacteria, UA 03/18/2021 NEGATIVE*    Crystals, UA 03/18/2021 NEG*    Hyaline Casts, UA 03/18/2021 3     WBC, UA 03/18/2021 8*    RBC, UA 03/18/2021 1     Epithelial Cells, UA 03/18/2021 2            ASSESSMENT/PLAN:    Type 2 diabetes mellitus without complication, without long-term current use of insulin ([facility]  Lab results reviewed and discussed with patient  Hemoglobin A1c is 7.1 in 3/2021  Previously  A1c 6.8 in November 2020  She has been taking Januvia 50 mg twice daily and Trulicity 1.5 weekly and glyburide 5 mg twice daily  1. Continue Januvia 50 mg twice daily  2. Trulicity to 1.5 mg weekly  3. Glyburide continue decreased dose 5 mg twice daily with plans to further decrease this prescription and hopefully in the future to discontinue sulfonylurea     RLS (restless legs syndrome)  Has been well controlled/currently on no medications  rx made her constipated     Vitamin D deficiency  Lab results reviewed with patient  vitamin D level high 81 in 3/2021 (48 in March 2020)  HOLD Rx vit d 50,000 weekly unitl lab in 9/2021     Interstitial cystitis  Deviously followed with Dr. Yuriy Sexton, prior history of frequent UTIs.   Recently has been doing better     Primary insomnia- better     Pure hypercholesterolemia  Lab results reviewed and discussed with patient  Liver functions normal  LDL is well controlled at 70 in 3/2021, triglycerides normal  DC  Zetia 10 mg daily  Rx Lipitor 80 mg daily  Recommend diet lower in saturated fats     Asthma  Uses dulera + spiriva ( never smoker)  She states that she uses these medications episodically      Osteopenia  Osteopenia of left hip 02/09/2021    Overview Note:    2/2021 hip neck -1.7     Recommendations at this time  #1 make sure  she takes her vitamin D supplement  #2 exercise, specifically weightbearing exercise is recommended    Repeat 3-4 yrs          Orders Placed This Encounter   Procedures    VANESSA DIGITAL SCREEN W OR WO CAD BILATERAL    Hepatitis C Antibody    Lipid Panel    Hemoglobin A1C    Comprehensive Metabolic Panel    Vitamin D 25 Hydroxy     New Prescriptions    No medications on file     EMR Dragon/transcription disclaimer:Significant part of this  encounter note is electronic transcription/translationof spoken language to printed text. The electronic translation of spoken language may be erroneous, or at times, nonsensical words or phrases may be inadvertently transcribed.  Although I have reviewed the note for sucherrors, some may still exist.

## 2021-03-24 NOTE — PROGRESS NOTES
Medicare Annual Wellness Visit  Name: Kristin Vazquez Date: 3/24/2021   MRN: [id number] Sex: Female   Age: 76 y.o. Ethnicity: Non-/Non    : 1945 Race: Shahbaz Zamora is here for Medicare AWV    Screenings for behavioral, psychosocial and functional/safety risks, and cognitive dysfunction are all negative except as indicated below. These results, as well as other patient data from the [address] form, are documented in Flowsheets linked to this Encounter. Allergies   Allergen Reactions    Amoxicillin-Pot Clavulanate     Ciprofloxacin     Codeine     Sulfa Antibiotics          Prior to Visit Medications    Medication Sig Taking?  Authorizing Provider   vitamin D (ERGOCALCIFEROL) 1.25 MG (06295 UT) CAPS capsule Take 1 capsule by mouth once a week Yes Anette Simon MD   esomeprazole (NEXIUM) 40 MG delayed release capsule Take 1 capsule by mouth every morning (before breakfast) Yes Anette Simon MD   atorvastatin (LIPITOR) 80 MG tablet Take 1 tablet by mouth daily Yes Anette Simon MD   Dulaglutide (TRULICITY) 1.5 CK/7.8DG SOPN Inject 1.5 mg into the skin once a week Yes Anette Simon MD   albuterol (PROVENTIL) (2.5 MG/3ML) 0.083% nebulizer solution Take 3 mLs by nebulization 3 times daily as needed for Wheezing Yes Anette Simon MD   SITagliptin (JANUVIA) 50 MG tablet Take 50 mg by mouth 2 times daily Yes Historical Provider, MD   glyBURIDE (DIABETA) 5 MG tablet Take 5 mg by mouth 2 times daily (with meals) Yes Historical Provider, MD   vitamin B-12 (CYANOCOBALAMIN) 1000 MCG tablet Take 1,000 mcg by mouth daily Yes Historical Provider, MD   Calcium Carb-Cholecalciferol (CALCIUM 1000 + D PO) Take by mouth Yes Historical Provider, MD   Biotin 1000 MCG TABS Take 1,000 Units by mouth Yes Historical Provider, MD   tiotropium (SPIRIVA) 18 MCG inhalation capsule Inhale 18 mcg into the lungs daily Yes Historical Provider, MD   polyethylene glycol (MIRALAX) 17 g PACK packet Take 17 g by mouth daily Yes Historical Provider, MD   esomeprazole Magnesium (NEXIUM) 40 MG PACK Take 40 mg by mouth daily. Yes Historical Provider, MD   aspirin 81 MG EC tablet Take 81 mg by mouth daily. Yes Historical Provider, MD         Past Medical History:   Diagnosis Date    GERD (gastroesophageal reflux disease)     Helicobacter Pylori (H. Pylori) Infection     Hyperlipidemia        Past Surgical History:   Procedure Laterality Date    CHOLECYSTECTOMY      COLONOSCOPY  2017    nanda    HYSTERECTOMY      ALEX BSO    KNEE GANGLION SURGERY      NECK SURGERY      Tumor removal    UPPER GASTROINTESTINAL ENDOSCOPY  ? Rosa Riggins         Family History   Problem Relation Age of Onset   Annemarie [place] Stroke Mother     Diabetes Mother     Liver Cancer Father     Breast Cancer Sister     Liver Cancer Maternal Grandfather     Colon Cancer Neg Hx     Colon Polyps Neg Hx        CareTeam (Including outside providers/suppliers regularly involved in providing care):   Patient Care Team:  Julianne Juárez MD as PCP - General (Internal Medicine)  Julianne Juárez MD as PCP - REHABILITATION [facility] Empaneled Provider    Wt Readings from Last 3 Encounters:   03/24/21 150 lb (68 kg)   01/25/21 150 lb (68 kg)   11/23/20 150 lb (68 kg)     Vitals:    03/24/21 1206   BP: 120/72   Site: Left Upper Arm   Position: Sitting   Cuff Size: Large Adult   Pulse: 82   Resp: 18   SpO2: 98%   Weight: 150 lb (68 kg)   Height: 5' 4\" (1.626 m)     Body mass index is 25.75 kg/m². Based upon direct observation of the patient, evaluation of cognition reveals recent and remote memory intact. Patient's complete Health Risk Assessment and screening values have been reviewed and are found in Flowsheets. The following problems were reviewed today and where indicated follow up appointments were made and/or referrals ordered.     Positive Risk Factor Screenings with Interventions:            General Health and ACP:  General  In general, how would you say your health is?: Good  In the past 7 days, have you experienced any of the following?  New or Increased Pain, New or Increased Fatigue, Loneliness, Social Isolation, Stress or Anger?: None of These  Do you get the social and emotional support that you need?: Yes  Do you have a Living Will?: Yes  Advance Directives     Power of [facility] Will ACP-Advance Directive ACP-Power of     Not on File Not on File Not on File Not on File      General Health Risk Interventions:  Health Habits/Nutrition:  Health Habits/Nutrition  Do you exercise for at least 20 minutes 2-3 times per week?: (!) No  Have you lost any weight without trying in the past 3 months?: No  Do you eat only one meal per day?: No  Have you seen the dentist within the past year?: Yes  Body mass index: (!) 25.74  Health Habits/Nutrition Interventions:  · Inadequate physical activity:  patient agrees to increase physical activity as follows: walking x 5 days per week       Personalized Preventive Plan   Current Health Maintenance Status  Immunization History   Administered Date(s) Administered    Hepatitis A Adult (Havrix, Vaqta) 10/27/2005, 01/30/2007    Hepatitis B Adult (Recombivax HB) 09/26/2000, 10/27/2000, 03/21/2001    Pneumococcal Conjugate 13-valent ([address]) 11/01/2016    Polio IPV (IPOL) 10/27/2005    Td, unspecified formulation 10/27/2005    Typhoid Vi capsular polysaccharide (Typhim VI) 10/27/2005        Health Maintenance   Topic Date Due    Hepatitis C screen  Never done    Diabetic foot exam  Never done    Diabetic retinal exam  Never done    COVID-19 Vaccine (1) 07/25/2021 (Originally 11/12/1961)    Pneumococcal 65+ years Vaccine (2 of 2 - PPSV23) 09/24/2021 (Originally 11/1/2017)    Shingles Vaccine (1 of 2) 11/23/2021 (Originally 11/12/1995)    DTaP/Tdap/Td vaccine (1 - Tdap) 01/25/2022 (Originally 11/12/1964)    Flu vaccine (1) 01/25/2022 (Originally 9/1/2020)    A1C test (Diabetic or Prediabetic)  03/18/2022    Lipid screen  03/18/2022    Colon cancer screen colonoscopy  06/19/2022    DEXA (modify frequency per FRAX score)  Completed    Hepatitis A vaccine  Aged Out    Hib vaccine  Aged Out    Meningococcal (ACWY) vaccine  Aged Out     Recommendations for "[employer] Due: see orders and patient instructions/AVS.  . Recommended screening schedule for the next 5-10 years is provided to the patient in written form: see Patient Instructions/AVS.     MEDICARE WELLNESS SCREENING/ MAINTENANCE:  1:MAMMOGRAM 2/2021 - repeat 1 yr  PAP na  BONE DENSITY 2021 mild osteopenia- repeat 3-4 yrs  2. SCREENING CSCOPE 2017 repeat 10 yrs  3. CARDIOVASCULAR SCREENING- LIPID PANEL DONE  4. DIABETES SCREEN DONE - FBS =ABOVE LABS  5. PNEUMONIA VACCINATION prevnar 2016  6. INFLUENZA VACCINATION: TO BE DONE IN FALL  7. HEP B VACCINATION- PT DECLINES  8. HIV/ HEP SCREENING      HEALTH PLAN:  1. HEALTHY DIET: Diabetic diet recommended  2. EXERCISE-regular exercise recommended  3.  FALL RISK SCREEN REVIEWED; NO INCREASED FALL RISKON EXAM

## 2021-03-24 NOTE — PATIENT INSTRUCTIONS
Personalized Preventive Plan for Alia Butler - 3/24/2021  Medicare offers a range of preventive health benefits. Some of the tests and screenings are paid in full while other may be subject to a deductible, co-insurance, and/or copay. Some of these benefits include a comprehensive review of your medical history including lifestyle, illnesses that may run in your family, and various assessments and screenings as appropriate. After reviewing your medical record and screening and assessments performed today your provider may have ordered immunizations, labs, imaging, and/or referrals for you. A list of these orders (if applicable) as well as your Preventive Care list are included within your After Visit Summary for your review. Other Preventive Recommendations:    · A preventive eye exam performed by an eye specialist is recommended every 1-2 years to screen for glaucoma; cataracts, macular degeneration, and other eye disorders. · A preventive dental visit is recommended every 6 months. · Try to get at least 150 minutes of exercise per week or 10,000 steps per day on a pedometer . · Order or download the FREE \"Exercise & Physical Activity: Your Everyday Guide\" from [employer]. Call 9-312.566.7389 or search [employer] online. · You need 2731-0007 mg of calcium and 5235-4823 IU of vitamin D per day. It is possible to meet your calcium requirement with diet alone, but a vitamin D supplement is usually necessary to meet this goal.  · When exposed to the sun, use a sunscreen that protects against both UVA and UVB radiation with an SPF of 30 or greater. Reapply every 2 to 3 hours or after sweating, drying off with a towel, or swimming. · Always wear a seat belt when traveling in a car. Always wear a helmet when riding a bicycle or motorcycle.

## 2021-04-01 ENCOUNTER — OFFICE VISIT (OUTPATIENT)
Dept: INTERNAL MEDICINE | Age: 76
End: 2021-04-01
Payer: COMMERCIAL

## 2021-04-01 VITALS
BODY MASS INDEX: 26.43 KG/M2 | SYSTOLIC BLOOD PRESSURE: 128 MMHG | DIASTOLIC BLOOD PRESSURE: 68 MMHG | RESPIRATION RATE: 18 BRPM | HEART RATE: 58 BPM | WEIGHT: 154 LBS | OXYGEN SATURATION: 97 %

## 2021-04-01 DIAGNOSIS — H92.01 EARACHE ON RIGHT: Primary | ICD-10-CM

## 2021-04-01 DIAGNOSIS — H65.91 RIGHT OTITIS MEDIA WITH EFFUSION: ICD-10-CM

## 2021-04-01 PROCEDURE — 1090F PRES/ABSN URINE INCON ASSESS: CPT | Performed by: INTERNAL MEDICINE

## 2021-04-01 PROCEDURE — G8399 PT W/DXA RESULTS DOCUMENT: HCPCS | Performed by: INTERNAL MEDICINE

## 2021-04-01 PROCEDURE — 99213 OFFICE O/P EST LOW 20 MIN: CPT | Performed by: INTERNAL MEDICINE

## 2021-04-01 PROCEDURE — G8417 CALC BMI ABV UP PARAM F/U: HCPCS | Performed by: INTERNAL MEDICINE

## 2021-04-01 PROCEDURE — 1036F TOBACCO NON-USER: CPT | Performed by: INTERNAL MEDICINE

## 2021-04-01 PROCEDURE — 1123F ACP DISCUSS/DSCN MKR DOCD: CPT | Performed by: INTERNAL MEDICINE

## 2021-04-01 PROCEDURE — G8427 DOCREV CUR MEDS BY ELIG CLIN: HCPCS | Performed by: INTERNAL MEDICINE

## 2021-04-01 PROCEDURE — 3017F COLORECTAL CA SCREEN DOC REV: CPT | Performed by: INTERNAL MEDICINE

## 2021-04-01 PROCEDURE — 4040F PNEUMOC VAC/ADMIN/RCVD: CPT | Performed by: INTERNAL MEDICINE

## 2021-04-01 RX ORDER — AZITHROMYCIN 250 MG/1
250 TABLET, FILM COATED ORAL SEE ADMIN INSTRUCTIONS
Qty: 6 TABLET | Refills: 0 | Status: SHIPPED | OUTPATIENT
Start: 2021-04-01 | End: 2021-04-06

## 2021-04-01 ASSESSMENT — ENCOUNTER SYMPTOMS
SORE THROAT: 0
ABDOMINAL PAIN: 0
WHEEZING: 0
CONSTIPATION: 0
COUGH: 0
CHEST TIGHTNESS: 0

## 2021-04-01 NOTE — PROGRESS NOTES
Chief Complaint   Patient presents with    Otalgia     Right ear pain ongoing since last week      History of presenting illness:  Marcelo Baldwin is a74 y.o. female who presents today for follow up on her chronic medical conditions as noted below. Right earache that started few days ago  Hurts at nighttime  No ear discharge  No fever chills  No cough  No sinus pressure or drainage      Patient Active Problem List    Diagnosis Date Noted    Osteopenia of left hip 02/09/2021     Overview Note:     2/2021 hip neck -1.7      Type 2 diabetes mellitus without complication, without long-term current use of insulin ([address] 11/23/2020    RLS (restless legs syndrome) 11/23/2020    Vitamin D deficiency 11/23/2020    Nausea 08/22/2013    Gastroesophageal reflux disease without esophagitis 08/22/2013    History of Helicobacter pylori infection 08/22/2013    History of colon polyps 08/22/2013    Hiatal hernia 08/22/2013    Diverticulosis 08/22/2013     Past Medical History:   Diagnosis Date    GERD (gastroesophageal reflux disease)     Helicobacter Pylori (H. Pylori) Infection     Hyperlipidemia       Past Surgical History:   Procedure Laterality Date    CHOLECYSTECTOMY      COLONOSCOPY  2017    nanda    HYSTERECTOMY      ALEX BSO    KNEE GANGLION SURGERY      NECK SURGERY      Tumor removal    UPPER GASTROINTESTINAL ENDOSCOPY  ?     [facility]     Current Outpatient Medications   Medication Sig Dispense Refill    neomycin-polymyxin-hydrocortisone (CORTISPORIN) 3.5-65713-6 otic solution Place 3 drops into the right ear 3 times daily for 10 days Instill into RT Ear 1 Bottle 0    azithromycin (ZITHROMAX) 250 MG tablet Take 1 tablet by mouth See Admin Instructions for 5 days 500mg on day 1 followed by 250mg on days 2 - 5 6 tablet 0    vitamin D (ERGOCALCIFEROL) 1.25 MG (42448 UT) CAPS capsule Take 1 capsule by mouth once a week 12 capsule 1    esomeprazole (NEXIUM) 40 MG delayed release capsule Take 1 capsule by mouth every morning (before breakfast) 90 capsule 1    atorvastatin (LIPITOR) 80 MG tablet Take 1 tablet by mouth daily 90 tablet 1    Dulaglutide (TRULICITY) 1.5 TI/1.4PK SOPN Inject 1.5 mg into the skin once a week 9 mL 1    zoster recombinant adjuvanted vaccine (SHINGRIX) 50 MCG/0.5ML SUSR injection Inject 0.5 mLs into the muscle See Admin Instructions 1 dose now and repeat in 2-6 months 0.5 mL 0    albuterol (PROVENTIL) (2.5 MG/3ML) 0.083% nebulizer solution Take 3 mLs by nebulization 3 times daily as needed for Wheezing 30 each 3    SITagliptin (JANUVIA) 50 MG tablet Take 50 mg by mouth 2 times daily      glyBURIDE (DIABETA) 5 MG tablet Take 5 mg by mouth 2 times daily (with meals)      vitamin B-12 (CYANOCOBALAMIN) 1000 MCG tablet Take 1,000 mcg by mouth daily      Calcium Carb-Cholecalciferol (CALCIUM 1000 + D PO) Take by mouth      Biotin 1000 MCG TABS Take 1,000 Units by mouth      tiotropium (SPIRIVA) 18 MCG inhalation capsule Inhale 18 mcg into the lungs daily      polyethylene glycol (MIRALAX) 17 g PACK packet Take 17 g by mouth daily      esomeprazole Magnesium (NEXIUM) 40 MG PACK Take 40 mg by mouth daily.  aspirin 81 MG EC tablet Take 81 mg by mouth daily. No current facility-administered medications for this visit. Allergies   Allergen Reactions    Amoxicillin-Pot Clavulanate     Ciprofloxacin     Codeine     Sulfa Antibiotics      Social History     Tobacco Use    Smoking status: Never Smoker    Smokeless tobacco: Never Used   Substance Use Topics    Alcohol use: No      Family History   Problem Relation Age of Onset    Stroke Mother     Diabetes Mother     Liver Cancer Father     Breast Cancer Sister     Liver Cancer Maternal Grandfather     Colon Cancer Neg Hx     Colon Polyps Neg Hx        Review of Systems   Constitutional: Positive for fatigue. Negative for chills and fever. HENT: Positive for ear pain.  Negative for congestion, nosebleeds, postnasal drip and sore throat. Respiratory: Negative for cough, chest tightness and wheezing. Cardiovascular: Negative for chest pain, palpitations and leg swelling. Gastrointestinal: Negative for abdominal pain and constipation. Genitourinary: Negative for dysuria and urgency. Musculoskeletal: Negative. Negative for arthralgias. Skin: Negative for rash. Neurological: Negative for dizziness and headaches. Psychiatric/Behavioral: Negative. Vitals:    04/01/21 1316   BP: 128/68   Site: Left Upper Arm   Position: Sitting   Cuff Size: Large Adult   Pulse: 58   Resp: 18   SpO2: 97%   Weight: 154 lb (69.9 kg)     Body mass index is 26.43 kg/m². Physical Exam  Constitutional:       Appearance: She is well-developed. HENT:      Left Ear: External ear normal.      Ears:      Comments: Right external ear canal mildly erythematous  Right tympanic membrane mildly erythematous/dull     Mouth/Throat:      Pharynx: No oropharyngeal exudate. Eyes:      Conjunctiva/sclera: Conjunctivae normal.      Pupils: Pupils are equal, round, and reactive to light. Neck:      Musculoskeletal: Neck supple. Thyroid: No thyromegaly. Vascular: No JVD. Cardiovascular:      Rate and Rhythm: Normal rate. Heart sounds: Normal heart sounds. No murmur. Pulmonary:      Effort: No respiratory distress. Breath sounds: Normal breath sounds. No wheezing or rales. Chest:      Chest wall: No tenderness. Abdominal:      General: Bowel sounds are normal.      Palpations: Abdomen is soft. Lymphadenopathy:      Cervical: No cervical adenopathy. Skin:     General: Skin is warm. Findings: No rash. Neurological:      Mental Status: She is oriented to person, place, and time.          Lab Review   Orders Only on 03/18/2021   Component Date Value    SARS-CoV-2 Anitbody, Tot* 03/18/2021 Negative    Orders Only on 03/18/2021   Component Date Value    Vit D, 25-Hydroxy 03/18/2021 81.1  TSH 03/18/2021 0.765     Color, UA 03/18/2021 YELLOW     Clarity, UA 03/18/2021 Clear     Glucose, Ur 03/18/2021 Negative     Bilirubin Urine 03/18/2021 Negative     Ketones, Urine 03/18/2021 Negative     Specific Gravity, UA 03/18/2021 1.027     Blood, Urine 03/18/2021 Negative     pH, UA 03/18/2021 5.0     Protein, UA 03/18/2021 Negative     Urobilinogen, Urine 03/18/2021 1.0     Nitrite, Urine 03/18/2021 Negative     Leukocyte Esterase, Urine 03/18/2021 SMALL*    Cholesterol, Total 03/18/2021 129*    Triglycerides 03/18/2021 117     HDL 03/18/2021 32*    LDL Calculated 03/18/2021 74     Hemoglobin A1C 03/18/2021 7.1*    Sodium 03/18/2021 141     Potassium 03/18/2021 4.1     Chloride 03/18/2021 104     CO2 03/18/2021 27     Anion Gap 03/18/2021 10     Glucose 03/18/2021 157*    BUN 03/18/2021 19     CREATININE 03/18/2021 0.7     GFR Non- 03/18/2021 >60     GFR  03/18/2021 >59     Calcium 03/18/2021 9.2     Total Protein 03/18/2021 6.6     Albumin 03/18/2021 4.4     Total Bilirubin 03/18/2021 0.6     Alkaline Phosphatase 03/18/2021 104     ALT 03/18/2021 34*    AST 03/18/2021 28     WBC 03/18/2021 4.6*    RBC 03/18/2021 4.57     Hemoglobin 03/18/2021 13.6     Hematocrit 03/18/2021 40.7     MCV 03/18/2021 89.1     MCH 03/18/2021 29.8     MCHC 03/18/2021 33.4     RDW 03/18/2021 12.2     Platelets 08/68/8064 182     MPV 03/18/2021 9.6     Neutrophils % 03/18/2021 57.3     Lymphocytes % 03/18/2021 30.2     Monocytes % 03/18/2021 8.8     Eosinophils % 03/18/2021 2.6     Basophils % 03/18/2021 0.9     Neutrophils Absolute 03/18/2021 2.6     Immature Granulocytes # 03/18/2021 0.0     Lymphocytes Absolute 03/18/2021 1.4     Monocytes Absolute 03/18/2021 0.40     Eosinophils Absolute 03/18/2021 0.10     Basophils Absolute 03/18/2021 0.00     Microalbumin, Random Uri* 03/18/2021 <1.20     Creatinine, Ur 03/18/2021 172.8    

## 2021-04-29 DIAGNOSIS — N39.0 RECURRENT UTI: ICD-10-CM

## 2021-04-29 NOTE — TELEPHONE ENCOUNTER
Pt called requesting 90 day supply on her Macrobid.    Requested Prescriptions     Pending Prescriptions Disp Refills   • nitrofurantoin, macrocrystal-monohydrate, (MACROBID) 100 MG capsule 24 capsule 0     Sig: Take 1 capsule by mouth 2 (Two) Times a Week for 90 days.

## 2021-04-30 RX ORDER — NITROFURANTOIN 25; 75 MG/1; MG/1
100 CAPSULE ORAL 2 TIMES WEEKLY
Qty: 24 CAPSULE | Refills: 0 | Status: SHIPPED | OUTPATIENT
Start: 2021-05-03 | End: 2021-05-03 | Stop reason: SDUPTHER

## 2021-05-03 DIAGNOSIS — N39.0 RECURRENT UTI: ICD-10-CM

## 2021-05-03 RX ORDER — NITROFURANTOIN 25; 75 MG/1; MG/1
100 CAPSULE ORAL 2 TIMES WEEKLY
Qty: 180 CAPSULE | Refills: 0 | Status: SHIPPED | OUTPATIENT
Start: 2021-05-03 | End: 2021-07-28 | Stop reason: SDUPTHER

## 2021-05-03 NOTE — TELEPHONE ENCOUNTER
Requested Prescriptions     Pending Prescriptions Disp Refills   • nitrofurantoin, macrocrystal-monohydrate, (MACROBID) 100 MG capsule 180 capsule 0     Sig: Take 1 capsule by mouth 2 (Two) Times a Week for 90 days.

## 2021-06-17 ENCOUNTER — OFFICE VISIT (OUTPATIENT)
Dept: INTERNAL MEDICINE | Age: 76
End: 2021-06-17
Payer: MEDICARE

## 2021-06-17 VITALS
HEIGHT: 64 IN | RESPIRATION RATE: 18 BRPM | WEIGHT: 147 LBS | DIASTOLIC BLOOD PRESSURE: 76 MMHG | BODY MASS INDEX: 25.1 KG/M2 | HEART RATE: 92 BPM | OXYGEN SATURATION: 97 % | SYSTOLIC BLOOD PRESSURE: 130 MMHG

## 2021-06-17 DIAGNOSIS — H92.01 EAR PAIN, REFERRED, RIGHT: ICD-10-CM

## 2021-06-17 DIAGNOSIS — R09.89 SINUS SYMPTOM: ICD-10-CM

## 2021-06-17 DIAGNOSIS — H66.91 OTITIS OF RIGHT EAR: Primary | ICD-10-CM

## 2021-06-17 PROCEDURE — 4040F PNEUMOC VAC/ADMIN/RCVD: CPT | Performed by: INTERNAL MEDICINE

## 2021-06-17 PROCEDURE — 99213 OFFICE O/P EST LOW 20 MIN: CPT | Performed by: INTERNAL MEDICINE

## 2021-06-17 PROCEDURE — G8399 PT W/DXA RESULTS DOCUMENT: HCPCS | Performed by: INTERNAL MEDICINE

## 2021-06-17 PROCEDURE — G8417 CALC BMI ABV UP PARAM F/U: HCPCS | Performed by: INTERNAL MEDICINE

## 2021-06-17 PROCEDURE — 1123F ACP DISCUSS/DSCN MKR DOCD: CPT | Performed by: INTERNAL MEDICINE

## 2021-06-17 PROCEDURE — 1036F TOBACCO NON-USER: CPT | Performed by: INTERNAL MEDICINE

## 2021-06-17 PROCEDURE — G8427 DOCREV CUR MEDS BY ELIG CLIN: HCPCS | Performed by: INTERNAL MEDICINE

## 2021-06-17 PROCEDURE — 1090F PRES/ABSN URINE INCON ASSESS: CPT | Performed by: INTERNAL MEDICINE

## 2021-06-17 PROCEDURE — 3017F COLORECTAL CA SCREEN DOC REV: CPT | Performed by: INTERNAL MEDICINE

## 2021-06-17 RX ORDER — AZITHROMYCIN 250 MG/1
250 TABLET, FILM COATED ORAL SEE ADMIN INSTRUCTIONS
Qty: 6 TABLET | Refills: 0 | Status: SHIPPED | OUTPATIENT
Start: 2021-06-17 | End: 2021-06-22

## 2021-06-17 SDOH — ECONOMIC STABILITY: FOOD INSECURITY: WITHIN THE PAST 12 MONTHS, YOU WORRIED THAT YOUR FOOD WOULD RUN OUT BEFORE YOU GOT MONEY TO BUY MORE.: NEVER TRUE

## 2021-06-17 SDOH — ECONOMIC STABILITY: FOOD INSECURITY: WITHIN THE PAST 12 MONTHS, THE FOOD YOU BOUGHT JUST DIDN'T LAST AND YOU DIDN'T HAVE MONEY TO GET MORE.: NEVER TRUE

## 2021-06-17 ASSESSMENT — SOCIAL DETERMINANTS OF HEALTH (SDOH): HOW HARD IS IT FOR YOU TO PAY FOR THE VERY BASICS LIKE FOOD, HOUSING, MEDICAL CARE, AND HEATING?: NOT HARD AT ALL

## 2021-06-17 NOTE — PROGRESS NOTES
Chief Complaint   Patient presents with    Otalgia     History of presenting illness:  Sophia Perez is a74 y.o. female who presents today for follow up on her chronic medical conditions as noted below. SX x 5 days  Pressure sensation/ pain RT ear    No f/c  No issues with throat/     Patient Active Problem List    Diagnosis Date Noted    Osteopenia of left hip 02/09/2021     Overview Note:     2/2021 hip neck -1.7      Type 2 diabetes mellitus without complication, without long-term current use of insulin ([facility] [address] 11/23/2020    RLS (restless legs syndrome) 11/23/2020    Vitamin D deficiency 11/23/2020    Nausea 08/22/2013    Gastroesophageal reflux disease without esophagitis 08/22/2013    History of Helicobacter pylori infection 08/22/2013    History of colon polyps 08/22/2013    Hiatal hernia 08/22/2013    Diverticulosis 08/22/2013     Past Medical History:   Diagnosis Date    GERD (gastroesophageal reflux disease)     Helicobacter Pylori (H. Pylori) Infection     Hyperlipidemia       Past Surgical History:   Procedure Laterality Date    CHOLECYSTECTOMY      COLONOSCOPY  2017    nanda    HYSTERECTOMY      ALEX BSO    KNEE GANGLION SURGERY      NECK SURGERY      Tumor removal    UPPER GASTROINTESTINAL ENDOSCOPY  ?     [facility]     Current Outpatient Medications   Medication Sig Dispense Refill    neomycin-polymyxin-hydrocortisone (CORTISPORIN) 3.5-22434-3 otic solution Place 4 drops into the right ear 3 times daily for 8 days Instill into RT Ear 1 Bottle 0    azithromycin (ZITHROMAX) 250 MG tablet Take 1 tablet by mouth See Admin Instructions for 5 days 500mg on day 1 followed by 250mg on days 2 - 5 6 tablet 0    vitamin D (ERGOCALCIFEROL) 1.25 MG (74861 UT) CAPS capsule Take 1 capsule by mouth once a week 12 capsule 1    esomeprazole (NEXIUM) 40 MG delayed release capsule Take 1 capsule by mouth every morning (before breakfast) 90 capsule 1    atorvastatin (LIPITOR) 80 MG tablet Take 1 tablet by mouth daily 90 tablet 1    Dulaglutide (TRULICITY) 1.5 MT/7.4RK SOPN Inject 1.5 mg into the skin once a week 9 mL 1    zoster recombinant adjuvanted vaccine (SHINGRIX) 50 MCG/0.5ML SUSR injection Inject 0.5 mLs into the muscle See Admin Instructions 1 dose now and repeat in 2-6 months 0.5 mL 0    albuterol (PROVENTIL) (2.5 MG/3ML) 0.083% nebulizer solution Take 3 mLs by nebulization 3 times daily as needed for Wheezing 30 each 3    SITagliptin (JANUVIA) 50 MG tablet Take 50 mg by mouth 2 times daily      glyBURIDE (DIABETA) 5 MG tablet Take 5 mg by mouth 2 times daily (with meals)      vitamin B-12 (CYANOCOBALAMIN) 1000 MCG tablet Take 1,000 mcg by mouth daily      Calcium Carb-Cholecalciferol (CALCIUM 1000 + D PO) Take by mouth      Biotin 1000 MCG TABS Take 1,000 Units by mouth      tiotropium (SPIRIVA) 18 MCG inhalation capsule Inhale 18 mcg into the lungs daily      polyethylene glycol (MIRALAX) 17 g PACK packet Take 17 g by mouth daily      esomeprazole Magnesium (NEXIUM) 40 MG PACK Take 40 mg by mouth daily.  aspirin 81 MG EC tablet Take 81 mg by mouth daily. No current facility-administered medications for this visit. Allergies   Allergen Reactions    Amoxicillin-Pot Clavulanate     Ciprofloxacin     Codeine     Sulfa Antibiotics      Social History     Tobacco Use    Smoking status: Never Smoker    Smokeless tobacco: Never Used   Substance Use Topics    Alcohol use: No      Family History   Problem Relation Age of Onset    Stroke Mother     Diabetes Mother     Liver Cancer Father     Breast Cancer Sister     Liver Cancer Maternal Grandfather     Colon Cancer Neg Hx     Colon Polyps Neg Hx        Review of Systems  Vitals:    06/17/21 1058   BP: 130/76   Pulse: 92   Resp: 18   SpO2: 97%   Weight: 147 lb (66.7 kg)   Height: 5' 4\" (1.626 m)     Body mass index is 25.23 kg/m².     Physical Exam  Constitutional:       Appearance: She is well-developed. HENT:      Right Ear: External ear normal.      Left Ear: External ear normal.      Ears:      Comments: Right tympanic membrane is very slightly dull  Right external ear canal is mildly erythematous  No discharge is present     Mouth/Throat:      Pharynx: No oropharyngeal exudate. Eyes:      Conjunctiva/sclera: Conjunctivae normal.      Pupils: Pupils are equal, round, and reactive to light. Neck:      Thyroid: No thyromegaly. Vascular: No JVD. Cardiovascular:      Rate and Rhythm: Normal rate. Heart sounds: Normal heart sounds. No murmur heard. Pulmonary:      Effort: No respiratory distress. Breath sounds: Normal breath sounds. No wheezing or rales. Chest:      Chest wall: No tenderness. Abdominal:      General: Bowel sounds are normal.      Palpations: Abdomen is soft. Musculoskeletal:      Cervical back: Neck supple. Lymphadenopathy:      Cervical: No cervical adenopathy. Skin:     Findings: No rash. Lab Review   No visits with results within 2 Month(s) from this visit.    Latest known visit with results is:   Orders Only on 03/18/2021   Component Date Value    SARS-CoV-2 Antibody, Tot* 03/18/2021 Negative            ASSESSMENT/PLAN:  Otitis of right ear  Ear pain, referred, right  Sinus symptom    Mild external otitis of right ear  Symptoms most likely partially at least caused by sinus/referred pain/eustachian tube dysfunction  Recommendations  Use Cortisporin eardrops to right ear 3 times daily  Since patient is leaving for long trip to [place] starting next week on 6/23 I am giving her prescription for Zithromax antibiotic in case her symptoms were to get worse during the drip  It is our understanding that she is not starting any oral antibiotics at this time since at this time there is no need for antibiotic  Recommend she increases her Zyrtec to 10 mg twice daily for next 1 week and then decrease this back to 10 mg once daily p.o.    orders  - neomycin-polymyxin-hydrocortisone (CORTISPORIN) 3.5-40291-2 otic solution; Place 4 drops into the right ear 3 times daily for 8 days Instill into RT Ear    -     azithromycin (ZITHROMAX) 250 MG tablet; Take 1 tablet by mouth See Admin Instructions for 5 days 500mg on day 1 followed by 250mg on days 2 - 5              No orders of the defined types were placed in this encounter. New Prescriptions    AZITHROMYCIN (ZITHROMAX) 250 MG TABLET    Take 1 tablet by mouth See Admin Instructions for 5 days 500mg on day 1 followed by 250mg on days 2 - 5    NEOMYCIN-POLYMYXIN-HYDROCORTISONE (CORTISPORIN) 3.5-79314-3 OTIC SOLUTION    Place 4 drops into the right ear 3 times daily for 8 days Instill into RT Ear         No follow-ups on file. There are no Patient Instructions on file for this visit. EMR Dragon/transcription disclaimer:Significant part of this  encounter note is electronic transcription/translationof spoken language to printed text. The electronic translation of spoken language may be erroneous, or at times, nonsensical words or phrases may be inadvertently transcribed.  Although I have reviewed the note for sucherrors, some may still exist.

## 2021-06-22 ENCOUNTER — TELEPHONE (OUTPATIENT)
Dept: INTERNAL MEDICINE | Age: 76
End: 2021-06-22

## 2021-06-22 RX ORDER — PHENAZOPYRIDINE HYDROCHLORIDE 200 MG/1
200 TABLET, FILM COATED ORAL 3 TIMES DAILY PRN
Qty: 9 TABLET | Refills: 0 | Status: SHIPPED | OUTPATIENT
Start: 2021-06-22 | End: 2021-06-25

## 2021-06-22 RX ORDER — MECLIZINE HYDROCHLORIDE 25 MG/1
25 TABLET ORAL 3 TIMES DAILY PRN
Qty: 30 TABLET | Refills: 0 | Status: SHIPPED | OUTPATIENT
Start: 2021-06-22 | End: 2021-07-02

## 2021-06-22 NOTE — TELEPHONE ENCOUNTER
Pt is going to [facility] and is needing Antivert or meclizine for her inner ear sent in and also some pyridium

## 2021-07-28 DIAGNOSIS — N39.0 RECURRENT UTI: ICD-10-CM

## 2021-07-28 RX ORDER — GLYBURIDE 5 MG/1
5 TABLET ORAL 2 TIMES DAILY WITH MEALS
Qty: 60 TABLET | Refills: 3 | Status: SHIPPED | OUTPATIENT
Start: 2021-07-28 | End: 2021-10-20 | Stop reason: SDUPTHER

## 2021-07-28 RX ORDER — NITROFURANTOIN 25; 75 MG/1; MG/1
100 CAPSULE ORAL 2 TIMES WEEKLY
Qty: 24 CAPSULE | Refills: 6 | Status: SHIPPED | OUTPATIENT
Start: 2021-07-29 | End: 2021-10-27

## 2021-07-28 NOTE — TELEPHONE ENCOUNTER
Requested Prescriptions     Signed Prescriptions Disp Refills   • nitrofurantoin, macrocrystal-monohydrate, (MACROBID) 100 MG capsule 24 capsule 6     Sig: Take 1 capsule by mouth 2 (Two) Times a Week for 90 days.     Authorizing Provider: MARCELLA WONG     Ordering User: ABILIO KIRBY

## 2021-07-28 NOTE — TELEPHONE ENCOUNTER
Diamond William called requesting a refill of the below medication which has been pended for you:     Requested Prescriptions     Pending Prescriptions Disp Refills    glyBURIDE (DIABETA) 5 MG tablet 60 tablet 3     Sig: Take 1 tablet by mouth 2 times daily (with meals)       Last Appointment Date: 6/17/2021  Next Appointment Date: 9/28/2021    Allergies   Allergen Reactions    Amoxicillin-Pot Clavulanate     Ciprofloxacin     Codeine     Sulfa Antibiotics

## 2021-09-28 ENCOUNTER — OFFICE VISIT (OUTPATIENT)
Dept: INTERNAL MEDICINE | Age: 76
End: 2021-09-28
Payer: MEDICARE

## 2021-09-28 VITALS
DIASTOLIC BLOOD PRESSURE: 78 MMHG | OXYGEN SATURATION: 94 % | HEIGHT: 64 IN | WEIGHT: 149 LBS | SYSTOLIC BLOOD PRESSURE: 128 MMHG | BODY MASS INDEX: 25.44 KG/M2 | HEART RATE: 76 BPM | RESPIRATION RATE: 18 BRPM

## 2021-09-28 DIAGNOSIS — M85.852 OSTEOPENIA OF LEFT HIP: ICD-10-CM

## 2021-09-28 DIAGNOSIS — F51.01 PRIMARY INSOMNIA: ICD-10-CM

## 2021-09-28 DIAGNOSIS — E55.9 VITAMIN D DEFICIENCY: ICD-10-CM

## 2021-09-28 DIAGNOSIS — Z23 NEED FOR VACCINATION: ICD-10-CM

## 2021-09-28 DIAGNOSIS — E78.00 PURE HYPERCHOLESTEROLEMIA: ICD-10-CM

## 2021-09-28 DIAGNOSIS — H93.13 BILATERAL TINNITUS: ICD-10-CM

## 2021-09-28 DIAGNOSIS — E11.9 TYPE 2 DIABETES MELLITUS WITHOUT COMPLICATION, WITHOUT LONG-TERM CURRENT USE OF INSULIN (HCC): Primary | ICD-10-CM

## 2021-09-28 DIAGNOSIS — G25.81 RLS (RESTLESS LEGS SYNDROME): ICD-10-CM

## 2021-09-28 DIAGNOSIS — N30.10 INTERSTITIAL CYSTITIS: ICD-10-CM

## 2021-09-28 PROCEDURE — G8417 CALC BMI ABV UP PARAM F/U: HCPCS | Performed by: INTERNAL MEDICINE

## 2021-09-28 PROCEDURE — 3017F COLORECTAL CA SCREEN DOC REV: CPT | Performed by: INTERNAL MEDICINE

## 2021-09-28 PROCEDURE — 1090F PRES/ABSN URINE INCON ASSESS: CPT | Performed by: INTERNAL MEDICINE

## 2021-09-28 PROCEDURE — 99214 OFFICE O/P EST MOD 30 MIN: CPT | Performed by: INTERNAL MEDICINE

## 2021-09-28 PROCEDURE — G8427 DOCREV CUR MEDS BY ELIG CLIN: HCPCS | Performed by: INTERNAL MEDICINE

## 2021-09-28 PROCEDURE — 4040F PNEUMOC VAC/ADMIN/RCVD: CPT | Performed by: INTERNAL MEDICINE

## 2021-09-28 PROCEDURE — 3044F HG A1C LEVEL LT 7.0%: CPT | Performed by: INTERNAL MEDICINE

## 2021-09-28 PROCEDURE — 1036F TOBACCO NON-USER: CPT | Performed by: INTERNAL MEDICINE

## 2021-09-28 PROCEDURE — 90732 PPSV23 VACC 2 YRS+ SUBQ/IM: CPT | Performed by: INTERNAL MEDICINE

## 2021-09-28 PROCEDURE — 2022F DILAT RTA XM EVC RTNOPTHY: CPT | Performed by: INTERNAL MEDICINE

## 2021-09-28 PROCEDURE — 1123F ACP DISCUSS/DSCN MKR DOCD: CPT | Performed by: INTERNAL MEDICINE

## 2021-09-28 PROCEDURE — G0009 ADMIN PNEUMOCOCCAL VACCINE: HCPCS | Performed by: INTERNAL MEDICINE

## 2021-09-28 PROCEDURE — G8399 PT W/DXA RESULTS DOCUMENT: HCPCS | Performed by: INTERNAL MEDICINE

## 2021-09-28 RX ORDER — EZETIMIBE 10 MG/1
10 TABLET ORAL DAILY
Qty: 90 TABLET | Refills: 1 | Status: SHIPPED | OUTPATIENT
Start: 2021-09-28 | End: 2022-02-26

## 2021-09-28 RX ORDER — NITROFURANTOIN 25; 75 MG/1; MG/1
CAPSULE ORAL
Qty: 24 CAPSULE | Refills: 2 | Status: SHIPPED | OUTPATIENT
Start: 2021-09-28 | End: 2022-10-11 | Stop reason: SDUPTHER

## 2021-09-28 RX ORDER — ERGOCALCIFEROL 1.25 MG/1
50000 CAPSULE ORAL
Qty: 6 CAPSULE | Refills: 3 | Status: SHIPPED | OUTPATIENT
Start: 2021-09-28 | End: 2022-07-22

## 2021-09-28 RX ORDER — ZOSTER VACCINE RECOMBINANT, ADJUVANTED 50 MCG/0.5
0.5 KIT INTRAMUSCULAR SEE ADMIN INSTRUCTIONS
Qty: 0.5 ML | Refills: 0 | Status: SHIPPED | OUTPATIENT
Start: 2021-09-28 | End: 2022-03-27

## 2021-09-28 RX ORDER — DULAGLUTIDE 1.5 MG/.5ML
INJECTION, SOLUTION SUBCUTANEOUS
COMMUNITY
Start: 2021-07-15 | Stop reason: SDUPTHER

## 2021-09-28 ASSESSMENT — ENCOUNTER SYMPTOMS
ABDOMINAL PAIN: 0
CONSTIPATION: 0
COUGH: 0
WHEEZING: 0
CHEST TIGHTNESS: 0
SORE THROAT: 0

## 2021-09-28 NOTE — PROGRESS NOTES
Chief Complaint   Patient presents with    6 Month Follow-Up     History of presenting illness:  Negrito Hartmann is a74 y.o. female who presents today for follow up on her chronic medical conditions as noted below. Patient Active Problem List    Diagnosis Date Noted    Osteopenia of left hip 02/09/2021     Overview Note:     2/2021 hip neck -1.7      Type 2 diabetes mellitus without complication, without long-term current use of insulin ([facility] [address] 11/23/2020    RLS (restless legs syndrome) 11/23/2020    Vitamin D deficiency 11/23/2020    Nausea 08/22/2013    Gastroesophageal reflux disease without esophagitis 08/22/2013    History of Helicobacter pylori infection 08/22/2013    History of colon polyps 08/22/2013    Hiatal hernia 08/22/2013    Diverticulosis 08/22/2013     Past Medical History:   Diagnosis Date    GERD (gastroesophageal reflux disease)     Helicobacter Pylori (H. Pylori) Infection     Hyperlipidemia       Past Surgical History:   Procedure Laterality Date    CHOLECYSTECTOMY      COLONOSCOPY  2017    nanad    HYSTERECTOMY      ALEX BSO    KNEE GANGLION SURGERY      NECK SURGERY      Tumor removal    [facility]  [facility]     Current Outpatient Medications   Medication Sig Dispense Refill    blood glucose test strips (ASCENSIA AUTODISC VI;ONE TOUCH ULTRA TEST VI) strip 1 each by In Vitro route daily As needed.  100 each 3    vitamin D (ERGOCALCIFEROL) 1.25 MG (21277 UT) CAPS capsule Take 1 capsule by mouth every 14 days 6 capsule 3    ezetimibe (ZETIA) 10 MG tablet Take 1 tablet by mouth daily 90 tablet 1    nitrofurantoin, macrocrystal-monohydrate, (MACROBID) 100 MG capsule Take 1 tablet by mouth twice weekly 24 capsule 2    glyBURIDE (DIABETA) 5 MG tablet Take 1 tablet by mouth 2 times daily (with meals) 60 tablet 3    esomeprazole (NEXIUM) 40 MG delayed release capsule Take 1 capsule by mouth every morning (before breakfast) 90 capsule 1  atorvastatin (LIPITOR) 80 MG tablet Take 1 tablet by mouth daily 90 tablet 1    albuterol (PROVENTIL) (2.5 MG/3ML) 0.083% nebulizer solution Take 3 mLs by nebulization 3 times daily as needed for Wheezing 30 each 3    SITagliptin (JANUVIA) 50 MG tablet Take 50 mg by mouth 2 times daily      vitamin B-12 (CYANOCOBALAMIN) 1000 MCG tablet Take 1,000 mcg by mouth daily      Calcium Carb-Cholecalciferol (CALCIUM 1000 + D PO) Take by mouth      Biotin 1000 MCG TABS Take 1,000 Units by mouth      tiotropium (SPIRIVA) 18 MCG inhalation capsule Inhale 18 mcg into the lungs daily      polyethylene glycol (MIRALAX) 17 g PACK packet Take 17 g by mouth daily      aspirin 81 MG EC tablet Take 81 mg by mouth daily.  TRULICITY 1.5 JF/8.6LZ SOPN        No current facility-administered medications for this visit. Allergies   Allergen Reactions    Amoxicillin-Pot Clavulanate     Ciprofloxacin     Codeine     Sulfa Antibiotics      Social History     Tobacco Use    Smoking status: Never Smoker    Smokeless tobacco: Never Used   Substance Use Topics    Alcohol use: No      Family History   Problem Relation Age of Onset    Stroke Mother     Diabetes Mother     Liver Cancer Father     Breast Cancer Sister     Liver Cancer Maternal Grandfather     Colon Cancer Neg Hx     Colon Polyps Neg Hx        Review of Systems   Constitutional: Positive for fatigue. Negative for chills and fever. HENT: Positive for hearing loss. Negative for congestion, ear pain, nosebleeds, postnasal drip and sore throat. Respiratory: Negative for cough, chest tightness and wheezing. Cardiovascular: Negative for chest pain, palpitations and leg swelling. Gastrointestinal: Negative for abdominal pain and constipation. Genitourinary: Negative for dysuria and urgency. Musculoskeletal: Negative. Negative for arthralgias. Skin: Negative for rash. Neurological: Positive for dizziness. Negative for headaches. Psychiatric/Behavioral: Negative. Vitals:    09/28/21 1030   BP: 128/78   Site: Left Upper Arm   Position: Sitting   Cuff Size: Large Adult   Pulse: 76   Resp: 18   SpO2: 94%   Weight: 149 lb (67.6 kg)   Height: 5' 4\" (1.626 m)     Body mass index is 25.58 kg/m². Physical Exam  Constitutional:       Appearance: She is well-developed. HENT:      Right Ear: External ear normal.      Left Ear: External ear normal.      Mouth/Throat:      Pharynx: No oropharyngeal exudate. Eyes:      Conjunctiva/sclera: Conjunctivae normal.      Pupils: Pupils are equal, round, and reactive to light. Neck:      Thyroid: No thyromegaly. Vascular: No JVD. Cardiovascular:      Rate and Rhythm: Normal rate. Heart sounds: Normal heart sounds. No murmur heard. Pulmonary:      Effort: No respiratory distress. Breath sounds: Normal breath sounds. No wheezing or rales. Chest:      Chest wall: No tenderness. Abdominal:      General: Bowel sounds are normal.      Palpations: Abdomen is soft. Musculoskeletal:      Cervical back: Neck supple. Lymphadenopathy:      Cervical: No cervical adenopathy. Skin:     Findings: No rash. Neurological:      Mental Status: She is oriented to person, place, and time.          Lab Review   Orders Only on 09/23/2021   Component Date Value    Vit D, 25-Hydroxy 09/23/2021 36.8     Sodium 09/23/2021 140     Potassium 09/23/2021 4.0     Chloride 09/23/2021 104     CO2 09/23/2021 27     Anion Gap 09/23/2021 9     Glucose 09/23/2021 142*    BUN 09/23/2021 19     CREATININE 09/23/2021 0.7     GFR Non- 09/23/2021 >60     GFR  09/23/2021 >59     Calcium 09/23/2021 9.4     Total Protein 09/23/2021 6.6     Albumin 09/23/2021 4.6     Total Bilirubin 09/23/2021 0.6     Alkaline Phosphatase 09/23/2021 114*    ALT 09/23/2021 35*    AST 09/23/2021 29     Hemoglobin A1C 09/23/2021 6.5*    Cholesterol, Total 09/23/2021 186     Triglycerides 09/23/2021 197*    HDL 09/23/2021 34*    LDL Calculated 09/23/2021 113     Hep C Ab Interp 09/23/2021 Non-Reactive            ASSESSMENT/PLAN:  Type 2 diabetes mellitus without complication, without long-term current use of insulin (HCC)  Lab results reviewed and discussed with patient  Hemoglobin A1c is 6.5 9/2021   7.1 in 3/2021  Previously  A1c 6.8 in November 2020  She has been taking Januvia 50 mg twice daily and Trulicity 1.5 weekly and glyburide 5 mg twice daily  1.  Januvia 50 mg twice daily  2.  Trulicity to 1.5 mg weekly  3.  Glyburide continue decreased dose 5 mg twice daily with plans to further decrease this prescription and hopefully in the future to discontinue sulfonylurea     RLS (restless legs syndrome)  Has been well controlled/currently on no medications  rx made her constipated     Vitamin D deficiency  Lab results reviewed with patient  vitamin D level 36  Was 81 in 3/2021 (48 in March 2020)  HOLDING Rx vit d 50,000 weekly unitl lab in 9/2021 9/2021- restart every other week     Interstitial cystitis  Deviously followed with Dr. Jameson, prior history of frequent UTIs.  Recently has been doing better     Primary insomnia- better     Pure hypercholesterolemia  Lab results reviewed and discussed with patient  Liver functions normal  LDL is higher in 9/2021    70 in 3/2021, triglycerides normal  restsart   Zetia 10 mg daily  Rx Lipitor 80 mg daily  Recommend diet lower in saturated fats     Asthma  Uses dulera + spiriva ( never smoker)  She states that she uses these medications episodically      Osteopenia       Osteopenia of left hip 02/09/2021     Overview Note:     2/2021 hip neck -1.7      Recommendations at this time  #1 make sure  she takes her vitamin D supplement  #2 exercise, specifically weightbearing exercise is recommended     Repeat 3-4 yrs       History of Meniere's   Tinnitus  Does not want to see ENT thsi time  ( seen Arely garcia many yrs ago)  trila vestibular

## 2021-10-07 RX ORDER — DULAGLUTIDE 1.5 MG/.5ML
INJECTION, SOLUTION SUBCUTANEOUS
Qty: 8 ML | Refills: 3 | Status: SHIPPED | OUTPATIENT
Start: 2021-10-07 | End: 2022-03-28 | Stop reason: CLARIF

## 2021-10-20 ENCOUNTER — TELEPHONE (OUTPATIENT)
Dept: INTERNAL MEDICINE | Age: 76
End: 2021-10-20

## 2021-10-20 RX ORDER — GLYBURIDE 5 MG/1
5 TABLET ORAL 2 TIMES DAILY WITH MEALS
Qty: 180 TABLET | Refills: 1 | Status: SHIPPED | OUTPATIENT
Start: 2021-10-20 | End: 2021-12-27 | Stop reason: ALTCHOICE

## 2021-10-20 NOTE — TELEPHONE ENCOUNTER
Adam Kamara called requesting a refill of the below medication which has been pended for you:     Requested Prescriptions     Pending Prescriptions Disp Refills    glyBURIDE (DIABETA) 5 MG tablet 180 tablet 1     Sig: Take 1 tablet by mouth 2 times daily (with meals)       Last Appointment Date: 9/28/2021  Next Appointment Date: 3/28/2022    Allergies   Allergen Reactions    Amoxicillin-Pot Clavulanate     Ciprofloxacin     Codeine     Sulfa Antibiotics

## 2021-10-25 ENCOUNTER — VIRTUAL VISIT (OUTPATIENT)
Dept: INTERNAL MEDICINE | Age: 76
End: 2021-10-25
Payer: MEDICARE

## 2021-10-25 DIAGNOSIS — R05.9 COUGH: ICD-10-CM

## 2021-10-25 DIAGNOSIS — J01.90 ACUTE SINUSITIS, RECURRENCE NOT SPECIFIED, UNSPECIFIED LOCATION: ICD-10-CM

## 2021-10-25 PROCEDURE — 99213 OFFICE O/P EST LOW 20 MIN: CPT | Performed by: NURSE PRACTITIONER

## 2021-10-25 PROCEDURE — G8417 CALC BMI ABV UP PARAM F/U: HCPCS | Performed by: NURSE PRACTITIONER

## 2021-10-25 PROCEDURE — 3017F COLORECTAL CA SCREEN DOC REV: CPT | Performed by: NURSE PRACTITIONER

## 2021-10-25 PROCEDURE — 1123F ACP DISCUSS/DSCN MKR DOCD: CPT | Performed by: NURSE PRACTITIONER

## 2021-10-25 PROCEDURE — 1036F TOBACCO NON-USER: CPT | Performed by: NURSE PRACTITIONER

## 2021-10-25 PROCEDURE — G8484 FLU IMMUNIZE NO ADMIN: HCPCS | Performed by: NURSE PRACTITIONER

## 2021-10-25 PROCEDURE — G8427 DOCREV CUR MEDS BY ELIG CLIN: HCPCS | Performed by: NURSE PRACTITIONER

## 2021-10-25 PROCEDURE — 4040F PNEUMOC VAC/ADMIN/RCVD: CPT | Performed by: NURSE PRACTITIONER

## 2021-10-25 PROCEDURE — G8399 PT W/DXA RESULTS DOCUMENT: HCPCS | Performed by: NURSE PRACTITIONER

## 2021-10-25 PROCEDURE — 1090F PRES/ABSN URINE INCON ASSESS: CPT | Performed by: NURSE PRACTITIONER

## 2021-10-25 RX ORDER — CEFDINIR 300 MG/1
300 CAPSULE ORAL 2 TIMES DAILY
Qty: 14 CAPSULE | Refills: 0 | Status: SHIPPED | OUTPATIENT
Start: 2021-10-25 | End: 2021-11-01

## 2021-10-25 RX ORDER — METHYLPREDNISOLONE 4 MG/1
TABLET ORAL
Qty: 1 KIT | Refills: 0 | Status: SHIPPED | OUTPATIENT
Start: 2021-10-25 | End: 2021-10-31

## 2021-10-25 ASSESSMENT — ENCOUNTER SYMPTOMS
SHORTNESS OF BREATH: 0
SINUS PAIN: 1
CHOKING: 0
ABDOMINAL PAIN: 0
DIARRHEA: 0
COUGH: 1
CONSTIPATION: 0
STRIDOR: 0
WHEEZING: 0
NAUSEA: 0
EYE DISCHARGE: 0
EYE ITCHING: 0
VOMITING: 0
BLOOD IN STOOL: 0
TROUBLE SWALLOWING: 0
SORE THROAT: 0
ABDOMINAL DISTENTION: 0
COLOR CHANGE: 0

## 2021-10-25 NOTE — PATIENT INSTRUCTIONS
1.  Acute sinusitis    Acute sinusitis  Medrol bebo start on tues   Cefdinir 300 twice  a day for 7 days  get 2 doses in today    Saline nasal spray 4 times a day both nares for 7 days  Steroid spray, rhinocort, nasocort, nasonex, flonase twice daily about 5 minutes after the saline   mucinex 600 mg twice daily for 7 days with plenty of water  Delsym cough syrup up to 3 times daily as needed for cough   Ricola cough drops, honey lemon in pink bag;  has echanesia to help build your immunity

## 2021-10-25 NOTE — PROGRESS NOTES
[address] [facility]  [address] [place] [address]  [address] [address]  Dept: [phone]  Dept Fax: [phone]: [phone]    Matthew Rudolph (:  1945) is a 76 y.o. female,Established patient, here for evaluation of the following chief complaint(s): Cough (Dry Cough, not productive, terrile nasal drainage, chest congestion, No HA, or fever Sx started Friday night after being around sick grandkids; has had 2 doses of prednisode but that is it.)      Matthew Rudolph is a 76 y.o. female who presents today for her medical conditions/complaints as noted below. Matthew Rudolph is c/yulia Cough (Dry Cough, not productive, terrile nasal drainage, chest congestion, No HA, or fever Sx started Friday night after being around sick grandkids; has had 2 doses of prednisode but that is it.)    who were are performing tele health communication  for her medical conditions/complaints as noted below. Currently, our state is under umbrella of national state of emergency and we are using alternative methods of taking care of the needs of our patients so they are not exposed in our office; The patient has consented to this form of communication   who were are performing tele health communication  for her medical conditions/complaints as noted below. Currently, our state is under umbrella of national state of emergency and we are using alternative methods of taking care of the needs of our patients so they are not exposed in our office; The patient has consented to this form of communication   Doxy  visit 15  minutes            HPI:     Chief Complaint   Patient presents with    Cough     Dry Cough, not productive, terrile nasal drainage, chest congestion, No HA, or fever Sx started Friday night after being around sick grandkids; has had 2 doses of prednisode but that is it. HPI   1. Cough   2. Nasal drainage  3. Chest congestion;  4.   Sinus pressure and da  She started with these symptoms about 5 days ago; She has had no fever;        Past Medical History:   Diagnosis Date    GERD (gastroesophageal reflux disease)     Helicobacter Pylori (H. Pylori) Infection     Hyperlipidemia       Past Surgical History:   Procedure Laterality Date    CHOLECYSTECTOMY      COLONOSCOPY  2017    nanda    HYSTERECTOMY      ALEX BSO    KNEE GANGLION SURGERY      NECK SURGERY      Tumor removal    UPPER GASTROINTESTINAL ENDOSCOPY  ? [place]       Vitals 9/28/2021 6/17/2021 4/1/2021 3/24/2021 1/25/2021 24/46/8716   SYSTOLIC 529 [phone]   DIASTOLIC 78 76 68 72 78 82   Site Left Upper Arm - Left Upper Arm Left Upper Arm Left Upper Arm Left Upper Arm   Position Sitting - Sitting Sitting Sitting Sitting   Cuff Size Large Adult - Large Adult Large Adult Large Adult Large Adult   Pulse 76 92 58 82 68 82   Resp 18 18 18 18 18 18   SpO2 94 97 97 98 98 98   Weight 149 lb 147 lb 154 lb 150 lb 150 lb 150 lb   Height 5' 4\" 5' 4\" - 5' 4\" 5' 4\" 5' 4\"   Body mass index 25.57 kg/m2 25.23 kg/m2 - 25.74 kg/m2 25.74 kg/m2 25.74 kg/m2   Some recent data might be hidden       Family History   Problem Relation Age of Onset    Stroke Mother     Diabetes Mother     Liver Cancer Father     Breast Cancer Sister     Liver Cancer Maternal Grandfather     Colon Cancer Neg Hx     Colon Polyps Neg Hx        Social History     Tobacco Use    Smoking status: Never Smoker    Smokeless tobacco: Never Used   Substance Use Topics    Alcohol use: No      Current Outpatient Medications   Medication Sig Dispense Refill    cefdinir (OMNICEF) 300 MG capsule Take 1 capsule by mouth 2 times daily for 7 days 14 capsule 0    methylPREDNISolone (MEDROL DOSEPACK) 4 MG tablet Take by mouth.  1 kit 0    glyBURIDE (DIABETA) 5 MG tablet Take 1 tablet by mouth 2 times daily (with meals) 180 tablet 1    blood glucose test strips (ASCENSIA AUTODISC VI;ONE TOUCH ULTRA TEST VI) strip 1 each by In Vitro route daily As needed. 100 each 3    ezetimibe (ZETIA) 10 MG tablet Take 1 tablet by mouth daily 90 tablet 1    nitrofurantoin, macrocrystal-monohydrate, (MACROBID) 100 MG capsule Take 1 tablet by mouth twice weekly 24 capsule 2    esomeprazole (NEXIUM) 40 MG delayed release capsule Take 1 capsule by mouth every morning (before breakfast) 90 capsule 1    atorvastatin (LIPITOR) 80 MG tablet Take 1 tablet by mouth daily 90 tablet 1    albuterol (PROVENTIL) (2.5 MG/3ML) 0.083% nebulizer solution Take 3 mLs by nebulization 3 times daily as needed for Wheezing 30 each 3    SITagliptin (JANUVIA) 50 MG tablet Take 50 mg by mouth 2 times daily      Calcium Carb-Cholecalciferol (CALCIUM 1000 + D PO) Take by mouth      Biotin 1000 MCG TABS Take 1,000 Units by mouth      tiotropium (SPIRIVA) 18 MCG inhalation capsule Inhale 18 mcg into the lungs daily      polyethylene glycol (MIRALAX) 17 g PACK packet Take 17 g by mouth daily      aspirin 81 MG EC tablet Take 81 mg by mouth daily.  TRULICITY 1.5 YZ/6.2BR SOPN INJECT 1.5 MG INTO THE SKIN ONCE A WEEK 8 mL 3    vitamin D (ERGOCALCIFEROL) 1.25 MG (27098 UT) CAPS capsule Take 1 capsule by mouth every 14 days 6 capsule 3    zoster recombinant adjuvanted vaccine (SHINGRIX) 50 MCG/0.5ML SUSR injection Inject 0.5 mLs into the muscle See Admin Instructions 1 dose now and repeat in 2-6 months 0.5 mL 0    vitamin B-12 (CYANOCOBALAMIN) 1000 MCG tablet Take 1,000 mcg by mouth daily       No current facility-administered medications for this visit.      Allergies   Allergen Reactions    Amoxicillin-Pot Clavulanate     Ciprofloxacin     Codeine     Sulfa Antibiotics        Health Maintenance   Topic Date Due    Diabetic foot exam  Never done    Diabetic retinal exam  Never done    Shingles Vaccine (1 of 2) 11/23/2021 (Originally 11/12/1995)    DTaP/Tdap/Td vaccine (1 - Tdap) 01/25/2022 (Originally 10/28/2005)    Flu vaccine (1) 09/28/2022 (Originally 9/1/2021)    Annual Wellness Visit (AWV)  03/25/2022    Colon cancer screen colonoscopy  06/19/2022    A1C test (Diabetic or Prediabetic)  09/23/2022    Lipid screen  09/23/2022    DEXA (modify frequency per FRAX score)  Completed    Pneumococcal 65+ years Vaccine  Completed    COVID-19 Vaccine  Completed    Hepatitis C screen  Completed    Hepatitis A vaccine  Aged Out    Hib vaccine  Aged Out    Meningococcal (ACWY) vaccine  Aged Out       Lab Results   Component Value Date    LABA1C 6.5 (H) 09/23/2021     No results found for: PSA, PSADIA  TSH   Date Value Ref Range Status   03/18/2021 0.765 0.270 - 4.200 uIU/mL Final   ]  Lab Results   Component Value Date     09/23/2021    K 4.0 09/23/2021     09/23/2021    CO2 27 09/23/2021    BUN 19 09/23/2021    CREATININE 0.7 09/23/2021    GLUCOSE 142 (H) 09/23/2021    CALCIUM 9.4 09/23/2021    PROT 6.6 09/23/2021    LABALBU 4.6 09/23/2021    BILITOT 0.6 09/23/2021    ALKPHOS 114 (H) 09/23/2021    AST 29 09/23/2021    ALT 35 (H) 09/23/2021    LABGLOM >60 09/23/2021    GFRAA >59 09/23/2021     Lab Results   Component Value Date    CHOL 186 09/23/2021    CHOL 129 (L) 03/18/2021     Lab Results   Component Value Date    TRIG 197 (H) 09/23/2021    TRIG 117 03/18/2021     Lab Results   Component Value Date    HDL 34 (L) 09/23/2021    HDL 32 (L) 03/18/2021     Lab Results   Component Value Date    LDLCALC 113 09/23/2021    LDLCALC 74 03/18/2021     Lab Results   Component Value Date     09/23/2021    K 4.0 09/23/2021     09/23/2021    CO2 27 09/23/2021    BUN 19 09/23/2021    CREATININE 0.7 09/23/2021    GLUCOSE 142 09/23/2021    CALCIUM 9.4 09/23/2021      Lab Results   Component Value Date    WBC 4.6 (L) 03/18/2021    HGB 13.6 03/18/2021    HCT 40.7 03/18/2021    MCV 89.1 03/18/2021     03/18/2021    LABLYMP 1.42 07/19/2012    LYMPHOPCT 30.2 03/18/2021    RBC 4.57 03/18/2021    MCH 29.8 03/18/2021    MCHC 33.4 03/18/2021    RDW 12.2 03/18/2021     Lab Results   Component Value Date    VITD25 36.8 09/23/2021     Subjective:      Review of Systems   Constitutional: Negative for fatigue, fever and unexpected weight change. HENT: Positive for congestion and sinus pain. Negative for ear discharge, ear pain, mouth sores, sore throat and trouble swallowing. Eyes: Negative for discharge, itching and visual disturbance. Respiratory: Positive for cough. Negative for choking, shortness of breath, wheezing and stridor. Cardiovascular: Negative for chest pain, palpitations and leg swelling. Gastrointestinal: Negative for abdominal distention, abdominal pain, blood in stool, constipation, diarrhea, nausea and vomiting. Endocrine: Negative for cold intolerance, polydipsia and polyuria. Genitourinary: Negative for difficulty urinating, dysuria, frequency and urgency. Musculoskeletal: Negative for arthralgias and gait problem. Skin: Negative for color change and rash. Allergic/Immunologic: Negative for food allergies and immunocompromised state. Neurological: Negative for dizziness, tremors, syncope, speech difficulty, weakness and headaches. Hematological: Negative for adenopathy. Does not bruise/bleed easily. Psychiatric/Behavioral: Negative for confusion and hallucinations. Objective:     Physical Exam   Constitutional  well developed, well nourished. No diaphoresis or acute distress. Neck- ROM appears normal  Extremities -No cyanosis, no edema  Pulmonary  effort appears normal.  No respiratory distress. No accessory muscle use  Neurologic  alert and oriented X 3. Cranial Nerves II-XII grossly intact  Skin color is good;  no sign of dehydration   Psychiatric  mood, affect, and behavior appear normal.  Judgment and thought processes appear normal.       There were no vitals taken for this visit.       Vital signs were not taken at this visit as no equipment was available;        Assessment:      Problem List     Acute sinusitis      \     Acute sinusitis  Medrol dosepak   Cefdinir 300 twice  a day for 7 days  get 2 doses in today    Saline nasal spray 4 times a day both nares for 7 days  Steroid spray, rhinocort, nasocort, nasonex, flonase twice daily about 5 minutes after the saline   mucinex 1200 mg twice daily for 7 days with plenty of water  Delsym cough syrup up to 3 times daily as needed for cough   Ricola cough drops, honey lemon in pink bag;  has echanesia to help build your immunity                Relevant Medications    nitrofurantoin, macrocrystal-monohydrate, (MACROBID) 100 MG capsule    cefdinir (OMNICEF) 300 MG capsule    methylPREDNISolone (MEDROL DOSEPACK) 4 MG tablet    Cough     Delsym cough syrup and Ricola cough drops                Plan:        Patient given educational materials - see patient instructions. Discussed use, benefit, and side effects of prescribed medications. Allpatient questions answered. Pt voiced understanding. Reviewed health maintenance. Instructed to continue current medications, diet and exercise. Patient agreed with treatment plan. Follow up as directed. MEDICATIONS:  Orders Placed This Encounter   Medications    cefdinir (OMNICEF) 300 MG capsule     Sig: Take 1 capsule by mouth 2 times daily for 7 days     Dispense:  14 capsule     Refill:  0     She has taken keflex in the past without issues    methylPREDNISolone (MEDROL DOSEPACK) 4 MG tablet     Sig: Take by mouth. Dispense:  1 kit     Refill:  0         ORDERS:  No orders of the defined types were placed in this encounter. Follow-up:  Return for keep fu appt. PATIENT INSTRUCTIONS:  Patient Instructions   1.   Acute sinusitis    Acute sinusitis  Medrol bebo start on tues   Cefdinir 300 twice  a day for 7 days  get 2 doses in today    Saline nasal spray 4 times a day both nares for 7 days  Steroid spray, rhinocort, nasocort, nasonex, flonase twice daily about 5 minutes after the saline   mucinex 600 mg twice daily for 7 days with plenty of water  Delsym cough syrup up to 3 times daily as needed for cough   Ricola cough drops, honey lemon in pink bag;  has echanesia to help build your immunity           Electronically signed by Karyl Osler, APRN on 10/25/2021 at 3:27 PM    @    [employer]/transcription disclaimer:  Much of this encounter note is electronic transcription/translation of spoken language to printed texts. The electronic translation of spoken language may be erroneous, or at times,nonsensical words or phrases may be inadvertently transcribed.   Although I have reviewed the note for such errors, some may still exist.

## 2021-10-26 RX ORDER — ATORVASTATIN CALCIUM 80 MG/1
TABLET, FILM COATED ORAL
Qty: 90 TABLET | Refills: 1 | Status: SHIPPED | OUTPATIENT
Start: 2021-10-26 | End: 2022-05-09

## 2021-10-26 NOTE — TELEPHONE ENCOUNTER
Madhavi Cuevas called requesting a refill of the below medication which has been pended for you:     Requested Prescriptions     Pending Prescriptions Disp Refills    atorvastatin (LIPITOR) 80 MG tablet [Pharmacy Med Name: ATORVASTATIN TABS 80MG] 90 tablet 1     Sig: TAKE 1 TABLET DAILY       Last Appointment Date: 9/28/2021  Next Appointment Date: 3/28/2022    Allergies   Allergen Reactions    Amoxicillin-Pot Clavulanate     Ciprofloxacin     Codeine     Sulfa Antibiotics

## 2021-10-29 ENCOUNTER — HOSPITAL ENCOUNTER (OUTPATIENT)
Dept: GENERAL RADIOLOGY | Age: 76
Discharge: HOME | End: 2021-10-29
Payer: MEDICARE

## 2021-10-29 ENCOUNTER — TELEPHONE (OUTPATIENT)
Dept: INTERNAL MEDICINE | Age: 76
End: 2021-10-29

## 2021-10-29 ENCOUNTER — OFFICE VISIT (OUTPATIENT)
Dept: URGENT CARE | Age: 76
End: 2021-10-29
Payer: MEDICARE

## 2021-10-29 VITALS
OXYGEN SATURATION: 97 % | BODY MASS INDEX: 23.89 KG/M2 | SYSTOLIC BLOOD PRESSURE: 135 MMHG | DIASTOLIC BLOOD PRESSURE: 85 MMHG | HEART RATE: 98 BPM | HEIGHT: 65 IN | TEMPERATURE: 98.4 F | WEIGHT: 143.4 LBS

## 2021-10-29 DIAGNOSIS — R05.9 COUGH: ICD-10-CM

## 2021-10-29 DIAGNOSIS — R05.9 COUGH: Primary | ICD-10-CM

## 2021-10-29 DIAGNOSIS — R06.02 SHORTNESS OF BREATH: ICD-10-CM

## 2021-10-29 DIAGNOSIS — J45.901 EXACERBATION OF ASTHMA, UNSPECIFIED ASTHMA SEVERITY, UNSPECIFIED WHETHER PERSISTENT: ICD-10-CM

## 2021-10-29 PROCEDURE — G8427 DOCREV CUR MEDS BY ELIG CLIN: HCPCS | Performed by: NURSE PRACTITIONER

## 2021-10-29 PROCEDURE — 3017F COLORECTAL CA SCREEN DOC REV: CPT | Performed by: NURSE PRACTITIONER

## 2021-10-29 PROCEDURE — G8484 FLU IMMUNIZE NO ADMIN: HCPCS | Performed by: NURSE PRACTITIONER

## 2021-10-29 PROCEDURE — 96372 THER/PROPH/DIAG INJ SC/IM: CPT | Performed by: NURSE PRACTITIONER

## 2021-10-29 PROCEDURE — 1090F PRES/ABSN URINE INCON ASSESS: CPT | Performed by: NURSE PRACTITIONER

## 2021-10-29 PROCEDURE — 99214 OFFICE O/P EST MOD 30 MIN: CPT | Performed by: NURSE PRACTITIONER

## 2021-10-29 PROCEDURE — G8420 CALC BMI NORM PARAMETERS: HCPCS | Performed by: NURSE PRACTITIONER

## 2021-10-29 PROCEDURE — 4040F PNEUMOC VAC/ADMIN/RCVD: CPT | Performed by: NURSE PRACTITIONER

## 2021-10-29 PROCEDURE — G8399 PT W/DXA RESULTS DOCUMENT: HCPCS | Performed by: NURSE PRACTITIONER

## 2021-10-29 PROCEDURE — 1123F ACP DISCUSS/DSCN MKR DOCD: CPT | Performed by: NURSE PRACTITIONER

## 2021-10-29 PROCEDURE — 71046 X-RAY EXAM CHEST 2 VIEWS: CPT

## 2021-10-29 PROCEDURE — 1036F TOBACCO NON-USER: CPT | Performed by: NURSE PRACTITIONER

## 2021-10-29 RX ORDER — PREDNISONE 20 MG/1
20 TABLET ORAL 2 TIMES DAILY
Qty: 10 TABLET | Refills: 0 | Status: SHIPPED | OUTPATIENT
Start: 2021-10-29 | End: 2021-11-03

## 2021-10-29 RX ORDER — DEXAMETHASONE SODIUM PHOSPHATE 10 MG/ML
10 INJECTION INTRAMUSCULAR; INTRAVENOUS ONCE
Status: COMPLETED | OUTPATIENT
Start: 2021-10-29 | End: 2021-10-29

## 2021-10-29 RX ADMIN — DEXAMETHASONE SODIUM PHOSPHATE 10 MG: 10 INJECTION INTRAMUSCULAR; INTRAVENOUS at 16:23

## 2021-10-29 ASSESSMENT — VISUAL ACUITY: OU: 1

## 2021-10-29 ASSESSMENT — ENCOUNTER SYMPTOMS
ABDOMINAL PAIN: 0
NAUSEA: 0
RHINORRHEA: 1
ALLERGIC/IMMUNOLOGIC COMMENTS: ASTHMA
SINUS PRESSURE: 0
SINUS PAIN: 0
SHORTNESS OF BREATH: 1
SORE THROAT: 1
DIARRHEA: 0
COUGH: 1
VOMITING: 0

## 2021-10-29 ASSESSMENT — PATIENT HEALTH QUESTIONNAIRE - PHQ9
SUM OF ALL RESPONSES TO PHQ QUESTIONS 1-9: 0
1. LITTLE INTEREST OR PLEASURE IN DOING THINGS: 0
2. FEELING DOWN, DEPRESSED OR HOPELESS: 0
SUM OF ALL RESPONSES TO PHQ9 QUESTIONS 1 & 2: 0

## 2021-10-29 NOTE — TELEPHONE ENCOUNTER
----- Message from Rowland Lanes sent at 10/29/2021  9:59 AM CDT -----  Subject: Message to Provider    QUESTIONS  Information for Provider? Pt reports medication and medrose pack she was   prescribed on 10/25/21 is not helping.  ---------------------------------------------------------------------------  --------------  CALL BACK INFO  What is the best way for the office to contact you? OK to leave message on   voicemail  Preferred Call Back Phone Number? [phone]  ---------------------------------------------------------------------------  --------------  SCRIPT ANSWERS  Relationship to Patient?  Self

## 2021-10-29 NOTE — PROGRESS NOTES
[address] [facility]  [address] [facility]  [address] [address]  Dept: [phone]  Dept Fax: [phone]  Loc: [phone]    Nori Bowers is a 76 y.o. female who presents today for her medical conditions/complaintsas noted below. Nori Bowers is c/o of Cough and Nasal Congestion        HPI:     Cough  This is a new problem. The current episode started in the past 7 days. The problem has been unchanged. The problem occurs every few minutes. The cough is productive of sputum. Associated symptoms include ear pain, rhinorrhea, a sore throat and shortness of breath. Pertinent negatives include no chills, fever, headaches, myalgias or rash. Nothing aggravates the symptoms. She has tried body position changes, cool air and OTC cough suppressant (Cefdinir, Medrol dose pack, Mucinex) for the symptoms. The treatment provided mild relief. Her past medical history is significant for environmental allergies. Kenn Long has been taking Omnicef and Medrol since Monday. She does have asthma and is taking breathing treatments 4 times daily. She says her grandkids were sick and she knows she caught this from them. They were tested for COVID and tests were negative. She has been vaccinated for COVID. She says she is no better and is afraid she may have pneumonia. Past Medical History:   Diagnosis Date    GERD (gastroesophageal reflux disease)     Helicobacter Pylori (H. Pylori) Infection     Hyperlipidemia      Past Surgical History:   Procedure Laterality Date    CHOLECYSTECTOMY      COLONOSCOPY  2017    nanda    HYSTERECTOMY      ALEX BSO    KNEE GANGLION SURGERY      NECK SURGERY      Tumor removal    UPPER GASTROINTESTINAL ENDOSCOPY  ?     Bree Castro       Family History   Problem Relation Age of Onset   Jodi Ramirez Stroke Mother     Diabetes Mother     Liver Cancer Father     Breast Cancer Sister     Liver Cancer Maternal Grandfather     Colon Cancer Neg Hx     Colon Polyps Neg Hx        Social History     Tobacco Use    Smoking status: Never Smoker    Smokeless tobacco: Never Used   Substance Use Topics    Alcohol use: No      Current Outpatient Medications   Medication Sig Dispense Refill    predniSONE (DELTASONE) 20 MG tablet Take 1 tablet by mouth 2 times daily for 5 days 10 tablet 0    atorvastatin (LIPITOR) 80 MG tablet TAKE 1 TABLET DAILY 90 tablet 1    methylPREDNISolone (MEDROL DOSEPACK) 4 MG tablet Take by mouth. 1 kit 0    glyBURIDE (DIABETA) 5 MG tablet Take 1 tablet by mouth 2 times daily (with meals) 180 tablet 1    TRULICITY 1.5 HF/6.4MZ SOPN INJECT 1.5 MG INTO THE SKIN ONCE A WEEK 8 mL 3    blood glucose test strips (ASCENSIA AUTODISC VI;ONE TOUCH ULTRA TEST VI) strip 1 each by In Vitro route daily As needed. 100 each 3    vitamin D (ERGOCALCIFEROL) 1.25 MG (90707 UT) CAPS capsule Take 1 capsule by mouth every 14 days 6 capsule 3    ezetimibe (ZETIA) 10 MG tablet Take 1 tablet by mouth daily 90 tablet 1    nitrofurantoin, macrocrystal-monohydrate, (MACROBID) 100 MG capsule Take 1 tablet by mouth twice weekly 24 capsule 2    zoster recombinant adjuvanted vaccine (SHINGRIX) 50 MCG/0.5ML SUSR injection Inject 0.5 mLs into the muscle See Admin Instructions 1 dose now and repeat in 2-6 months 0.5 mL 0    esomeprazole (NEXIUM) 40 MG delayed release capsule Take 1 capsule by mouth every morning (before breakfast) 90 capsule 1    SITagliptin (JANUVIA) 50 MG tablet Take 50 mg by mouth 2 times daily      vitamin B-12 (CYANOCOBALAMIN) 1000 MCG tablet Take 1,000 mcg by mouth daily      Calcium Carb-Cholecalciferol (CALCIUM 1000 + D PO) Take by mouth      Biotin 1000 MCG TABS Take 1,000 Units by mouth      tiotropium (SPIRIVA) 18 MCG inhalation capsule Inhale 18 mcg into the lungs daily      polyethylene glycol (MIRALAX) 17 g PACK packet Take 17 g by mouth daily      aspirin 81 MG EC tablet Take 81 mg by mouth daily.       cefdinir (OMNICEF) 300 MG capsule Take 1 capsule by mouth 2 times daily for 7 days (Patient not taking: Reported on 10/29/2021) 14 capsule 0    albuterol (PROVENTIL) (2.5 MG/3ML) 0.083% nebulizer solution Take 3 mLs by nebulization 3 times daily as needed for Wheezing (Patient not taking: Reported on 10/29/2021) 30 each 3     Current Facility-Administered Medications   Medication Dose Route Frequency Provider Last Rate Last Admin    dexamethasone (DECADRON) injection 10 mg  10 mg IntraMUSCular Once Kermit Allis, APRN - CNP         Allergies   Allergen Reactions    Amoxicillin-Pot Clavulanate     Ciprofloxacin     Codeine     Sulfa Antibiotics        Health Maintenance   Topic Date Due    Diabetic foot exam  Never done    Diabetic retinal exam  Never done    Shingles Vaccine (1 of 2) 11/23/2021 (Originally 11/12/1995)    DTaP/Tdap/Td vaccine (1 - Tdap) 01/25/2022 (Originally 10/28/2005)    Flu vaccine (1) 09/28/2022 (Originally 9/1/2021)    Annual Wellness Visit (AWV)  03/25/2022    Colon cancer screen colonoscopy  06/19/2022    A1C test (Diabetic or Prediabetic)  09/23/2022    Lipid screen  09/23/2022    DEXA (modify frequency per FRAX score)  Completed    Pneumococcal 65+ years Vaccine  Completed    COVID-19 Vaccine  Completed    Hepatitis C screen  Completed    Hepatitis A vaccine  Aged Out    Hib vaccine  Aged Out    Meningococcal (ACWY) vaccine  Aged Out       Subjective:     Review of Systems   Constitutional: Negative for activity change, appetite change, chills and fever. HENT: Positive for ear pain, rhinorrhea and sore throat. Negative for congestion, ear discharge, sinus pressure and sinus pain. Respiratory: Positive for cough and shortness of breath. Gastrointestinal: Negative for abdominal pain, diarrhea, nausea and vomiting. Musculoskeletal: Negative for arthralgias and myalgias. Skin: Negative for rash. Allergic/Immunologic: Positive for environmental allergies.         Asthma   Neurological: Negative for headaches.       :Objective      Physical Exam  Vitals and nursing note reviewed. Constitutional:       General: She is awake. She is not in acute distress. Appearance: Normal appearance. She is well-developed, well-groomed and normal weight. She is not ill-appearing. HENT:      Head: Normocephalic. Right Ear: Hearing, tympanic membrane, ear canal and external ear normal.      Left Ear: Hearing, tympanic membrane, ear canal and external ear normal.      Nose: Congestion present. Right Sinus: Frontal sinus tenderness present. Left Sinus: Frontal sinus tenderness present. Mouth/Throat:      Lips: Pink. Mouth: Mucous membranes are moist.      Pharynx: Oropharynx is clear. Uvula midline. Tonsils: 0 on the right. 0 on the left. Eyes:      General: Vision grossly intact. Conjunctiva/sclera: Conjunctivae normal.   Neck:      Trachea: Phonation normal.   Cardiovascular:      Rate and Rhythm: Normal rate and regular rhythm. Heart sounds: Normal heart sounds, S1 normal and S2 normal. No murmur heard. No friction rub. No gallop. Pulmonary:      Effort: Pulmonary effort is normal. No respiratory distress. Breath sounds: Normal air entry. Examination of the right-lower field reveals decreased breath sounds and rhonchi. Examination of the left-lower field reveals decreased breath sounds and rhonchi. Decreased breath sounds, wheezing and rhonchi present. No rales. Abdominal:      General: Abdomen is flat. Bowel sounds are normal.      Palpations: Abdomen is soft. Tenderness: There is no abdominal tenderness. Musculoskeletal:         General: No tenderness or deformity. Normal range of motion. Cervical back: Full passive range of motion without pain and neck supple. Lymphadenopathy:      Head:      Right side of head: No tonsillar adenopathy. Left side of head: No tonsillar adenopathy. Skin:     General: Skin is warm and dry. Capillary Refill: Capillary refill takes less than 2 seconds. Neurological:      General: No focal deficit present. Mental Status: She is alert, oriented to person, place, and time and easily aroused. Psychiatric:         Attention and Perception: Attention normal.         Mood and Affect: Mood normal.         Speech: Speech normal.         Behavior: Behavior normal. Behavior is cooperative. /85   Pulse 98   Temp 98.4 °F (36.9 °C)   Ht 5' 5\" (1.651 m)   Wt 143 lb 6.4 oz (65 kg)   SpO2 97%   BMI 23.86 kg/m²     :Assessment       Diagnosis Orders   1. Cough  XR CHEST STANDARD (2 VW)    dexamethasone (DECADRON) injection 10 mg    predniSONE (DELTASONE) 20 MG tablet   2. Shortness of breath  XR CHEST STANDARD (2 VW)    dexamethasone (DECADRON) injection 10 mg    predniSONE (DELTASONE) 20 MG tablet   3. Exacerbation of asthma, unspecified asthma severity, unspecified whether persistent  dexamethasone (DECADRON) injection 10 mg    predniSONE (DELTASONE) 20 MG tablet       :Plan      Orders Placed This Encounter   Procedures    XR CHEST STANDARD (2 VW)     Standing Status:   Future     Number of Occurrences:   1     Standing Expiration Date:   10/29/2022     Order Specific Question:   Reason for exam:     Answer:   cough, shortness of breath   EXAMINATION:  XR CHEST (2 VW)  10/29/2021 4:56 PM  HISTORY: Coughing. Shortness of breath. COMPARISON: No comparison study. TECHNIQUE: 2 views were obtained. FINDINGS:  There is no focal infiltrate or effusion. The heart is  normal in size. There is scoliosis and degenerative change of the  visualized spine. No acute bony abnormality is seen. IMPRESSION:  No active disease is seen. No follow-ups on file.     Orders Placed This Encounter   Medications    dexamethasone (DECADRON) injection 10 mg    predniSONE (DELTASONE) 20 MG tablet     Sig: Take 1 tablet by mouth 2 times daily for 5 days     Dispense:  10 tablet     Refill:  0        Patient Instructions   Complete Omnicef  Stop Medrol dose pack   Tomorrow start Prednisone as directed  Plenty of fluids  Continue Albuterol as directed'  Follow up with PCP or return to Urgent Care for worsening or unresolved symptoms. Patient given educational materials- see patient instructions. Discussed use, benefit, and side effects of prescribedmedications. All patient questions answered. Pt voiced understanding.        Electronically signed by LETY Darnell CNP on 10/29/2021 at 4:18 PM

## 2021-10-29 NOTE — TELEPHONE ENCOUNTER
Pt was given Omnicef 300mg and steroids on 10/25 for cough, chest congestion, drainage. She states she is not getting any better.

## 2021-10-29 NOTE — PATIENT INSTRUCTIONS
Complete Omnicef  Stop Medrol dose pack   Tomorrow start Prednisone as directed  Plenty of fluids  Continue Albuterol as directed'  Follow up with PCP or return to Urgent Care for worsening or unresolved symptoms.

## 2021-11-08 ENCOUNTER — OFFICE VISIT (OUTPATIENT)
Dept: INTERNAL MEDICINE | Age: 76
End: 2021-11-08
Payer: MEDICARE

## 2021-11-08 VITALS
RESPIRATION RATE: 18 BRPM | DIASTOLIC BLOOD PRESSURE: 70 MMHG | OXYGEN SATURATION: 98 % | WEIGHT: 145 LBS | BODY MASS INDEX: 24.13 KG/M2 | SYSTOLIC BLOOD PRESSURE: 130 MMHG | HEART RATE: 76 BPM

## 2021-11-08 DIAGNOSIS — J45.21 MILD INTERMITTENT ASTHMA WITH EXACERBATION: ICD-10-CM

## 2021-11-08 DIAGNOSIS — R39.15 URINARY URGENCY: ICD-10-CM

## 2021-11-08 DIAGNOSIS — R35.0 URINE FREQUENCY: ICD-10-CM

## 2021-11-08 DIAGNOSIS — J20.9 ACUTE BRONCHITIS AND BRONCHIOLITIS: ICD-10-CM

## 2021-11-08 DIAGNOSIS — R05.9 COUGH: ICD-10-CM

## 2021-11-08 DIAGNOSIS — J21.9 ACUTE BRONCHITIS AND BRONCHIOLITIS: ICD-10-CM

## 2021-11-08 DIAGNOSIS — N39.41 URGENCY INCONTINENCE: ICD-10-CM

## 2021-11-08 DIAGNOSIS — J20.9 ACUTE BRONCHITIS AND BRONCHIOLITIS: Primary | ICD-10-CM

## 2021-11-08 DIAGNOSIS — J21.9 ACUTE BRONCHITIS AND BRONCHIOLITIS: Primary | ICD-10-CM

## 2021-11-08 DIAGNOSIS — R73.9 HYPERGLYCEMIA: ICD-10-CM

## 2021-11-08 LAB
BILIRUBIN URINE: NEGATIVE
BLOOD, URINE: NEGATIVE
CLARITY: CLEAR
COLOR: YELLOW
GLUCOSE URINE: =>1000 MG/DL
KETONES, URINE: ABNORMAL MG/DL
LEUKOCYTE ESTERASE, URINE: NEGATIVE
NITRITE, URINE: NEGATIVE
PH UA: 5.5 (ref 5–8)
PROTEIN UA: NEGATIVE MG/DL
SPECIFIC GRAVITY UA: 1.03 (ref 1–1.03)
UROBILINOGEN, URINE: 0.2 E.U./DL

## 2021-11-08 PROCEDURE — 3017F COLORECTAL CA SCREEN DOC REV: CPT | Performed by: INTERNAL MEDICINE

## 2021-11-08 PROCEDURE — G8399 PT W/DXA RESULTS DOCUMENT: HCPCS | Performed by: INTERNAL MEDICINE

## 2021-11-08 PROCEDURE — G8428 CUR MEDS NOT DOCUMENT: HCPCS | Performed by: INTERNAL MEDICINE

## 2021-11-08 PROCEDURE — G8420 CALC BMI NORM PARAMETERS: HCPCS | Performed by: INTERNAL MEDICINE

## 2021-11-08 PROCEDURE — G8484 FLU IMMUNIZE NO ADMIN: HCPCS | Performed by: INTERNAL MEDICINE

## 2021-11-08 PROCEDURE — 1090F PRES/ABSN URINE INCON ASSESS: CPT | Performed by: INTERNAL MEDICINE

## 2021-11-08 PROCEDURE — 0509F URINE INCON PLAN DOCD: CPT | Performed by: INTERNAL MEDICINE

## 2021-11-08 PROCEDURE — 99214 OFFICE O/P EST MOD 30 MIN: CPT | Performed by: INTERNAL MEDICINE

## 2021-11-08 PROCEDURE — 1036F TOBACCO NON-USER: CPT | Performed by: INTERNAL MEDICINE

## 2021-11-08 PROCEDURE — 1123F ACP DISCUSS/DSCN MKR DOCD: CPT | Performed by: INTERNAL MEDICINE

## 2021-11-08 PROCEDURE — 4040F PNEUMOC VAC/ADMIN/RCVD: CPT | Performed by: INTERNAL MEDICINE

## 2021-11-08 RX ORDER — ALBUTEROL SULFATE 2.5 MG/3ML
2.5 SOLUTION RESPIRATORY (INHALATION) 3 TIMES DAILY PRN
Qty: 30 EACH | Refills: 3 | Status: SHIPPED | OUTPATIENT
Start: 2021-11-08 | End: 2022-07-28

## 2021-11-08 RX ORDER — AZITHROMYCIN 250 MG/1
250 TABLET, FILM COATED ORAL SEE ADMIN INSTRUCTIONS
Qty: 6 TABLET | Refills: 0 | Status: SHIPPED | OUTPATIENT
Start: 2021-11-08 | End: 2021-11-13

## 2021-11-08 ASSESSMENT — ENCOUNTER SYMPTOMS
SORE THROAT: 0
WHEEZING: 1
ABDOMINAL PAIN: 0
COUGH: 1
CHEST TIGHTNESS: 0
CONSTIPATION: 0

## 2021-11-08 NOTE — PROGRESS NOTES
Chief Complaint   Patient presents with    Urinary Tract Infection     On-Going since saturday, urgency, achy    Head Congestion     On-Going for 2 weeks, seen [place] Laguerre virtually, then went to urgent care was given more meds, and cant seem to get rid of it    Cough     Was given a depo 80 shot in urgent care along with predisone for 5 days     History of presenting illness:  Nash Mixon is a74 y.o. female who presents today for follow up on her chronic medical conditions as noted below. Patient has been sick since around 10/24/2021  States her grandkids were sick, with diagnosed with something \"viral\"  She is asthmatic and then she started to have coughing and wheezing and low-grade fever  Initially seen by Pete Laguerre 10/25  Was given Medrol Dosepak and Ceftin  Subsequently seen at the 10/29 was given Depo Medrol, prednisone.   Has been using albuterol nebulizer treatments    Since few days ago has also noticed increased urinary urgency  She has known history of previous UTIs and she maintains on Macrobid 100 mg capsule twice weekly    She denies any fever chills or shortness of breath today  Patient Active Problem List    Diagnosis Date Noted    Cough 10/25/2021    Acute sinusitis 10/25/2021    Osteopenia of left hip 02/09/2021     Overview Note:     2/2021 hip neck -1.7      Type 2 diabetes mellitus without complication, without long-term current use of insulin (HCC) 11/23/2020    RLS (restless legs syndrome) 11/23/2020    Vitamin D deficiency 11/23/2020    Nausea 08/22/2013    Gastroesophageal reflux disease without esophagitis 08/22/2013    History of Helicobacter pylori infection 08/22/2013    History of colon polyps 08/22/2013    Hiatal hernia 08/22/2013    Diverticulosis 08/22/2013     Past Medical History:   Diagnosis Date    GERD (gastroesophageal reflux disease)     Helicobacter Pylori (H. Pylori) Infection     Hyperlipidemia       Past Surgical History:   Procedure daily      polyethylene glycol (MIRALAX) 17 g PACK packet Take 17 g by mouth daily      aspirin 81 MG EC tablet Take 81 mg by mouth daily. No current facility-administered medications for this visit. Allergies   Allergen Reactions    Amoxicillin-Pot Clavulanate     Ciprofloxacin     Codeine     Sulfa Antibiotics      Social History     Tobacco Use    Smoking status: Never Smoker    Smokeless tobacco: Never Used   Substance Use Topics    Alcohol use: No      Family History   Problem Relation Age of Onset    Stroke Mother     Diabetes Mother     Liver Cancer Father     Breast Cancer Sister     Liver Cancer Maternal Grandfather     Colon Cancer Neg Hx     Colon Polyps Neg Hx        Review of Systems   Constitutional: Positive for fatigue. Negative for chills and fever. HENT: Positive for congestion. Negative for ear pain, nosebleeds, postnasal drip and sore throat. Respiratory: Positive for cough and wheezing. Negative for chest tightness. Cardiovascular: Negative for chest pain, palpitations and leg swelling. Gastrointestinal: Negative for abdominal pain and constipation. Genitourinary: Positive for dysuria and urgency. Musculoskeletal: Negative. Negative for arthralgias. Skin: Negative for rash. Neurological: Negative for dizziness and headaches. Psychiatric/Behavioral: Negative. Vitals:    11/08/21 1006   BP: 130/70   Site: Left Upper Arm   Position: Sitting   Cuff Size: Large Adult   Pulse: 76   Resp: 18   SpO2: 98%   Weight: 145 lb (65.8 kg)     Body mass index is 24.13 kg/m². Physical Exam  Constitutional:       Appearance: She is well-developed. HENT:      Right Ear: External ear normal.      Left Ear: External ear normal.      Mouth/Throat:      Pharynx: No oropharyngeal exudate. Eyes:      Conjunctiva/sclera: Conjunctivae normal.      Pupils: Pupils are equal, round, and reactive to light. Neck:      Thyroid: No thyromegaly. Vascular: No JVD. Cardiovascular:      Rate and Rhythm: Normal rate. Heart sounds: Normal heart sounds. No murmur heard. Pulmonary:      Effort: No respiratory distress. Breath sounds: Wheezing and rales present. Chest:      Chest wall: No tenderness. Abdominal:      General: Bowel sounds are normal.      Palpations: Abdomen is soft. Musculoskeletal:      Cervical back: Neck supple. Lymphadenopathy:      Cervical: No cervical adenopathy. Skin:     General: Skin is warm. Findings: No rash. Neurological:      Mental Status: She is oriented to person, place, and time. Lab Review   Orders Only on 09/23/2021   Component Date Value    Vit D, 25-Hydroxy 09/23/2021 36.8     Sodium 09/23/2021 140     Potassium 09/23/2021 4.0     Chloride 09/23/2021 104     CO2 09/23/2021 27     Anion Gap 09/23/2021 9     Glucose 09/23/2021 142*    BUN 09/23/2021 19     CREATININE 09/23/2021 0.7     GFR Non- 09/23/2021 >60     GFR  09/23/2021 >59     Calcium 09/23/2021 9.4     Total Protein 09/23/2021 6.6     Albumin 09/23/2021 4.6     Total Bilirubin 09/23/2021 0.6     Alkaline Phosphatase 09/23/2021 114*    ALT 09/23/2021 35*    AST 09/23/2021 29     Hemoglobin A1C 09/23/2021 6.5*    Cholesterol, Total 09/23/2021 186     Triglycerides 09/23/2021 197*    HDL 09/23/2021 34*    LDL Calculated 09/23/2021 113     Hep C Ab Interp 09/23/2021 Non-Reactive            ASSESSMENT/PLAN:    Acute bronchitis and bronchiolitis  Mild intermittent asthma with exacerbation  Cough  Patient has been sick since around 10/24/2021  States her grandkids were sick, with diagnosed with something \"viral\"  She is asthmatic and then she started to have coughing and wheezing and low-grade fever  Initially seen by Leia Mustafa 10/25  Was given Medrol Dosepak and Ceftin  Subsequently seen at the 10/29 was given Depo Medrol, prednisone.   Has been using albuterol nebulizer treatments    Chest x-ray 10/29  Narrative   EXAMINATION:  XR CHEST (2 VW)  10/29/2021 4:56 PM   HISTORY: Coughing. Shortness of breath. COMPARISON: No comparison study. TECHNIQUE: 2 views were obtained. FINDINGS: Blaine Vicente is no focal infiltrate or effusion. The heart is   normal in size. There is scoliosis and degenerative change of the   visualized spine. No acute bony abnormality is seen.       Impression   No active disease is seen. Signed by Dr Kera Nj         Since few days ago has also noticed increased urinary urgency  She has known history of previous UTIs and she maintains on Macrobid 100 mg capsule twice weekly    She denies any fever chills or shortness of breath today    Obtain respiratory panel  -     Miscellaneous Sendout 1; Future    Discussion  As above patient has been already antibiotic and has received multiple courses of steroids/sees diabetic now with elevated blood sugars as below  Most likely reason is viral illness which could produce an result with prolonged coughing  She is not short of breath and her oxygen levels are 98% and chest x-ray has been negative  Recommendations  Await respiratory culture results  Rx    -     azithromycin (ZITHROMAX) 250 MG tablet; Take 1 tablet by mouth See Admin Instructions for 5 days 500mg on day 1 followed by 250mg on days 2 - 5  She will use her nebulizer treatments every 6 hours  Sample of Trelegy 101 puff daily  States that she is able to sleep, does not need any additional cough syrup  If sx not improving and resolving , if sx continue or re-occur pt has been instructed to call us and / or return here for follow- up evaluation        Urine frequency  Urgency incontinence     Obtain:  -     Urinalysis; Future  -     Culture, Urine;  Future  Discussed t with patient that this could be also related to hyperglycemia      Type 2 diabetes  Hyperglycemia-glucosuria  Patient to monitor blood sugars closely  At this time she will continue Trulicity  She will use glipizide  May need to add additional Rx if sugars are staying elevated  Discussed with her worrisome use of multiple dose of steroids and now causing hyperglycemia    Other orders  -     albuterol (PROVENTIL) (2.5 MG/3ML) 0.083% nebulizer solution; Take 3 mLs by nebulization 3 times daily as needed for Wheezing              Orders Placed This Encounter   Procedures    Culture, Urine    Urinalysis    Miscellaneous Sendout 1     New Prescriptions    AZITHROMYCIN (ZITHROMAX) 250 MG TABLET    Take 1 tablet by mouth See Admin Instructions for 5 days 500mg on day 1 followed by 250mg on days 2 - 5         No follow-ups on file. There are no Patient Instructions on file for this visit. EMR Dragon/transcription disclaimer:Significant part of this  encounter note is electronic transcription/translationof spoken language to printed text. The electronic translation of spoken language may be erroneous, or at times, nonsensical words or phrases may be inadvertently transcribed.  Although I have reviewed the note for sucherrors, some may still exist. negative

## 2021-11-09 LAB
ADENOVIRUS BY PCR: NOT DETECTED
BORDETELLA PARAPERTUSSIS BY PCR: NOT DETECTED
BORDETELLA PERTUSSIS BY PCR: NOT DETECTED
CHLAMYDOPHILIA PNEUMONIAE BY PCR: NOT DETECTED
CORONAVIRUS 229E BY PCR: NOT DETECTED
CORONAVIRUS HKU1 BY PCR: NOT DETECTED
CORONAVIRUS NL63 BY PCR: NOT DETECTED
CORONAVIRUS OC43 BY PCR: NOT DETECTED
HUMAN METAPNEUMOVIRUS BY PCR: DETECTED
HUMAN RHINOVIRUS/ENTEROVIRUS BY PCR: NOT DETECTED
INFLUENZA A BY PCR: NOT DETECTED
INFLUENZA B BY PCR: NOT DETECTED
MYCOPLASMA PNEUMONIAE BY PCR: NOT DETECTED
PARAINFLUENZA VIRUS 1 BY PCR: NOT DETECTED
PARAINFLUENZA VIRUS 2 BY PCR: NOT DETECTED
PARAINFLUENZA VIRUS 3 BY PCR: NOT DETECTED
PARAINFLUENZA VIRUS 4 BY PCR: NOT DETECTED
RESPIRATORY SYNCYTIAL VIRUS BY PCR: NOT DETECTED
SARS-COV-2, PCR: NOT DETECTED

## 2021-11-10 LAB — URINE CULTURE, ROUTINE: NORMAL

## 2021-11-24 PROBLEM — R05.9 COUGH: Status: RESOLVED | Noted: 2021-10-25 | Resolved: 2021-11-24

## 2021-12-21 ENCOUNTER — TELEPHONE (OUTPATIENT)
Dept: INTERNAL MEDICINE | Age: 76
End: 2021-12-21

## 2021-12-21 RX ORDER — ESOMEPRAZOLE MAGNESIUM 40 MG/1
40 CAPSULE, DELAYED RELEASE ORAL
Qty: 90 CAPSULE | Refills: 1 | Status: SHIPPED | OUTPATIENT
Start: 2021-12-21 | End: 2022-06-14

## 2021-12-21 NOTE — TELEPHONE ENCOUNTER
Patient is calling to request refills on her nexium 40mg    [facility]    Please call and advise

## 2021-12-27 ENCOUNTER — TELEPHONE (OUTPATIENT)
Dept: INTERNAL MEDICINE | Age: 76
End: 2021-12-27

## 2021-12-27 RX ORDER — GLIPIZIDE 5 MG/1
5 TABLET ORAL 2 TIMES DAILY
Qty: 180 TABLET | Refills: 1 | Status: SHIPPED | OUTPATIENT
Start: 2021-12-27 | End: 2022-10-24

## 2021-12-28 DIAGNOSIS — E11.9 DIABETES MELLITUS TREATED WITH ORAL MEDICATION: ICD-10-CM

## 2021-12-28 DIAGNOSIS — Z79.84 DIABETES MELLITUS TREATED WITH ORAL MEDICATION: ICD-10-CM

## 2021-12-28 RX ORDER — SITAGLIPTIN 50 MG/1
TABLET, FILM COATED ORAL
Qty: 180 TABLET | Refills: 3 | OUTPATIENT
Start: 2021-12-28

## 2022-01-20 ENCOUNTER — TELEPHONE (OUTPATIENT)
Dept: INTERNAL MEDICINE | Age: 77
End: 2022-01-20

## 2022-01-20 NOTE — TELEPHONE ENCOUNTER
Pt is requesting neomycin sent to [facility] her ear is bothering her this morning like it did about the same time last year

## 2022-02-17 ENCOUNTER — TELEPHONE (OUTPATIENT)
Dept: INTERNAL MEDICINE | Age: 77
End: 2022-02-17

## 2022-02-17 NOTE — TELEPHONE ENCOUNTER
Rey Linton called requesting a refill of the below medication which has been pended for you:     Requested Prescriptions     Pending Prescriptions Disp Refills    SITagliptin (TONIUVIA) 50 MG tablet 180 tablet 1     Sig: Take 1 tablet by mouth 2 times daily       Last Appointment Date: 11/8/2021  Next Appointment Date: 3/28/2022    Allergies   Allergen Reactions    Amoxicillin-Pot Clavulanate     Ciprofloxacin     Codeine     Sulfa Antibiotics

## 2022-02-23 DIAGNOSIS — Z12.31 ENCOUNTER FOR SCREENING MAMMOGRAM FOR BREAST CANCER: ICD-10-CM

## 2022-02-25 ENCOUNTER — TELEPHONE (OUTPATIENT)
Dept: INTERNAL MEDICINE | Age: 77
End: 2022-02-25

## 2022-02-25 DIAGNOSIS — H91.91 HEARING LOSS OF RIGHT EAR, UNSPECIFIED HEARING LOSS TYPE: Primary | ICD-10-CM

## 2022-02-25 NOTE — TELEPHONE ENCOUNTER
Spoke with patient. This has been an ongoing issue. She has had two rounds of medication to help treat the conidtion and plans are to have her see a specialist should symptoms continue. Patient would like a referral for 's office. She has been a patient in the past but needs to re-establish.    Referral made and faxed to [phone]  Confirmation in media

## 2022-02-25 NOTE — TELEPHONE ENCOUNTER
----- Message from Debora Bautista sent at 2/25/2022 11:25 AM CST -----  Subject: Referral Request    QUESTIONS   Reason for referral request? trouble hearing out of right ear. Has the physician seen you for this condition before? No   Preferred Specialist (if applicable)? Do you already have an appointment scheduled? No  Additional Information for Provider?   ---------------------------------------------------------------------------  --------------  CALL BACK INFO  What is the best way for the office to contact you?  OK to leave message on   voicemail  Preferred Call Back Phone Number? [phone]

## 2022-02-26 RX ORDER — EZETIMIBE 10 MG/1
TABLET ORAL
Qty: 90 TABLET | Refills: 3 | Status: SHIPPED | OUTPATIENT
Start: 2022-02-26

## 2022-03-24 DIAGNOSIS — H93.13 BILATERAL TINNITUS: ICD-10-CM

## 2022-03-24 DIAGNOSIS — F51.01 PRIMARY INSOMNIA: ICD-10-CM

## 2022-03-24 DIAGNOSIS — E55.9 VITAMIN D DEFICIENCY: ICD-10-CM

## 2022-03-24 DIAGNOSIS — Z23 NEED FOR VACCINATION: ICD-10-CM

## 2022-03-24 DIAGNOSIS — E78.00 PURE HYPERCHOLESTEROLEMIA: ICD-10-CM

## 2022-03-24 DIAGNOSIS — G25.81 RLS (RESTLESS LEGS SYNDROME): ICD-10-CM

## 2022-03-24 DIAGNOSIS — M85.852 OSTEOPENIA OF LEFT HIP: ICD-10-CM

## 2022-03-24 DIAGNOSIS — E11.9 TYPE 2 DIABETES MELLITUS WITHOUT COMPLICATION, WITHOUT LONG-TERM CURRENT USE OF INSULIN (HCC): ICD-10-CM

## 2022-03-24 LAB
ALBUMIN SERPL-MCNC: 4.7 G/DL (ref 3.5–5.2)
ALP BLD-CCNC: 99 U/L (ref 35–104)
ALT SERPL-CCNC: 36 U/L (ref 5–33)
ANION GAP SERPL CALCULATED.3IONS-SCNC: 13 MMOL/L (ref 7–19)
AST SERPL-CCNC: 28 U/L (ref 5–32)
BACTERIA: NEGATIVE /HPF
BASOPHILS ABSOLUTE: 0 K/UL (ref 0–0.2)
BASOPHILS RELATIVE PERCENT: 0.6 % (ref 0–1)
BILIRUB SERPL-MCNC: 0.6 MG/DL (ref 0.2–1.2)
BILIRUBIN URINE: NEGATIVE
BLOOD, URINE: NEGATIVE
BUN BLDV-MCNC: 19 MG/DL (ref 8–23)
CALCIUM SERPL-MCNC: 10.1 MG/DL (ref 8.8–10.2)
CHLORIDE BLD-SCNC: 105 MMOL/L (ref 98–111)
CHOLESTEROL, TOTAL: 146 MG/DL (ref 160–199)
CLARITY: CLEAR
CO2: 25 MMOL/L (ref 22–29)
COLOR: YELLOW
CREAT SERPL-MCNC: 0.7 MG/DL (ref 0.5–0.9)
CREATININE URINE: 103.7 MG/DL (ref 4.2–622)
CRYSTALS, UA: ABNORMAL /HPF
EOSINOPHILS ABSOLUTE: 0.2 K/UL (ref 0–0.6)
EOSINOPHILS RELATIVE PERCENT: 2.8 % (ref 0–5)
EPITHELIAL CELLS, UA: 1 /HPF (ref 0–5)
GFR AFRICAN AMERICAN: >59
GFR NON-AFRICAN AMERICAN: >60
GLUCOSE BLD-MCNC: 159 MG/DL (ref 74–109)
GLUCOSE URINE: NEGATIVE MG/DL
HBA1C MFR BLD: 7.7 % (ref 4–6)
HCT VFR BLD CALC: 43.8 % (ref 37–47)
HDLC SERPL-MCNC: 36 MG/DL (ref 65–121)
HEMOGLOBIN: 14.6 G/DL (ref 12–16)
HYALINE CASTS: 3 /HPF (ref 0–8)
IMMATURE GRANULOCYTES #: 0 K/UL
KETONES, URINE: NEGATIVE MG/DL
LDL CHOLESTEROL CALCULATED: 83 MG/DL
LEUKOCYTE ESTERASE, URINE: ABNORMAL
LYMPHOCYTES ABSOLUTE: 1.7 K/UL (ref 1.1–4.5)
LYMPHOCYTES RELATIVE PERCENT: 31.2 % (ref 20–40)
MCH RBC QN AUTO: 29.4 PG (ref 27–31)
MCHC RBC AUTO-ENTMCNC: 33.3 G/DL (ref 33–37)
MCV RBC AUTO: 88.3 FL (ref 81–99)
MICROALBUMIN UR-MCNC: <1.2 MG/DL (ref 0–19)
MICROALBUMIN/CREAT UR-RTO: NORMAL MG/G
MONOCYTES ABSOLUTE: 0.5 K/UL (ref 0–0.9)
MONOCYTES RELATIVE PERCENT: 8.9 % (ref 0–10)
NEUTROPHILS ABSOLUTE: 3 K/UL (ref 1.5–7.5)
NEUTROPHILS RELATIVE PERCENT: 56.3 % (ref 50–65)
NITRITE, URINE: NEGATIVE
PDW BLD-RTO: 12 % (ref 11.5–14.5)
PH UA: 5.5 (ref 5–8)
PLATELET # BLD: 189 K/UL (ref 130–400)
PMV BLD AUTO: 9.6 FL (ref 9.4–12.3)
POTASSIUM SERPL-SCNC: 4.9 MMOL/L (ref 3.5–5)
PROTEIN UA: NEGATIVE MG/DL
RBC # BLD: 4.96 M/UL (ref 4.2–5.4)
RBC UA: 1 /HPF (ref 0–4)
SODIUM BLD-SCNC: 143 MMOL/L (ref 136–145)
SPECIFIC GRAVITY UA: 1.02 (ref 1–1.03)
TOTAL PROTEIN: 7.1 G/DL (ref 6.6–8.7)
TRIGL SERPL-MCNC: 137 MG/DL (ref 0–149)
TSH SERPL DL<=0.05 MIU/L-ACNC: 0.88 UIU/ML (ref 0.27–4.2)
UROBILINOGEN, URINE: 0.2 E.U./DL
VITAMIN D 25-HYDROXY: 61.1 NG/ML
WBC # BLD: 5.4 K/UL (ref 4.8–10.8)
WBC UA: 6 /HPF (ref 0–5)

## 2022-03-28 ENCOUNTER — OFFICE VISIT (OUTPATIENT)
Dept: INTERNAL MEDICINE | Age: 77
End: 2022-03-28
Payer: MEDICARE

## 2022-03-28 VITALS
BODY MASS INDEX: 25.33 KG/M2 | HEIGHT: 65 IN | SYSTOLIC BLOOD PRESSURE: 112 MMHG | DIASTOLIC BLOOD PRESSURE: 78 MMHG | OXYGEN SATURATION: 97 % | RESPIRATION RATE: 18 BRPM | WEIGHT: 152 LBS | HEART RATE: 89 BPM

## 2022-03-28 DIAGNOSIS — E55.9 VITAMIN D DEFICIENCY: ICD-10-CM

## 2022-03-28 DIAGNOSIS — R73.9 HYPERGLYCEMIA: ICD-10-CM

## 2022-03-28 DIAGNOSIS — M85.852 OSTEOPENIA OF LEFT HIP: ICD-10-CM

## 2022-03-28 DIAGNOSIS — Z00.00 MEDICARE ANNUAL WELLNESS VISIT, SUBSEQUENT: Primary | ICD-10-CM

## 2022-03-28 DIAGNOSIS — E11.9 TYPE 2 DIABETES MELLITUS WITHOUT COMPLICATION, WITHOUT LONG-TERM CURRENT USE OF INSULIN (HCC): ICD-10-CM

## 2022-03-28 DIAGNOSIS — G25.81 RLS (RESTLESS LEGS SYNDROME): ICD-10-CM

## 2022-03-28 DIAGNOSIS — Z12.31 ENCOUNTER FOR SCREENING MAMMOGRAM FOR BREAST CANCER: ICD-10-CM

## 2022-03-28 DIAGNOSIS — F51.01 PRIMARY INSOMNIA: ICD-10-CM

## 2022-03-28 DIAGNOSIS — N30.10 INTERSTITIAL CYSTITIS: ICD-10-CM

## 2022-03-28 DIAGNOSIS — E78.00 PURE HYPERCHOLESTEROLEMIA: ICD-10-CM

## 2022-03-28 PROCEDURE — 4040F PNEUMOC VAC/ADMIN/RCVD: CPT | Performed by: INTERNAL MEDICINE

## 2022-03-28 PROCEDURE — G8427 DOCREV CUR MEDS BY ELIG CLIN: HCPCS | Performed by: INTERNAL MEDICINE

## 2022-03-28 PROCEDURE — 3051F HG A1C>EQUAL 7.0%<8.0%: CPT | Performed by: INTERNAL MEDICINE

## 2022-03-28 PROCEDURE — G0439 PPPS, SUBSEQ VISIT: HCPCS | Performed by: INTERNAL MEDICINE

## 2022-03-28 PROCEDURE — G8417 CALC BMI ABV UP PARAM F/U: HCPCS | Performed by: INTERNAL MEDICINE

## 2022-03-28 PROCEDURE — 1036F TOBACCO NON-USER: CPT | Performed by: INTERNAL MEDICINE

## 2022-03-28 PROCEDURE — G8399 PT W/DXA RESULTS DOCUMENT: HCPCS | Performed by: INTERNAL MEDICINE

## 2022-03-28 PROCEDURE — 1123F ACP DISCUSS/DSCN MKR DOCD: CPT | Performed by: INTERNAL MEDICINE

## 2022-03-28 PROCEDURE — G8484 FLU IMMUNIZE NO ADMIN: HCPCS | Performed by: INTERNAL MEDICINE

## 2022-03-28 PROCEDURE — 99214 OFFICE O/P EST MOD 30 MIN: CPT | Performed by: INTERNAL MEDICINE

## 2022-03-28 PROCEDURE — 1090F PRES/ABSN URINE INCON ASSESS: CPT | Performed by: INTERNAL MEDICINE

## 2022-03-28 RX ORDER — DULAGLUTIDE 3 MG/.5ML
3 INJECTION, SOLUTION SUBCUTANEOUS WEEKLY
Qty: 12 PEN | Refills: 1 | Status: CANCELLED | OUTPATIENT
Start: 2022-03-28

## 2022-03-28 RX ORDER — DULAGLUTIDE 3 MG/.5ML
3 INJECTION, SOLUTION SUBCUTANEOUS WEEKLY
COMMUNITY
End: 2022-04-20 | Stop reason: SDUPTHER

## 2022-03-28 ASSESSMENT — ENCOUNTER SYMPTOMS
CHEST TIGHTNESS: 0
SORE THROAT: 0
COUGH: 0
CONSTIPATION: 0
ABDOMINAL PAIN: 0
WHEEZING: 0

## 2022-03-28 ASSESSMENT — PATIENT HEALTH QUESTIONNAIRE - PHQ9
SUM OF ALL RESPONSES TO PHQ QUESTIONS 1-9: 0
2. FEELING DOWN, DEPRESSED OR HOPELESS: 0
SUM OF ALL RESPONSES TO PHQ QUESTIONS 1-9: 0
SUM OF ALL RESPONSES TO PHQ9 QUESTIONS 1 & 2: 0
1. LITTLE INTEREST OR PLEASURE IN DOING THINGS: 0
SUM OF ALL RESPONSES TO PHQ QUESTIONS 1-9: 0
SUM OF ALL RESPONSES TO PHQ QUESTIONS 1-9: 0

## 2022-03-28 ASSESSMENT — LIFESTYLE VARIABLES: HOW OFTEN DO YOU HAVE A DRINK CONTAINING ALCOHOL: NEVER

## 2022-03-28 NOTE — PROGRESS NOTES
Medicare Annual Wellness Visit    Gerry Wells is here for Medicare AWV   Recommendations for Preventive Services Due: see orders and patient instructions/AVS.  Recommended screening schedule for the next 5-10 years is provided to the patient in written form: see Patient Instructions/AVS.     Return in 3 months (on 6/28/2022) for Medicare Annual Wellness Visit in 1 year, Medication check. Subjective Patient's complete Health Risk Assessment and screening values have been reviewed and are found in Flowsheets. The following problems were reviewed today and where indicated follow up appointments were made and/or referrals ordered. Positive Risk Factor Screenings with Interventions:               General Health and ACP:  General  In general, how would you say your health is?: Good  In the past 7 days, have you experienced any of the following: New or Increased Pain, New or Increased Fatigue, Loneliness, Social Isolation, Stress or Anger?: No  Do you get the social and emotional support that you need?: Yes  Do you have a Living Will?: Yes    Advance Directives     Power of  Living Will ACP-Advance Directive ACP-Power of     Not on File Not on File Not on File Not on File      General Health Risk Interventions:  Health Habits/Nutrition:     Physical Activity: Inactive    Days of Exercise per Week: 0 days    Minutes of Exercise per Session: 0 min     Have you lost any weight without trying in the past 3 months?: No  Body mass index: (!) 25.29  Have you seen the dentist within the past year?: Yes    Health Habits/Nutrition Interventions:           Objective   Vitals:    03/28/22 0925   BP: 112/78   Site: Left Upper Arm   Position: Sitting   Cuff Size: Large Adult   Pulse: 89   Resp: 18   SpO2: 97%   Weight: 152 lb (68.9 kg)   Height: 5' 5\" (1.651 m)      Body mass index is 25.29 kg/m².        Allergies   Allergen Reactions    Amoxicillin-Pot Clavulanate     Ciprofloxacin     Codeine     Sulfa Antibiotics      Prior to Visit Medications    Medication Sig Taking? Authorizing Provider   Dulaglutide (TRULICITY) 3 IN/6.1MJ SOPN Inject 3 mg into the skin once a week Yes Historical Provider, MD   ezetimibe (ZETIA) 10 MG tablet TAKE 1 TABLET DAILY Yes Melony Gallego MD   SITagliptin (JANUVIA) 50 MG tablet Take 1 tablet by mouth 2 times daily Yes Melony Gallego MD   glipiZIDE (GLUCOTROL) 5 MG tablet Take 1 tablet by mouth 2 times daily Yes Melony Gallego MD   esomeprazole (NEXIUM) 40 MG delayed release capsule Take 1 capsule by mouth every morning (before breakfast) Yes Melony Gallego MD   atorvastatin (LIPITOR) 80 MG tablet TAKE 1 TABLET DAILY Yes Melony Gallego MD   blood glucose test strips (ASCENSIA AUTODISC VI;ONE TOUCH ULTRA TEST VI) strip 1 each by In Vitro route daily As needed. Yes Melony Gallego MD   vitamin D (ERGOCALCIFEROL) 1.25 MG (69680 UT) CAPS capsule Take 1 capsule by mouth every 14 days Yes Melony Gallego MD   nitrofurantoin, macrocrystal-monohydrate, (MACROBID) 100 MG capsule Take 1 tablet by mouth twice weekly Yes Melony Gallego MD   vitamin B-12 (CYANOCOBALAMIN) 1000 MCG tablet Take 1,000 mcg by mouth daily Yes Historical Provider, MD   Calcium Carb-Cholecalciferol (CALCIUM 1000 + D PO) Take by mouth Yes Historical Provider, MD   Biotin 1000 MCG TABS Take 1,000 Units by mouth Yes Historical Provider, MD   tiotropium (SPIRIVA) 18 MCG inhalation capsule Inhale 18 mcg into the lungs daily Yes Historical Provider, MD   polyethylene glycol (MIRALAX) 17 g PACK packet Take 17 g by mouth daily Yes Historical Provider, MD   aspirin 81 MG EC tablet Take 81 mg by mouth daily.  Yes Historical Provider, MD   albuterol (PROVENTIL) (2.5 MG/3ML) 0.083% nebulizer solution Take 3 mLs by nebulization 3 times daily as needed for Wheezing  Patient not taking: Reported on 3/28/2022  MD SAHIL Brunson 1.5 YZ/9.0IO SOPN   Historical Provider, MD       CareTeam (Including outside providers/suppliers regularly involved in providing care):   Patient Care Team:  Juan Francisco Orellana MD as PCP - General (Internal Medicine)  Juan Francisco [facility]rellana [facility]    Reviewed and updated this visit:  Tobacco  Allergies  Meds  Med Hx  Surg Hx  Soc Hx  Fam Hx             MEDICARE WELLNESS SCREENING/ MAINTENANCE:  1:MAMMOGRAM 2/2021 - repeat 1 yr  PAP na  BONE DENSITY 2021 mild osteopenia- repeat 3-4 yrs  2. SCREENING CSCOPE 2017 repeat 10 yrs  3. CARDIOVASCULAR SCREENING- LIPID PANEL DONE  4. DIABETES SCREEN DONE - FBS =ABOVE LABS  5. PNEUMONIA VACCINATION prevnar 2016  6. INFLUENZA VACCINATION: TO BE DONE IN FALL  7. HEP B VACCINATION- PT DECLINES  8. HIV/ HEP SCREENING        HEALTH PLAN:  1. HEALTHY DIET: Diabetic diet recommended  2. EXERCISE-regular exercise recommended  3. FALL RISK SCREEN REVIEWED; NO INCREASED FALL RISKON EXAM        Patient Instructions     Personalized Preventive Plan for Violet Rhodes - 3/28/2022  Medicare offers a range of preventive health benefits. Some of the tests and screenings are paid in full while other may be subject to a deductible, co-insurance, and/or copay. Some of these benefits include a comprehensive review of your medical history including lifestyle, illnesses that may run in your family, and various assessments and screenings as appropriate. After reviewing your medical record and screening and assessments performed today your provider may have ordered immunizations, labs, imaging, and/or referrals for you. A list of these orders (if applicable) as well as your Preventive Care list are included within your After Visit Summary for your review. Other Preventive Recommendations:    · A preventive eye exam performed by an eye specialist is recommended every 1-2 years to screen for glaucoma; cataracts, macular degeneration, and other eye disorders. · A preventive dental visit is recommended every 6 months.   · Try to get at least 150 minutes of exercise per week or 10,000 steps per day on a pedometer . · Order or download the FREE \"Exercise & Physical Activity: Your Everyday Guide\" from The [employer]. Call 4-653.604.2123 or search [employer] online. · You need 3712-3706 mg of calcium and 9856-6594 IU of vitamin D per day. It is possible to meet your calcium requirement with diet alone, but a vitamin D supplement is usually necessary to meet this goal.  · When exposed to the sun, use a sunscreen that protects against both UVA and UVB radiation with an SPF of 30 or greater. Reapply every 2 to 3 hours or after sweating, drying off with a towel, or swimming. · Always wear a seat belt when traveling in a car. Always wear a helmet when riding a bicycle or motorcycle.

## 2022-03-28 NOTE — PATIENT INSTRUCTIONS
Personalized Preventive Plan for Sirisha Zamora - 3/28/2022  Medicare offers a range of preventive health benefits. Some of the tests and screenings are paid in full while other may be subject to a deductible, co-insurance, and/or copay. Some of these benefits include a comprehensive review of your medical history including lifestyle, illnesses that may run in your family, and various assessments and screenings as appropriate. After reviewing your medical record and screening and assessments performed today your provider may have ordered immunizations, labs, imaging, and/or referrals for you. A list of these orders (if applicable) as well as your Preventive Care list are included within your After Visit Summary for your review. Other Preventive Recommendations:    · A preventive eye exam performed by an eye specialist is recommended every 1-2 years to screen for glaucoma; cataracts, macular degeneration, and other eye disorders. · A preventive dental visit is recommended every 6 months. · Try to get at least 150 minutes of exercise per week or 10,000 steps per day on a pedometer . · Order or download the FREE \"Exercise & Physical Activity: Your Everyday Guide\" from [employer]. Call 2-494.424.5733 or search [employer] online. · You need 9477-3711 mg of calcium and 3082-0821 IU of vitamin D per day. It is possible to meet your calcium requirement with diet alone, but a vitamin D supplement is usually necessary to meet this goal.  · When exposed to the sun, use a sunscreen that protects against both UVA and UVB radiation with an SPF of 30 or greater. Reapply every 2 to 3 hours or after sweating, drying off with a towel, or swimming. · Always wear a seat belt when traveling in a car. Always wear a helmet when riding a bicycle or motorcycle.

## 2022-04-19 NOTE — TELEPHONE ENCOUNTER
----- Message from Tiffany Gr sent at 4/19/2022 11:12 AM CDT -----  Subject: Refill Request    QUESTIONS  Name of Medication? Dulaglutide (TRULICITY) 1.5 YY/1.3IF SOPN  Patient-reported dosage and instructions? Once a week  How many days do you have left? 14  Preferred Pharmacy? [address] phone number (if available)? [phone]  Additional Information for Provider? Patient states that Dulaglutide   (TRULICITY) 1.5 QP/8.7FJ SOPN should be 3MG. ---------------------------------------------------------------------------  --------------  Lo COLEMAN  What is the best way for the office to contact you? OK to leave message on   voicemail  Preferred Call Back Phone Number? [phone]  ---------------------------------------------------------------------------  --------------  SCRIPT ANSWERS  Relationship to Patient?  Self

## 2022-04-20 RX ORDER — DULAGLUTIDE 3 MG/.5ML
3 INJECTION, SOLUTION SUBCUTANEOUS WEEKLY
Qty: 12 PEN | Refills: 2 | Status: SHIPPED | OUTPATIENT
Start: 2022-04-20

## 2022-04-20 NOTE — TELEPHONE ENCOUNTER
Nonda Reaper called requesting a refill of the below medication which has been pended for you:     Requested Prescriptions     Pending Prescriptions Disp Refills    Dulaglutide (TRULICITY) 3 JN/1.5CS SOPN 12 pen 2     Sig: Inject 3 mg into the skin once a week       Last Appointment Date: 3/28/2022  Next Appointment Date: 7/6/2022    Allergies   Allergen Reactions    Amoxicillin-Pot Clavulanate     Ciprofloxacin     Codeine     Sulfa Antibiotics

## 2022-04-25 ENCOUNTER — OFFICE VISIT (OUTPATIENT)
Dept: OTOLARYNGOLOGY | Facility: CLINIC | Age: 77
End: 2022-04-25

## 2022-04-25 VITALS
SYSTOLIC BLOOD PRESSURE: 162 MMHG | TEMPERATURE: 97.8 F | HEART RATE: 76 BPM | DIASTOLIC BLOOD PRESSURE: 85 MMHG | HEIGHT: 66 IN | WEIGHT: 151.6 LBS | BODY MASS INDEX: 24.36 KG/M2

## 2022-04-25 DIAGNOSIS — H69.83 DYSFUNCTION OF BOTH EUSTACHIAN TUBES: Primary | ICD-10-CM

## 2022-04-25 DIAGNOSIS — H93.13 TINNITUS AURIUM, BILATERAL: ICD-10-CM

## 2022-04-25 DIAGNOSIS — H90.3 SENSORINEURAL HEARING LOSS (SNHL), BILATERAL: ICD-10-CM

## 2022-04-25 PROCEDURE — 99213 OFFICE O/P EST LOW 20 MIN: CPT | Performed by: EMERGENCY MEDICINE

## 2022-04-25 RX ORDER — FLUTICASONE PROPIONATE 50 MCG
2 SPRAY, SUSPENSION (ML) NASAL DAILY
Qty: 16 G | Refills: 11 | Status: SHIPPED | OUTPATIENT
Start: 2022-04-25

## 2022-04-25 RX ORDER — DULAGLUTIDE 3 MG/.5ML
3 INJECTION, SOLUTION SUBCUTANEOUS
COMMUNITY
Start: 2022-04-20

## 2022-04-25 NOTE — PROGRESS NOTES
CAROL Osei  [facility]  [address], [address]  [facility] [address]  Fax [phone]  Phone [phone]      Visit Type: NEW PATIENT   Chief Complaint   Patient presents with   • Ear Problem        HPI  She complains of ear pressure, popping and cracking of the ear and muffled hearing. The symptoms are worse in the right ear. The patient has had mild symptoms. The symptoms have been intermittant for the last several months. There have been no identified factors that aggravate the symptoms. There have been no factors that have improved the symptoms. She uses flonase only intermittently.    Past Medical History:   Diagnosis Date   • Asthma    • Diabetes mellitus (HCC)     Type 2   • GERD (gastroesophageal reflux disease)    • HH (hiatus hernia)    • Hx of colonic polyps    • Insomnia    • Vitamin D deficiency        Past Surgical History:   Procedure Laterality Date   • CATARACT EXTRACTION     • CHOLECYSTECTOMY     • COLONOSCOPY  05/26/2011    Isolated area of ischemic colitis, Diverticulosis repeat exam in 5 years Dr. Sommer   • COLONOSCOPY N/A 6/16/2017    Procedure: COLONOSCOPY WITH ANESTHESIA;  Surgeon: Faraz Cardenas MD;  Location: [facility] ENDOSCOPY;  Service:    • ENDOSCOPY  09/18/2013    Benign hyperplastic polyp with moderate chronic active inflammation stomach, Mild gastritis repeat exam 3 years Dr. Christie   • ENDOSCOPY N/A 6/1/2017    Procedure: ESOPHAGOGASTRODUODENOSCOPY WITH ANESTHESIA;  Surgeon: Faraz Cardenas MD;  Location: [facility] ENDOSCOPY;  Service:    • HYSTERECTOMY     • KNEE SURGERY Left     baker's cyst    • TUMOR EXCISION      Throat benign   • UPPER GASTROINTESTINAL ENDOSCOPY  09/17/2013    small cardia polyp (benign hyperplastic),mild gastritis       Family History: Her family history includes Cancer in her father; Stroke in her mother.     Social History: She  reports that she has never  smoked. She has never used smokeless tobacco. She reports that she does not drink alcohol and does not use drugs.    Home Medications:  Biotin, Dulaglutide, SITagliptin, albuterol, ascorbic acid, aspirin, atorvastatin, busPIRone, cetirizine, ezetimibe, fluticasone, glucose monitor, glyburide, mometasone-formoterol, pantoprazole, phenazopyridine, polyethylene glycol, tiotropium, vitamin B-12, and vitamin D    Allergies:  She is allergic to ciprofloxacin hcl, codeine, metformin and related, augmentin [amoxicillin-pot clavulanate], and sulfa antibiotics.       Vital Signs:   Temp:  [97.8 °F (36.6 °C)] 97.8 °F (36.6 °C)  Heart Rate:  [76] 76  BP: (162)/(85) 162/85  ENT Physical Exam  Constitutional  Appearance: patient appears well-developed, well-nourished and well-groomed,  Communication/Voice: communication appropriate for developmental age; vocal quality normal;  Head and Face  Appearance: head appears normal, face appears normal and face appears atraumatic;  Palpation: facial palpation normal;  Salivary: glands normal;  Ear  Hearing: intact;  Auricles: right auricle normal; left auricle normal;  External Mastoids: right external mastoid normal; left external mastoid normal;  Ear Canals: right ear canal normal; left ear canal normal;  Tympanic Membranes: right tympanic membrane normal; left tympanic membrane normal;  Nose  Internal Nose: bilateral inferior turbinates with hypertrophy;  Oral Cavity/Oropharynx  Lips: normal;  Teeth: normal;  Gums: gingiva normal;  Tongue: normal;  Oral mucosa: normal;  Hard palate: normal;         Result Review    RESULTS REVIEW    I have reviewed the patients old records in the chart.     Assessment/Plan    Diagnoses and all orders for this visit:    1. Dysfunction of both eustachian tubes (Primary)  -     Comprehensive Hearing Test; Future    2. Tinnitus aurium, bilateral  -     Comprehensive Hearing Test; Future    3. Sensorineural hearing loss (SNHL), bilateral  -      Comprehensive Hearing Test; Future    Other orders  -     fluticasone (FLONASE) 50 MCG/ACT nasal spray; 2 sprays into the nostril(s) as directed by provider Daily.  Dispense: 16 g; Refill: 11            Call for ear problems, especially change of hearing, ear pain or dizziness.  For the best results, use nasal sprays every day. It may take a week to build up in the nasal lining and a few more weeks to improve the eustachian tube function.  Use hearing protection in loud noise situations.      Return in about 3 months (around 7/25/2022) for Follow up with CAROL Greene, Follow up with Audiogram.      CAROL Osei  04/25/22  15:32 CDT

## 2022-05-04 ENCOUNTER — TELEPHONE (OUTPATIENT)
Dept: INTERNAL MEDICINE | Age: 77
End: 2022-05-04

## 2022-05-04 NOTE — TELEPHONE ENCOUNTER
Pot called asking if she can get a steroid pack for her knee she has a appt June 2 with Dr Deanne Blanton for a injection but she is not sure she can wait that long she is using volteran gel

## 2022-05-04 NOTE — TELEPHONE ENCOUNTER
Talk to me about her  She is diabetic, her last A1c was elevated at 7.7  Oral steroid significantly affect diabetic control

## 2022-05-09 RX ORDER — ATORVASTATIN CALCIUM 80 MG/1
TABLET, FILM COATED ORAL
Qty: 90 TABLET | Refills: 3 | Status: SHIPPED | OUTPATIENT
Start: 2022-05-09

## 2022-05-09 NOTE — TELEPHONE ENCOUNTER
Ajay Clements called requesting a refill of the below medication which has been pended for you:     Requested Prescriptions     Pending Prescriptions Disp Refills    atorvastatin (LIPITOR) 80 MG tablet [Pharmacy Med Name: ATORVASTATIN TABS 80MG] 90 tablet 3     Sig: TAKE 1 TABLET DAILY       Last Appointment Date: 3/28/2022  Next Appointment Date: 7/6/2022    Allergies   Allergen Reactions    Amoxicillin-Pot Clavulanate     Ciprofloxacin     Codeine     Sulfa Antibiotics

## 2022-05-18 RX ORDER — LIDOCAINE AND PRILOCAINE 25; 25 MG/G; MG/G
CREAM TOPICAL
Qty: 30 G | Refills: 2 | Status: SHIPPED | OUTPATIENT
Start: 2022-05-18

## 2022-05-18 NOTE — TELEPHONE ENCOUNTER
----- Message from Berenice Hester sent at 5/18/2022 10:49 AM CDT -----  Subject: Refill Request    QUESTIONS  Name of Medication? Other - Lidocaine-Prilocaine Topical Cream 2.5  Patient-reported dosage and instructions? was using 2 times daily for back   and knee pain  How many days do you have left? 0  Preferred Pharmacy? [address] phone number (if available)? [phone]  ---------------------------------------------------------------------------  --------------  CALL BACK INFO  What is the best way for the office to contact you? OK to leave message on   voicemail  Preferred Call Back Phone Number? [phone]  ---------------------------------------------------------------------------  --------------  SCRIPT ANSWERS  Relationship to Patient?  Self

## 2022-06-14 RX ORDER — ESOMEPRAZOLE MAGNESIUM 40 MG/1
CAPSULE, DELAYED RELEASE ORAL
Qty: 90 CAPSULE | Refills: 3 | Status: SHIPPED | OUTPATIENT
Start: 2022-06-14

## 2022-06-14 NOTE — TELEPHONE ENCOUNTER
Esaw Press called requesting a refill of the below medication which has been pended for you:     Requested Prescriptions     Pending Prescriptions Disp Refills    esomeprazole (goodideazs) 40 MG delayed release capsule [Pharmacy Med Name: ESOMEPRAZOLE MAGNESIUM DR CAPS 40MG] 90 capsule 3     Sig: TAKE 1 CAPSULE EVERY MORNING BEFORE BREAKFAST       Last Appointment Date: 3/28/2022  Next Appointment Date: 7/6/2022    Allergies   Allergen Reactions    Amoxicillin-Pot Clavulanate     Ciprofloxacin     Codeine     Sulfa Antibiotics

## 2022-07-22 RX ORDER — ERGOCALCIFEROL 1.25 MG/1
50000 CAPSULE ORAL
Qty: 6 CAPSULE | Refills: 3 | Status: SHIPPED | OUTPATIENT
Start: 2022-07-22

## 2022-07-22 NOTE — TELEPHONE ENCOUNTER
Rusty Shane called to request a refill on her medication.       Last office visit : 3/28/2022   Next office visit : 8/2/2022     Requested Prescriptions     Pending Prescriptions Disp Refills    vitamin D (ERGOCALCIFEROL) 1.25 MG (08333 UT) CAPS capsule [Pharmacy Med Name: VIT D2 1.25 MG (50,000 UNIT 1.25 MG Capsule] 6 capsule 3     Sig: TAKE 1 CAPSULE BY MOUTH EVERY [facility]

## 2022-07-27 ENCOUNTER — APPOINTMENT (OUTPATIENT)
Dept: GENERAL RADIOLOGY | Age: 77
End: 2022-07-27
Payer: MEDICARE

## 2022-07-27 ENCOUNTER — APPOINTMENT (OUTPATIENT)
Dept: MRI IMAGING | Age: 77
End: 2022-07-27
Payer: MEDICARE

## 2022-07-27 ENCOUNTER — HOSPITAL ENCOUNTER (OUTPATIENT)
Age: 77
Setting detail: OBSERVATION
Discharge: HOME | End: 2022-07-28
Attending: EMERGENCY MEDICINE | Admitting: STUDENT IN AN ORGANIZED HEALTH CARE EDUCATION/TRAINING PROGRAM
Payer: MEDICARE

## 2022-07-27 ENCOUNTER — APPOINTMENT (OUTPATIENT)
Dept: CT IMAGING | Age: 77
End: 2022-07-27
Payer: MEDICARE

## 2022-07-27 ENCOUNTER — OFFICE VISIT (OUTPATIENT)
Dept: INTERNAL MEDICINE | Age: 77
End: 2022-07-27
Payer: MEDICARE

## 2022-07-27 VITALS — HEART RATE: 68 BPM | OXYGEN SATURATION: 94 % | SYSTOLIC BLOOD PRESSURE: 132 MMHG | DIASTOLIC BLOOD PRESSURE: 80 MMHG

## 2022-07-27 DIAGNOSIS — R20.0 NUMBNESS AND TINGLING OF RIGHT ARM: ICD-10-CM

## 2022-07-27 DIAGNOSIS — R20.2 NUMBNESS AND TINGLING OF RIGHT ARM: ICD-10-CM

## 2022-07-27 DIAGNOSIS — R29.898 RIGHT ARM WEAKNESS: ICD-10-CM

## 2022-07-27 DIAGNOSIS — R20.2 NUMBNESS AND TINGLING OF RIGHT ARM: Primary | ICD-10-CM

## 2022-07-27 DIAGNOSIS — G45.9 TIA (TRANSIENT ISCHEMIC ATTACK): ICD-10-CM

## 2022-07-27 DIAGNOSIS — R20.0 NUMBNESS AND TINGLING OF RIGHT ARM: Primary | ICD-10-CM

## 2022-07-27 DIAGNOSIS — R20.0 RIGHT ARM NUMBNESS: Primary | ICD-10-CM

## 2022-07-27 PROBLEM — E78.00 ELEVATED CHOLESTEROL: Status: ACTIVE | Noted: 2020-05-15

## 2022-07-27 PROBLEM — J45.901 INTRINSIC ASTHMA WITH ACUTE EXACERBATION: Status: ACTIVE | Noted: 2020-05-15

## 2022-07-27 LAB
ALBUMIN SERPL-MCNC: 4.7 G/DL (ref 3.5–5.2)
ALP BLD-CCNC: 102 U/L (ref 35–104)
ALT SERPL-CCNC: 30 U/L (ref 5–33)
ANION GAP SERPL CALCULATED.3IONS-SCNC: 10 MMOL/L (ref 7–19)
APTT: 28.8 SEC (ref 26–36.2)
AST SERPL-CCNC: 29 U/L (ref 5–32)
BASOPHILS ABSOLUTE: 0 K/UL (ref 0–0.2)
BASOPHILS RELATIVE PERCENT: 0.7 % (ref 0–1)
BILIRUB SERPL-MCNC: 0.6 MG/DL (ref 0.2–1.2)
BUN BLDV-MCNC: 21 MG/DL (ref 8–23)
CALCIUM SERPL-MCNC: 9.6 MG/DL (ref 8.8–10.2)
CHLORIDE BLD-SCNC: 102 MMOL/L (ref 98–111)
CO2: 26 MMOL/L (ref 22–29)
CREAT SERPL-MCNC: 0.8 MG/DL (ref 0.5–0.9)
EKG P AXIS: 44 DEGREES
EKG P-R INTERVAL: 192 MS
EKG Q-T INTERVAL: 410 MS
EKG QRS DURATION: 100 MS
EKG QTC CALCULATION (BAZETT): 424 MS
EKG T AXIS: 1 DEGREES
EOSINOPHILS ABSOLUTE: 0.2 K/UL (ref 0–0.6)
EOSINOPHILS RELATIVE PERCENT: 3.1 % (ref 0–5)
GFR AFRICAN AMERICAN: >59
GFR NON-AFRICAN AMERICAN: >60
GLUCOSE BLD-MCNC: 160 MG/DL (ref 70–99)
GLUCOSE BLD-MCNC: 162 MG/DL (ref 70–99)
GLUCOSE BLD-MCNC: 185 MG/DL (ref 74–109)
GLUCOSE BLD-MCNC: 206 MG/DL (ref 70–99)
HCT VFR BLD CALC: 43 % (ref 37–47)
HEMOGLOBIN: 14.7 G/DL (ref 12–16)
IMMATURE GRANULOCYTES #: 0 K/UL
INR BLD: 0.99 (ref 0.88–1.18)
LV EF: 58 %
LVEF MODALITY: NORMAL
LYMPHOCYTES ABSOLUTE: 1.7 K/UL (ref 1.1–4.5)
LYMPHOCYTES RELATIVE PERCENT: 30.6 % (ref 20–40)
MCH RBC QN AUTO: 30.2 PG (ref 27–31)
MCHC RBC AUTO-ENTMCNC: 34.2 G/DL (ref 33–37)
MCV RBC AUTO: 88.5 FL (ref 81–99)
MONOCYTES ABSOLUTE: 0.4 K/UL (ref 0–0.9)
MONOCYTES RELATIVE PERCENT: 7.6 % (ref 0–10)
NEUTROPHILS ABSOLUTE: 3.1 K/UL (ref 1.5–7.5)
NEUTROPHILS RELATIVE PERCENT: 57.6 % (ref 50–65)
PDW BLD-RTO: 11.9 % (ref 11.5–14.5)
PERFORMED ON: ABNORMAL
PLATELET # BLD: 192 K/UL (ref 130–400)
PMV BLD AUTO: 9.3 FL (ref 9.4–12.3)
POTASSIUM SERPL-SCNC: 4.2 MMOL/L (ref 3.5–5)
PROTHROMBIN TIME: 13 SEC (ref 12–14.6)
RBC # BLD: 4.86 M/UL (ref 4.2–5.4)
SARS-COV-2, NAAT: NOT DETECTED
SODIUM BLD-SCNC: 138 MMOL/L (ref 136–145)
TOTAL PROTEIN: 7.3 G/DL (ref 6.6–8.7)
TROPONIN: <0.01 NG/ML (ref 0–0.03)
WBC # BLD: 5.4 K/UL (ref 4.8–10.8)

## 2022-07-27 PROCEDURE — 71045 X-RAY EXAM CHEST 1 VIEW: CPT

## 2022-07-27 PROCEDURE — 82947 ASSAY GLUCOSE BLOOD QUANT: CPT

## 2022-07-27 PROCEDURE — 1123F ACP DISCUSS/DSCN MKR DOCD: CPT | Performed by: NURSE PRACTITIONER

## 2022-07-27 PROCEDURE — 70450 CT HEAD/BRAIN W/O DYE: CPT

## 2022-07-27 PROCEDURE — 70498 CT ANGIOGRAPHY NECK: CPT | Performed by: RADIOLOGY

## 2022-07-27 PROCEDURE — 87635 SARS-COV-2 COVID-19 AMP PRB: CPT

## 2022-07-27 PROCEDURE — 85610 PROTHROMBIN TIME: CPT

## 2022-07-27 PROCEDURE — 84484 ASSAY OF TROPONIN QUANT: CPT

## 2022-07-27 PROCEDURE — G0378 HOSPITAL OBSERVATION PER HR: HCPCS

## 2022-07-27 PROCEDURE — 80053 COMPREHEN METABOLIC PANEL: CPT

## 2022-07-27 PROCEDURE — 99219 PR INITIAL OBSERVATION CARE/DAY 50 MINUTES: CPT | Performed by: PSYCHIATRY & NEUROLOGY

## 2022-07-27 PROCEDURE — 99214 OFFICE O/P EST MOD 30 MIN: CPT | Performed by: NURSE PRACTITIONER

## 2022-07-27 PROCEDURE — 6370000000 HC RX 637 (ALT 250 FOR IP): Performed by: NURSE PRACTITIONER

## 2022-07-27 PROCEDURE — 96372 THER/PROPH/DIAG INJ SC/IM: CPT

## 2022-07-27 PROCEDURE — 93010 ELECTROCARDIOGRAM REPORT: CPT | Performed by: INTERNAL MEDICINE

## 2022-07-27 PROCEDURE — 99285 EMERGENCY DEPT VISIT HI MDM: CPT

## 2022-07-27 PROCEDURE — 6360000004 HC RX CONTRAST MEDICATION: Performed by: EMERGENCY MEDICINE

## 2022-07-27 PROCEDURE — 6360000002 HC RX W HCPCS: Performed by: NURSE PRACTITIONER

## 2022-07-27 PROCEDURE — 70496 CT ANGIOGRAPHY HEAD: CPT

## 2022-07-27 PROCEDURE — 85730 THROMBOPLASTIN TIME PARTIAL: CPT

## 2022-07-27 PROCEDURE — 70496 CT ANGIOGRAPHY HEAD: CPT | Performed by: RADIOLOGY

## 2022-07-27 PROCEDURE — 36415 COLL VENOUS BLD VENIPUNCTURE: CPT

## 2022-07-27 PROCEDURE — 71045 X-RAY EXAM CHEST 1 VIEW: CPT | Performed by: RADIOLOGY

## 2022-07-27 PROCEDURE — 70551 MRI BRAIN STEM W/O DYE: CPT

## 2022-07-27 PROCEDURE — 70450 CT HEAD/BRAIN W/O DYE: CPT | Performed by: RADIOLOGY

## 2022-07-27 PROCEDURE — 70551 MRI BRAIN STEM W/O DYE: CPT | Performed by: RADIOLOGY

## 2022-07-27 PROCEDURE — 85025 COMPLETE CBC W/AUTO DIFF WBC: CPT

## 2022-07-27 PROCEDURE — 93005 ELECTROCARDIOGRAM TRACING: CPT | Performed by: EMERGENCY MEDICINE

## 2022-07-27 PROCEDURE — 93306 TTE W/DOPPLER COMPLETE: CPT

## 2022-07-27 RX ORDER — INSULIN LISPRO 100 [IU]/ML
0-4 INJECTION, SOLUTION INTRAVENOUS; SUBCUTANEOUS
Status: DISCONTINUED | OUTPATIENT
Start: 2022-07-27 | End: 2022-07-28 | Stop reason: HOSPADM

## 2022-07-27 RX ORDER — LABETALOL HYDROCHLORIDE 5 MG/ML
10 INJECTION, SOLUTION INTRAVENOUS EVERY 10 MIN PRN
Status: DISCONTINUED | OUTPATIENT
Start: 2022-07-27 | End: 2022-07-28 | Stop reason: HOSPADM

## 2022-07-27 RX ORDER — DIPHENHYDRAMINE HCL 25 MG
25 TABLET ORAL NIGHTLY PRN
Status: DISCONTINUED | OUTPATIENT
Start: 2022-07-27 | End: 2022-07-28 | Stop reason: HOSPADM

## 2022-07-27 RX ORDER — PANTOPRAZOLE SODIUM 40 MG/1
40 TABLET, DELAYED RELEASE ORAL
Status: DISCONTINUED | OUTPATIENT
Start: 2022-07-28 | End: 2022-07-28 | Stop reason: HOSPADM

## 2022-07-27 RX ORDER — CHOLECALCIFEROL (VITAMIN D3) 125 MCG
1000 CAPSULE ORAL DAILY
Status: DISCONTINUED | OUTPATIENT
Start: 2022-07-27 | End: 2022-07-28 | Stop reason: HOSPADM

## 2022-07-27 RX ORDER — ASPIRIN 81 MG/1
81 TABLET ORAL DAILY
Status: DISCONTINUED | OUTPATIENT
Start: 2022-07-27 | End: 2022-07-28 | Stop reason: HOSPADM

## 2022-07-27 RX ORDER — ONDANSETRON 2 MG/ML
4 INJECTION INTRAMUSCULAR; INTRAVENOUS EVERY 6 HOURS PRN
Status: DISCONTINUED | OUTPATIENT
Start: 2022-07-27 | End: 2022-07-28 | Stop reason: HOSPADM

## 2022-07-27 RX ORDER — DEXTROSE MONOHYDRATE 100 MG/ML
INJECTION, SOLUTION INTRAVENOUS CONTINUOUS PRN
Status: DISCONTINUED | OUTPATIENT
Start: 2022-07-27 | End: 2022-07-28 | Stop reason: HOSPADM

## 2022-07-27 RX ORDER — ALOGLIPTIN 6.25 MG/1
12.5 TABLET, FILM COATED ORAL DAILY
Status: DISCONTINUED | OUTPATIENT
Start: 2022-07-27 | End: 2022-07-28 | Stop reason: HOSPADM

## 2022-07-27 RX ORDER — ASPIRIN 300 MG/1
300 SUPPOSITORY RECTAL DAILY
Status: DISCONTINUED | OUTPATIENT
Start: 2022-07-27 | End: 2022-07-28 | Stop reason: HOSPADM

## 2022-07-27 RX ORDER — BIOTIN 1 MG
1000 TABLET ORAL DAILY
Status: DISCONTINUED | OUTPATIENT
Start: 2022-07-27 | End: 2022-07-27 | Stop reason: RX

## 2022-07-27 RX ORDER — INSULIN LISPRO 100 [IU]/ML
0-4 INJECTION, SOLUTION INTRAVENOUS; SUBCUTANEOUS NIGHTLY
Status: DISCONTINUED | OUTPATIENT
Start: 2022-07-27 | End: 2022-07-28 | Stop reason: HOSPADM

## 2022-07-27 RX ORDER — POLYETHYLENE GLYCOL 3350 17 G/17G
17 POWDER, FOR SOLUTION ORAL DAILY
Status: DISCONTINUED | OUTPATIENT
Start: 2022-07-27 | End: 2022-07-28 | Stop reason: HOSPADM

## 2022-07-27 RX ORDER — IBUPROFEN 400 MG/1
400 TABLET ORAL EVERY 6 HOURS PRN
Status: ON HOLD | COMMUNITY
End: 2022-09-28 | Stop reason: HOSPADM

## 2022-07-27 RX ORDER — ONDANSETRON 4 MG/1
4 TABLET, ORALLY DISINTEGRATING ORAL EVERY 8 HOURS PRN
Status: DISCONTINUED | OUTPATIENT
Start: 2022-07-27 | End: 2022-07-28 | Stop reason: HOSPADM

## 2022-07-27 RX ORDER — ATORVASTATIN CALCIUM 80 MG/1
80 TABLET, FILM COATED ORAL DAILY
Status: DISCONTINUED | OUTPATIENT
Start: 2022-07-27 | End: 2022-07-28 | Stop reason: HOSPADM

## 2022-07-27 RX ORDER — ERGOCALCIFEROL 1.25 MG/1
50000 CAPSULE ORAL
Status: DISCONTINUED | OUTPATIENT
Start: 2022-07-27 | End: 2022-07-28 | Stop reason: HOSPADM

## 2022-07-27 RX ORDER — ENOXAPARIN SODIUM 100 MG/ML
40 INJECTION SUBCUTANEOUS EVERY 24 HOURS
Status: DISCONTINUED | OUTPATIENT
Start: 2022-07-27 | End: 2022-07-28 | Stop reason: HOSPADM

## 2022-07-27 RX ORDER — EZETIMIBE 10 MG/1
10 TABLET ORAL DAILY
Status: DISCONTINUED | OUTPATIENT
Start: 2022-07-27 | End: 2022-07-28 | Stop reason: HOSPADM

## 2022-07-27 RX ORDER — POLYETHYLENE GLYCOL 3350 17 G/17G
17 POWDER, FOR SOLUTION ORAL DAILY PRN
Status: DISCONTINUED | OUTPATIENT
Start: 2022-07-27 | End: 2022-07-28 | Stop reason: HOSPADM

## 2022-07-27 RX ORDER — DIPHENHYDRAMINE HCL 25 MG
25 CAPSULE ORAL NIGHTLY PRN
COMMUNITY

## 2022-07-27 RX ADMIN — IOPAMIDOL 70 ML: 755 INJECTION, SOLUTION INTRAVENOUS at 10:52

## 2022-07-27 RX ADMIN — ENOXAPARIN SODIUM 40 MG: 100 INJECTION SUBCUTANEOUS at 16:53

## 2022-07-27 RX ADMIN — DIPHENHYDRAMINE HYDROCHLORIDE 25 MG: 25 TABLET ORAL at 21:11

## 2022-07-27 ASSESSMENT — ENCOUNTER SYMPTOMS
CHOKING: 0
SORE THROAT: 0
ABDOMINAL PAIN: 0
BACK PAIN: 0
COUGH: 0
EYE ITCHING: 0
DIARRHEA: 0
DIARRHEA: 0
NAUSEA: 0
STRIDOR: 0
RHINORRHEA: 0
CONSTIPATION: 0
ABDOMINAL DISTENTION: 0
SHORTNESS OF BREATH: 0
COLOR CHANGE: 0
VOMITING: 0
WHEEZING: 0
TROUBLE SWALLOWING: 0
ABDOMINAL PAIN: 0
VOMITING: 0
SHORTNESS OF BREATH: 0
EYE DISCHARGE: 0
NAUSEA: 0
BLOOD IN STOOL: 0
SORE THROAT: 0

## 2022-07-27 ASSESSMENT — PAIN - FUNCTIONAL ASSESSMENT: PAIN_FUNCTIONAL_ASSESSMENT: NONE - DENIES PAIN

## 2022-07-27 NOTE — PATIENT INSTRUCTIONS
Right arm numbness and tingling;  since 0830;  spoke with Dr Gabriella Che as she is her patient;  sent to the ER for stroke protocol and eval

## 2022-07-27 NOTE — ED NOTES
10: 35 a.m. Code Stroke called by Dr. Rafy Choudhury     10:35 a.m. CT notified of Code Stroke     10:36 a.m. Code Stroke announced over PA     10:36 a.m. Dr. Karey Burk (Neurology) notified     10:42 a.m.  Stroke Coordinator notified       Jackelin Bonilla  07/27/22 [phone]

## 2022-07-27 NOTE — PROGRESS NOTES
[facility] PHYSICIAN SERVICES  [facility] INTERNAL MEDICINE  [address] [facility] [address]  [facility] [place] [address]  Dept: [phone]  Dept Fax: [phone]: [phone]    Starla Garrido (:  1945) is a 68 y.o. female,Established patient  with green, here for evaluation of the following chief complaint(s): Other (Numbness in rt arm in hand started while working up garden peas this am)      Starla Garrido is a 68 y.o. female who presents today for her medical conditions/complaints as noted below. Starla Garrido is c/yulia Other (Numbness in rt arm in hand started while working up garden peas this am)        HPI:     Chief Complaint   Patient presents with    Other     Numbness in rt arm in hand started while working up garden peas this am     HPI    Numbness of right arm; and hand that lasted about 15 min;  then started tingling and has feeling of heaviness ;  no speech problems or weakness in other extremities;  ; this started at 0830;      Past Medical History:   Diagnosis Date    GERD (gastroesophageal reflux disease)     Helicobacter Pylori (H. Pylori) Infection     Hyperlipidemia       Past Surgical History:   Procedure Laterality Date    CHOLECYSTECTOMY      COLONOSCOPY  2017    nanda    HYSTERECTOMY (CERVIX STATUS UNKNOWN)      ALEX BSO    KNEE GANGLION SURGERY      NECK SURGERY      Tumor removal    UPPER GASTROINTESTINAL ENDOSCOPY  ?     Harry       Vitals 2022 3/28/2022 2021 10/29/2021 2021    SYSTOLIC 053 [phone]   DIASTOLIC 80 78 70 85 78 76   Site - Left Upper Arm Left Upper Arm - Left Upper Arm -   Position - Sitting Sitting - Sitting -   Cuff Size - Large Adult Large Adult - Large Adult -   Pulse 68 89 76 98 76 92   Temp - - - 98.4 - -   Resp - 18 18 - 18 18   SpO2 94 97 98 97 94 97   Weight - 152 lb 145 lb 143 lb 6.4 oz 149 lb 147 lb   Height - 5' 5\" - 5' 5\" 5' 4\" 5' 4\"   Body mass index - 25.29 kg/m2 - 23.86 kg/m2 25.57 kg/m2 25.23 kg/m2   Some recent data might be hidden       Family History   Problem Relation Age of Onset    Stroke Mother     Diabetes Mother     Liver Cancer Father     Breast Cancer Sister     Liver Cancer Maternal Grandfather     Colon Cancer Neg Hx     Colon Polyps Neg Hx        Social History     Tobacco Use    Smoking status: Never    Smokeless tobacco: Never   Substance Use Topics    Alcohol use: No      Current Outpatient Medications   Medication Sig Dispense Refill    vitamin D (ERGOCALCIFEROL) 1.25 MG (66919 UT) CAPS capsule TAKE 1 CAPSULE BY MOUTH EVERY 14 DAYS 6 capsule 3    esomeprazole (NEXIUM) 40 MG delayed release capsule TAKE 1 CAPSULE EVERY MORNING BEFORE BREAKFAST 90 capsule 3    lidocaine-prilocaine (EMLA) 2.5-2.5 % cream Apply topically to back and knee as needed. 30 g 2    atorvastatin (LIPITOR) 80 MG tablet TAKE 1 TABLET DAILY 90 tablet 3    Dulaglutide (TRULICITY) 3 VIEIRA/8.6LW SOPN Inject 3 mg into the skin once a week 12 pen 2    ezetimibe (ZETIA) 10 MG tablet TAKE 1 TABLET DAILY 90 tablet 3    SITagliptin (JANUVIA) 50 MG tablet Take 1 tablet by mouth 2 times daily 180 tablet 1    glipiZIDE (GLUCOTROL) 5 MG tablet Take 1 tablet by mouth 2 times daily 180 tablet 1    albuterol (PROVENTIL) (2.5 MG/3ML) 0.083% nebulizer solution Take 3 mLs by nebulization 3 times daily as needed for Wheezing (Patient not taking: Reported on 3/28/2022) 30 each 3    blood glucose test strips (ASCENSIA AUTODISC VI;ONE TOUCH ULTRA TEST VI) strip 1 each by In Vitro route daily As needed.  100 each 3    nitrofurantoin, macrocrystal-monohydrate, (MACROBID) 100 MG capsule Take 1 tablet by mouth twice weekly 24 capsule 2    vitamin B-12 (CYANOCOBALAMIN) 1000 MCG tablet Take 1,000 mcg by mouth daily      Calcium Carb-Cholecalciferol (CALCIUM 1000 + D PO) Take by mouth      Biotin 1000 MCG TABS Take 1,000 Units by mouth      tiotropium (SPIRIVA) 18 MCG inhalation capsule Inhale 18 mcg into the lungs daily      polyethylene glycol Date/Time     03/24/2022 09:35 AM    K 4.9 03/24/2022 09:35 AM     03/24/2022 09:35 AM    CO2 25 03/24/2022 09:35 AM    BUN 19 03/24/2022 09:35 AM    CREATININE 0.7 03/24/2022 09:35 AM    GLUCOSE 159 03/24/2022 09:35 AM    CALCIUM 10.1 03/24/2022 09:35 AM      Lab Results   Component Value Date    WBC 5.4 03/24/2022    HGB 14.6 03/24/2022    HCT 43.8 03/24/2022    MCV 88.3 03/24/2022     03/24/2022    LABLYMP 1.42 07/19/2012    LYMPHOPCT 31.2 03/24/2022    RBC 4.96 03/24/2022    MCH 29.4 03/24/2022    MCHC 33.3 03/24/2022    RDW 12.0 03/24/2022     Lab Results   Component Value Date    VITD25 61.1 03/24/2022       Subjective:      Review of Systems   Constitutional:  Negative for fatigue, fever and unexpected weight change. HENT:  Negative for ear discharge, ear pain, mouth sores, sore throat and trouble swallowing. Eyes:  Negative for discharge, itching and visual disturbance. Respiratory:  Negative for cough, choking, shortness of breath, wheezing and stridor. Cardiovascular:  Negative for chest pain, palpitations and leg swelling. Gastrointestinal:  Negative for abdominal distention, abdominal pain, blood in stool, constipation, diarrhea, nausea and vomiting. Endocrine: Negative for cold intolerance, polydipsia and polyuria. Genitourinary:  Negative for difficulty urinating, dysuria, frequency and urgency. Musculoskeletal:  Negative for arthralgias and gait problem. Skin:  Negative for color change and rash. Allergic/Immunologic: Negative for food allergies and immunocompromised state. Neurological:  Positive for weakness and numbness. Negative for dizziness, tremors, syncope, speech difficulty and headaches. Hematological:  Negative for adenopathy. Does not bruise/bleed easily. Psychiatric/Behavioral:  Negative for confusion and hallucinations. Objective:     Physical Exam  Constitutional:       General: She is not in acute distress.      Appearance: She is well-developed. HENT:      Head: Normocephalic and atraumatic. Eyes:      General: No scleral icterus. Right eye: No discharge. Left eye: No discharge. Pupils: Pupils are equal, round, and reactive to light. Neck:      Thyroid: No thyromegaly. Vascular: No JVD. Cardiovascular:      Rate and Rhythm: Normal rate and regular rhythm. Heart sounds: Normal heart sounds. No murmur heard. Pulmonary:      Effort: Pulmonary effort is normal. No respiratory distress. Breath sounds: Normal breath sounds. No wheezing or rales. Abdominal:      General: Bowel sounds are normal. There is no distension. Palpations: Abdomen is soft. There is no mass. Tenderness: There is no abdominal tenderness. There is no guarding or rebound. Musculoskeletal:         General: No tenderness. Normal range of motion. Cervical back: Normal range of motion and neck supple. Skin:     General: Skin is warm and dry. Findings: No erythema or rash. Neurological:      General: No focal deficit present. Mental Status: She is alert and oriented to person, place, and time. GCS: GCS eye subscore is 4. GCS verbal subscore is 5. GCS motor subscore is 6. Cranial Nerves: No cranial nerve deficit or facial asymmetry. Sensory: Sensation is intact. Motor: Weakness present. Coordination: Coordination normal.      Gait: Gait normal.      Deep Tendon Reflexes: Reflexes are normal and symmetric. Reflexes normal.   Psychiatric:         Mood and Affect: Mood is not depressed. Behavior: Behavior normal.         Thought Content: Thought content normal.         Judgment: Judgment normal.     /80   Pulse 68   SpO2 94%           Assessment:      Problem List       Numbness and tingling of right arm - Primary     She is sent to the ER for stroke protocol             Plan:        Patient given educational materials - see patient instructions.   Discussed use, benefit, and side effects of prescribed medications. Allpatient questions answered. Pt voiced understanding. Reviewed health maintenance. Instructed to continue current medications, diet and exercise. Patient agreed with treatment plan. Follow up as directed. MEDICATIONS:  No orders of the defined types were placed in this encounter. ORDERS:  No orders of the defined types were placed in this encounter. Follow-up:  No follow-ups on file. PATIENT INSTRUCTIONS:  Patient Instructions    Right arm numbness and tingling;  since 0830;  spoke with Dr Bhavna Cadena as she is her patient;  sent to the ER for stroke protocol and eval   Electronically signed by Marylene Prudent, APRN on 7/27/2022 at 10:14 AM        EMRDragon/transcription disclaimer:  Much of this encounter note is electronic transcription/translation of spoken language to printed texts. The electronic translation of spoken language may be erroneous, or at times,nonsensical words or phrases may be inadvertently transcribed.   Although I have reviewed the note for such errors, some may still exist.

## 2022-07-27 NOTE — PROGRESS NOTES
4 Eyes Skin Assessment    Luis E Monahan is being assessed upon: Admission    I agree that I, Dionne Whitaker RN, along with Nell Nobles RN have performed a thorough Head to Toe Skin Assessment on the patient. ALL assessment sites listed below have been assessed. Areas assessed by both nurses:     [x]   Head, Face, and Ears   [x]   Shoulders, Back, and Chest  [x]   Arms, Elbows, and Hands   [x]   Coccyx, Sacrum, and Ischium  [x]   Legs, Feet, and Heels    Does the Patient have Skin Breakdown?  No    Behzad Prevention initiated: No  Wound Care Orders initiated: No    WO nurse consulted for Pressure Injury (Stage 3,4, Unstageable, DTI, NWPT, and Complex wounds) and New or Established Ostomies: No        Primary Nurse eSignature: Dionne Whitaker RN on 7/27/2022 at 4:55 PM      Co-Signer eSignature: Electronically signed by Bryan Morrow RN on 7/27/22 at 5:35 PM CDT

## 2022-07-27 NOTE — PROGRESS NOTES
STROKE ADMISSION    Date of Note:  7/27/2022  Time of Note:  4:30 PM    Patient Name:  Nash Mixon  MRN:  [id number]  YOB: 1945  Age:      68 y.o. Gender:      female    Room:  [address] [place] [address]   Adm. Date: 7/27/2022  Adm. Status:   Allergies: Amoxicillin-pot clavulanate, Ciprofloxacin, Codeine, and Sulfa antibiotics  Code Status: Full Code    Adm. Provider: Van Tate MD  Neuro. Provider: Dr. Kyler Paris    Bedside report and handoff assessment completed with [facility]. Presentation(s)    ED Chief Complaint  Chief Complaint   Patient presents with    Arm Problem     Numbness to right arm started at 0830 numbness resolved but arm feels 'heavy'        ED Impression  1. Right arm numbness    2. Right arm weakness             Admission Impression  Principal Problem:    Right arm weakness  Resolved Problems:    * No resolved hospital problems. *                  Last Known Well Date & Time    Last Known to be Well (Stroke Diagnosis)  Date Last Known Well: 07/27/22  Time Last Known Well: 0830    Current NIHSS:  NIH Stroke Scale  Interval: Reassessment  Level of Consciousness (1a): Alert  LOC Questions (1b): Answers both correctly  LOC Commands (1c): Performs both tasks correctly  Best Gaze (2): Normal  Visual (3): No visual loss  Facial Palsy (4): Normal symmetrical movement  Motor Arm, Left (5a): No drift  Motor Arm, Right (5b): No drift  Motor Leg, Left (6a): No drift  Motor Leg, Right (6b):  No drift  Limb Ataxia (7): Absent  Sensory (8): Normal  Best Language (9): No aphasia  Dysarthria (10): Normal  Extinction and Inattention (11): No abnormality  Total: 0    Current GCS:  Chandni Coma Scale  Eye Opening: Spontaneous  Best Verbal Response: Oriented  Best Motor Response: Obeys commands  Chandler Coma Scale Score: 15      Education & Care Plan    [x]    Education Assessment Completed      Patient preferred method of education:   []    Visual   []    Written   [x]    Verbal   [] Demonstration   []    All of the above    [x]    Individualized Stroke/TIA Education template added, including patient specific risk factors:       Diabetes    [x]    Stroke education initiated with patient and/or caregiver. [x]    Stroke booklet given and reviewed completely and efficiently with patient and/or caregiver. All questions and concerns addressed. Will continue to educate appropriately. []    Individualized Stroke/TIA Care Plan added      [place] Swallow Screen (YSS)    [x]    Bedside swallow screen completed using the YSS Protocol, and documented prior to any PO meds, food, or drink:     []    Completed in ED    []    Passed    []    Failed and awaiting SLP eval     [x]    Completed upon admission to this unit    [x]    Passed    []    Failed and awaiting SLP eval     []    Patient strict NPO status for procedure/testing      Swallow Screening  Is the patient unable to remain alert for testing?: No  Is the patient on a modified diet (thickened liquids) due to pre-existing dysphagia?: No  Is there presence of existing enteral tube feeding via the stomach or nose?: No  Is there presence of head-of-bed restrictions (less than 30 degrees)?: No  Is there presence of tracheotomy tube?: No  Is the patient ordered nothing-by-mouth status?: No  3 oz Water Swallow Screen: Pass    VTE Prophylaxis  (ASA, Plavix and tPA are not VTE prophylaxis)      SCDs   []    On   [x]    Off   []    Refused   []    Contraindicated see MD orders      Medication(s)   [x]    Lovenox   []    Eliquis   []    Heparin   []    Coumadin/Warfarin   []    Other:   []    Contraindicated see MD orders    []    I attest as the admitting nurse that I have completed a thorough stroke assessment and admission on this patient. All checked assessment areas listed above have been addressed and completed.     Nurse eSignature:  Oneida Feliciano RN  Date:   7/27/2022   Time:   4:30 PM

## 2022-07-27 NOTE — ED PROVIDER NOTES
[facility]  eMERGENCY dEPARTMENT eNCOUnter      Pt Name: Luna Rowley  MRN: [id number]  [place] 1945  Date of evaluation: 7/27/2022  Provider: Geovani Munoz MD    [address] [place] [address]       Chief Complaint   Patient presents with    Arm Problem     Numbness to right arm started at 0830 numbness resolved but arm feels 'heavy'          HISTORY OF PRESENT ILLNESS   (Location/Symptom, Timing/Onset,Context/Setting, Quality, Duration, Modifying Factors, Severity)  Note limiting factors. Luna Rowley is a 68 y.o. female who presents to the emergency department with right arm numbness and weakness. The patient states her symptoms started at 830 this morning with right arm numbness. It lasted about an hour. The numbness is now resolved. Her arm feels heavy and not back to baseline. She denies any other symptoms. She had a TIA in the past but cannot recall her previous symptoms. She does not take Plavix any longer but is on a baby aspirin. HPI    NursingNotes were reviewed. REVIEW OF SYSTEMS    (2-9 systems for level 4, 10 or more for level 5)     Review of Systems   Constitutional:  Negative for chills and fever. HENT:  Negative for rhinorrhea and sore throat. Respiratory:  Negative for shortness of breath. Cardiovascular:  Negative for chest pain and leg swelling. Gastrointestinal:  Negative for abdominal pain, diarrhea, nausea and vomiting. Genitourinary:  Negative for difficulty urinating. Musculoskeletal:  Negative for back pain and neck pain. Skin:  Negative for rash. Neurological:  Positive for weakness and numbness. Negative for headaches. Psychiatric/Behavioral:  Negative for confusion. A complete review of systems was performed and is negative except as noted above in the HPI.        PAST MEDICAL HISTORY     Past Medical History:   Diagnosis Date    GERD (gastroesophageal reflux disease)     Helicobacter Pylori (H. Pylori) Infection     Hyperlipidemia SURGICAL HISTORY       Past Surgical History:   Procedure Laterality Date    CHOLECYSTECTOMY      COLONOSCOPY  2017    nanda    HYSTERECTOMY (CERVIX STATUS UNKNOWN)      ALEX BSO    KNEE GANGLION SURGERY      NECK SURGERY      Tumor removal    UPPER GASTROINTESTINAL ENDOSCOPY  ? [facility]         CURRENT MEDICATIONS       Previous Medications    ALBUTEROL (PROVENTIL) (2.5 MG/3ML) 0.083% NEBULIZER SOLUTION    Take 3 mLs by nebulization 3 times daily as needed for Wheezing    ASPIRIN 81 MG EC TABLET    Take 81 mg by mouth daily. ATORVASTATIN (LIPITOR) 80 MG TABLET    TAKE 1 TABLET DAILY    BIOTIN 1000 MCG TABS    Take 1,000 Units by mouth    BLOOD GLUCOSE TEST STRIPS (ASCENSIA AUTODISC VI;ONE TOUCH ULTRA TEST VI) STRIP    1 each by In Vitro route daily As needed. CALCIUM CARB-CHOLECALCIFEROL (CALCIUM 1000 + D PO)    Take by mouth    DULAGLUTIDE (TRULICITY) 3 YH/0.8XL SOPN    Inject 3 mg into the skin once a week    ESOMEPRAZOLE (NEXIUM) 40 MG DELAYED RELEASE CAPSULE    TAKE 1 CAPSULE EVERY MORNING BEFORE BREAKFAST    EZETIMIBE (ZETIA) 10 MG TABLET    TAKE 1 TABLET DAILY    GLIPIZIDE (GLUCOTROL) 5 MG TABLET    Take 1 tablet by mouth 2 times daily    LIDOCAINE-PRILOCAINE (EMLA) 2.5-2.5 % CREAM    Apply topically to back and knee as needed.     NITROFURANTOIN, MACROCRYSTAL-MONOHYDRATE, (MACROBID) 100 MG CAPSULE    Take 1 tablet by mouth twice weekly    POLYETHYLENE GLYCOL (MIRALAX) 17 G PACK PACKET    Take 17 g by mouth daily    SITAGLIPTIN (JANUVIA) 50 MG TABLET    Take 1 tablet by mouth 2 times daily    TIOTROPIUM (SPIRIVA) 18 MCG INHALATION CAPSULE    Inhale 18 mcg into the lungs daily    VITAMIN B-12 (CYANOCOBALAMIN) 1000 MCG TABLET    Take 1,000 mcg by mouth daily    VITAMIN D (ERGOCALCIFEROL) 1.25 MG (14684 UT) CAPS CAPSULE    TAKE 1 CAPSULE BY MOUTH EVERY 14 DAYS       ALLERGIES     Amoxicillin-pot clavulanate, Ciprofloxacin, Codeine, and Sulfa antibiotics    FAMILY HISTORY       Family History Problem Relation Age of Onset    Stroke Mother     Diabetes Mother     Liver Cancer Father     Breast Cancer Sister     Liver Cancer Maternal Grandfather     Colon Cancer Neg Hx     Colon Polyps Neg Hx           SOCIAL HISTORY       Social History     Socioeconomic History    Marital status:      Spouse name: None    Number of children: None    Years of education: None    Highest education level: None   Tobacco Use    Smoking status: Never    Smokeless tobacco: Never   Vaping Use    Vaping Use: Never used   Substance and Sexual Activity    Alcohol use: No    Drug use: No    Sexual activity: Not Currently     Social Determinants of Health     Physical Activity: Inactive    Days of Exercise per Week: 0 days    Minutes of Exercise per Session: 0 min       SCREENINGS   NIH Stroke Scale  Interval: Baseline  Level of Consciousness (1a): Alert  LOC Questions (1b): Answers both correctly  LOC Commands (1c): Performs both tasks correctly  Best Gaze (2): Normal  Visual (3): No visual loss  Facial Palsy (4): Normal symmetrical movement  Motor Arm, Left (5a): No drift  Motor Arm, Right (5b): No drift  Motor Leg, Left (6a): No drift  Motor Leg, Right (6b): No drift  Limb Ataxia (7): Absent  Sensory (8): (!) Mild to Moderate  Best Language (9): No aphasia  Dysarthria (10): Normal  Extinction and Inattention (11): No abnormality  Total: 1Glasgow Coma Scale  Eye Opening: Spontaneous  Best Verbal Response: Oriented  Best Motor Response: Obeys commands  [place] Coma Scale Score: 15        PHYSICAL EXAM    (up to 7 for level 4, 8 or more for level 5)     ED Triage Vitals [07/27/22 1025]   BP Temp Temp src Heart Rate Resp SpO2 Height Weight   (!) 165/100 98 °F (36.7 °C) -- 72 18 98 % -- 147 lb (66.7 kg)       Physical Exam  Constitutional:       General: She is not in acute distress. Appearance: She is well-developed. She is not diaphoretic. HENT:      Head: Normocephalic and atraumatic.    Eyes:      Pupils: Pupils are equal, round, and reactive to light. Cardiovascular:      Rate and Rhythm: Normal rate and regular rhythm. Heart sounds: Normal heart sounds. Pulmonary:      Effort: Pulmonary effort is normal. No respiratory distress. Breath sounds: Normal breath sounds. Abdominal:      General: Bowel sounds are normal. There is no distension. Palpations: Abdomen is soft. Tenderness: There is no abdominal tenderness. Musculoskeletal:         General: Normal range of motion. Cervical back: Normal range of motion and neck supple. Skin:     General: Skin is warm and dry. Findings: No rash. Neurological:      Mental Status: She is alert and oriented to person, place, and time. Cranial Nerves: No cranial nerve deficit. Motor: No abnormal muscle tone. Coordination: Coordination normal.   Psychiatric:         Behavior: Behavior normal.       DIAGNOSTIC RESULTS     EKG: All EKG's are interpreted by the Emergency Department Physician who either signs or Co-signs this chart in the absence of a cardiologist.    Normal sinus rhythm rate 69 nonspecific ST waves no prior to compare    RADIOLOGY:   Non-plain film images such as CT, Ultrasound and MRI are read by the radiologist. Laura Belk images are visualized and preliminarily interpreted by the emergency physician with the below findings:        Interpretation per the Radiologist below, if available at the time of this note:    XR CHEST PORTABLE   Final Result   Modest DJD of both AC joints. Short tubular structure in the left supraclavicular fossa, possibly calcified. Recommendation: Follow up as clinically indicated. Electronically Signed by Rachana Lagos MD at 27-Jul-2022 12:43:44 PM               CTA HEAD NECK W CONTRAST   Final Result   No acute intracranial arterial abnormalities. Recommendation:   Follow up as clinically indicated.    All CT scans at this facility utilize dose modulation, iterative reconstruction, and/or weight based dosing when appropriate to reduce radiation dose to as low as reasonably achievable. Exam: CTA OF THE CAROTID ARTERIES   Clinical data:Right arm numbness and weakness. Technique: Axial CT angiography of the carotid arteries within the neck was performed with the administration of intravenous contrast for evaluation of the carotid bifurcation. Oral contrast was not utilized. Coronal, sagittal, and 3D volume    reconstructions of the carotid arteries were performed. Reformatted/3D-MPR images were performed. NASCET Criteria was used in evaluating the study. Radiation Dose: CTDIvol =98.3 mGy, DLP =1494 mGy x cm. Prior studies: CT scan of the brain done on same day. Findings:   No definite abnormality seen on 3D reformatted images. CCA, Carotid Bulb, ICA, and origin of the ECA are well opacified. Vertebral arteries are well opacified. Jugular veins are well opacified   Calcified plaque involving the aortic arch, portions of the great vessels and both carotid arteries. Included lung apices are grossly unremarkable. Bony structures: No acute or destructive abnormality. Mild-moderate lower cervical spondylosis with subtle C6-7 listhesis and convex left curvature. Postinflammatory right palatine tonsillar calcification. IMPRESSION: No evidence of acute vascular abnormality. No occlusion or significant stenosis. Calcified plaque involving the aortic arch, portions of the great vessels and both carotid arteries. Other nonacute findings above. Recommendation:   Follow up as clinically indicated. All CT scans at this facility utilize dose modulation, iterative reconstruction, and/or weight based dosing when appropriate to reduce radiation dose to as low as reasonably achievable. Electronically Signed by Ameya Ross MD at 27-Jul-2022 12:54:54 PM               CT HEAD WO CONTRAST   Final Result   1. Minor chronic microvascular ischemic changes. 2. No evidence of acute cortical infarction or intracranial hemorrhage. Recommendation: Follow up as clinically indicated. All CT scans at this facility utilize dose modulation, iterative reconstruction, and/or weight based dosing when appropriate to reduce radiation dose to as low as reasonably achievable. Amended by Enedina Khan MD at 27-Jul-2022 12:15:21 PM   Electronically Signed by Enedina Khan MD at 27-Jul-2022 12:00:56 PM               MRI brain without contrast    (Results Pending)         ED BEDSIDE ULTRASOUND:   Performed by ED Physician - none    LABS:  Labs Reviewed   CBC WITH AUTO DIFFERENTIAL - Abnormal; Notable for the following components:       Result Value    MPV 9.3 (*)     All other components within normal limits   COMPREHENSIVE METABOLIC PANEL - Abnormal; Notable for the following components:    Glucose 185 (*)     All other components within normal limits   POCT GLUCOSE - Abnormal; Notable for the following components:    POC Glucose 206 (*)     All other components within normal limits   COVID-19, RAPID   APTT   PROTIME-INR   TROPONIN   POCT GLUCOSE       All other labs were within normal range or not returned as of this dictation. EMERGENCY DEPARTMENT COURSE and DIFFERENTIALDIAGNOSIS/MDM:   Vitals:    Vitals:    07/27/22 1045 07/27/22 1100 07/27/22 1300 07/27/22 1330   BP: (!) 165/79 (!) 142/66 136/87 (!) 153/74   Pulse: 76 79 69 72   Resp: 14 15 16 18   Temp:       SpO2: 96% 91% 94% 95%   Weight:           MDM  Pt has rapidly improving symptoms at this time her stroke scale is essentially zero though pt feels she is not back to baseline at this time. D/w Dr Sasha Suazo and at this time would not be a TNKase candidate due to her improvement. Can admit for further evaluation  D/w hospitalist for admission      CONSULTS:  Onesimo Dow 19:  Unless otherwise notedbelow, none     Procedures    FINAL IMPRESSION     1. Right arm numbness    2.  Right arm weakness DISPOSITION/PLAN   DISPOSITION Admitted 07/27/2022 12:56:52 PM      PATIENT REFERRED TO:  @FUP@    DISCHARGE MEDICATIONS:  New Prescriptions    No medications on file          (Please note that portions of this note were completed with a voice recognition program.  Efforts were made to edit the dictations butoccasionally words are mis-transcribed.)    Aminata Fall MD (electronically signed)  AttendingEmergency Physician         Aminata Fall MD  07/27/22 7004

## 2022-07-27 NOTE — H&P
[facility]      Hospitalist - History & Physical      PCP: Monita Goodell, MD    Date of Admission: 7/27/2022    Date of Service: 7/27/2022    Chief Complaint:  right arm numbness    History Of Present Illness: The patient is a 68 y.o. female with a PMH of asthma, DM, and HLD who presented to [facility] ED on 7/27/2022 complaining of right arm numbness. She states that she began to notice her arm numbness around 8:30 AM.  She states that her hand was numb and she was unable to move it. She denies any headaches, syncope, injuries or numbness of her leg or face. Her symptoms had significantly improved within an hour, however, at assessment she continued to have some decrease sensation to her right hand. She denies any recent illnesses including shortness of breath, fevers, decreased appetite, nausea, vomiting or cough. She does report that she has had multiple previous TIAs that involve confusion approximately 10 years ago. Further ED work-up revealed a chemistry within normal limits other than hyperglycemia at 185. Negative troponin. CBC within normal limits. She was found to be hypertensive upon arrival with a BP of 165/100. CTA of the brain no acute evidence of vascular abnormality. CT of the head showed moderate chronic microvascular ischemic changes. Chest x-ray showed modest DJD of both AC joints however no acute cardiopulmonary concerns. She will be admitted to hospital medicine for right-sided numbness and strokelike symptoms and neurology consultation.     Past Medical History:        Diagnosis Date    Asthma     Diabetes mellitus (HCC)     GERD (gastroesophageal reflux disease)     Helicobacter Pylori (H. Pylori) Infection     Hyperlipidemia        Past Surgical History:        Procedure Laterality Date    CHOLECYSTECTOMY      COLONOSCOPY  2017    nanda    HYSTERECTOMY (CERVIX STATUS UNKNOWN)      ALEX BSO    KNEE GANGLION SURGERY      NECK SURGERY      Tumor removal    UPPER GASTROINTESTINAL ENDOSCOPY  ? [facility]       Home Medications:  Prior to Admission medications    Medication Sig Start Date End Date Taking? Authorizing Provider   diphenhydrAMINE (BENADRYL) 25 MG capsule Take 25 mg by mouth nightly as needed for Sleep   Yes Historical Provider, MD   ibuprofen (ADVIL;MOTRIN) 400 MG tablet Take 400 mg by mouth every 6 hours as needed for Pain   Yes Historical Provider, MD   vitamin D (ERGOCALCIFEROL) 1.25 MG (19442 UT) CAPS capsule TAKE 1 CAPSULE BY MOUTH EVERY 14 DAYS 7/22/22   Nely Caputo MD   esomeprazole (NEXIUM) 40 MG delayed release capsule TAKE 1 CAPSULE EVERY MORNING BEFORE BREAKFAST 6/14/22   Nely Caputo MD   lidocaine-prilocaine (EMLA) 2.5-2.5 % cream Apply topically to back and knee as needed. 5/18/22   Nely Caputo MD   atorvastatin (LIPITOR) 80 MG tablet TAKE 1 TABLET DAILY 5/9/22   Nely Caputo MD   Dulaglutide (TRULICITY) 3 IE/1.8NG SOPN Inject 3 mg into the skin once a week 4/20/22   Nely Caputo MD   ezetimibe (ZETIA) 10 MG tablet TAKE 1 TABLET DAILY 2/26/22   Nely Caputo MD   SITagliptin (JANUVIA) 50 MG tablet Take 1 tablet by mouth 2 times daily 2/17/22   Nely Caputo MD   glipiZIDE (GLUCOTROL) 5 MG tablet Take 1 tablet by mouth 2 times daily 12/27/21   Nely Caputo MD   albuterol (PROVENTIL) (2.5 MG/3ML) 0.083% nebulizer solution Take 3 mLs by nebulization 3 times daily as needed for Wheezing  Patient not taking: No sig reported 11/8/21   Nely Caputo MD   blood glucose test strips (ASCENSIA AUTODISC VI;ONE TOUCH ULTRA TEST VI) strip 1 each by In Vitro route daily As needed.  9/28/21   Nely Caputo MD   nitrofurantoin, Jasmin Sorrel, (MACROBID) 100 MG capsule Take 1 tablet by mouth twice weekly 9/28/21   Nely Caputo MD   TRULICITY 1.5 DL/9.5GL [facility]  7/15/21   Historical Provider, MD   vitamin B-12 (CYANOCOBALAMIN) 1000 MCG tablet Take 1,000 mcg by mouth daily    Historical Provider, MD   Calcium Carb-Cholecalciferol (CALCIUM 1000 + D PO) Take by mouth    Historical Provider, MD   Biotin 1000 MCG TABS Take 1,000 Units by mouth    Historical Provider, MD   tiotropium (SPIRIVA) 18 MCG inhalation capsule Inhale 18 mcg into the lungs daily  Patient not taking: Reported on 7/27/2022    Historical Provider, MD   polyethylene glycol (MIRALAX) 17 g PACK packet Take 17 g by mouth daily    Historical Provider, MD   aspirin 81 MG EC tablet Take 81 mg by mouth daily. Historical Provider, MD       Allergies:    Amoxicillin-pot clavulanate, Ciprofloxacin, Codeine, and Sulfa antibiotics    Social History:    The patient currently lives at home with spouse  Tobacco:   reports that she has never smoked. She has never used smokeless tobacco.  Alcohol:   reports no history of alcohol use. Illicit Drugs: Never    Family History:      Problem Relation Age of Onset    Stroke Mother     Diabetes Mother     Liver Cancer Father     Breast Cancer Sister     Liver Cancer Maternal Grandfather     Colon Cancer Neg Hx     Colon Polyps Neg Hx        Review of Systems:   Review of Systems     14 point review of systems is negative except as specifically addressed above.     Physical Examination:  BP (!) 155/71   Pulse 71   Temp 97 °F (36.1 °C) (Temporal)   Resp 18   Wt 147 lb (66.7 kg)   SpO2 95%   BMI 24.46 kg/m²   Physical Exam     Diagnostic Data:  CBC:  Recent Labs     07/27/22  1029   WBC 5.4   HGB 14.7   HCT 43.0        BMP:  Recent Labs     07/27/22  1029      K 4.2      CO2 26   BUN 21   CREATININE 0.8   CALCIUM 9.6     Recent Labs     07/27/22  1029   AST 29   ALT 30   BILITOT 0.6   ALKPHOS 102     Coag Panel:   Recent Labs     07/27/22  1029   INR 0.99   PROTIME 13.0   APTT 28.8     Cardiac Enzymes:   Recent Labs     07/27/22  1029   TROPONINI <0.01     ABGs:No results found for: PHART, PO2ART, KOX7SLD  Urinalysis:  Lab Results   Component Value Date/Time    NITRU Negative 03/24/2022 09:35 AM    WBCUA 6 03/24/2022 09:35 AM    BACTERIA NEGATIVE 03/24/2022 09:35 AM    RBCUA 1 03/24/2022 09:35 AM    BLOODU Negative 03/24/2022 09:35 AM    SPECGRAV 1.020 03/24/2022 09:35 AM    GLUCOSEU Negative 03/24/2022 09:35 AM     A1C: No results for input(s): LABA1C in the last 72 hours. ABG:No results for input(s): PHART, HQP8SQH, PO2ART, NSB7IHG, BEART, HGBAE, A9TVQORH, CARBOXHGBART in the last 72 hours. CT HEAD WO CONTRAST    Result Date: 7/27/2022  <Addendum Signed by Hemant Jones MD at 27-Jul-2022 12:15:21 [phone] Findings were communicated to Maame  on 7/27/22 at 12:00 pm who confirms having received the report and  Dr. Polly Garvey is aware of the STROKE findings. No further action required. If the referring clinician has any further questions, please call [facility] service [phone], option 1. STROKE documentation completed. RM Addendum End> The Exam: CT OF THE BRAIN WITHOUT CONTRAST Clinical data: Right arm numb and weak. Technique: Contiguous axial images are obtained from the skull base to vertex without intravenous contrast. Reformatted/MPR images were performed. Radiation dose: CTDIvol = 98.3 mGy, DLP = 1494 mGy x cm. Prior studies: None available. Findings: Minor patchy hypoattenuation in the periventricular white matter, nonspecific but compatible with chronic microvascular ischemic changes. No evidence of acute cortical infarction, hemorrhage, mass or mass effect. No hydrocephalus or abnormal extra-axial fluid collections are present. The posterior fossa is unremarkable. The skull base and calvarium are intact. The included portions of the paranasal sinuses and mastoid air cells are clear. 1. Minor chronic microvascular ischemic changes. 2. No evidence of acute cortical infarction or intracranial hemorrhage. Recommendation: Follow up as clinically indicated.  All CT scans at this facility utilize dose modulation, iterative reconstruction, and/or weight based dosing when appropriate to reduce radiation dose to as low as reasonably achievable. Amended by Davey Thacker MD at 27-Jul-2022 12:15:21 PM Electronically Signed by Davey Thacker MD at 27-Jul-2022 12:00:56 PM             XR CHEST PORTABLE    Result Date: 7/27/2022  Exam: X-RAY OF [facility] Clinical data:Code stroke. Technique:Single view of the chest. Prior studies: Radiograph of the chest dated 10/29/21 images. Findings: The lungs are grossly clear; noevidence of acute infiltrate or pleural effusion. Cardiac silhouette is within normal limits. No acute osseous abnormality is detected. There is modest DJD of both AC joints. Short tubular structure noted in the left supraclavicular fossa, possibly calcified. This could be a vascular structure. Modest DJD of both AC joints. Short tubular structure in the left supraclavicular fossa, possibly calcified. Recommendation: Follow up as clinically indicated. Electronically Signed by Wyona Eisenmenger MD at 27-Jul-2022 12:43:44 PM             CTA HEAD NECK W CONTRAST    Result Date: 7/27/2022  CTA BRAIN AND NECK Exam: CTA OF THEINTRACRANIAL ARTERIES (COW) Clinical data:Right arm numbness and weakness. Technique: Axial CT angiography of the intracranial arteries within the brain was performed with administration of intravenous contrast. Coronal, sagittal, and 3D volume reconstructions of theintracranial arteries were performed. Reformatted/3D-MPR images were performed. Radiation Dose: CTDIvol =98.3 mGy, DLP =1494 mGy x cm. Prior studies: CT scan of the brain done on same day. Findings: No definite abnormality seen on 3D reformatted images. Anterior cerebral arteries demonstrate enhance normally. Anterior communicating artery is opacified. Middle Cerebral arteries are unremarkable. Posterior communicating arteries are opacified. Posterior cerebral arteries are intact. Developmental atresia of both P1 segments. Vertebral arteries are visualized. Basilar artery is unremarkable.  Intracranial internal carotid arteries demonstrate when appropriate to reduce radiation dose to as low as reasonably achievable. Electronically Signed by Morro Katz MD at 27-Jul-2022 12:54:54 PM             MRI brain without contrast    Result Date: 7/27/2022  Exam: MRI OF THE BRAIN WITHOUTINTRAVENOUS CONTRAST Clinical data:Right arm weakness, numbness. Technique: Multiplanar multi-sequence MRI of the brain without intravenous gadolinium. Diffusion-weighted imaging is submitted. Prior studies: CTA of the brain and CTA of the neck done on same day. CT scan of the brain done on same day. Findings: Mild to moderate periventricular and deep white matter chronic small vessel changes. No evidence of acute cortical infarction, hemorrhage, mass or mass effect is seen. The ventricular system is normal in size, shape, and contour. No hydrocephalus or abnormal extra-axial fluid collections are present. The marrow signal within the skull base and calvarium is intact. The paranasal sinuses and mastoid air cells are clear. DWI/ADC: No evidence of restricted diffusion. 1.  No acute intracranial abnormality. 2.  Mild to moderate periventricular and deep white matter chronic small vessel changes. Recommendation: Follow up as clinically indicated.  Electronically Signed by Mariely Ohara MD at 27-Jul-2022 04:17:59 PM               Assessment/Plan:  Principal Problem:    Right arm weakness   - Neurology consultation and recommendations appreciated   - Aspirin and statin   - MRI brain completed-7/27/2022   - CTA completed and negative 7/27/2022   - ECHO    - NIHSS/Neuro checks   - Nursing swallow assessment   - PT/OT/SLP   - FLP in a.m./ HgbA1c   - Allow permissive hypertension    - PRN labetalol with strict parameters   - Monitor on telemetry      Active Problems:    Elevated cholesterol   -Continue home Zetia   -AM FLP      Intrinsic asthma with acute exacerbation   -continue home inhalers   -No signs of acute exacerbation      Type 2 diabetes mellitus without complication, without long-term current use of insulin ([facility] [address]   -continue Home med-(Substitution for Januvia)   -Low dose correctional protocol    Resolved Problems:    * No resolved hospital problems.  *       Signed:  LETY Suero, 7/27/2022 5:04 PM

## 2022-07-27 NOTE — PROGRESS NOTES
PHARMACY NOTE  Lo Gonzales was ordered Biotin. Per the [address], this medication is non-formulary and not stocked by pharmacy. It has been discontinued. The medication can be reordered at discharge.      Electronically signed by Emiliano Miller [facility] on 7/27/2022 at 3:04 PM

## 2022-07-27 NOTE — CONSULTS
[facility] Neurology Consult      Patient:   Luis E Monahan  MR#:    [id number]  Account Number:                   [id number]      Room:    [facility]-   YOB: 1945  Date of Progress Note: 7/27/2022  Time of Note                           6:20 PM  Attending Physician:  Darren Adrian MD  Consulting Physician:  Jana Dong DO       CHIEF COMPLAINT:   Right upper extremity numbness and weakness    HISTORY OF PRESENT ILLNESS:   This is a 68 y.o. female who was admitted with right upper extremity numbness and weakness. The patient's symptoms started around 830 this morning. She states that she noted numbness in her right arm and possible weakness and incoordination. She denies facial drooping or slurred speech. She does have a prior TIA history remotely. Her symptoms have improved significantly. No history of atrial fibrillation. No history of carotid artery disease. CT head did not reveal anything acute. Follow-up MRI was largely negative as well. CTA of the head and neck was largely negative. REVIEW OF SYSTEMS:  Constitutional - No fever or chills. HENT -  No Scalp tenderness. No tinnitus or significant hearing loss. No nose bleeding, no sore throat. Eyes - No sudden vision change or eye pain  Respiratory - No significant shortness of breath or cough  Cardiovascular - No chest pain. No palpitations or significant leg swelling  Gastrointestinal - No abdominal swelling or pain. Genitourinary - No difficulty urinating, dysuria  Musculoskeletal - No back pain or myalgia. Skin - No color change or rash  Neurologic - No seizures. Hematologic - No easy bruising or spontaneous bleeding. Psychiatric - No anxiety.      PAST MEDICAL HISTORY:      Diagnosis Date    Asthma     Diabetes mellitus (HCC)     GERD (gastroesophageal reflux disease)     Helicobacter Pylori (H. Pylori) Infection     Hyperlipidemia        PAST SURGICAL HISTORY:      Procedure Laterality Date CHOLECYSTECTOMY      COLONOSCOPY  2017    nanda    HYSTERECTOMY (CERVIX STATUS UNKNOWN)      ALEX BSO    KNEE GANGLION SURGERY      NECK SURGERY      Tumor removal    UPP[facility]  [facility]       SOCIAL HISTORY:   TOBACCO:   reports that she has never smoked. She has never used smokeless tobacco.  ETOH:   reports no history of alcohol use.   DRUG:    Social History     Substance and Sexual Activity   Drug Use No       FAMILY HISTORY:       Problem Relation Age of Onset    Stroke Mother     Diabetes Mother     Liver Cancer Father     Breast Cancer Sister     Liver Cancer Maternal Grandfather     Colon Cancer Neg Hx     Colon Polyps Neg Hx        MEDICATIONS:      Current Facility-Administered Medications:     ondansetron (ZOFRAN-ODT) disintegrating tablet 4 mg, 4 mg, Oral, Q8H PRN **OR** ondansetron (ZOFRAN) injection 4 mg, 4 mg, IntraVENous, Q6H PRN, Jennifer Clink, APRN    polyethylene glycol (GLYCOLAX) packet 17 g, 17 g, Oral, Daily PRN, Jennifer Clink, APRN    enoxaparin (LOVENOX) injection 40 mg, 40 mg, SubCUTAneous, Q24H, Jennifer Clink, APRN, 40 mg at 07/27/22 1653    aspirin EC tablet 81 mg, 81 mg, Oral, Daily **OR** aspirin suppository 300 mg, 300 mg, Rectal, Daily, Jennifer Clink, APRN    perflutren lipid microspheres (DEFINITY) injection 1.65 mg, 1.5 mL, IntraVENous, ONCE PRN, Jennifer Clink, APRN    labetalol (NORMODYNE;TRANDATE) injection 10 mg, 10 mg, IntraVENous, Q10 Min PRN, Jennifer Clink, APRN    atorvastatin (LIPITOR) tablet 80 mg, 80 mg, Oral, Daily, Jennifer Clink, APRN    calcium-cholecalciferol 500-200 MG-UNIT per tablet 2 tablet, 2 tablet, Oral, Daily, Jennifer Clink, APRN    ezetimibe (ZETIA) tablet 10 mg, 10 mg, Oral, Daily, Jennifer Clink, APRN    [START ON 7/28/2022] pantoprazole (PROTONIX) tablet 40 mg, 40 mg, Oral, QAM AC, Jennifer Clink, APRN    polyethylene glycol (GLYCOLAX) packet 17 g, 17 g, Oral, Daily, Jennifer Clink, LETY    alogliptin (NESINA) tablet 12.5 mg, 12.5 mg, Oral, Daily, LETY Muñiz    ipratropium (ATROVENT) 0.02 % nebulizer solution 0.5 mg, 0.5 mg, Nebulization, 4x Daily PRN, LETY Muñiz    vitamin B-12 (CYANOCOBALAMIN) tablet 1,000 mcg, 1,000 mcg, Oral, Daily, LETY Muñiz    vitamin D (ERGOCALCIFEROL) capsule 50,000 Units, 50,000 Units, Oral, Q14 Days, LETY Muñiz    diphenhydrAMINE (BENADRYL) tablet 25 mg, 25 mg, Oral, Nightly PRN, LETY Muñiz    insulin lispro (HUMALOG) injection vial 0-4 Units, 0-4 Units, SubCUTAneous, TID WC, LETY Muñiz    insulin lispro (HUMALOG) injection vial 0-4 Units, 0-4 Units, SubCUTAneous, Nightly, LETY Muñiz    glucose chewable tablet 16 g, 4 tablet, Oral, PRN, Chaya Rodrigez, APRN    dextrose bolus 10% 125 mL, 125 mL, IntraVENous, PRN **OR** dextrose bolus 10% 250 mL, 250 mL, IntraVENous, PRN, LETY Muñiz    glucagon (rDNA) injection 1 mg, 1 mg, SubCUTAneous, PRN, Chaya Rodrigez, LETY    dextrose 10 % infusion, , IntraVENous, Continuous PRN, LETY Muñiz    ALLERGIES:    Amoxicillin-pot clavulanate, Ciprofloxacin, Codeine, and Sulfa antibiotics    PHYSICAL EXAM:    Constitutional -   BP (!) 155/71   Pulse 71   Temp 97 °F (36.1 °C) (Temporal)   Resp 18   Wt 147 lb (66.7 kg)   SpO2 95%   BMI 24.46 kg/m²   General appearance: No acute distress   EYES -   Conjunctiva normal  Pupillary exam as below, see CN exam in the neurologic exam  ENT-    No scars, masses, or lesions over external nose or ears  Oropharynx without erythema, palate midline  Cardiovascular -   No clubbing, cyanosis, or edema   Pulmonary-   Good expansion, normal effort without use of accessory muscles  Musculoskeletal -   No significant wasting of muscles noted  Gait as below, see gait exam in the neurologic exam  Muscle strength, tone, stability as below see the motor exam in the neurologic exam.   No bony deformities  Skin -   Warm, dry, and intact to inspection and palpation. No rash, erythema, or pallor  Psychiatric -   Mood, affect, and behavior appear normal    Memory as below see mental status examination in the neurologic exam      NEUROLOGICAL EXAM    Mental status   [x] Awake, alert, oriented   [x] Affect attention and concentration appear appropriate  [x] Recent and remote memory appears unremarkable  [x] Speech normal without dysarthria or aphasia, comprehension and repetition intact. COMMENTS:   Cranial Nerves [x] No VF deficit to confrontation  [x] PERRLA, EOMI, no nystagmus, conjugate eye movements, no ptosis  [x] Face symmetric  [x] Facial sensation intact  [x] Tongue midline no atrophy or fasciculations present  [x] Palate midline, hearing to finger rub normal  [x] Shoulder shrug and SCM testing normal  COMMENTS:   Motor   [x] 5/5 strength x 4 extremities  [x] Normal bulk and tone  [x] No tremor present  [x] No rigidity or bradykinesia noted  COMMENTS:   Sensory  [x] Sensation intact to light touch, pin prick, vibration, and proprioception BLE  [] Sensation intact to light touch, pin prick, vibration, and proprioception BUE  COMMENTS:   Coordination [x] FTN normal bilaterally   [] HTS normal bilaterally  [] HARMAN normal.   COMMENTS:   Reflexes  [x] Symmetric and non-pathological  [x] Toes downgoing bilaterally  [x] No clonus present  COMMENTS:   Gait                  [] Normal steady gait    [] Ataxic    [] Spastic     [] Magnetic     [] Shuffling  [x] Not assessed  COMMENTS:       LABS/IMAGING:    As below and per HPI    CT HEAD WO CONTRAST    Result Date: 7/27/2022  <Addendum Signed by Raina Perez MD at 27-Jul-2022 12:15:21 [phone] Findings were communicated to Maame  on 7/27/22 at 12:00 pm who confirms having received the report and  Dr. Karen Duggan is aware of the STROKE findings. No further action required.  If the referring clinician has any further questions, please call customer service [phone], option 1. STROKE documentation completed. RM Addendum End> The Exam: CT OF THE BRAIN WITHOUT CONTRAST Clinical data: Right arm numb and weak. Technique: Contiguous axial images are obtained from the skull base to vertex without intravenous contrast. Reformatted/MPR images were performed. Radiation dose: CTDIvol = 98.3 mGy, DLP = 1494 mGy x cm. Prior studies: None available. Findings: Minor patchy hypoattenuation in the periventricular white matter, nonspecific but compatible with chronic microvascular ischemic changes. No evidence of acute cortical infarction, hemorrhage, mass or mass effect. No hydrocephalus or abnormal extra-axial fluid collections are present. The posterior fossa is unremarkable. The skull base and calvarium are intact. The included portions of the paranasal sinuses and mastoid air cells are clear. 1. Minor chronic microvascular ischemic changes. 2. No evidence of acute cortical infarction or intracranial hemorrhage. Recommendation: Follow up as clinically indicated. All CT scans at this facility utilize dose modulation, iterative reconstruction, and/or weight based dosing when appropriate to reduce radiation dose to as low as reasonably achievable. Amended by Mancil Siemens MD at 27-Jul-2022 12:15:21 PM Electronically Signed by Mancil Siemens MD at 27-Jul-2022 12:00:56 PM             XR CHEST PORTABLE    Result Date: 7/27/2022  Exam: X-RAY OF [facility] Clinical data:Code stroke. Technique:Single view of the chest. Prior studies: Radiograph of the chest dated 10/29/21 images. Findings: The lungs are grossly clear; noevidence of acute infiltrate or pleural effusion. Cardiac silhouette is within normal limits. No acute osseous abnormality is detected. There is modest DJD of both AC joints. Short tubular structure noted in the left supraclavicular fossa, possibly calcified. This could be a vascular structure. Modest DJD of both AC joints.  Short tubular structure in the left supraclavicular fossa, possibly calcified. Recommendation: Follow up as clinically indicated. Electronically Signed by Wyona Eisenmenger MD at 27-Jul-2022 12:43:44 PM             CTA HEAD NECK W CONTRAST    Result Date: 7/27/2022  CTA BRAIN AND NECK Exam: CTA OF THEINTRACRANIAL ARTERIES (COW) Clinical data:Right arm numbness and weakness. Technique: Axial CT angiography of the intracranial arteries within the brain was performed with administration of intravenous contrast. Coronal, sagittal, and 3D volume reconstructions of theintracranial arteries were performed. Reformatted/3D-MPR images were performed. Radiation Dose: CTDIvol =98.3 mGy, DLP =1494 mGy x cm. Prior studies: CT scan of the brain done on same day. Findings: No definite abnormality seen on 3D reformatted images. Anterior cerebral arteries demonstrate enhance normally. Anterior communicating artery is opacified. Middle Cerebral arteries are unremarkable. Posterior communicating arteries are opacified. Posterior cerebral arteries are intact. Developmental atresia of both P1 segments. Vertebral arteries are visualized. Basilar artery is unremarkable. Intracranial internal carotid arteries demonstrate normal enhancement. No evidence of aneurysm (greater than 4 mm) orarteriovenous lesion. Bony structures: No acute or destructive abnormality. No acute intracranial arterial abnormalities. Recommendation: Follow up as clinically indicated. All CT scans at this facility utilize dose modulation, iterative reconstruction, and/or weight based dosing when appropriate to reduce radiation dose to as low as reasonably achievable. Exam: CTA OF THE CAROTID ARTERIES Clinical data:Right arm numbness and weakness. Technique: Axial CT angiography of the carotid arteries within the neck was performed with the administration of intravenous contrast for evaluation of the carotid bifurcation. Oral contrast was not utilized.  Coronal, sagittal, and 3D volume reconstructions of the carotid arteries were performed. Reformatted/3D-MPR images were performed. NASCET Criteria was used in evaluating the study. Radiation Dose: CTDIvol =98.3 mGy, DLP =1494 mGy x cm. Prior studies: CT scan of the brain done on same day. Findings: No definite abnormality seen on 3D reformatted images. CCA, Carotid Bulb, ICA, and origin of the ECA are well opacified. Vertebral arteries are well opacified. Jugular veins are well opacified Calcified plaque involving the aortic arch, portions of the great vessels and both carotid arteries. Included lung apices are grossly unremarkable. Bony structures: No acute or destructive abnormality. Mild-moderate lower cervical spondylosis with subtle C6-7 listhesis and convex left curvature. Postinflammatory right palatine tonsillar calcification. IMPRESSION: No evidence of acute vascular abnormality. No occlusion or significant stenosis. Calcified plaque involving the aortic arch, portions of the great vessels and both carotid arteries. Other nonacute findings above. Recommendation: Follow up as clinically indicated. All CT scans at this facility utilize dose modulation, iterative reconstruction, and/or weight based dosing when appropriate to reduce radiation dose to as low as reasonably achievable. Electronically Signed by Raysa Lainez MD at 27-Jul-2022 12:54:54 PM             MRI brain without contrast    Result Date: 7/27/2022  Exam: MRI OF THE BRAIN WITHOUTINTRAVENOUS CONTRAST Clinical data:Right arm weakness, numbness. Technique: Multiplanar multi-sequence MRI of the brain without intravenous gadolinium. Diffusion-weighted imaging is submitted. Prior studies: CTA of the brain and CTA of the neck done on same day. CT scan of the brain done on same day. Findings: Mild to moderate periventricular and deep white matter chronic small vessel changes. No evidence of acute cortical infarction, hemorrhage, mass or mass effect is seen.  The ventricular system is normal in size, shape, and contour. No hydrocephalus or abnormal extra-axial fluid collections are present. The marrow signal within the skull base and calvarium is intact. The paranasal sinuses and mastoid air cells are clear. DWI/ADC: No evidence of restricted diffusion. 1.  No acute intracranial abnormality. 2.  Mild to moderate periventricular and deep white matter chronic small vessel changes. Recommendation: Follow up as clinically indicated. Electronically Signed by Gini Pizarro MD at 27-Jul-2022 04:17:59 PM               Recent Labs     07/27/22  1029   WBC 5.4   HGB 14.7        Recent Labs     07/27/22  1029      K 4.2      CO2 26   BUN 21   CREATININE 0.8   GLUCOSE 185*     Recent Labs     07/27/22  1029   AST 29   ALT 30   BILITOT 0.6   ALKPHOS 102       Recent Labs     07/27/22  1029   INR 0.99         ASSESSMENT:  68 y.o. admitted with transient right upper extremity numbness and weakness. MRI negative for an acute stroke. CTA head and neck largely negative as well. Suspect TIA. PLAN:  ECHO, follow tele, discharge with edda  Continue ASA, discussed adding plavix but patient has had bleeding complications on this previously, statin, blood glucose and risk factor maximization. PT, OT, ST  DVT proph      Please feel free to call with any questions. [phone] (cell phone).     Masoud Fletcher DO  Board Certified Neurology

## 2022-07-27 NOTE — ED NOTES
Report called to Deonna Smith on 5th floor. Pt transferred to MRI via w/c. Transport has been notified and will take pt to admission room when mri is completed.       Grant Cortez RN  07/27/22 7874

## 2022-07-27 NOTE — ED NOTES
Report given and care transferred to [facility] - YOMI KENDALL  07/27/22 5215

## 2022-07-28 VITALS
BODY MASS INDEX: 24.46 KG/M2 | TEMPERATURE: 97.5 F | HEART RATE: 68 BPM | OXYGEN SATURATION: 93 % | RESPIRATION RATE: 18 BRPM | WEIGHT: 147 LBS | DIASTOLIC BLOOD PRESSURE: 74 MMHG | SYSTOLIC BLOOD PRESSURE: 141 MMHG

## 2022-07-28 PROBLEM — G45.9 TIA (TRANSIENT ISCHEMIC ATTACK): Status: ACTIVE | Noted: 2022-07-28

## 2022-07-28 LAB
ANION GAP SERPL CALCULATED.3IONS-SCNC: 10 MMOL/L (ref 7–19)
BUN BLDV-MCNC: 21 MG/DL (ref 8–23)
CALCIUM SERPL-MCNC: 9.3 MG/DL (ref 8.8–10.2)
CHLORIDE BLD-SCNC: 105 MMOL/L (ref 98–111)
CHOLESTEROL, TOTAL: 124 MG/DL (ref 160–199)
CO2: 26 MMOL/L (ref 22–29)
CREAT SERPL-MCNC: 0.9 MG/DL (ref 0.5–0.9)
GFR AFRICAN AMERICAN: >59
GFR NON-AFRICAN AMERICAN: >60
GLUCOSE BLD-MCNC: 124 MG/DL (ref 74–109)
GLUCOSE BLD-MCNC: 133 MG/DL (ref 70–99)
HBA1C MFR BLD: 7.1 % (ref 4–6)
HCT VFR BLD CALC: 39.2 % (ref 37–47)
HDLC SERPL-MCNC: 27 MG/DL (ref 65–121)
HEMOGLOBIN: 13.4 G/DL (ref 12–16)
LDL CHOLESTEROL CALCULATED: 52 MG/DL
MCH RBC QN AUTO: 30.3 PG (ref 27–31)
MCHC RBC AUTO-ENTMCNC: 34.2 G/DL (ref 33–37)
MCV RBC AUTO: 88.7 FL (ref 81–99)
PDW BLD-RTO: 12 % (ref 11.5–14.5)
PERFORMED ON: ABNORMAL
PLATELET # BLD: 177 K/UL (ref 130–400)
PMV BLD AUTO: 9.4 FL (ref 9.4–12.3)
POTASSIUM SERPL-SCNC: 4.3 MMOL/L (ref 3.5–5)
RBC # BLD: 4.42 M/UL (ref 4.2–5.4)
SODIUM BLD-SCNC: 141 MMOL/L (ref 136–145)
TRIGL SERPL-MCNC: 227 MG/DL (ref 0–149)
WBC # BLD: 5.3 K/UL (ref 4.8–10.8)

## 2022-07-28 PROCEDURE — 93246 EXT ECG>7D<15D RECORDING: CPT

## 2022-07-28 PROCEDURE — 36415 COLL VENOUS BLD VENIPUNCTURE: CPT

## 2022-07-28 PROCEDURE — 80048 BASIC METABOLIC PNL TOTAL CA: CPT

## 2022-07-28 PROCEDURE — 97165 OT EVAL LOW COMPLEX 30 MIN: CPT

## 2022-07-28 PROCEDURE — 6370000000 HC RX 637 (ALT 250 FOR IP): Performed by: NURSE PRACTITIONER

## 2022-07-28 PROCEDURE — 85027 COMPLETE CBC AUTOMATED: CPT

## 2022-07-28 PROCEDURE — 82947 ASSAY GLUCOSE BLOOD QUANT: CPT

## 2022-07-28 PROCEDURE — 97161 PT EVAL LOW COMPLEX 20 MIN: CPT

## 2022-07-28 PROCEDURE — 99214 OFFICE O/P EST MOD 30 MIN: CPT | Performed by: PSYCHIATRY & NEUROLOGY

## 2022-07-28 PROCEDURE — 83036 HEMOGLOBIN GLYCOSYLATED A1C: CPT

## 2022-07-28 PROCEDURE — 80061 LIPID PANEL: CPT

## 2022-07-28 PROCEDURE — G0378 HOSPITAL OBSERVATION PER HR: HCPCS

## 2022-07-28 RX ADMIN — ASPIRIN 81 MG: 81 TABLET, COATED ORAL at 08:39

## 2022-07-28 RX ADMIN — Medication 2 TABLET: at 08:39

## 2022-07-28 RX ADMIN — ATORVASTATIN CALCIUM 80 MG: 80 TABLET, FILM COATED ORAL at 08:39

## 2022-07-28 RX ADMIN — ALOGLIPTIN 12.5 MG: 6.25 TABLET, FILM COATED ORAL at 08:39

## 2022-07-28 RX ADMIN — EZETIMIBE 10 MG: 10 TABLET ORAL at 08:40

## 2022-07-28 RX ADMIN — CYANOCOBALAMIN TAB 500 MCG 1000 MCG: 500 TAB at 08:39

## 2022-07-28 RX ADMIN — PANTOPRAZOLE SODIUM 40 MG: 40 TABLET, DELAYED RELEASE ORAL at 06:22

## 2022-07-28 RX ADMIN — POLYETHYLENE GLYCOL 3350 17 G: 17 POWDER, FOR SOLUTION ORAL at 08:39

## 2022-07-28 NOTE — PROGRESS NOTES
Discharge teaching done with patient and family at discharge. Discussed follow up appointments and medications. No questions or concerns. IV removed and waiting on transport.     Electronically signed by Severa Saba, RN on 7/28/2022 at 11:12 AM

## 2022-07-28 NOTE — CARE COORDINATION
Date / Time of Evaluation:   7/28/2022    12:14 PM  Assessment Completed by:   Sophie Betts RN, BSN      Patient Name:   Starla Garrido  MRN:   [id number]  YOB: 1945    Patient Admission Status:   Observation    Patient Contact Information:    [facility] [address]  [address] [address]  [phone] (home)   Telephone Information:   Mobile [phone]     Above information verified? [x]   Yes  []   No    (Best Practice:   Have patient/caregiver verify above address and phone number by stating out loud their current address and reachable phone number. Initial Assessment Completed at bedside with:      [x]   Patient  []   Family/Caregiver/Guardian   []   Other:      Current PCP:    Starla Meneses MD    PCP verified? [x]   Yes  []   No    Emergency Contacts:    Extended Emergency Contact Information  Primary Emergency Contact: Avelino CAMARGO  Address: [address] Phone: [phone]  Relation: Spouse    Advance Directives:    Does Ms. Zamora have an advance directive in her electronic medical record? []   Yes  [x]   No    Code Status:   Full Code      Have you been vaccinated for COVID-19 (SARS-CoV-2)? [x]   Yes  []   No                   If so, when?     Which :         []   Pf[employer]  []   Chidi Bran  []   Luz Elena Fresh  []   Other:       Financial:    Payor: Rhys Elliott / Plan: [facility] MEDICARE COMPLETE / Product Type: *No Product type* /     Pre-Cert required for SNF:     [x]   Yes  []   No    Have Long Term Care Insurance:      []   Yes  [x]   No    Pharmacy:    [facility] [address]  [address] [place] [address]  Phone: [phone] Fax: [phone] 214 [address] [phone] - F [phone]  [address] [place] [address]  Phone: [phone] Fax: [phone]    Potential assistance purchasing medications? []   Yes  [x]   No    ADLS:       Support System:   (P) Spouse/Significant Other, Family Members    Currently ACTIVE with Home Health CARE:      []   Yes  [x]   No    Home Health Care Agency:  [facility] Provider:   [facility]    Transition Plan:  Home with spouse    Transportation PLAN for Discharge:  Spouse    Patient Deficits:    [x]   Yes   []   No    If yes:    []   Confusion/Memory  []   Visual  [x]   Motor/Sensory         [x]   Right arm         []   Right leg         []   Left arm         []   Left leg  []   Language/Speech         []   Aphasia         []   Dysarthria         []   Swallow    NIH Stroke Scale  Interval: Reassessment  Level of Consciousness (1a): Alert  LOC Questions (1b): Answers both correctly  LOC Commands (1c): Performs both tasks correctly  Best Gaze (2): Normal  Visual (3): No visual loss  Facial Palsy (4): Normal symmetrical movement  Motor Arm, Left (5a): No drift  Motor Arm, Right (5b): No drift  Motor Leg, Left (6a): No drift  Motor Leg, Right (6b): No drift  Limb Ataxia (7): Absent  Sensory (8): Normal  Best Language (9): No aphasia  Dysarthria (10): Normal  Extinction and Inattention (11): No abnormality  Total: 0    [place] Coma Scale  Eye Opening: Spontaneous  Best Verbal Response: Oriented  Best Motor Response: Obeys commands  [place] Coma Scale Score: 13    Swallow Screening  Is the patient unable to remain alert for testing?: No  Is the patient on a modified diet (thickened liquids) due to pre-existing dysphagia?: No  Is there presence of existing enteral tube feeding via the stomach or nose?: No  Is there presence of head-of-bed restrictions (less than 30 degrees)?: No  Is there presence of tracheotomy tube?: No  Is the patient ordered nothing-by-mouth status?: No  3 oz Water Swallow Screen: Pass    Patient Deficit Notes:     Symptoms have resolved    Additional CM/SW Notes:    Patient lives with spouse.  She states that she does not need DME or home healthcare upon discharge. No needs identified. [x]   Stroke education booklet reviewed and given to patient/family/caregiver/guardian. All questions answered all questions answered appropriately and efficiently per family. Given By Patient's nurse       07/28/22 1213   Service Assessment   Patient Orientation Alert and Oriented   Cognition Alert   History Provided By Patient   Primary Caregiver Spouse   Support Systems Spouse/Significant Other;Family Members   Patient's Healthcare Decision Maker is: Legal Next of Kin   PCP Verified by CM Yes   Last Visit to PCP Within last 3 months   Prior Functional Level Independent in ADLs/IADLs   Current Functional Level Independent in ADLs/IADLs   Can patient return to prior living arrangement Yes   Ability to make needs known: Good   Family able to assist with home care needs: Yes   Financial Resources Medicare   Social/Functional History   Lives With Spouse   ADL Assistance Independent   Homemaking Assistance Independent   Ambulation Assistance Independent   Transfer Assistance Independent   Active  Yes   Mode of Transportation Car   Discharge Planning   Type of Residence House   Living Arrangements Spouse/Significant Other   Current Services Prior To Admission None   Potential Assistance Needed N/A   DME Ordered? No   Potential Assistance Purchasing Medications No   Type of Home Care Services None   Patient expects to be discharged to: House   One/Two Story Residence One story   History of falls?  0     Electronically signed by Ozzie Izquierdo RN, BSN on 7/28/2022 at 12:17 PM

## 2022-07-28 NOTE — DISCHARGE SUMMARY
Doyne Holiday  :  1945  MRN:  [id number]    Admit date:  2022  Discharge date:  2022    Discharging Physician:  Dr. Ela Ordonez Directive: Full Code    Consults: Mckenzie Mtz     Primary Care Physician:  Delicia Thorpe MD    Discharge Diagnoses:  Principal Problem:    TIA (transient ischemic attack)  Active Problems:    Right arm weakness    Elevated cholesterol    Intrinsic asthma with acute exacerbation    Type 2 diabetes mellitus without complication, without long-term current use of insulin ([facility] [address]  Resolved Problems:    * No resolved hospital problems. *      Portions of this note have been copied forward, however, changed to reflect the most current clinical status of this patient. Hospital Course: The patient is a 68 y.o. female with a PMH of asthma, DM, and HLD who presented to [facility] ED on 2022 complaining of right arm numbness. She stated that she began to notice her arm numbness around 8:30 AM.  She stated that her hand was numb and she was unable to move it. She denied any headaches, syncope, injuries or numbness of her leg or face. Her symptoms had significantly improved within an hour, however, at assessment in the ED she continued to have some decrease sensation to her right hand. She denied any recent illnesses including shortness of breath, fevers, decreased appetite, nausea, vomiting or cough. She does report that she has had multiple previous TIAs that involve confusion approximately 10 years ago. Further ED work-up revealed a chemistry within normal limits other than hyperglycemia at 185. Negative troponin. CBC within normal limits. She was found to be hypertensive upon arrival with a BP of 165/100. CTA of the brain no acute evidence of vascular abnormality. CT of the head showed moderate chronic microvascular ischemic changes. Chest x-ray showed modest DJD of both AC joints however no acute cardiopulmonary concerns.   She was admitted to hospital medicine for right-sided numbness and strokelike symptoms and neurology consultation. Neurology recommends MRI, echo, CTA and maximized prevention and Zio patch upon d/c. She is unable to take Plavix due to previous spontaneous arterial bleeding within her nose. She underwent an MRI of the brain with showed no acute abnormality with mild to moderate periventricular and deep white matter chronic small vessel changes. CTA of the head and neck  with contrast showed no acute abnormalities. 2D echo revealed mild MR,no evidence of intraatrial communication with normal EF and mild DD. Her BP has returned to [facility] and her labs have remained stable. Her right arm numbness and weakness has resolved completely at this time. She is medically and clinically stable for discharge. She is to follow-up with her PCP within 1 week of discharge. She will be discharged home in stable condition. Significant Diagnostic Studies:   Echo Complete    Result Date: 7/28/2022  Transthoracic Echocardiography Report (TTE)  Demographics   Patient Name Tammi Tejeda  Date of Study         07/27/2022               F   MRN          [id number]            Gender                Female   Date of      1945        Room Number           MHL-0523  Birth   Age          68 year(s)   Height:      65 inches         Referring Physician   Dilcia Sorto   Weight:      147 pounds        Sonographer           Tommie Dorsey [facility]   BSA:         1.74 m^2          Interpreting          Irish Flores MD                                 Physician   BMI:         24.46 kg/m^2  Procedure Type of Study   TTE procedure:ECHO NO CONTRAST WITH DOP/COLR. Study Location: Echo Lab Technical Quality: Adequate visualization Patient Status: Inpatient Contrast Medium: Bubble Study. Rhythm: Within normal limits Indications:Stroke. Conclusions   Summary  Mitral valve leaflets are mildly thickened with preserved leaflet  mobility.   Mild mitral regurgitation is present. Mildly thickened aortic valve leaflets with preserved leaflet mobility. Tricuspid valve is structurally normal.  Normal size left atrium. Normal intact intra-atrial septum was noted with no evidence of  significant intra-atrial communications by color flow Doppler or by  agitated saline study. Normal left ventricular size with preserved LV function and an estimated  ejection fraction of approximately 55-60%. Normal left ventricular wall thickness. No regional wall motion abnormalities. Impaired relaxation compatible with diastolic dysfunction. ( reversed E/A  ratio)   Signature   ----------------------------------------------------------------  Electronically signed by Tami Abraham MD(Interpreting  physician) on 07/28/2022 07:26 AM  ----------------------------------------------------------------   Findings   Mitral Valve  Mitral valve leaflets are mildly thickened with preserved leaflet  mobility. Mild mitral regurgitation is present. Aortic Valve  Mildly thickened aortic valve leaflets with preserved leaflet mobility. Tricuspid Valve  Tricuspid valve is structurally normal.   Pulmonic Valve  The pulmonic valve was not well visualized . Left Atrium  Normal size left atrium. Normal intact intra-atrial septum was noted with no evidence of  significant intra-atrial communications by color flow Doppler or by  agitated saline study. Left Ventricle  Normal left ventricular size with preserved LV function and an estimated  ejection fraction of approximately 55-60%. Normal left ventricular wall thickness. No regional wall motion abnormalities. Impaired relaxation compatible with diastolic dysfunction. ( reversed E/A  ratio)   Right Atrium  Normal right atrial dimension with no evidence of thrombus or mass noted. Right Ventricle  Normal right ventricular size with preserved RV function. Pericardial Effusion  No evidence of significant pericardial effusion is noted. Pleural Effusion  No evidence of pleural effusion. 2D Measurements and Calculations(cm)   LVIDd: 4.79 cm                      LVIDs: 3.42 cm  IVSd: 0.85 cm  LVPWd: 0.92 cm                      AO Root Dimension: 1.9 cm  Rt. Vent. Dimension: 3.2 cm         LA Dimension: 3.5 cm  % Ejection Fraction: 60 %           LA Area: 19.4 cm^2  LA Volume: 57.3 ml                  LV Systolic dimension: 9.72BG  LA Volume Index: 33 ml/m^2          LV PW diastolic: 9.66PS  LV dimension: 4.79 cm               LVOT diameter: 2 cm                                      RV Diastolic dimension: 7.6XY  LVEDV: 55.1 ml                      LVEDVI: 32 ml/m^2  LVESV:23.8 ml                       LVESVI: 14 ml/m^2  Ascending Aorta: 2.8 cm  Doppler Measurements and Calculations   AV Peak Velocity:148 cm/s               MV Peak E-Wave: 61.2 cm/s  AV Peak Gradient: 8.76 mmHg             MV Peak A-Wave: 123 cm/s                                          MV E/A Ratio: 0.5 %                                          MV Mean velocity: NaNcm/s                                          MV Peak Gradient: 1.5 mmHg   MV E' septal velocity: 6.2cm/s  MV E' lateral velocity:7.29 cm/s      CT HEAD WO CONTRAST    Result Date: 7/27/2022  <Addendum Signed by Vicente Schofield MD at 27-Jul-2022 12:15:21 [phone] Findings were communicated to [facility]  on 7/27/22 at 12:00 pm who confirms having received the report and  Dr. Yefri Araya is aware of the STROKE findings. No further action required. If the referring clinician has any further questions, please call customer service [phone], option 1. STROKE documentation completed. RM Addendum End> The Exam: CT OF THE BRAIN WITHOUT CONTRAST Clinical data: Right arm numb and weak. Technique: Contiguous axial images are obtained from the skull base to vertex without intravenous contrast. Reformatted/MPR images were performed. Radiation dose: CTDIvol = 98.3 mGy, DLP = 1494 mGy x cm.  Prior studies: None available. Findings: Minor patchy hypoattenuation in the periventricular white matter, nonspecific but compatible with chronic microvascular ischemic changes. No evidence of acute cortical infarction, hemorrhage, mass or mass effect. No hydrocephalus or abnormal extra-axial fluid collections are present. The posterior fossa is unremarkable. The skull base and calvarium are intact. The included portions of the paranasal sinuses and mastoid air cells are clear. 1. Minor chronic microvascular ischemic changes. 2. No evidence of acute cortical infarction or intracranial hemorrhage. Recommendation: Follow up as clinically indicated. All CT scans at this facility utilize dose modulation, iterative reconstruction, and/or weight based dosing when appropriate to reduce radiation dose to as low as reasonably achievable. Amended by Celia Oro MD at 27-Jul-2022 12:15:21 PM Electronically Signed by Celia Oro MD at 27-Jul-2022 12:00:56 PM             XR CHEST PORTABLE    Result Date: 7/27/2022  Exam: X-RAY OF [facility] Clinical data:Code stroke. Technique:Single view of the chest. Prior studies: Radiograph of the chest dated 10/29/21 images. Findings: The lungs are grossly clear; noevidence of acute infiltrate or pleural effusion. Cardiac silhouette is within normal limits. No acute osseous abnormality is detected. There is modest DJD of both AC joints. Short tubular structure noted in the left supraclavicular fossa, possibly calcified. This could be a vascular structure. Modest DJD of both AC joints. Short tubular structure in the left supraclavicular fossa, possibly calcified. Recommendation: Follow up as clinically indicated. Electronically Signed by Ramiro Redding MD at 27-Jul-2022 12:43:44 PM             CTA HEAD NECK W CONTRAST    Result Date: 7/27/2022  CTA BRAIN AND NECK Exam: CTA OF THEINTRACRANIAL ARTERIES (COW) Clinical data:Right arm numbness and weakness.  Technique: Axial CT angiography of the intracranial arteries within the brain was performed with administration of intravenous contrast. Coronal, sagittal, and 3D volume reconstructions of theintracranial arteries were performed. Reformatted/3D-MPR images were performed. Radiation Dose: CTDIvol =98.3 mGy, DLP =1494 mGy x cm. Prior studies: CT scan of the brain done on same day. Findings: No definite abnormality seen on 3D reformatted images. Anterior cerebral arteries demonstrate enhance normally. Anterior communicating artery is opacified. Middle Cerebral arteries are unremarkable. Posterior communicating arteries are opacified. Posterior cerebral arteries are intact. Developmental atresia of both P1 segments. Vertebral arteries are visualized. Basilar artery is unremarkable. Intracranial internal carotid arteries demonstrate normal enhancement. No evidence of aneurysm (greater than 4 mm) orarteriovenous lesion. Bony structures: No acute or destructive abnormality. No acute intracranial arterial abnormalities. Recommendation: Follow up as clinically indicated. All CT scans at this facility utilize dose modulation, iterative reconstruction, and/or weight based dosing when appropriate to reduce radiation dose to as low as reasonably achievable. Exam: CTA OF THE CAROTID ARTERIES Clinical data:Right arm numbness and weakness. Technique: Axial CT angiography of the carotid arteries within the neck was performed with the administration of intravenous contrast for evaluation of the carotid bifurcation. Oral contrast was not utilized. Coronal, sagittal, and 3D volume reconstructions of the carotid arteries were performed. Reformatted/3D-MPR images were performed. NASCET Criteria was used in evaluating the study. Radiation Dose: CTDIvol =98.3 mGy, DLP =1494 mGy x cm. Prior studies: CT scan of the brain done on same day. Findings: No definite abnormality seen on 3D reformatted images. CCA, Carotid Bulb, ICA, and origin of the ECA are well opacified.  Vertebral arteries are well opacified. Jugular veins are well opacified Calcified plaque involving the aortic arch, portions of the great vessels and both carotid arteries. Included lung apices are grossly unremarkable. Bony structures: No acute or destructive abnormality. Mild-moderate lower cervical spondylosis with subtle C6-7 listhesis and convex left curvature. Postinflammatory right palatine tonsillar calcification. IMPRESSION: No evidence of acute vascular abnormality. No occlusion or significant stenosis. Calcified plaque involving the aortic arch, portions of the great vessels and both carotid arteries. Other nonacute findings above. Recommendation: Follow up as clinically indicated. All CT scans at this facility utilize dose modulation, iterative reconstruction, and/or weight based dosing when appropriate to reduce radiation dose to as low as reasonably achievable. Electronically Signed by Ariana Marshall MD at 27-Jul-2022 12:54:54 PM             MRI brain without contrast    Result Date: 7/27/2022  Exam: MRI OF THE BRAIN WITHOUTINTRAVENOUS CONTRAST Clinical data:Right arm weakness, numbness. Technique: Multiplanar multi-sequence MRI of the brain without intravenous gadolinium. Diffusion-weighted imaging is submitted. Prior studies: CTA of the brain and CTA of the neck done on same day. CT scan of the brain done on same day. Findings: Mild to moderate periventricular and deep white matter chronic small vessel changes. No evidence of acute cortical infarction, hemorrhage, mass or mass effect is seen. The ventricular system is normal in size, shape, and contour. No hydrocephalus or abnormal extra-axial fluid collections are present. The marrow signal within the skull base and calvarium is intact. The paranasal sinuses and mastoid air cells are clear. DWI/ADC: No evidence of restricted diffusion. 1.  No acute intracranial abnormality.  2.  Mild to moderate periventricular and deep white matter chronic small vessel changes. Recommendation: Follow up as clinically indicated. Electronically Signed by Maurilio Ring MD at 27-Jul-2022 04:17:59 PM               Pertinent Labs:   CBC:   Recent Labs     07/27/22  1029 07/28/22  0349   WBC 5.4 5.3   HGB 14.7 13.4    177     BMP:    Recent Labs     07/27/22  1029 07/28/22  0349    141   K 4.2 4.3    105   CO2 26 26   BUN 21 21   CREATININE 0.8 0.9   GLUCOSE 185* 124*     INR:   Recent Labs     07/27/22  1029   INR 0.99       Physical Exam:  Vital Signs: BP (!) 141/74   Pulse 68   Temp 97.5 °F (36.4 °C) (Temporal)   Resp 18   Wt 147 lb (66.7 kg)   SpO2 93%   BMI 24.46 kg/m²   Physical Exam  Vitals and nursing note reviewed. Constitutional:       General: She is not in acute distress. Appearance: Normal appearance. She is not toxic-appearing or diaphoretic. HENT:      Head: Normocephalic and atraumatic. Right Ear: External ear normal.      Left Ear: External ear normal.      Nose: Nose normal. No congestion or rhinorrhea. Mouth/Throat:      Mouth: Mucous membranes are moist.      Pharynx: Oropharynx is clear. Eyes:      General: No scleral icterus. Extraocular Movements: Extraocular movements intact. Conjunctiva/sclera: Conjunctivae normal.   Cardiovascular:      Rate and Rhythm: Normal rate and regular rhythm. Pulses: Normal pulses. Heart sounds: Normal heart sounds. No murmur heard. No friction rub. No gallop. Pulmonary:      Effort: Pulmonary effort is normal. No respiratory distress. Breath sounds: Normal breath sounds. No wheezing, rhonchi or rales. Abdominal:      General: Abdomen is flat. Bowel sounds are normal. There is no distension. Palpations: Abdomen is soft. Tenderness: There is no abdominal tenderness. Musculoskeletal:         General: No swelling. Normal range of motion. Cervical back: Normal range of motion and neck supple. Right lower leg: No edema.       Left lower leg: No edema. Skin:     General: Skin is warm and dry. Coloration: Skin is not jaundiced. Findings: No erythema, lesion or rash. Neurological:      General: No focal deficit present. Mental Status: She is alert and oriented to person, place, and time. Mental status is at baseline. Cranial Nerves: No cranial nerve deficit. Sensory: No sensory deficit. Motor: No weakness. Psychiatric:         Mood and Affect: Mood normal.         Behavior: Behavior normal.         Thought Content: Thought content normal.         Judgment: Judgment normal.       Discharge Medications:         Medication List        CONTINUE taking these medications      aspirin 81 MG EC tablet     atorvastatin 80 MG tablet  Commonly known as: LIPITOR  TAKE 1 TABLET DAILY     Biotin 1000 MCG Tabs     blood glucose test strips strip  Commonly known as: [employer] AUTODISC VI;ONE TOUCH ULTRA TEST VI  1 each by In Vitro route daily As needed. CALCIUM 1000 + D PO     diphenhydrAMINE 25 MG capsule  Commonly known as: BENADRYL     esomeprazole 40 MG delayed release capsule  Commonly known as: NEXIUM  TAKE 1 CAPSULE EVERY MORNING BEFORE BREAKFAST     ezetimibe 10 MG tablet  Commonly known as: ZETIA  TAKE 1 TABLET DAILY     glipiZIDE 5 MG tablet  Commonly known as: GLUCOTROL  Take 1 tablet by mouth 2 times daily     ibuprofen 400 MG tablet  Commonly known as: ADVIL;MOTRIN     lidocaine-prilocaine 2.5-2.5 % cream  Commonly known as: EMLA  Apply topically to back and knee as needed.      nitrofurantoin (macrocrystal-monohydrate) 100 MG capsule  Commonly known as: MACROBID  Take 1 tablet by mouth twice weekly     polyethylene glycol 17 g Pack packet  Commonly known as: MIRALAX     SITagliptin 50 MG tablet  Commonly known as: Januvia  Take 1 tablet by mouth 2 times daily     Trulicity 3 Hungarian/5.8JA Sopn  Generic drug: Dulaglutide  Inject 3 mg into the skin once a week     vitamin B-12 1000 MCG tablet  Commonly known as: CYANOCOBALAMIN     vitamin D 1.25 MG (29197 UT) Caps capsule  Commonly known as: ERGOCALCIFEROL  TAKE 1 CAPSULE BY MOUTH EVERY 14 DAYS            STOP taking these medications      albuterol (2.5 MG/3ML) 0.083% nebulizer solution  Commonly known as: PROVENTIL     tiotropium 18 MCG inhalation capsule  Commonly known as: SPIRIVA              Discharge Instructions: Follow up with Magaly Sky MD as scheduled within 1 week of hospital discharge. Take medications as directed. Resume activity as tolerated. Diet: ADULT DIET; Regular; 4 carb choices (60 gm/meal)     Disposition: Patient is Stableand will be discharged to Home. Time spent on discharge 37 minutes spent in assessing patient, reviewing medications, discussion with nursing, confirming safe discharge plan and preparation of discharge summary.     Signed:  LETY Mullen, 7/28/2022 10:38 AM

## 2022-07-28 NOTE — PROGRESS NOTES
[facility] Neurology Progress Note      Patient:   Kimmy Neil  MR#:    [id number]   Room:    [facility]/096-   YOB: 1945  Date of Progress Note: 7/28/2022  Time of Note                           11:43 AM  Consulting Physician:  Adela Buerger, DO    INTERVAL HISTORY:  Remains much improved, back to baseline. REVIEW OF SYSTEMS:  Constitutional: No fevers No chills  Neck:No stiffness  Respiratory: No shortness of breath  Cardiovascular: No chest pain No palpitations  Gastrointestinal: No abdominal pain    Genitourinary: No Dysuria  Neurological: No headache, no confusion    PHYSICAL EXAM:    Constitutional -   BP (!) 141/74   Pulse 68   Temp 97.5 °F (36.4 °C) (Temporal)   Resp 18   Wt 147 lb (66.7 kg)   SpO2 93%   BMI 24.46 kg/m²   General appearance: No acute distress  EYES -  Conjunctiva normal  Pupillary exam as below, see CN exam in the neurologic exam  ENT-    No scars, masses, or lesions over external nose or ears  Oropharynx without erythema, palate midline  Cardiovascular -   No clubbing, cyanosis, or edema  Pulmonary-   Good expansion, normal effort without use of accessory muscles  Musculoskeletal -   No significant wasting of muscles noted  Gait as below, see gait exam in the neurologic exam  Muscle strength, tone, stability as below see the motor exam in the neurologic exam.  No bony deformities  Skin -   Warm, dry, and intact to inspection and palpation. No rash, erythema, or pallor  Psychiatric -   Mood, affect, and behavior appear normal    Memory as below see mental status examination in the neurologic exam        NEUROLOGICAL EXAM     Mental status    [x] Awake, alert, oriented  [x] Affect attention and concentration appear appropriate  [x] Recent and remote memory appears unremarkable  [x] Speech normal without dysarthria or aphasia, comprehension and repetition intact.   COMMENTS:   Cranial Nerves [x] No VF deficit to confrontation  [x] PERRLA, EOMI, no nystagmus, conjugate eye movements, no ptosis  [x] Face symmetric  [x] Facial sensation intact  [x] Tongue midline no atrophy or fasciculations present  [x] Palate midline, hearing to finger rub normal  [x] Shoulder shrug and SCM testing normal  COMMENTS:   Motor   [x] 5/5 strength x 4 extremities  [x] Normal bulk and tone  [x] No tremor present  [x] No rigidity or bradykinesia noted  COMMENTS:   Sensory [x] Sensation intact to light touch, pin prick, vibration, and proprioception BLE  [] Sensation intact to light touch, pin prick, vibration, and proprioception BUE  COMMENTS:   Coordination [x] FTN normal bilaterally  [] HTS normal bilaterally  [] HARMAN normal.  COMMENTS:   Reflexes [x] Symmetric and non-pathological  [x] Toes downgoing bilaterally  [x] No clonus present  COMMENTS:   Gait                  [] Normal steady gait    [] Ataxic    [] Spastic     [] Magnetic     [] Shuffling  [x] Not assessed  COMMENTS:        LABS/IMAGING:    Echo Complete    Result Date: 7/28/2022  Transthoracic Echocardiography Report (TTE)  Demographics   Patient Name Nichole Lipoma  Date of Study         07/27/2022               F   MRN          [id number]            Gender                Female   Date of      1945        Room Number           [facility]-0523  Birth   Age          68 year(s)   Height:      65 inches         Referring Physician   Huber Kaur   Weight:      147 pounds        Sonographer           Ankita Orozco RDCS   BSA:         1.74 m^2          Interpreting          Katrina Levy MD                                 Physician   BMI:         24.46 kg/m^2  Procedure Type of Study   TTE procedure:ECHO NO CONTRAST WITH DOP/COLR. Study Location: Echo Lab Technical Quality: Adequate visualization Patient Status: Inpatient Contrast Medium: Bubble Study. Rhythm: Within normal limits Indications:Stroke. Conclusions   Summary  Mitral valve leaflets are mildly thickened with preserved leaflet  mobility.   Mild mitral regurgitation is present. Mildly thickened aortic valve leaflets with preserved leaflet mobility. Tricuspid valve is structurally normal.  Normal size left atrium. Normal intact intra-atrial septum was noted with no evidence of  significant intra-atrial communications by color flow Doppler or by  agitated saline study. Normal left ventricular size with preserved LV function and an estimated  ejection fraction of approximately 55-60%. Normal left ventricular wall thickness. No regional wall motion abnormalities. Impaired relaxation compatible with diastolic dysfunction. ( reversed E/A  ratio)   Signature   ----------------------------------------------------------------  Electronically signed by Casandra Chery MD(Interpreting  physician) on 07/28/2022 07:26 AM  ----------------------------------------------------------------   Findings   Mitral Valve  Mitral valve leaflets are mildly thickened with preserved leaflet  mobility. Mild mitral regurgitation is present. Aortic Valve  Mildly thickened aortic valve leaflets with preserved leaflet mobility. Tricuspid Valve  Tricuspid valve is structurally normal.   Pulmonic Valve  The pulmonic valve was not well visualized . Left Atrium  Normal size left atrium. Normal intact intra-atrial septum was noted with no evidence of  significant intra-atrial communications by color flow Doppler or by  agitated saline study. Left Ventricle  Normal left ventricular size with preserved LV function and an estimated  ejection fraction of approximately 55-60%. Normal left ventricular wall thickness. No regional wall motion abnormalities. Impaired relaxation compatible with diastolic dysfunction. ( reversed E/A  ratio)   Right Atrium  Normal right atrial dimension with no evidence of thrombus or mass noted. Right Ventricle  Normal right ventricular size with preserved RV function. Pericardial Effusion  No evidence of significant pericardial effusion is noted.    Pleural Effusion  No evidence of pleural effusion. 2D Measurements and Calculations(cm)   LVIDd: 4.79 cm                      LVIDs: 3.42 cm  IVSd: 0.85 cm  LVPWd: 0.92 cm                      AO Root Dimension: 1.9 cm  Rt. Vent. Dimension: 3.2 cm         LA Dimension: 3.5 cm  % Ejection Fraction: 60 %           LA Area: 19.4 cm^2  LA Volume: 57.3 ml                  LV Systolic dimension: 3.98GL  LA Volume Index: 33 ml/m^2          LV PW diastolic: 4.18YG  LV dimension: 4.79 cm               LVOT diameter: 2 cm                                      RV Diastolic dimension: 0.6WV  LVEDV: 55.1 ml                      LVEDVI: 32 ml/m^2  LVESV:23.8 ml                       LVESVI: 14 ml/m^2  Ascending Aorta: 2.8 cm  Doppler Measurements and Calculations   AV Peak Velocity:148 cm/s               MV Peak E-Wave: 61.2 cm/s  AV Peak Gradient: 8.76 mmHg             MV Peak A-Wave: 123 cm/s                                          MV E/A Ratio: 0.5 %                                          MV Mean velocity: NaNcm/s                                          MV Peak Gradient: 1.5 mmHg   MV E' septal velocity: 6.2cm/s  MV E' lateral velocity:7.29 cm/s      CT HEAD WO CONTRAST    Result Date: 7/27/2022  <Addendum Signed by Verito Henry MD at 27-Jul-2022 12:15:21 [phone] Findings were communicated to [facility]  on 7/27/22 at 12:00 pm who confirms having received the report and  Dr. Desirae Taylor is aware of the STROKE findings. No further action required. If the referring clinician has any further questions, please call customer service [phone], option 1. STROKE documentation completed. RM Addendum End> The Exam: CT OF THE BRAIN WITHOUT CONTRAST Clinical data: Right arm numb and weak. Technique: Contiguous axial images are obtained from the skull base to vertex without intravenous contrast. Reformatted/MPR images were performed. Radiation dose: CTDIvol = 98.3 mGy, DLP = 1494 mGy x cm. Prior studies: None available.  Findings: Minor the brain was performed with administration of intravenous contrast. Coronal, sagittal, and 3D volume reconstructions of theintracranial arteries were performed. Reformatted/3D-MPR images were performed. Radiation Dose: CTDIvol =98.3 mGy, DLP =1494 mGy x cm. Prior studies: CT scan of the brain done on same day. Findings: No definite abnormality seen on 3D reformatted images. Anterior cerebral arteries demonstrate enhance normally. Anterior communicating artery is opacified. Middle Cerebral arteries are unremarkable. Posterior communicating arteries are opacified. Posterior cerebral arteries are intact. Developmental atresia of both P1 segments. Vertebral arteries are visualized. Basilar artery is unremarkable. Intracranial internal carotid arteries demonstrate normal enhancement. No evidence of aneurysm (greater than 4 mm) orarteriovenous lesion. Bony structures: No acute or destructive abnormality. No acute intracranial arterial abnormalities. Recommendation: Follow up as clinically indicated. All CT scans at this facility utilize dose modulation, iterative reconstruction, and/or weight based dosing when appropriate to reduce radiation dose to as low as reasonably achievable. Exam: CTA OF THE CAROTID ARTERIES Clinical data:Right arm numbness and weakness. Technique: Axial CT angiography of the carotid arteries within the neck was performed with the administration of intravenous contrast for evaluation of the carotid bifurcation. Oral contrast was not utilized. Coronal, sagittal, and 3D volume reconstructions of the carotid arteries were performed. Reformatted/3D-MPR images were performed. NASCET Criteria was used in evaluating the study. Radiation Dose: CTDIvol =98.3 mGy, DLP =1494 mGy x cm. Prior studies: CT scan of the brain done on same day. Findings: No definite abnormality seen on 3D reformatted images. CCA, Carotid Bulb, ICA, and origin of the ECA are well opacified. Vertebral arteries are well opacified. Jugular veins are well opacified Calcified plaque involving the aortic arch, portions of the great vessels and both carotid arteries. Included lung apices are grossly unremarkable. Bony structures: No acute or destructive abnormality. Mild-moderate lower cervical spondylosis with subtle C6-7 listhesis and convex left curvature. Postinflammatory right palatine tonsillar calcification. IMPRESSION: No evidence of acute vascular abnormality. No occlusion or significant stenosis. Calcified plaque involving the aortic arch, portions of the great vessels and both carotid arteries. Other nonacute findings above. Recommendation: Follow up as clinically indicated. All CT scans at this facility utilize dose modulation, iterative reconstruction, and/or weight based dosing when appropriate to reduce radiation dose to as low as reasonably achievable. Electronically Signed by Francis Márquez MD at 27-Jul-2022 12:54:54 PM             MRI brain without contrast    Result Date: 7/27/2022  Exam: MRI OF THE BRAIN WITHOUTINTRAVENOUS CONTRAST Clinical data:Right arm weakness, numbness. Technique: Multiplanar multi-sequence MRI of the brain without intravenous gadolinium. Diffusion-weighted imaging is submitted. Prior studies: CTA of the brain and CTA of the neck done on same day. CT scan of the brain done on same day. Findings: Mild to moderate periventricular and deep white matter chronic small vessel changes. No evidence of acute cortical infarction, hemorrhage, mass or mass effect is seen. The ventricular system is normal in size, shape, and contour. No hydrocephalus or abnormal extra-axial fluid collections are present. The marrow signal within the skull base and calvarium is intact. The paranasal sinuses and mastoid air cells are clear. DWI/ADC: No evidence of restricted diffusion. 1.  No acute intracranial abnormality. 2.  Mild to moderate periventricular and deep white matter chronic small vessel changes. Recommendation: Follow up as clinically indicated. Electronically Signed by Josefa Kingston MD at 27-Jul-2022 04:17:59 PM               Lab Results   Component Value Date    WBC 5.3 07/28/2022    HGB 13.4 07/28/2022    HCT 39.2 07/28/2022    MCV 88.7 07/28/2022     07/28/2022     Lab Results   Component Value Date     07/28/2022    K 4.3 07/28/2022     07/28/2022    CO2 26 07/28/2022    BUN 21 07/28/2022    CREATININE 0.9 07/28/2022    GLUCOSE 124 (H) 07/28/2022    CALCIUM 9.3 07/28/2022    PROT 7.3 07/27/2022    LABALBU 4.7 07/27/2022    BILITOT 0.6 07/27/2022    ALKPHOS 102 07/27/2022    AST 29 07/27/2022    ALT 30 07/27/2022    LABGLOM >60 07/28/2022    GFRAA >59 07/28/2022     Lab Results   Component Value Date    INR 0.99 07/27/2022    INR 1.01 07/19/2012    PROTIME 13.0 07/27/2022    PROTIME 12.9 07/19/2012       RECORD REVIEW:   Previous medical records, medications were reviewed at today's visit. Nursing/physician notes, imaging, labs and vitals reviewed. PT,OT and/or speech notes reviewed      ASSESSMENT:  68 y.o. admitted with transient right upper extremity numbness and weakness. MRI negative for an acute stroke. CTA head and neck largely negative as well. ECHO negative. Suspect TIA. PLAN:  Follow tele, discharge with zio  Continue ASA, discussed adding plavix but patient has had bleeding complications on this previously, statin, blood glucose and risk factor maximization. PT, OT, ST  DVT proph  Home today    Please feel free to call with any questions. [phone] (cell phone).       Gisela Han DO  Board Certified Neurology

## 2022-07-28 NOTE — DISCHARGE INSTR - DIET
Good nutrition is important when healing from an illness, injury, or surgery. Follow any nutrition recommendations given to you during your hospital stay. If you were given an oral nutrition supplement while in the hospital, continue to take this supplement at home. You can take it with meals, in-between meals, and/or before bedtime. These supplements can be purchased at most local grocery stores, pharmacies, and chain [employer]. If you have any questions about your diet or nutrition, call the hospital and ask for the dietitian.     Regular 4 carb choices per meal

## 2022-07-28 NOTE — DISCHARGE INSTRUCTIONS
Keep follow up appointments  Take medications as directed  Seek medical assistance for recurrent or worsening symptoms

## 2022-07-28 NOTE — PROGRESS NOTES
Occupational Therapy Initial Assessment  Date: 2022   Patient Name: Starla Garrido  MRN: [id number]     : 1945    Date of Service: 2022    Discharge Recommendations:  24 hour supervision or assist (home initially for monitoring of new symptoms; likely PRN assist)  OT Equipment Recommendations  Equipment Needed: No    Assessment   Assessment: OT evaluation completed. Pt does not display any deficits that would warrant further OT services in this setting. Pt reports return to baseline with RUE. OT does not anticipate any environmental barriers to D/C home once medically cleared if 24/7 supervision is initially available for safety. No Skilled OT: At baseline function; Safe to return home; Independent with ADL's;Independent with functional mobility  REQUIRES OT FOLLOW-UP: No  Activity Tolerance  Activity Tolerance: Patient Tolerated treatment well              Patient Diagnosis(es): The primary encounter diagnosis was Right arm numbness. A diagnosis of Right arm weakness was also pertinent to this visit. Past Medical History:   Past Medical History:   Diagnosis Date    Asthma     Diabetes mellitus (HCC)     GERD (gastroesophageal reflux disease)     Helicobacter Pylori (H. Pylori) Infection     Hyperlipidemia         Past Surgical History:   Past Surgical History:   Procedure Laterality Date    CHOLECYSTECTOMY      COLONOSCOPY  2017    nanda    HYSTERECTOMY (CERVIX STATUS UNKNOWN)      ALEX BSO    KNEE GANGLION SURGERY      NECK SURGERY      Tumor removal    UPPER GASTROINTESTINAL ENDOSCOPY  ?     Harry              Restrictions  Restrictions/Precautions  Restrictions/Precautions: General Precautions    Subjective   General  Chart Reviewed: Yes  Patient assessed for rehabilitation services?: Yes  Additional Pertinent Hx: TIA; GERD; H. pylori infection; cyst removal from neck  Family / Caregiver Present: No  Diagnosis: Right arm weakness (c/o tingling and heaviness upon admission)  Pre Treatment Pain Screening  Pain at present: 0  Scale Used: Numeric Score  Intervention List: Patient able to continue with treatment  Vital Signs  Temp: 97.5 °F (36.4 °C)  Temp Source: Temporal  Heart Rate: 68  Heart Rate Source: Monitor  Resp: 18  BP: (!) 141/74  BP Location: Left upper arm  MAP (Calculated): 96.33  Patient Position: Supine  Oxygen Therapy  SpO2: 93 %  O2 Device: None (Room air)    Social/Functional History  Social/Functional History  Lives With: Spouse  Has the patient had two or more falls in the past year or any fall with injury in the past year?: No  ADL Assistance: Independent  Homemaking Assistance: Independent  Ambulation Assistance: Independent  Transfer Assistance: Independent       Objective      Vision Exceptions: Wears glasses at all times  Hearing: Within functional limits          Toilet Transfers  Toilet - Technique: Ambulating  Equipment Used: Raised toilet seat with rails  Toilet Transfer: Independent  ADL  Feeding: Independent  Grooming: Independent  UE Bathing: Independent  LE Bathing: Independent  UE Dressing: Independent  LE Dressing: Independent  Toileting: Independent        Bed mobility  Supine to Sit: Independent  Sit to Supine: Independent  Transfers  Stand Step Transfers: Independent  Sit to stand: Independent  Stand to sit: Independent  Transfer Comments: no DME; supervision given in this setting but pt is independent     Cognition  Overall Cognitive Status: WNL               Gross Assessment  AROM: Within functional limits  Strength:  Within functional limits (equal bilaterally)  Sensation: Intact (pt reports return to baseline)                       Plan   Plan  Plan Comment: N/A; eval only    Goals  Short Term Goals  Short Term Goal 1: N/A; eval only  Long Term Goals  Long Term Goal 1: N/A; eval only               JESSICA Montero/L  Electronically signed by Brittny GALAN on 7/28/2022 at 8:40 AM.

## 2022-07-28 NOTE — PROGRESS NOTES
[address] PHYSICAL THERAPY EVALUATION      Lizzy Rojas    : 1945  MRN: [id number]   PHYSICIAN:  Nelson Hess MD  Primary Problem    Patient Active Problem List   Diagnosis    Nausea    Gastroesophageal reflux disease without esophagitis    History of Helicobacter pylori infection    History of colon polyps    Hiatal hernia    Diverticulosis    Type 2 diabetes mellitus without complication, without long-term current use of insulin (HCC)    RLS (restless legs syndrome)    Vitamin D deficiency    Osteopenia of left hip    Acute sinusitis    Numbness and tingling of right arm    Right arm weakness    Elevated cholesterol    Intrinsic asthma with acute exacerbation    TIA (transient ischemic attack)       Rehabilitation Diagnosis:     Right arm numbness [R20.0]  Right arm weakness [R29.898]       SERVICE DATE: 2022        SUBJECTIVE: Patient agrees that they are safe with mobility and do not require physical therapy services. Pt reports RUE symptoms have resolved. Pain: No complaint of pain    OBJECTIVE:    Orientation is Within normal limits           ACTIVE ROM:       All major lower extremity joints are within functional limits      STRENGTH     Both lower extremities are grossly within functional limits. TRANSFERS   Sit to stand   Independent      Bed to chair   Independent     Bed to mobility   Supine to sit   Independent       Roll     Independent     Scoot Side to side / Up and down   Independent    AMBULATION   Distance: Functional distances     Device: NONE     Assistance: Independent       Comment: no deviations    BALANCE   Sitting    Good     Standing    Good    ASSESSMENT      Independent and safe with all functional mobility. No skilled physical therapy needs identified at this time. PLAN: Discharge from skilled physical therapy. Nursing has been updated.       GOALS   Independent and safe with all functional mobility (MET)            Electronically signed by Rachele Alatorre PT on 7/28/2022 at 8:37 AM

## 2022-07-29 ENCOUNTER — CARE COORDINATION (OUTPATIENT)
Dept: CASE MANAGEMENT | Age: 77
End: 2022-07-29

## 2022-07-29 ENCOUNTER — TELEPHONE (OUTPATIENT)
Dept: INTERNAL MEDICINE | Age: 77
End: 2022-07-29

## 2022-07-29 NOTE — TELEPHONE ENCOUNTER
[facility] Trans[facility] Initial Follow Up Call    Outreach made within 2 business days of discharge: Yes    Patient: Luna Rowley   Patient : 1945 MRN: [id number]      Reason for Admission: right arm numbness    Discharge Date: 22      Discharge Diagnoses:  Principal Problem:    TIA (transient ischemic attack)  Active Problems:    Right arm weakness    Elevated cholesterol    Intrinsic asthma with acute exacerbation    Type 2 diabetes mellitus without complication, without long-term current use of insulin ([facility])  Resolved Problems:    * No resolved hospital problems     Spoke with: Patient    Discharge department/facility: [address] Interactive Patient Contact:  Was patient able to fill all prescriptions: Yes  Was patient instructed to bring all medications to the follow-up visit: Yes  Is patient taking all medications as directed in the discharge summary? Yes  Does patient understand their discharge instructions: Yes  Does patient have questions or concerns that need addressed prior to 7-14 day follow up office visit: no    Spoke to the patient she has not had anymore  numbness, she was told she did not need physical therapy. She was not given any new medication.  She is scheduled for 22 and will let us know if she needs anything before that apt    Scheduled appointment with PCP within 7-14 days    Follow Up  Future Appointments   Date Time Provider Kat Obando   2022  8:45 AM MD BRANDON [place]   8/3/2022 10:20 AM SCHEDULE, L MED ONC MA L MED ONC Julia MINA   8/3/2022 10:45 AM Amy Feliberto Gitelman, APRN [address] Saranya Zhang

## 2022-07-29 NOTE — CARE COORDINATION
Juan 45 Transitions Initial Follow Up Call    Call within 2 business days of discharge: Yes    Patient: Lilian Franklin Patient : 1945   MRN: [id number]  Reason for Admission: TIA  Discharge Date: 22 RARS: No data recorded    Last Discharge [facility]       Date Complaint Diagnosis Description Type Department Provider    22 Arm Problem Right arm numbness . .. ED to Hosp-Admission (Discharged) (ADMITTED) [facility]G Beth Leyva MD; Sadie Barthel. .. Spoke with: [facility]: [address]    Non-face-to-face services provided:  Reviewed encounter information for continuity of care prior to follow up Care Transitions Coordination phone call - chart notes, consults, progress notes, test results, med list, appointments, AVS, other information. Care Transitions 24 Hour Call    Schedule Follow Up Appointment with PCP: Completed  Do you have a copy of your discharge instructions?: Yes  Do you have all of your prescriptions and are they filled?: Yes  Have you been contacted by a [employer] [address]?: No  Have you scheduled your follow up appointment?: Yes  How are you going to get to your appointment?: Car - family or friend to transport  Do you feel like you have everything you need to keep you well at home?: Yes  Care Transitions Interventions       Placed a call to the number listed for patient for an initial follow up call for Care Transitions Coordination following discharge from the hospital.  Introduced myself and explained the purpose of my call as well as verifying name, date of birth of patient. Spoke with patient regarding hospitalization, discharge and status thus far. She reported that she is doing quite well. She denied any abnormal symptoms, any residual effects, etc.  She said she has all of her meds and is taking them as ordered, no new meds at discharge. She did not want to do a med review.   She is aware of stopping Albuterol and Spiriva. She said she is aware of her hospital follow up appointment and has transportation. She said she is eating well, drinking well. She is tolerating activity well. She said she is doing fine. Her answers were very brief. She is not aware of any other issues or needs. Does not see any need for further calls. Reminded of the purpose of Care Transitions Coordination calls. Given the opportunity to ask questions and answered appropriately. Ensured to have CTN contact information to be used as needed. Encouraged to call for new/ongoing issues, questions, concerns, etc if CTN can be of assistance. Encouraged to make contact with PCP as indicated for any further needs as well. No further scheduled calls needed at this time. Transitions of Care Initial Call    Was this an external facility discharge? No   Challenges to be reviewed by the provider   Additional needs identified to be addressed with provider: No  none       Method of communication with provider : none    Advance Care Planning:   Does patient have an Advance Directive: not on file; education provided. Advance Care Planning   Healthcare Decision Maker:    Primary Decision Maker: Ronnell Funes spouse - [phone]    Care Transition Nurse contacted the patient by telephone to perform post hospital discharge assessment. Verified name and  with patient as identifiers. Provided introduction to self, and explanation of the CTN role. CTN reviewed discharge instructions, medical action plan and red flags with patient who verbalized understanding. Patient given an opportunity to ask questions and does not have any further questions or concerns at this time. Were discharge instructions available to patient? Yes. Reviewed appropriate site of care based on symptoms and resources available to patient including: PCP  Urgent care clinics  [facility]   When to call [address]    The patient agrees to contact the PCP

## 2022-08-01 NOTE — PROGRESS NOTES
Initial Consult Note      Pt Name: Cuate Mccollum  YOB: 1945  MRN: [id number]    Date of evaluation: 8/3/2022  History Obtained From:  patient, electronic medical record    CHIEF COMPLAINT:    Chief Complaint   Patient presents with    New Patient     Evaluate and treat for Hx of blood clots      HISTORY OF PRESENT ILLNESS:    Cuate Mccollum is a 68 y.o.  female who is initial hematology consultation for prothrombotic work-up related to a history of provoked left lower extremity DVT (following scope of left knee approximately in ), \" blood clots in my arms after IV insertion\" and TIAs with most recent event on 2022, referred by Dr Tati Vásquez for further evaluation, anticipating left total left knee replacement on 2022. Aster Bradshaw denied any pulmonary emboli or other known thrombotic events. She reported that with her first TIA in  which was related to her hypertension and stress that she was placed on Plavix and continued for 12 years until she experienced a spontaneous arterial bleeding within her nose that required cauterization. She reported routinely taking aspirin 81 mg daily and denied any other spontaneous bleeding events. Aster Bradshaw was admitted to [facility] on 2022 to 2022 with transient ischemic attack-right sided weakness. She was discharged home with a Zio patch, unfortunately she only was able to wear this for approximately 2 days to a severe skin reaction to the adhesion tape. 2022 MRI brain without contrast- No acute intracranial abnormality. Mild to moderate periventricular and deep white matter chronic small vessel changes.     HEMATOLOGIC HISTORY:   Diagnosis:  Provoked left lower extremity DVT, following scope of left knee approximately in   TIAs  Arterial nose bleed while on Plavix    Treatment summary:  Work-up in progress    Age-appropriate health screenin2017 Endoscopy [facility])- benign gastric hyperplastic polyp  06/16/2017 Colonoscopy ([facility])- normal with 10 year recall  02/23/2022 Bilateral mammogram [facility])- no mammographic evidence of malignancy    Past Medical History:    Past Medical History:   Diagnosis Date    Asthma     Diabetes mellitus ([address]     GERD (gastroesophageal reflux disease)     Helicobacter Pylori (H. Pylori) Infection     Hyperlipidemia        Past Surgical History:    Past Surgical History:   Procedure Laterality Date    CHOLECYSTECTOMY      COLONOSCOPY  2017    nanda    HYSTERECTOMY (CERVIX STATUS UNKNOWN)      ALEX BSO    KNEE GANGLION SURGERY      NECK SURGERY      Tumor removal    [facility]       Current Medications:    Current Outpatient Medications   Medication Sig Dispense Refill    empagliflozin (JARDIANCE) 10 MG tablet Take 10 mg by mouth in the morning. Samples given to patient. predniSONE (DELTASONE) 10 MG tablet Take 1 tablet by mouth in the morning and 1 tablet before bedtime. Do all this for 3 days. 6 tablet 0    diphenhydrAMINE (BENADRYL) 25 MG capsule Take 25 mg by mouth nightly as needed for Sleep      ibuprofen (ADVIL;MOTRIN) 400 MG tablet Take 400 mg by mouth every 6 hours as needed for Pain      vitamin D (ERGOCALCIFEROL) 1.25 MG (43049 UT) CAPS capsule TAKE 1 CAPSULE BY MOUTH EVERY 14 DAYS 6 capsule 3    esomeprazole (NEXIUM) 40 MG delayed release capsule TAKE 1 CAPSULE EVERY MORNING BEFORE BREAKFAST 90 capsule 3    lidocaine-prilocaine (EMLA) 2.5-2.5 % cream Apply topically to back and knee as needed.  30 g 2    atorvastatin (LIPITOR) 80 MG tablet TAKE 1 TABLET DAILY 90 tablet 3    Dulaglutide (TRULICITY) 3 WM/0.5XQ SOPN Inject 3 mg into the skin once a week 12 pen 2    ezetimibe (ZETIA) 10 MG tablet TAKE 1 TABLET DAILY 90 tablet 3    glipiZIDE (GLUCOTROL) 5 MG tablet Take 1 tablet by mouth 2 times daily 180 tablet 1    blood glucose test strips (ASCENSIA AUTODISC VI;ONE TOUCH ULTRA TEST VI) strip 1 each by In Vitro route daily As needed. 100 each 3    nitrofurantoin, macrocrystal-monohydrate, (MACROBID) 100 MG capsule Take 1 tablet by mouth twice weekly 24 capsule 2    vitamin B-12 (CYANOCOBALAMIN) 1000 MCG tablet Take 1,000 mcg by mouth daily      Calcium Carb-Cholecalciferol (CALCIUM 1000 + D PO) Take by mouth      Biotin 1000 MCG TABS Take 1,000 Units by mouth      polyethylene glycol (MIRALAX) 17 g PACK packet Take 17 g by mouth daily      aspirin 81 MG EC tablet Take 81 mg by mouth daily. No current facility-administered medications for this visit. Allergies: Allergies   Allergen Reactions    Amoxicillin-Pot Clavulanate     Ciprofloxacin     Codeine     Sulfa Antibiotics        Social History:    Social History     Tobacco Use    Smoking status: Never    Smokeless tobacco: Never   Vaping Use    Vaping Use: Never used   Substance Use Topics    Alcohol use: No    Drug use: No       Family History:   Family History   Problem Relation Age of Onset    Stroke Mother     Diabetes Mother     Liver Cancer Father     Kidney Cancer Father     Breast Cancer Sister     Liver Cancer Maternal Grandfather     Colon Cancer Neg Hx     Colon Polyps Neg Hx        Vitals:  Vitals:    08/03/22 1039   BP: 110/72   Pulse: 79   SpO2: 95%   Weight: 146 lb 4.8 oz (66.4 kg)   Height: 5' 5\" (1.651 m)        Subjective   REVIEW OF SYSTEMS:   Review of Systems   Constitutional: Negative. Negative for chills, diaphoresis and fever. HENT: Negative. Negative for congestion, ear pain, hearing loss, nosebleeds, sore throat and tinnitus. Eyes: Negative. Negative for pain, discharge and redness. Respiratory: Negative. Negative for cough, shortness of breath and wheezing. Cardiovascular: Negative. Negative for chest pain, palpitations and leg swelling. Gastrointestinal: Negative. Negative for abdominal pain, blood in stool, constipation, diarrhea, nausea and vomiting. Endocrine: Negative for polydipsia.    Genitourinary:  Negative for dysuria, flank pain, frequency, hematuria and urgency. Musculoskeletal: Negative. Negative for back pain, myalgias and neck pain. Skin: Negative. Negative for rash. Neurological: Negative. Negative for dizziness, tremors, seizures, weakness and headaches. Hematological:  Does not bruise/bleed easily. Psychiatric/Behavioral: Negative. The patient is not nervous/anxious. Objective   PHYSICAL EXAM:  Physical Exam  Vitals reviewed. Constitutional:       General: She is not in acute distress. Appearance: She is well-developed. HENT:      Head: Normocephalic and atraumatic. Mouth/Throat:      Pharynx: Uvula midline. Tonsils: No tonsillar exudate. Eyes:      General: Lids are normal.      Conjunctiva/sclera: Conjunctivae normal.      Pupils: Pupils are equal, round, and reactive to light. Neck:      Thyroid: No thyroid mass or thyromegaly. Vascular: No JVD. Trachea: Trachea normal. No tracheal deviation. Cardiovascular:      Rate and Rhythm: Normal rate and regular rhythm. Pulses: Normal pulses. Heart sounds: Normal heart sounds. Pulmonary:      Effort: Pulmonary effort is normal. No respiratory distress. Breath sounds: Normal breath sounds. No wheezing or rales. Chest:      Chest wall: No tenderness. Abdominal:      General: Bowel sounds are normal. There is no distension. Palpations: Abdomen is soft. There is no mass. Tenderness: There is no abdominal tenderness. There is no guarding. Musculoskeletal:         General: No tenderness or deformity. Cervical back: Normal range of motion and neck supple. Comments: Range of motion within normal limits x4 extremities   Skin:     General: Skin is warm. Findings: No bruising, erythema or rash. Neurological:      Mental Status: She is alert and oriented to person, place, and time. Cranial Nerves: No cranial nerve deficit.       Coordination: Coordination normal. Psychiatric:         Behavior: Behavior normal.         Thought Content: Thought content normal.       Labs reviewed today:  Lab Results   Component Value Date    WBC 5.51 08/03/2022    HGB 14.1 08/03/2022    HCT 41.6 08/03/2022    MCV 89.8 08/03/2022     (L) 08/03/2022     Lab Results   Component Value Date    NEUTROABS 3.78 08/03/2022       ASSESSMENT/PLAN:      1. History of provoked DVT of lower extremity following scope of left knee approximately in 1987, reported blood clots in arms after IV placement and TIAs with most recent event on 7/27/2022. No history of pulmonary emboli or other thrombotic events. She reported routinely taking aspirin 81 mg daily and denied any other spontaneous bleeding events. Denied any excessive bruising. Prothrombotic work-up to evaluate for underlying clotting deficiency:  -Antiphospholipid syndrome panel  - Thrombotic Risk Reflexive Panel; Future  - Protime-INR; Future  - APTT; Future        2. History of TIA (transient ischemic attack), first TIA in 2000 with reported 3 occurrences within the 1 year, reports related to  hypertension and stress. She was placed on Plavix and continued for 12 years until she experienced a spontaneous arterial bleed within her nose that required cauterization. Most recently on 7/27/2022 requiring hospitalization after experiencing right-sided weakness. Work-up included a negative MRI for acute stroke, CTA of the head negative and echocardiogram negative    Today asymptomatic denying any symptoms. Taking aspirin 81 mg daily as recommended by Dr. Dona Michelle with continuing aspirin 81 mg daily    3. Chronic left knee pain  Anticipating left total left knee replacement on 09/26/2022 by Dr. Ellie Myers. I discussed all of the above findings included in the assessment and plan with the patient and the patient is in agreement to move forward with current recommendations/treatment.   I have addressed all of their questions and concerns that were verbalized. FOLLOW UP:  Follow-up appointment made for 4 weeks, or sooner, if needed  Continue to follow with other medical providers as recommended    EMR Dragon/Transcription disclaimer:   Much of this encounter note is an electronic transcription/translation of spoken language to printed text. The electronic translation of spoken language may permit erroneous, or at times, nonsensical words or phrases to be inadvertently transcribed; although attempts have made to review the note for such errors, some may still exist.  Please excuse any unrecognized transcription errors and contact us if the air is unintelligible or needs documented correction. Also, portions of this note have been copied forward, however, changed to reflect the most current clinical status of this patient. Electronically signed by LETY Hall on 8/3/2022 at 6:16 PM   Masoud HIGHTOWER am pre-charting as a registered nurse for LETY Chopra.

## 2022-08-02 ENCOUNTER — OFFICE VISIT (OUTPATIENT)
Dept: INTERNAL MEDICINE | Age: 77
End: 2022-08-02
Payer: MEDICARE

## 2022-08-02 VITALS
RESPIRATION RATE: 18 BRPM | SYSTOLIC BLOOD PRESSURE: 120 MMHG | OXYGEN SATURATION: 93 % | WEIGHT: 146 LBS | HEIGHT: 65 IN | BODY MASS INDEX: 24.32 KG/M2 | DIASTOLIC BLOOD PRESSURE: 78 MMHG | HEART RATE: 78 BPM

## 2022-08-02 DIAGNOSIS — R20.0 RIGHT ARM NUMBNESS: ICD-10-CM

## 2022-08-02 DIAGNOSIS — R21 SKIN RASH: ICD-10-CM

## 2022-08-02 DIAGNOSIS — E11.9 TYPE 2 DIABETES MELLITUS WITHOUT COMPLICATION, WITHOUT LONG-TERM CURRENT USE OF INSULIN (HCC): ICD-10-CM

## 2022-08-02 DIAGNOSIS — G45.9 TIA (TRANSIENT ISCHEMIC ATTACK): Primary | ICD-10-CM

## 2022-08-02 DIAGNOSIS — E78.00 PURE HYPERCHOLESTEROLEMIA: ICD-10-CM

## 2022-08-02 DIAGNOSIS — E55.9 VITAMIN D DEFICIENCY: ICD-10-CM

## 2022-08-02 PROCEDURE — 99496 TRANSJ CARE MGMT HIGH F2F 7D: CPT | Performed by: INTERNAL MEDICINE

## 2022-08-02 RX ORDER — PREDNISONE 10 MG/1
10 TABLET ORAL 2 TIMES DAILY
Qty: 6 TABLET | Refills: 0 | Status: SHIPPED | OUTPATIENT
Start: 2022-08-02 | End: 2022-08-05

## 2022-08-02 SDOH — ECONOMIC STABILITY: FOOD INSECURITY: WITHIN THE PAST 12 MONTHS, THE FOOD YOU BOUGHT JUST DIDN'T LAST AND YOU DIDN'T HAVE MONEY TO GET MORE.: NEVER TRUE

## 2022-08-02 SDOH — ECONOMIC STABILITY: FOOD INSECURITY: WITHIN THE PAST 12 MONTHS, YOU WORRIED THAT YOUR FOOD WOULD RUN OUT BEFORE YOU GOT MONEY TO BUY MORE.: NEVER TRUE

## 2022-08-02 ASSESSMENT — ENCOUNTER SYMPTOMS
SORE THROAT: 0
WHEEZING: 0
CHEST TIGHTNESS: 0
COUGH: 0
ABDOMINAL PAIN: 0
CONSTIPATION: 0

## 2022-08-02 ASSESSMENT — SOCIAL DETERMINANTS OF HEALTH (SDOH): HOW HARD IS IT FOR YOU TO PAY FOR THE VERY BASICS LIKE FOOD, HOUSING, MEDICAL CARE, AND HEATING?: NOT HARD AT ALL

## 2022-08-02 NOTE — PROGRESS NOTES
Hospital Follow-up Visit      Lo Gonzales   YOB: 1945    Date of Visit:  8/2/2022    Vitals:    08/02/22 0856   BP: 120/78   Site: Left Upper Arm   Position: Sitting   Cuff Size: Large Adult   Pulse: 78   Resp: 18   SpO2: 93%   Weight: 146 lb (66.2 kg)   Height: 5' 5\" (1.651 m)     Body mass index is 24.3 kg/m². Wt Readings from Last 3 Encounters:   08/02/22 146 lb (66.2 kg)   07/27/22 147 lb (66.7 kg)   07/27/22 147 lb (66.7 kg)     BP Readings from Last 3 Encounters:   08/02/22 120/78   07/28/22 (!) 141/74   07/27/22 132/80       Allergies   Allergen Reactions    Amoxicillin-Pot Clavulanate     Ciprofloxacin     Codeine     Sulfa Antibiotics      Outpatient Medications Marked as Taking for the 8/2/22 encounter (Office Visit) with Lavelle Cabral MD   Medication Sig Dispense Refill    diphenhydrAMINE (BENADRYL) 25 MG capsule Take 25 mg by mouth nightly as needed for Sleep      ibuprofen (ADVIL;MOTRIN) 400 MG tablet Take 400 mg by mouth every 6 hours as needed for Pain      vitamin D (ERGOCALCIFEROL) 1.25 MG (34146 UT) CAPS capsule TAKE 1 CAPSULE BY MOUTH EVERY 14 DAYS 6 capsule 3    esomeprazole (NEXIUM) 40 MG delayed release capsule TAKE 1 CAPSULE EVERY MORNING BEFORE BREAKFAST 90 capsule 3    lidocaine-prilocaine (EMLA) 2.5-2.5 % cream Apply topically to back and knee as needed. 30 g 2    atorvastatin (LIPITOR) 80 MG tablet TAKE 1 TABLET DAILY 90 tablet 3    Dulaglutide (TRULICITY) 3 EV/6.3ZY SOPN Inject 3 mg into the skin once a week 12 pen 2    ezetimibe (ZETIA) 10 MG tablet TAKE 1 TABLET DAILY 90 tablet 3    SITagliptin (JANUVIA) 50 MG tablet Take 1 tablet by mouth 2 times daily 180 tablet 1    glipiZIDE (GLUCOTROL) 5 MG tablet Take 1 tablet by mouth 2 times daily 180 tablet 1    blood glucose test strips (ASCENSIA AUTODISC VI;ONE TOUCH ULTRA TEST VI) strip 1 each by In Vitro route daily As needed.  100 each 3    nitrofurantoin, macrocrystal-monohydrate, (MACROBID) 100 MG capsule Take 1 tablet by mouth twice weekly 24 capsule 2    vitamin B-12 (CYANOCOBALAMIN) 1000 MCG tablet Take 1,000 mcg by mouth daily      Calcium Carb-Cholecalciferol (CALCIUM 1000 + D PO) Take by mouth      Biotin 1000 MCG TABS Take 1,000 Units by mouth      polyethylene glycol (MIRALAX) 17 g PACK packet Take 17 g by mouth daily      aspirin 81 MG EC tablet Take 81 mg by mouth daily. CC:  Patient presents for hospital discharge follow-upafter admission     (following highlighted text has been copied from this specialist note)    Kimmy Shown                :  1945                   MRN:  [id number]     Admit date:  2022                      Discharge date:  2022     Discharging Physician:  Dr. Nakul Hernadez Directive: Full Code     Consults: Savanna Cabello      Primary Care Physician:  Faina Schrader MD     Discharge Diagnoses:  Principal Problem:    TIA (transient ischemic attack)  Active Problems:    Right arm weakness    Elevated cholesterol    Intrinsic asthma with acute exacerbation    Type 2 diabetes mellitus without complication, without long-term current use of insulin ([facility] [address]  Resolved Problems:    * No resolved hospital problems. *        Portions of this note have been copied forward, however, changed to reflect the most current clinical status of this patient. Hospital Course: The patient is a 68 y.o. female with a PMH of asthma, DM, and HLD who presented to [facility] ED on 2022 complaining of right arm numbness. She stated that she began to notice her arm numbness around 8:30 AM.  She stated that her hand was numb and she was unable to move it. She denied any headaches, syncope, injuries or numbness of her leg or face. Her symptoms had significantly improved within an hour, however, at assessment in the ED she continued to have some decrease sensation to her right hand.   She denied any recent illnesses including shortness of breath, fevers, decreased appetite, nausea, vomiting or cough. She does report that she has had multiple previous TIAs that involve confusion approximately 10 years ago. Further ED work-up revealed a chemistry within normal limits other than hyperglycemia at 185. Negative troponin. CBC within normal limits. She was found to be hypertensive upon arrival with a BP of 165/100. CTA of the brain no acute evidence of vascular abnormality. CT of the head showed moderate chronic microvascular ischemic changes. Chest x-ray showed modest DJD of both AC joints however no acute cardiopulmonary concerns. She was admitted to hospital medicine for right-sided numbness and strokelike symptoms and neurology consultation. Neurology recommends MRI, echo, CTA and maximized prevention and Zio patch upon d/c. She is unable to take Plavix due to previous spontaneous arterial bleeding within her nose. She underwent an MRI of the brain with showed no acute abnormality with mild to moderate periventricular and deep white matter chronic small vessel changes. CTA of the head and neck  with contrast showed no acute abnormalities. 2D echo revealed mild MR,no evidence of intraatrial communication with normal EF and mild DD. Her BP has returned to [facility] and her labs have remained stable. Her right arm numbness and weakness has resolved completely at this time. She is medically and clinically stable for discharge. She is to follow-up with her PCP within 1 week of discharge. She will be discharged home in stable condition. Medications are reconciled and reviewed. MRI  Impression   1. No acute intracranial abnormality. 2.  Mild to moderate periventricular and deep white matter chronic small vessel changes. Recommendation: Follow up as clinically indicated.     Electronically Signed by Geremias Schwab MD at 27-Jul-2022 04:17:59 PM                CTA head  IMPRESSION: No evidence of acute vascular abnormality. No occlusion or significant stenosis. Calcified plaque involving the aortic arch, portions of the great vessels and both carotid arteries. Other nonacute findings above. Recommendation:   Follow up as clinically indicated. All CT scans at this facility utilize dose modulation, iterative reconstruction, and/or weight based dosing when appropriate to reduce radiation dose to as low as reasonably achievable. Electronically Signed by Tenzin Kapoor MD at 27-Jul-2022 12:54:54 PM                CT head  Impression   1. Minor chronic microvascular ischemic changes. 2. No evidence of acute cortical infarction or intracranial hemorrhage. Recommendation: Follow up as clinically indicated. All CT scans at this facility utilize dose modulation, iterative reconstruction, and/or weight based dosing when appropriate to reduce radiation dose to as low as reasonably achievable. Amended by Saul Abdullahi MD at 27-Jul-2022 12:15:21 PM   Electronically Signed by Saul Abdullahi MD at 27-Jul-2022 12:00:56 PM                  Echocardiogram  Diastolic dysfunction, EF 55 to 60%    ZIo - developed rash after 36 hrs- results pending  Inpatient course: Discharge summary reviewed- see chart. Current status: stable    Review of Systems   Constitutional:  Positive for fatigue. Negative for chills and fever. HENT:  Negative for congestion, ear pain, nosebleeds, postnasal drip and sore throat. Respiratory:  Negative for cough, chest tightness and wheezing. Cardiovascular:  Negative for chest pain, palpitations and leg swelling. Gastrointestinal:  Negative for abdominal pain and constipation. Genitourinary:  Negative for dysuria and urgency. Musculoskeletal:  Positive for arthralgias. Skin:  Negative for rash. Neurological:  Negative for dizziness and headaches. Psychiatric/Behavioral: Negative.         Physical Exam  Constitutional:       Appearance: She is well-developed. HENT:      Right Ear: External ear normal.      Left Ear: External ear normal.      Mouth/Throat:      Pharynx: No oropharyngeal exudate. Eyes:      Conjunctiva/sclera: Conjunctivae normal.      Pupils: Pupils are equal, round, and reactive to light. Neck:      Thyroid: No thyromegaly. Vascular: No JVD. Cardiovascular:      Rate and Rhythm: Normal rate. Heart sounds: Normal heart sounds. No murmur heard. Pulmonary:      Effort: No respiratory distress. Breath sounds: Normal breath sounds. No wheezing or rales. Chest:      Chest wall: No tenderness. Abdominal:      General: Bowel sounds are normal.      Palpations: Abdomen is soft. Musculoskeletal:      Cervical back: Neck supple. Lymphadenopathy:      Cervical: No cervical adenopathy. Skin:     Findings: No rash. Lab Results   Component Value Date/Time     07/28/2022 03:49 AM    K 4.3 07/28/2022 03:49 AM     07/28/2022 03:49 AM    CO2 26 07/28/2022 03:49 AM    BUN 21 07/28/2022 03:49 AM    CREATININE 0.9 07/28/2022 03:49 AM    GLUCOSE 124 07/28/2022 03:49 AM    CALCIUM 9.3 07/28/2022 03:49 AM     Lab Results   Component Value Date/Time    WBC 5.3 07/28/2022 03:49 AM    WBC 5.4 07/27/2022 10:29 AM    WBC 5.4 03/24/2022 09:35 AM    HGB 13.4 07/28/2022 03:49 AM    HGB 14.7 07/27/2022 10:29 AM    HGB 14.6 03/24/2022 09:35 AM    HCT 39.2 07/28/2022 03:49 AM    HCT 43.0 07/27/2022 10:29 AM    HCT 43.8 03/24/2022 09:35 AM    MCV 88.7 07/28/2022 03:49 AM    MCV 88.5 07/27/2022 10:29 AM    MCV 88.3 03/24/2022 09:35 AM     07/28/2022 03:49 AM     07/27/2022 10:29 AM     03/24/2022 09:35 AM     Lab Results   Component Value Date/Time    CHOL 124 07/28/2022 03:49 AM    TRIG 227 07/28/2022 03:49 AM    HDL 27 07/28/2022 03:49 AM       Assessment/Plan:      TIA (transient ischemic attack)- all sx resoved within 4 hrs of start    Right arm numbness- resolved  Evaluation;  MRI  Impression   1.   No acute intracranial abnormality. 2.  Mild to moderate periventricular and deep white matter chronic small vessel changes. Recommendation: Follow up as clinically indicated. Electronically Signed by Benita Gilliam MD at 27-Jul-2022 04:17:59 PM                CTA head  IMPRESSION: No evidence of acute vascular abnormality. No occlusion or significant stenosis. Calcified plaque involving the aortic arch, portions of the great vessels and both carotid arteries. Other nonacute findings above. Recommendation:   Follow up as clinically indicated. All CT scans at this facility utilize dose modulation, iterative reconstruction, and/or weight based dosing when appropriate to reduce radiation dose to as low as reasonably achievable. Electronically Signed by Johana Pan MD at 27-Jul-2022 12:54:54 PM                CT head  Impression   1. Minor chronic microvascular ischemic changes. 2. No evidence of acute cortical infarction or intracranial hemorrhage. Recommendation: Follow up as clinically indicated. All CT scans at this facility utilize dose modulation, iterative reconstruction, and/or weight based dosing when appropriate to reduce radiation dose to as low as reasonably achievable.     Amended by Reilly Arguelles MD at 27-Jul-2022 12:15:21 PM   Electronically Signed by Reilly Arguelles MD at 27-Jul-2022 12:00:56 PM                  Echocardiogram  Diastolic dysfunction, EF 55 to 60%    ZIo - developed rash after 36 hrs- results pending    Patient is  Patient unable to take Plavix prior history of intolerance  Was unable to complete Zio patch, partial ZIO results currently pending  Still taking aspirin 325 enteric-coated daily  She will control her cholesterol, LDL well controlled during this admission and 50s  She will continue atorvastatin 80 mg p.o. daily      Skin rash at [facility] patch location ascending towards the neck  Pruritic maculopapular rash  Rx prednisone 10 twice daily x3 days, continue using topical Cortaid  If sx not improving and resolving , if sx continue or re-occur pt has been instructed to call us and / or return here for follow- up evaluation      Pure hypercholesterolemia  RX as above  Type 2 diabetes  Hyperglycemia  Significantly elevated hemoglobin A1c at 7.7 in 3/2022  Recommendations     1. Januvia 50 mg twice daily- dc  Trial jardiance 10 daily per pt request ( worry about candidal infections related to her hx0  2. Trulicity  3 mg weekly                   3.  Glyburide rx  5 mg twice daily  4. Plan close fu in 3 months     Diagnostic test results reviewed    Patient risk of morbidity and mortality: high  This is high complexity TCM visit  Patient was called within 2 business days of discharge    Family able to provide care to patient at home  No other needs including PT, OT needs detected at this time  No additional provider follow-ups needed at this time      Orders Placed This Encounter   Procedures    Hemoglobin A1C     Standing Status:   Future     Standing Expiration Date:   8/2/2023    Comprehensive Metabolic Panel     Standing Status:   Future     Standing Expiration Date:   8/2/2023    Lipid Panel     Standing Status:   Future     Standing Expiration Date:   8/2/2023     Order Specific Question:   Is Patient Fasting?/# of Hours     Answer:   fasting    Vitamin D 25 Hydroxy     Standing Status:   Future     Standing Expiration Date:   8/2/2023       No follow-ups on file.

## 2022-08-03 ENCOUNTER — OFFICE VISIT (OUTPATIENT)
Dept: HEMATOLOGY | Age: 77
End: 2022-08-03
Payer: MEDICARE

## 2022-08-03 ENCOUNTER — HOSPITAL ENCOUNTER (OUTPATIENT)
Dept: INFUSION THERAPY | Age: 77
Discharge: HOME | End: 2022-08-03
Payer: MEDICARE

## 2022-08-03 VITALS
OXYGEN SATURATION: 95 % | BODY MASS INDEX: 24.37 KG/M2 | HEIGHT: 65 IN | SYSTOLIC BLOOD PRESSURE: 110 MMHG | HEART RATE: 79 BPM | DIASTOLIC BLOOD PRESSURE: 72 MMHG | WEIGHT: 146.3 LBS

## 2022-08-03 DIAGNOSIS — G45.9 TIA (TRANSIENT ISCHEMIC ATTACK): ICD-10-CM

## 2022-08-03 DIAGNOSIS — R04.0: ICD-10-CM

## 2022-08-03 DIAGNOSIS — G89.29 CHRONIC PAIN OF LEFT KNEE: ICD-10-CM

## 2022-08-03 DIAGNOSIS — E11.9 TYPE 2 DIABETES MELLITUS WITHOUT COMPLICATION, WITHOUT LONG-TERM CURRENT USE OF INSULIN (HCC): ICD-10-CM

## 2022-08-03 DIAGNOSIS — M25.562 CHRONIC PAIN OF LEFT KNEE: ICD-10-CM

## 2022-08-03 DIAGNOSIS — Z86.718 HISTORY OF DVT OF LOWER EXTREMITY: Primary | ICD-10-CM

## 2022-08-03 DIAGNOSIS — Z86.718 HISTORY OF DVT OF LOWER EXTREMITY: ICD-10-CM

## 2022-08-03 DIAGNOSIS — E11.9 TYPE 2 DIABETES MELLITUS WITHOUT COMPLICATION, WITHOUT LONG-TERM CURRENT USE OF INSULIN (HCC): Primary | ICD-10-CM

## 2022-08-03 LAB
APTT: 27.5 SEC (ref 26–36.2)
BASOPHILS ABSOLUTE: 0.04 K/UL (ref 0.01–0.08)
BASOPHILS RELATIVE PERCENT: 0.7 % (ref 0.1–1.2)
EOSINOPHILS ABSOLUTE: 0.13 K/UL (ref 0.04–0.54)
EOSINOPHILS RELATIVE PERCENT: 2.4 % (ref 0.7–7)
HCT VFR BLD CALC: 41.6 % (ref 34.1–44.9)
HEMOGLOBIN: 14.1 G/DL (ref 11.2–15.7)
INR BLD: 1.06 (ref 0.88–1.18)
LYMPHOCYTES ABSOLUTE: 1.12 K/UL (ref 1.18–3.74)
LYMPHOCYTES RELATIVE PERCENT: 20.3 % (ref 19.3–53.1)
MCH RBC QN AUTO: 30.5 PG (ref 25.6–32.2)
MCHC RBC AUTO-ENTMCNC: 33.9 G/DL (ref 32.3–35.5)
MCV RBC AUTO: 89.8 FL (ref 79.4–94.8)
MONOCYTES ABSOLUTE: 0.42 K/UL (ref 0.24–0.82)
MONOCYTES RELATIVE PERCENT: 7.6 % (ref 4.7–12.5)
NEUTROPHILS ABSOLUTE: 3.78 K/UL (ref 1.56–6.13)
NEUTROPHILS RELATIVE PERCENT: 68.6 % (ref 34–71.1)
PDW BLD-RTO: 12.1 % (ref 11.7–14.4)
PLATELET # BLD: 176 K/UL (ref 182–369)
PMV BLD AUTO: 9.7 FL (ref 7.4–10.4)
PROTHROMBIN TIME: 13.7 SEC (ref 12–14.6)
RBC # BLD: 4.63 M/UL (ref 3.93–5.22)
WBC # BLD: 5.51 K/UL (ref 3.98–10.04)

## 2022-08-03 PROCEDURE — 1123F ACP DISCUSS/DSCN MKR DOCD: CPT | Performed by: NURSE PRACTITIONER

## 2022-08-03 PROCEDURE — 93227 XTRNL ECG REC<48 HR R&I: CPT | Performed by: NURSE PRACTITIONER

## 2022-08-03 PROCEDURE — G8427 DOCREV CUR MEDS BY ELIG CLIN: HCPCS | Performed by: NURSE PRACTITIONER

## 2022-08-03 PROCEDURE — G8420 CALC BMI NORM PARAMETERS: HCPCS | Performed by: NURSE PRACTITIONER

## 2022-08-03 PROCEDURE — 85025 COMPLETE CBC W/AUTO DIFF WBC: CPT

## 2022-08-03 PROCEDURE — G8399 PT W/DXA RESULTS DOCUMENT: HCPCS | Performed by: NURSE PRACTITIONER

## 2022-08-03 PROCEDURE — 99212 OFFICE O/P EST SF 10 MIN: CPT

## 2022-08-03 PROCEDURE — 1090F PRES/ABSN URINE INCON ASSESS: CPT | Performed by: NURSE PRACTITIONER

## 2022-08-03 PROCEDURE — 99204 OFFICE O/P NEW MOD 45 MIN: CPT | Performed by: NURSE PRACTITIONER

## 2022-08-03 PROCEDURE — 1036F TOBACCO NON-USER: CPT | Performed by: NURSE PRACTITIONER

## 2022-08-03 ASSESSMENT — PROMIS GLOBAL HEALTH SCALE
IN GENERAL, HOW WOULD YOU RATE YOUR MENTAL HEALTH, INCLUDING YOUR MOOD AND YOUR ABILITY TO THINK [ON A SCALE OF 1 (POOR) TO 5 (EXCELLENT)]?: 4
SUM OF RESPONSES TO QUESTIONS 3, 6, 7, & 8: 19
TO WHAT EXTENT ARE YOU ABLE TO CARRY OUT YOUR EVERYDAY PHYSICAL ACTIVITIES SUCH AS WALKING, CLIMBING STAIRS, CARRYING GROCERIES, OR MOVING A CHAIR [ON A SCALE OF 1 (NOT AT ALL) TO 5 (COMPLETELY)]?: 5
SUM OF RESPONSES TO QUESTIONS 2, 4, 5, & 10: 13
IN GENERAL, HOW WOULD YOU RATE YOUR PHYSICAL HEALTH [ON A SCALE OF 1 (POOR) TO 5 (EXCELLENT)]?: 4
IN THE PAST 7 DAYS, HOW WOULD YOU RATE YOUR PAIN ON AVERAGE [ON A SCALE FROM 0 (NO PAIN) TO 10 (WORST IMAGINABLE PAIN)]?: 5
IN GENERAL, HOW WOULD YOU RATE YOUR SATISFACTION WITH YOUR SOCIAL ACTIVITIES AND RELATIONSHIPS [ON A SCALE OF 1 (POOR) TO 5 (EXCELLENT)]?: 4
IN GENERAL, PLEASE RATE HOW WELL YOU CARRY OUT YOUR USUAL SOCIAL ACTIVITIES (INCLUDES ACTIVITIES AT HOME, AT WORK, AND IN YOUR COMMUNITY, AND RESPONSIBILITIES AS A PARENT, CHILD, SPOUSE, EMPLOYEE, FRIEND, ETC) [ON A SCALE OF 1 (POOR) TO 5 (EXCELLENT)]?: 4
IN THE PAST 7 DAYS, HOW OFTEN HAVE YOU BEEN BOTHERED BY EMOTIONAL PROBLEMS, SUCH AS FEELING ANXIOUS, DEPRESSED, OR IRRITABLE [ON A SCALE FROM 1 (NEVER) TO 5 (ALWAYS)]?: 1
IN THE PAST 7 DAYS, HOW WOULD YOU RATE YOUR FATIGUE ON AVERAGE [ON A SCALE FROM 1 (NONE) TO 5 (VERY SEVERE)]?: 5
IN GENERAL, WOULD YOU SAY YOUR HEALTH IS...[ON A SCALE OF 1 (POOR) TO 5 (EXCELLENT)]: 4
IN GENERAL, WOULD YOU SAY YOUR QUALITY OF LIFE IS...[ON A SCALE OF 1 (POOR) TO 5 (EXCELLENT)]: 4

## 2022-08-03 ASSESSMENT — ENCOUNTER SYMPTOMS
EYES NEGATIVE: 1
RESPIRATORY NEGATIVE: 1
VOMITING: 0
EYE PAIN: 0
GASTROINTESTINAL NEGATIVE: 1
SHORTNESS OF BREATH: 0
EYE DISCHARGE: 0
COUGH: 0
EYE REDNESS: 0
SORE THROAT: 0
BLOOD IN STOOL: 0
NAUSEA: 0
WHEEZING: 0
DIARRHEA: 0
ABDOMINAL PAIN: 0
BACK PAIN: 0
CONSTIPATION: 0

## 2022-08-10 LAB
ACTIVATED PROTEIN C RESISTANCE: 4.61
ANTICARDIOLIPIN IGG ANTIBODY: <10 GPL
ANTITHROMBIN ACTIVITY: 127 % (ref 76–128)
BETA-2 GLYCOPROTEIN 1 IGG ANTIBODY: <10 SGU
BETA-2 GLYCOPROTEIN 1 IGM ANTIBODY: 10 SMU
CARDIOLIPIN AB IGM: 12 MPL
DRVVT CONFIRMATION TEST: ABNORMAL RATIO
DRVVT SCREEN: 32 SEC (ref 33–44)
DRVVT,DIL: ABNORMAL SEC (ref 33–44)
FACTOR V LEIDEN: ABNORMAL
FACV SPECIMEN: ABNORMAL
HEXAGONAL PHOSPHOLIPID NEUTRALIZAT TEST: ABNORMAL
HOMOCYSTEINE: 8 UMOL/L (ref 0–15)
LUPUS ANTICOAG INTERP: ABNORMAL
PLT NEUTA: ABNORMAL
PROTEIN C FUNCTIONAL: 170 % (ref 83–168)
PROTEIN S ANTIGEN, FREE: 124 % (ref 55–123)
PROTHROMBIN G20210A MUTATION: NEGATIVE
PT D: 13.2 SEC (ref 12–15.5)
PT PCR SPECIMEN: ABNORMAL
PTT D: 43 SEC (ref 32–48)
PTT-D CORR REFLEX: ABNORMAL SEC (ref 32–48)
PTT-HEPARIN NEUTRALIZED: ABNORMAL SEC (ref 32–48)
REPTILASE TIME: ABNORMAL SEC
THROMBIN TIME: ABNORMAL SEC (ref 14.7–19.5)
THROMBOSIS INTERPRETATION: ABNORMAL

## 2022-08-12 PROBLEM — R20.0 RIGHT ARM NUMBNESS: Status: ACTIVE | Noted: 2022-08-12

## 2022-08-12 NOTE — PROCEDURES
[facility] Monitor Report      Joann Wbeer  [id number]    : 1945      Indications: TIA      Test Date:  2022    Analysis Date:  2022    Received and scanned date:       Nearly 1 day and 11  hours of rhythm are processed. Agree with interpretation. (Please see ZIO strips for full report)  (Copied from Dr. Tessie Sutton handwritten report on the ZIO strips).        Electronically signed by LETY Sarah on 22

## 2022-08-24 ENCOUNTER — TRANSCRIBE ORDERS (OUTPATIENT)
Dept: ADMINISTRATIVE | Facility: HOSPITAL | Age: 77
End: 2022-08-24

## 2022-08-24 ENCOUNTER — HOSPITAL ENCOUNTER (OUTPATIENT)
Dept: ULTRASOUND IMAGING | Facility: HOSPITAL | Age: 77
Discharge: HOME | End: 2022-08-24
Admitting: ORTHOPAEDIC SURGERY

## 2022-08-24 DIAGNOSIS — M79.605 PAIN IN LEFT LEG: ICD-10-CM

## 2022-08-24 DIAGNOSIS — M79.604 PAIN IN RIGHT LEG: Primary | ICD-10-CM

## 2022-08-24 DIAGNOSIS — M79.604 PAIN IN RIGHT LEG: ICD-10-CM

## 2022-08-24 PROCEDURE — 93970 EXTREMITY STUDY: CPT

## 2022-08-30 DIAGNOSIS — G45.9 TIA (TRANSIENT ISCHEMIC ATTACK): Primary | ICD-10-CM

## 2022-08-31 ENCOUNTER — HOSPITAL ENCOUNTER (OUTPATIENT)
Dept: INFUSION THERAPY | Age: 77
Discharge: HOME | End: 2022-08-31
Payer: MEDICARE

## 2022-08-31 ENCOUNTER — OFFICE VISIT (OUTPATIENT)
Dept: HEMATOLOGY | Age: 77
End: 2022-08-31
Payer: MEDICARE

## 2022-08-31 VITALS
WEIGHT: 144 LBS | HEIGHT: 65 IN | OXYGEN SATURATION: 96 % | DIASTOLIC BLOOD PRESSURE: 68 MMHG | SYSTOLIC BLOOD PRESSURE: 126 MMHG | BODY MASS INDEX: 23.99 KG/M2 | HEART RATE: 77 BPM

## 2022-08-31 DIAGNOSIS — G45.9 TIA (TRANSIENT ISCHEMIC ATTACK): ICD-10-CM

## 2022-08-31 DIAGNOSIS — Z86.718 HISTORY OF DVT OF LOWER EXTREMITY: Primary | ICD-10-CM

## 2022-08-31 DIAGNOSIS — G89.29 CHRONIC PAIN OF LEFT KNEE: ICD-10-CM

## 2022-08-31 DIAGNOSIS — M25.562 CHRONIC PAIN OF LEFT KNEE: ICD-10-CM

## 2022-08-31 LAB
BASOPHILS ABSOLUTE: 0.05 K/UL (ref 0.01–0.08)
BASOPHILS RELATIVE PERCENT: 0.9 % (ref 0.1–1.2)
EOSINOPHILS ABSOLUTE: 0.15 K/UL (ref 0.04–0.54)
EOSINOPHILS RELATIVE PERCENT: 2.8 % (ref 0.7–7)
HCT VFR BLD CALC: 45.1 % (ref 34.1–44.9)
HEMOGLOBIN: 14.7 G/DL (ref 11.2–15.7)
LYMPHOCYTES ABSOLUTE: 1.89 K/UL (ref 1.18–3.74)
LYMPHOCYTES RELATIVE PERCENT: 35.1 % (ref 19.3–53.1)
MCH RBC QN AUTO: 30.1 PG (ref 25.6–32.2)
MCHC RBC AUTO-ENTMCNC: 32.6 G/DL (ref 32.3–35.5)
MCV RBC AUTO: 92.4 FL (ref 79.4–94.8)
MONOCYTES ABSOLUTE: 0.49 K/UL (ref 0.24–0.82)
MONOCYTES RELATIVE PERCENT: 9.1 % (ref 4.7–12.5)
NEUTROPHILS ABSOLUTE: 2.79 K/UL (ref 1.56–6.13)
NEUTROPHILS RELATIVE PERCENT: 51.7 % (ref 34–71.1)
PDW BLD-RTO: 12.2 % (ref 11.7–14.4)
PLATELET # BLD: 147 K/UL (ref 182–369)
PMV BLD AUTO: 10.1 FL (ref 7.4–10.4)
RBC # BLD: 4.88 M/UL (ref 3.93–5.22)
WBC # BLD: 5.39 K/UL (ref 3.98–10.04)

## 2022-08-31 PROCEDURE — 1090F PRES/ABSN URINE INCON ASSESS: CPT | Performed by: NURSE PRACTITIONER

## 2022-08-31 PROCEDURE — G8399 PT W/DXA RESULTS DOCUMENT: HCPCS | Performed by: NURSE PRACTITIONER

## 2022-08-31 PROCEDURE — G8420 CALC BMI NORM PARAMETERS: HCPCS | Performed by: NURSE PRACTITIONER

## 2022-08-31 PROCEDURE — 1036F TOBACCO NON-USER: CPT | Performed by: NURSE PRACTITIONER

## 2022-08-31 PROCEDURE — G8427 DOCREV CUR MEDS BY ELIG CLIN: HCPCS | Performed by: NURSE PRACTITIONER

## 2022-08-31 PROCEDURE — 99211 OFF/OP EST MAY X REQ PHY/QHP: CPT

## 2022-08-31 PROCEDURE — 99213 OFFICE O/P EST LOW 20 MIN: CPT | Performed by: NURSE PRACTITIONER

## 2022-08-31 PROCEDURE — 1123F ACP DISCUSS/DSCN MKR DOCD: CPT | Performed by: NURSE PRACTITIONER

## 2022-08-31 PROCEDURE — 85025 COMPLETE CBC W/AUTO DIFF WBC: CPT

## 2022-08-31 ASSESSMENT — ENCOUNTER SYMPTOMS
CONSTIPATION: 0
WHEEZING: 0
DIARRHEA: 0
BACK PAIN: 0
COUGH: 0
RESPIRATORY NEGATIVE: 1
NAUSEA: 0
SORE THROAT: 0
EYES NEGATIVE: 1
VOMITING: 0
EYE DISCHARGE: 0
GASTROINTESTINAL NEGATIVE: 1
EYE REDNESS: 0
SHORTNESS OF BREATH: 0
BLOOD IN STOOL: 0
EYE PAIN: 0
ABDOMINAL PAIN: 0

## 2022-08-31 NOTE — PROGRESS NOTES
1987), \" blood clots in my arms after IV insertion\" and TIAs with most recent event on 2022, referred by Dr Isael Gonzalez for further evaluation, anticipating left total knee replacement on 2022. Dilan Garvin denied any pulmonary emboli or other known thrombotic events. She reported that with her first TIA in  which was related to her hypertension and stress that she was placed on Plavix and continued for 12 years until she experienced a spontaneous arterial bleeding within her nose that required cauterization. She reported routinely taking aspirin 81 mg daily and denied any other spontaneous bleeding events. Dilan Garvin was admitted to [facility] on 2022 to 2022 with transient ischemic attack-right sided weakness. She was discharged home with a Zio patch, unfortunately she only was able to wear this for approximately 2 days to a severe skin reaction to the adhesion tape. 2022 MRI brain without contrast- No acute intracranial abnormality. Mild to moderate periventricular and deep white matter chronic small vessel changes.       2022 Serology results  PT/INR 13.7/1.06  PTT 27.5  Protein C Resistance 4.61  Antithrombin Activity 127%  Protein C Functional 170% (%)  Protein S Free 124% (%)  dRvvt 32 seconds  Lupus anticoag not detected  Homocysteine 8  Beta 2 glyco IgG <10, IgM 10  Anticardiolipin IgG <10  Cardiolipin IgM 12 (negative)  Prothrombin mutation negative   No thrombotic risk factor is identified    Age-appropriate health screenin2017 Endoscopy [facility])- benign gastric hyperplastic polyp  2017 Colonoscopy ([facility])- normal with 10 year recall  2022 Bilateral mammogram [facility])- no mammographic evidence of malignancy    Past Medical History:    Past Medical History:   Diagnosis Date    Asthma     Diabetes mellitus ([address]     GERD (gastroesophageal reflux disease)     Helicobacter Pylori (H. Pylori) Infection     Hyperlipidemia        Past Surgical History: Past Surgical History:   Procedure Laterality Date    CHOLECYSTECTOMY      COLONOSCOPY  2017    nanda    HYSTERECTOMY (CERVIX STATUS UNKNOWN)      ALEX BSO    KNEE GANGLION SURGERY      NECK SURGERY      Tumor removal    UPPER GASTROINTESTINAL ENDOSCOPY  ? Harry       Current Medications:    Current Outpatient Medications   Medication Sig Dispense Refill    empagliflozin (JARDIANCE) 10 MG tablet Take 10 mg by mouth in the morning. Samples given to patient. diphenhydrAMINE (BENADRYL) 25 MG capsule Take 25 mg by mouth nightly as needed for Sleep      ibuprofen (ADVIL;MOTRIN) 400 MG tablet Take 400 mg by mouth every 6 hours as needed for Pain      vitamin D (ERGOCALCIFEROL) 1.25 MG (75527 UT) CAPS capsule TAKE 1 CAPSULE BY MOUTH EVERY 14 DAYS 6 capsule 3    esomeprazole (NEXIUM) 40 MG delayed release capsule TAKE 1 CAPSULE EVERY MORNING BEFORE BREAKFAST 90 capsule 3    lidocaine-prilocaine (EMLA) 2.5-2.5 % cream Apply topically to back and knee as needed. 30 g 2    atorvastatin (LIPITOR) 80 MG tablet TAKE 1 TABLET DAILY 90 tablet 3    Dulaglutide (TRULICITY) 3 PG/8.4FP SOPN Inject 3 mg into the skin once a week 12 pen 2    ezetimibe (ZETIA) 10 MG tablet TAKE 1 TABLET DAILY 90 tablet 3    glipiZIDE (GLUCOTROL) 5 MG tablet Take 1 tablet by mouth 2 times daily 180 tablet 1    blood glucose test strips (ASCENSIA AUTODISC VI;ONE TOUCH ULTRA TEST VI) strip 1 each by In Vitro route daily As needed. 100 each 3    nitrofurantoin, macrocrystal-monohydrate, (MACROBID) 100 MG capsule Take 1 tablet by mouth twice weekly 24 capsule 2    vitamin B-12 (CYANOCOBALAMIN) 1000 MCG tablet Take 1,000 mcg by mouth daily      Calcium Carb-Cholecalciferol (CALCIUM 1000 + D PO) Take by mouth      Biotin 1000 MCG TABS Take 1,000 Units by mouth      polyethylene glycol (MIRALAX) 17 g PACK packet Take 17 g by mouth daily      aspirin 81 MG EC tablet Take 81 mg by mouth daily.        No current facility-administered medications for this visit. Allergies: Allergies   Allergen Reactions    Amoxicillin-Pot Clavulanate     Ciprofloxacin     Codeine     Sulfa Antibiotics        Social History:    Social History     Tobacco Use    Smoking status: Never    Smokeless tobacco: Never   Vaping Use    Vaping Use: Never used   Substance Use Topics    Alcohol use: No    Drug use: No       Family History:   Family History   Problem Relation Age of Onset    Stroke Mother     Diabetes Mother     Liver Cancer Father     Kidney Cancer Father     Breast Cancer Sister     Liver Cancer Maternal Grandfather     Colon Cancer Neg Hx     Colon Polyps Neg Hx        Vitals:  Vitals:    08/31/22 1004   BP: 126/68   Pulse: 77   SpO2: 96%   Weight: 144 lb (65.3 kg)   Height: 5' 5\" (1.651 m)        Subjective   REVIEW OF SYSTEMS:   Review of Systems   Constitutional: Negative. Negative for chills, diaphoresis and fever. HENT: Negative. Negative for congestion, ear pain, hearing loss, nosebleeds, sore throat and tinnitus. Eyes: Negative. Negative for pain, discharge and redness. Respiratory: Negative. Negative for cough, shortness of breath and wheezing. Cardiovascular: Negative. Negative for chest pain, palpitations and leg swelling. Gastrointestinal: Negative. Negative for abdominal pain, blood in stool, constipation, diarrhea, nausea and vomiting. Endocrine: Negative for polydipsia. Genitourinary:  Negative for dysuria, flank pain, frequency, hematuria and urgency. Musculoskeletal: Negative. Negative for back pain, myalgias and neck pain. Skin: Negative. Negative for rash. Neurological: Negative. Negative for dizziness, tremors, seizures, weakness and headaches. Hematological:  Does not bruise/bleed easily. Psychiatric/Behavioral: Negative. The patient is not nervous/anxious. Objective   PHYSICAL EXAM:  Physical Exam  Vitals reviewed. Constitutional:       General: She is not in acute distress.      Appearance: She is well-developed. HENT:      Head: Normocephalic and atraumatic. Mouth/Throat:      Pharynx: Uvula midline. Tonsils: No tonsillar exudate. Eyes:      General: Lids are normal.      Conjunctiva/sclera: Conjunctivae normal.      Pupils: Pupils are equal, round, and reactive to light. Neck:      Thyroid: No thyroid mass or thyromegaly. Vascular: No JVD. Trachea: Trachea normal. No tracheal deviation. Cardiovascular:      Rate and Rhythm: Normal rate and regular rhythm. Pulses: Normal pulses. Heart sounds: Normal heart sounds. Pulmonary:      Effort: Pulmonary effort is normal. No respiratory distress. Breath sounds: Normal breath sounds. No wheezing or rales. Chest:      Chest wall: No tenderness. Abdominal:      General: Bowel sounds are normal. There is no distension. Palpations: Abdomen is soft. There is no mass. Tenderness: There is no abdominal tenderness. There is no guarding. Musculoskeletal:         General: No tenderness or deformity. Cervical back: Normal range of motion and neck supple. Comments: Range of motion within normal limits x4 extremities   Skin:     General: Skin is warm. Findings: No bruising, erythema or rash. Neurological:      Mental Status: She is alert and oriented to person, place, and time. Cranial Nerves: No cranial nerve deficit. Coordination: Coordination normal.   Psychiatric:         Behavior: Behavior normal.         Thought Content: Thought content normal.       Labs reviewed today:  Lab Results   Component Value Date    WBC 5.39 08/31/2022    HGB 14.7 08/31/2022    HCT 45.1 (H) 08/31/2022    MCV 92.4 08/31/2022     (L) 08/31/2022     Lab Results   Component Value Date    NEUTROABS 2.79 08/31/2022       ASSESSMENT/PLAN:      1.  History of provoked DVT of lower extremity following scope of left knee approximately in 1987, reported blood clots in arms after IV placement and TIAs with most recent event on 7/27/2022. No history of pulmonary emboli or other thrombotic events. 08/03/2022 Serology results  PT/INR 13.7/1.06  PTT 27.5  Protein C Resistance 4.61  Antithrombin Activity 127%  Protein C Functional 170% (%)  Protein S Free 124% (%)  dRvvt 32 seconds  Lupus anticoag not detected  Homocysteine 8  Beta 2 glyco IgG <10, IgM 10  Anticardiolipin IgG <10  Cardiolipin IgM 12  Prothrombin mutation negative   No thrombotic risk factor is identified      The prothrombotic work-up on 8/3/2022 did not reveal any underlying clotting deficiencies, with her known history of provoked DVT after having a knee procedure, I would recommend prophylactic anticoagulation with DOAC postprocedure until she is up and ambulating well.     -Keep follow-up appointment with Dr. Leonoar Bennett tomorrow for further preparation of having a left total knee replacement anticipated on 9/26/2022.  -Follow-up as needed      2. History of TIA (transient ischemic attack), first TIA in 2000 with reported 3 occurrences within the 1 year, reports related to  hypertension and stress. She was placed on Plavix and continued for 12 years until she experienced a spontaneous arterial bleed within her nose that required cauterization. Most recently on 7/27/2022 requiring hospitalization after experiencing right-sided weakness. Work-up included a negative MRI for acute stroke, CTA of the head negative and echocardiogram negative    Continues to take aspirin 81 mg daily as recommended by Dr. Lina Moncada with continuing aspirin 81 mg daily    3. Chronic left knee pain  Anticipating left total left knee replacement on 09/26/2022 by Dr. Leonora Bennett. I discussed all of the above findings included in the assessment and plan with the patient and the patient is in agreement to move forward with current recommendations/treatment. I have addressed all of their questions and concerns that were verbalized.     FOLLOW UP:  Follow-up PRN  Continue to follow with other medical providers as recommended    EMR Dragon/Transcription disclaimer:   Much of this encounter note is an electronic transcription/translation of spoken language to printed text. The electronic translation of spoken language may permit erroneous, or at times, nonsensical words or phrases to be inadvertently transcribed; although attempts have made to review the note for such errors, some may still exist.  Please excuse any unrecognized transcription errors and contact us if the air is unintelligible or needs documented correction. Also, portions of this note have been copied forward, however, changed to reflect the most current clinical status of this patient. Electronically signed by LETY Louise on 8/31/2022 at 1:05 PM   Mignon HIGHTOWER am pre-charting as a registered nurse for LETY Gabriel.

## 2022-09-08 ENCOUNTER — HOSPITAL ENCOUNTER (OUTPATIENT)
Dept: GENERAL RADIOLOGY | Age: 77
Discharge: HOME | End: 2022-09-08
Payer: MEDICARE

## 2022-09-08 ENCOUNTER — HOSPITAL ENCOUNTER (OUTPATIENT)
Dept: PREADMISSION TESTING | Age: 77
Discharge: HOME | End: 2022-09-12
Payer: MEDICARE

## 2022-09-08 ENCOUNTER — TELEPHONE (OUTPATIENT)
Dept: INTERNAL MEDICINE | Age: 77
End: 2022-09-08

## 2022-09-08 VITALS — HEIGHT: 65 IN | BODY MASS INDEX: 23.99 KG/M2 | WEIGHT: 144 LBS

## 2022-09-08 LAB
ABO/RH: NORMAL
ALBUMIN SERPL-MCNC: 4.4 G/DL (ref 3.5–5.2)
ALP BLD-CCNC: 92 U/L (ref 35–104)
ALT SERPL-CCNC: 30 U/L (ref 5–33)
ANION GAP SERPL CALCULATED.3IONS-SCNC: 12 MMOL/L (ref 7–19)
ANTIBODY SCREEN: NORMAL
APTT: 29.4 SEC (ref 26–36.2)
AST SERPL-CCNC: 22 U/L (ref 5–32)
BASOPHILS ABSOLUTE: 0 K/UL (ref 0–0.2)
BASOPHILS RELATIVE PERCENT: 0.6 % (ref 0–1)
BILIRUB SERPL-MCNC: 0.6 MG/DL (ref 0.2–1.2)
BILIRUBIN URINE: NEGATIVE
BLOOD, URINE: NEGATIVE
BUN BLDV-MCNC: 21 MG/DL (ref 8–23)
CALCIUM SERPL-MCNC: 9.6 MG/DL (ref 8.8–10.2)
CHLORIDE BLD-SCNC: 104 MMOL/L (ref 98–111)
CLARITY: CLEAR
CO2: 24 MMOL/L (ref 22–29)
COLOR: YELLOW
CREAT SERPL-MCNC: 0.9 MG/DL (ref 0.5–0.9)
EKG P AXIS: 47 DEGREES
EKG P-R INTERVAL: 188 MS
EKG Q-T INTERVAL: 432 MS
EKG QRS DURATION: 104 MS
EKG QTC CALCULATION (BAZETT): 439 MS
EKG T AXIS: 21 DEGREES
EOSINOPHILS ABSOLUTE: 0.1 K/UL (ref 0–0.6)
EOSINOPHILS RELATIVE PERCENT: 2.6 % (ref 0–5)
GFR AFRICAN AMERICAN: >59
GFR NON-AFRICAN AMERICAN: >60
GLUCOSE BLD-MCNC: 172 MG/DL (ref 74–109)
GLUCOSE URINE: =>1000 MG/DL
HBA1C MFR BLD: 6.8 % (ref 4–6)
HCT VFR BLD CALC: 41.1 % (ref 37–47)
HEMOGLOBIN: 13.6 G/DL (ref 12–16)
IMMATURE GRANULOCYTES #: 0 K/UL
INR BLD: 1.05 (ref 0.88–1.18)
KETONES, URINE: ABNORMAL MG/DL
LEUKOCYTE ESTERASE, URINE: NEGATIVE
LYMPHOCYTES ABSOLUTE: 1.7 K/UL (ref 1.1–4.5)
LYMPHOCYTES RELATIVE PERCENT: 30.9 % (ref 20–40)
MCH RBC QN AUTO: 29.9 PG (ref 27–31)
MCHC RBC AUTO-ENTMCNC: 33.1 G/DL (ref 33–37)
MCV RBC AUTO: 90.3 FL (ref 81–99)
MONOCYTES ABSOLUTE: 0.4 K/UL (ref 0–0.9)
MONOCYTES RELATIVE PERCENT: 7.5 % (ref 0–10)
MRSA SCREEN RT-PCR: NOT DETECTED
NEUTROPHILS ABSOLUTE: 3.1 K/UL (ref 1.5–7.5)
NEUTROPHILS RELATIVE PERCENT: 57.8 % (ref 50–65)
NITRITE, URINE: NEGATIVE
PDW BLD-RTO: 12.1 % (ref 11.5–14.5)
PH UA: 5.5 (ref 5–8)
PLATELET # BLD: 191 K/UL (ref 130–400)
PMV BLD AUTO: 9.8 FL (ref 9.4–12.3)
POTASSIUM SERPL-SCNC: 4.2 MMOL/L (ref 3.5–5)
PROTEIN UA: NEGATIVE MG/DL
PROTHROMBIN TIME: 13.7 SEC (ref 12–14.6)
RBC # BLD: 4.55 M/UL (ref 4.2–5.4)
SODIUM BLD-SCNC: 140 MMOL/L (ref 136–145)
SPECIFIC GRAVITY UA: 1.03 (ref 1–1.03)
TOTAL PROTEIN: 6.6 G/DL (ref 6.6–8.7)
UROBILINOGEN, URINE: 1 E.U./DL
WBC # BLD: 5.3 K/UL (ref 4.8–10.8)

## 2022-09-08 PROCEDURE — 93005 ELECTROCARDIOGRAM TRACING: CPT | Performed by: ORTHOPAEDIC SURGERY

## 2022-09-08 PROCEDURE — 86901 BLOOD TYPING SEROLOGIC RH(D): CPT

## 2022-09-08 PROCEDURE — 80053 COMPREHEN METABOLIC PANEL: CPT

## 2022-09-08 PROCEDURE — 86900 BLOOD TYPING SEROLOGIC ABO: CPT

## 2022-09-08 PROCEDURE — 87641 MR-STAPH DNA AMP PROBE: CPT

## 2022-09-08 PROCEDURE — 85025 COMPLETE CBC W/AUTO DIFF WBC: CPT

## 2022-09-08 PROCEDURE — 85730 THROMBOPLASTIN TIME PARTIAL: CPT

## 2022-09-08 PROCEDURE — 83036 HEMOGLOBIN GLYCOSYLATED A1C: CPT

## 2022-09-08 PROCEDURE — 71046 X-RAY EXAM CHEST 2 VIEWS: CPT

## 2022-09-08 PROCEDURE — 85610 PROTHROMBIN TIME: CPT

## 2022-09-08 PROCEDURE — 71046 X-RAY EXAM CHEST 2 VIEWS: CPT | Performed by: RADIOLOGY

## 2022-09-08 PROCEDURE — 86850 RBC ANTIBODY SCREEN: CPT

## 2022-09-08 PROCEDURE — 81003 URINALYSIS AUTO W/O SCOPE: CPT

## 2022-09-08 RX ORDER — DEXAMETHASONE SODIUM PHOSPHATE 10 MG/ML
10 INJECTION, SOLUTION INTRAMUSCULAR; INTRAVENOUS ONCE
Status: CANCELLED | OUTPATIENT
Start: 2022-09-26

## 2022-09-08 RX ORDER — PREGABALIN 75 MG/1
75 CAPSULE ORAL ONCE
Status: CANCELLED | OUTPATIENT
Start: 2022-09-26

## 2022-09-08 RX ORDER — ACETAMINOPHEN 500 MG
1000 TABLET ORAL ONCE
Status: CANCELLED | OUTPATIENT
Start: 2022-09-26

## 2022-09-08 NOTE — TELEPHONE ENCOUNTER
Dilan Garvin called requesting a refill of the below medication which has been pended for you:     Requested Prescriptions     Pending Prescriptions Disp Refills    empagliflozin (JARDIANCE) 10 MG tablet 90 tablet 1     Sig: Take 1 tablet by mouth daily       Last Appointment Date: 8/2/2022  Next Appointment Date: 11/2/2022    Allergies   Allergen Reactions    Ciprofloxacin Other (See Comments)     \"Immediate cystitis\"    Codeine Other (See Comments)     Slow heart rate    Sulfa Antibiotics Hives    Amoxicillin-Pot Clavulanate Rash     Itchy rash

## 2022-09-08 NOTE — DISCHARGE INSTRUCTIONS
PREOPERATIVE GUIDELINES WHEN RECEIVING ANESTHESIA    Do not eat or drink anything after midnight, the night before your surgery. This is extremely important for your safety. Take a bath (or shower) the night before your surgery and you may brush your teeth the morning of your surgery. You will be scheduled to arrive at the hospital 2 hours before your surgery, or follow your surgeon's instructions. Dress comfortably. Wear loose clothing that will be easy to remove and comfortable for your trip home. You may wear eyeglasses or contacts but bring your cases with you as they must be remove before your surgery. Hearing aids and dentures will need to be removed before your surgery. Do not wear any jewelry, including body jewelry. All jewelry will need to be removed prior to your surgery. Do not wear fingernail polish or make-up. It is best not to bring any valuables with you. If you are to stay in the hospital overnight, bring your robe, slippers and personal toiletries that you may need. POSTOPERATIVE GUIDELINES AFTER RECEIVING ANESTHESIA    If you are to go home after your surgery, you will need a responsible adult to drive you home. You will not be able to take public transportation after your discharge from the Operative Care Unit unless you are accompanied by a        responsible adult. On returning home, be sure to follow your physician's orders regarding diet, activity and medications. Remember, surgery with general anesthesia or sedation may leave you sleepy, very tired and with a decreased appetite for 12 to 24 hours. If you develop any post-surgical complications or problems, call your surgeon or [facility] Department ([phone]). The day before your surgery, you will receive a phone call from the surgery nurse, to let you know what time to arrive on the day of surgery.   This call will usually be between 2-4 PM.  If you do not receive a phone call by 4 PM the day before your surgery, please call [phone] and let them know you have not received an arrival time. If your surgery is on Monday, your call will be on the Friday before your Monday surgery. MEDICATION INSTRUCTIONS PRIOR TO YOUR SURGERY      The morning of surgery: You can take all your usual prescribed medications with a small sip of water. DO NOT TAKE ANY DIABETIC MEDICATIONS the morning of your surgery. DO NOT TAKE ANY SUPPLEMENTS or over the counter medications the morning of  surgery. Sachin Raymundo for the NARES    A script for Bactroban ointment has been call to your pharmacy or was given to you in written form by your surgeon. The guidelines for the ointment use are as follows:    1)  Start using the ointment 7 days before your surgery date    2)  Use the ointment two times a day - morning and night    3)  Place the ointment on a Q-tip and swirl up in your nose making sure you cover completely       the skin just inside of each nostril. Use one end of the Q-tip for each nostril. CHLORHEXIDINE GLUCONATE 4% SHOWERING    Patient should shower with this soap a minimum of 3 consecutive showers (2 nights before surgery, the night before surgery and the morning of surgery) washing from the neck down (avoiding contact with genitalia). DO NOT [address] YOUR HAIR OR FACE WITH THIS SOAP. When washing with this soap, apply enough to suds up the body thoroughly, turn the water away from your body and allow the soap suds to remain on the body for 2 full minutes, then rinse body completely. After using this soap on the body, please do not apply powders or lotions to your body. After the shower the night before surgery, please dry off with a new towel, sleep in new freshly laundered pj's, and change your bed linen before going to sleep.        [place] [facility] at 3[place]  for your COVID test.  Enter at the main level of the [facility], where the granite ball is floating on water, go past the ball and when you come to the information board on the wall across from the elevators, you will see a door with a keypad. The sign next to it will read \"Staff Only\" and will have a red [place] on the sign. You will knock on that door and we will come get you for your test.  We will be expecting you. You must have your COVID test on this day or surgery will be canceled. After having your COVID 19 test, you will need to self isolate until the day of surgery. This means no public gatherings such as [employer] attendance, weddings, showers, funerals, etc.  If you need something from the store, you will need someone to pick it up for you. It is very important that you are not around other people prior to your surgery or you could contract COVID 19 between the time you are tested and your surgery date.

## 2022-09-22 ENCOUNTER — HOSPITAL ENCOUNTER (OUTPATIENT)
Dept: PREADMISSION TESTING | Age: 77
Discharge: HOME | End: 2022-09-26
Payer: MEDICARE

## 2022-09-22 LAB — SARS-COV-2, PCR: NOT DETECTED

## 2022-09-22 PROCEDURE — U0005 INFEC AGEN DETEC AMPLI PROBE: HCPCS

## 2022-09-22 PROCEDURE — U0003 INFECTIOUS AGENT DETECTION BY NUCLEIC ACID (DNA OR RNA); SEVERE ACUTE RESPIRATORY SYNDROME CORONAVIRUS 2 (SARS-COV-2) (CORONAVIRUS DISEASE [COVID-19]), AMPLIFIED PROBE TECHNIQUE, MAKING USE OF HIGH THROUGHPUT TECHNOLOGIES AS DESCRIBED BY CMS-2020-01-R: HCPCS

## 2022-09-26 ENCOUNTER — HOSPITAL ENCOUNTER (OUTPATIENT)
Age: 77
Setting detail: OBSERVATION
Discharge: HOME | End: 2022-09-28
Attending: ORTHOPAEDIC SURGERY | Admitting: ORTHOPAEDIC SURGERY
Payer: MEDICARE

## 2022-09-26 ENCOUNTER — ANESTHESIA EVENT (OUTPATIENT)
Dept: OPERATING ROOM | Age: 77
End: 2022-09-26
Payer: MEDICARE

## 2022-09-26 ENCOUNTER — APPOINTMENT (OUTPATIENT)
Dept: GENERAL RADIOLOGY | Age: 77
End: 2022-09-26
Attending: ORTHOPAEDIC SURGERY
Payer: MEDICARE

## 2022-09-26 ENCOUNTER — ANESTHESIA (OUTPATIENT)
Dept: OPERATING ROOM | Age: 77
End: 2022-09-26
Payer: MEDICARE

## 2022-09-26 DIAGNOSIS — M17.12 PRIMARY OSTEOARTHRITIS OF LEFT KNEE: Primary | ICD-10-CM

## 2022-09-26 PROBLEM — J44.9 COPD (CHRONIC OBSTRUCTIVE PULMONARY DISEASE) (HCC): Chronic | Status: ACTIVE | Noted: 2022-09-26

## 2022-09-26 PROBLEM — E78.5 HLD (HYPERLIPIDEMIA): Status: ACTIVE | Noted: 2020-05-15

## 2022-09-26 PROBLEM — M19.90 ARTHRITIS: Status: ACTIVE | Noted: 2022-09-26

## 2022-09-26 LAB
GLUCOSE BLD-MCNC: 137 MG/DL (ref 70–99)
GLUCOSE BLD-MCNC: 215 MG/DL (ref 70–99)
GLUCOSE BLD-MCNC: 224 MG/DL (ref 70–99)
GLUCOSE BLD-MCNC: 224 MG/DL (ref 70–99)
HBA1C MFR BLD: 6.9 % (ref 4–6)
PERFORMED ON: ABNORMAL

## 2022-09-26 PROCEDURE — 6360000002 HC RX W HCPCS: Performed by: ANESTHESIOLOGY

## 2022-09-26 PROCEDURE — G0378 HOSPITAL OBSERVATION PER HR: HCPCS

## 2022-09-26 PROCEDURE — 6370000000 HC RX 637 (ALT 250 FOR IP): Performed by: ORTHOPAEDIC SURGERY

## 2022-09-26 PROCEDURE — 2500000003 HC RX 250 WO HCPCS

## 2022-09-26 PROCEDURE — 2500000003 HC RX 250 WO HCPCS: Performed by: ORTHOPAEDIC SURGERY

## 2022-09-26 PROCEDURE — 2580000003 HC RX 258: Performed by: ORTHOPAEDIC SURGERY

## 2022-09-26 PROCEDURE — 73560 X-RAY EXAM OF KNEE 1 OR 2: CPT

## 2022-09-26 PROCEDURE — 2709999900 HC NON-CHARGEABLE SUPPLY: Performed by: ORTHOPAEDIC SURGERY

## 2022-09-26 PROCEDURE — 3700000000 HC ANESTHESIA ATTENDED CARE: Performed by: ORTHOPAEDIC SURGERY

## 2022-09-26 PROCEDURE — 3600000005 HC SURGERY LEVEL 5 BASE: Performed by: ORTHOPAEDIC SURGERY

## 2022-09-26 PROCEDURE — 2500000003 HC RX 250 WO HCPCS: Performed by: ANESTHESIOLOGY

## 2022-09-26 PROCEDURE — 64447 NJX AA&/STRD FEMORAL NRV IMG: CPT

## 2022-09-26 PROCEDURE — C9290 INJ, BUPIVACAINE LIPOSOME: HCPCS | Performed by: ORTHOPAEDIC SURGERY

## 2022-09-26 PROCEDURE — 6360000002 HC RX W HCPCS

## 2022-09-26 PROCEDURE — 3700000001 HC ADD 15 MINUTES (ANESTHESIA): Performed by: ORTHOPAEDIC SURGERY

## 2022-09-26 PROCEDURE — 6360000002 HC RX W HCPCS: Performed by: ORTHOPAEDIC SURGERY

## 2022-09-26 PROCEDURE — C1776 JOINT DEVICE (IMPLANTABLE): HCPCS | Performed by: ORTHOPAEDIC SURGERY

## 2022-09-26 PROCEDURE — 83036 HEMOGLOBIN GLYCOSYLATED A1C: CPT

## 2022-09-26 PROCEDURE — C1713 ANCHOR/SCREW BN/BN,TIS/BN: HCPCS | Performed by: ORTHOPAEDIC SURGERY

## 2022-09-26 PROCEDURE — 2720000010 HC SURG SUPPLY STERILE: Performed by: ORTHOPAEDIC SURGERY

## 2022-09-26 PROCEDURE — 6370000000 HC RX 637 (ALT 250 FOR IP): Performed by: STUDENT IN AN ORGANIZED HEALTH CARE EDUCATION/TRAINING PROGRAM

## 2022-09-26 PROCEDURE — 2700000000 HC OXYGEN THERAPY PER DAY

## 2022-09-26 PROCEDURE — 7100000000 HC PACU RECOVERY - FIRST 15 MIN: Performed by: ORTHOPAEDIC SURGERY

## 2022-09-26 PROCEDURE — 36415 COLL VENOUS BLD VENIPUNCTURE: CPT

## 2022-09-26 PROCEDURE — 7100000001 HC PACU RECOVERY - ADDTL 15 MIN: Performed by: ORTHOPAEDIC SURGERY

## 2022-09-26 PROCEDURE — 3600000015 HC SURGERY LEVEL 5 ADDTL 15MIN: Performed by: ORTHOPAEDIC SURGERY

## 2022-09-26 PROCEDURE — A4217 STERILE WATER/SALINE, 500 ML: HCPCS | Performed by: ORTHOPAEDIC SURGERY

## 2022-09-26 PROCEDURE — 82947 ASSAY GLUCOSE BLOOD QUANT: CPT

## 2022-09-26 DEVICE — PSN FEM CR POR CCR NRW SZ9 L: Type: IMPLANTABLE DEVICE | Site: FEMUR | Status: FUNCTIONAL

## 2022-09-26 DEVICE — CEMENT BONE 40 GM W/ GENTMYCN HI VISC PALACOS R+G: Type: IMPLANTABLE DEVICE | Site: TIBIA | Status: FUNCTIONAL

## 2022-09-26 DEVICE — PSN TIB POR 2 PEG SZ D L: Type: IMPLANTABLE DEVICE | Site: TIBIA | Status: FUNCTIONAL

## 2022-09-26 DEVICE — PSN MC VE ASF L 10MM 8-9/CD: Type: IMPLANTABLE DEVICE | Site: TIBIA | Status: FUNCTIONAL

## 2022-09-26 RX ORDER — ROCURONIUM BROMIDE 10 MG/ML
INJECTION, SOLUTION INTRAVENOUS PRN
Status: DISCONTINUED | OUTPATIENT
Start: 2022-09-26 | End: 2022-09-26 | Stop reason: SDUPTHER

## 2022-09-26 RX ORDER — NITROGLYCERIN 20 MG/100ML
INJECTION INTRAVENOUS PRN
Status: DISCONTINUED | OUTPATIENT
Start: 2022-09-26 | End: 2022-09-26 | Stop reason: SDUPTHER

## 2022-09-26 RX ORDER — MIDAZOLAM HYDROCHLORIDE 2 MG/2ML
2 INJECTION, SOLUTION INTRAMUSCULAR; INTRAVENOUS ONCE
Status: COMPLETED | OUTPATIENT
Start: 2022-09-26 | End: 2022-09-26

## 2022-09-26 RX ORDER — PROPOFOL 10 MG/ML
INJECTION, EMULSION INTRAVENOUS PRN
Status: DISCONTINUED | OUTPATIENT
Start: 2022-09-26 | End: 2022-09-26 | Stop reason: SDUPTHER

## 2022-09-26 RX ORDER — POLYETHYLENE GLYCOL 3350 17 G/17G
17 POWDER, FOR SOLUTION ORAL 2 TIMES DAILY
Status: DISCONTINUED | OUTPATIENT
Start: 2022-09-26 | End: 2022-09-28 | Stop reason: HOSPADM

## 2022-09-26 RX ORDER — EPHEDRINE SULFATE 50 MG/ML
INJECTION, SOLUTION INTRAVENOUS PRN
Status: DISCONTINUED | OUTPATIENT
Start: 2022-09-26 | End: 2022-09-26 | Stop reason: SDUPTHER

## 2022-09-26 RX ORDER — SODIUM CHLORIDE 0.9 % (FLUSH) 0.9 %
5-40 SYRINGE (ML) INJECTION PRN
Status: DISCONTINUED | OUTPATIENT
Start: 2022-09-26 | End: 2022-09-28 | Stop reason: HOSPADM

## 2022-09-26 RX ORDER — HYDROMORPHONE HYDROCHLORIDE 1 MG/ML
1 INJECTION, SOLUTION INTRAMUSCULAR; INTRAVENOUS; SUBCUTANEOUS
Status: DISCONTINUED | OUTPATIENT
Start: 2022-09-26 | End: 2022-09-28 | Stop reason: HOSPADM

## 2022-09-26 RX ORDER — CEFAZOLIN SODIUM 1 G/3ML
INJECTION, POWDER, FOR SOLUTION INTRAMUSCULAR; INTRAVENOUS PRN
Status: DISCONTINUED | OUTPATIENT
Start: 2022-09-26 | End: 2022-09-26 | Stop reason: SDUPTHER

## 2022-09-26 RX ORDER — MEPERIDINE HYDROCHLORIDE 25 MG/ML
12.5 INJECTION INTRAMUSCULAR; INTRAVENOUS; SUBCUTANEOUS EVERY 5 MIN PRN
Status: DISCONTINUED | OUTPATIENT
Start: 2022-09-26 | End: 2022-09-26 | Stop reason: HOSPADM

## 2022-09-26 RX ORDER — TRAMADOL HYDROCHLORIDE 50 MG/1
50 TABLET ORAL EVERY 6 HOURS
Status: DISCONTINUED | OUTPATIENT
Start: 2022-09-26 | End: 2022-09-28 | Stop reason: HOSPADM

## 2022-09-26 RX ORDER — HYDROMORPHONE HYDROCHLORIDE 1 MG/ML
0.25 INJECTION, SOLUTION INTRAMUSCULAR; INTRAVENOUS; SUBCUTANEOUS EVERY 5 MIN PRN
Status: DISCONTINUED | OUTPATIENT
Start: 2022-09-26 | End: 2022-09-26 | Stop reason: HOSPADM

## 2022-09-26 RX ORDER — SODIUM CHLORIDE 9 MG/ML
INJECTION, SOLUTION INTRAVENOUS PRN
Status: DISCONTINUED | OUTPATIENT
Start: 2022-09-26 | End: 2022-09-28 | Stop reason: HOSPADM

## 2022-09-26 RX ORDER — ACETAMINOPHEN 325 MG/1
650 TABLET ORAL EVERY 6 HOURS
Status: DISCONTINUED | OUTPATIENT
Start: 2022-09-26 | End: 2022-09-28 | Stop reason: HOSPADM

## 2022-09-26 RX ORDER — SODIUM CHLORIDE 0.9 % (FLUSH) 0.9 %
5-40 SYRINGE (ML) INJECTION EVERY 12 HOURS SCHEDULED
Status: DISCONTINUED | OUTPATIENT
Start: 2022-09-26 | End: 2022-09-28 | Stop reason: HOSPADM

## 2022-09-26 RX ORDER — DIPHENHYDRAMINE HCL 25 MG
25 TABLET ORAL EVERY 6 HOURS PRN
Status: DISCONTINUED | OUTPATIENT
Start: 2022-09-26 | End: 2022-09-28 | Stop reason: HOSPADM

## 2022-09-26 RX ORDER — ATORVASTATIN CALCIUM 10 MG/1
10 TABLET, FILM COATED ORAL DAILY
Status: DISCONTINUED | OUTPATIENT
Start: 2022-09-26 | End: 2022-09-28 | Stop reason: HOSPADM

## 2022-09-26 RX ORDER — ROPIVACAINE HYDROCHLORIDE 5 MG/ML
INJECTION, SOLUTION EPIDURAL; INFILTRATION; PERINEURAL
Status: COMPLETED
Start: 2022-09-26 | End: 2022-09-26

## 2022-09-26 RX ORDER — DEXAMETHASONE SODIUM PHOSPHATE 10 MG/ML
INJECTION, SOLUTION INTRAMUSCULAR; INTRAVENOUS PRN
Status: DISCONTINUED | OUTPATIENT
Start: 2022-09-26 | End: 2022-09-26 | Stop reason: SDUPTHER

## 2022-09-26 RX ORDER — ONDANSETRON 4 MG/1
4 TABLET, ORALLY DISINTEGRATING ORAL EVERY 8 HOURS PRN
Status: DISCONTINUED | OUTPATIENT
Start: 2022-09-26 | End: 2022-09-28 | Stop reason: HOSPADM

## 2022-09-26 RX ORDER — ONDANSETRON 2 MG/ML
4 INJECTION INTRAMUSCULAR; INTRAVENOUS EVERY 6 HOURS PRN
Status: DISCONTINUED | OUTPATIENT
Start: 2022-09-26 | End: 2022-09-28 | Stop reason: HOSPADM

## 2022-09-26 RX ORDER — CHOLECALCIFEROL (VITAMIN D3) 125 MCG
1000 CAPSULE ORAL DAILY
Status: DISCONTINUED | OUTPATIENT
Start: 2022-09-26 | End: 2022-09-28 | Stop reason: HOSPADM

## 2022-09-26 RX ORDER — 0.9 % SODIUM CHLORIDE 0.9 %
500 INTRAVENOUS SOLUTION INTRAVENOUS PRN
Status: DISCONTINUED | OUTPATIENT
Start: 2022-09-26 | End: 2022-09-28 | Stop reason: HOSPADM

## 2022-09-26 RX ORDER — INSULIN LISPRO 100 [IU]/ML
0-4 INJECTION, SOLUTION INTRAVENOUS; SUBCUTANEOUS
Status: DISCONTINUED | OUTPATIENT
Start: 2022-09-26 | End: 2022-09-28 | Stop reason: HOSPADM

## 2022-09-26 RX ORDER — OXYCODONE HYDROCHLORIDE 5 MG/1
10 TABLET ORAL EVERY 4 HOURS PRN
Status: DISCONTINUED | OUTPATIENT
Start: 2022-09-26 | End: 2022-09-28 | Stop reason: HOSPADM

## 2022-09-26 RX ORDER — GLIPIZIDE 5 MG/1
5 TABLET ORAL 2 TIMES DAILY
Status: DISCONTINUED | OUTPATIENT
Start: 2022-09-26 | End: 2022-09-26

## 2022-09-26 RX ORDER — POLYETHYLENE GLYCOL 3350 17 G/17G
17 POWDER, FOR SOLUTION ORAL DAILY
Status: DISCONTINUED | OUTPATIENT
Start: 2022-09-26 | End: 2022-09-26

## 2022-09-26 RX ORDER — FENTANYL CITRATE 50 UG/ML
INJECTION, SOLUTION INTRAMUSCULAR; INTRAVENOUS PRN
Status: DISCONTINUED | OUTPATIENT
Start: 2022-09-26 | End: 2022-09-26 | Stop reason: SDUPTHER

## 2022-09-26 RX ORDER — PREGABALIN 75 MG/1
75 CAPSULE ORAL ONCE
Status: COMPLETED | OUTPATIENT
Start: 2022-09-26 | End: 2022-09-26

## 2022-09-26 RX ORDER — ACETAMINOPHEN 500 MG
1000 TABLET ORAL ONCE
Status: COMPLETED | OUTPATIENT
Start: 2022-09-26 | End: 2022-09-26

## 2022-09-26 RX ORDER — INSULIN LISPRO 100 [IU]/ML
0-4 INJECTION, SOLUTION INTRAVENOUS; SUBCUTANEOUS NIGHTLY
Status: DISCONTINUED | OUTPATIENT
Start: 2022-09-26 | End: 2022-09-28 | Stop reason: HOSPADM

## 2022-09-26 RX ORDER — HYDROMORPHONE HYDROCHLORIDE 1 MG/ML
0.5 INJECTION, SOLUTION INTRAMUSCULAR; INTRAVENOUS; SUBCUTANEOUS
Status: DISCONTINUED | OUTPATIENT
Start: 2022-09-26 | End: 2022-09-28 | Stop reason: HOSPADM

## 2022-09-26 RX ORDER — BIOTIN 1 MG
1000 TABLET ORAL DAILY
Status: DISCONTINUED | OUTPATIENT
Start: 2022-09-26 | End: 2022-09-26

## 2022-09-26 RX ORDER — SODIUM CHLORIDE 9 MG/ML
INJECTION, SOLUTION INTRAVENOUS PRN
Status: DISCONTINUED | OUTPATIENT
Start: 2022-09-26 | End: 2022-09-26 | Stop reason: HOSPADM

## 2022-09-26 RX ORDER — DIPHENHYDRAMINE HYDROCHLORIDE 50 MG/ML
12.5 INJECTION INTRAMUSCULAR; INTRAVENOUS
Status: DISCONTINUED | OUTPATIENT
Start: 2022-09-26 | End: 2022-09-26 | Stop reason: HOSPADM

## 2022-09-26 RX ORDER — CLINDAMYCIN PHOSPHATE 900 MG/50ML
900 INJECTION INTRAVENOUS EVERY 8 HOURS
Status: COMPLETED | OUTPATIENT
Start: 2022-09-26 | End: 2022-09-28

## 2022-09-26 RX ORDER — DEXTROSE MONOHYDRATE 100 MG/ML
INJECTION, SOLUTION INTRAVENOUS CONTINUOUS PRN
Status: DISCONTINUED | OUTPATIENT
Start: 2022-09-26 | End: 2022-09-28 | Stop reason: HOSPADM

## 2022-09-26 RX ORDER — SODIUM CHLORIDE, SODIUM LACTATE, POTASSIUM CHLORIDE, CALCIUM CHLORIDE 600; 310; 30; 20 MG/100ML; MG/100ML; MG/100ML; MG/100ML
INJECTION, SOLUTION INTRAVENOUS CONTINUOUS
Status: DISCONTINUED | OUTPATIENT
Start: 2022-09-26 | End: 2022-09-26 | Stop reason: HOSPADM

## 2022-09-26 RX ORDER — LIDOCAINE HYDROCHLORIDE 10 MG/ML
INJECTION, SOLUTION INFILTRATION; PERINEURAL
Status: COMPLETED | OUTPATIENT
Start: 2022-09-26 | End: 2022-09-26

## 2022-09-26 RX ORDER — OXYCODONE HYDROCHLORIDE 5 MG/1
5 TABLET ORAL EVERY 4 HOURS PRN
Status: DISCONTINUED | OUTPATIENT
Start: 2022-09-26 | End: 2022-09-28 | Stop reason: HOSPADM

## 2022-09-26 RX ORDER — OXYCODONE HYDROCHLORIDE 5 MG/1
15 TABLET ORAL EVERY 4 HOURS PRN
Status: DISCONTINUED | OUTPATIENT
Start: 2022-09-26 | End: 2022-09-28 | Stop reason: HOSPADM

## 2022-09-26 RX ORDER — HYDROMORPHONE HYDROCHLORIDE 1 MG/ML
0.5 INJECTION, SOLUTION INTRAMUSCULAR; INTRAVENOUS; SUBCUTANEOUS EVERY 5 MIN PRN
Status: DISCONTINUED | OUTPATIENT
Start: 2022-09-26 | End: 2022-09-26 | Stop reason: HOSPADM

## 2022-09-26 RX ORDER — ERGOCALCIFEROL 1.25 MG/1
50000 CAPSULE ORAL
Status: DISCONTINUED | OUTPATIENT
Start: 2022-10-09 | End: 2022-09-28 | Stop reason: HOSPADM

## 2022-09-26 RX ORDER — METOCLOPRAMIDE HYDROCHLORIDE 5 MG/ML
10 INJECTION INTRAMUSCULAR; INTRAVENOUS
Status: DISCONTINUED | OUTPATIENT
Start: 2022-09-26 | End: 2022-09-26 | Stop reason: HOSPADM

## 2022-09-26 RX ORDER — SENNA PLUS 8.6 MG/1
1 TABLET ORAL 2 TIMES DAILY
Status: DISCONTINUED | OUTPATIENT
Start: 2022-09-26 | End: 2022-09-28 | Stop reason: HOSPADM

## 2022-09-26 RX ORDER — TRANEXAMIC ACID 100 MG/ML
INJECTION, SOLUTION INTRAVENOUS PRN
Status: DISCONTINUED | OUTPATIENT
Start: 2022-09-26 | End: 2022-09-26 | Stop reason: SDUPTHER

## 2022-09-26 RX ORDER — SODIUM CHLORIDE 9 MG/ML
INJECTION, SOLUTION INTRAVENOUS CONTINUOUS
Status: DISCONTINUED | OUTPATIENT
Start: 2022-09-26 | End: 2022-09-28

## 2022-09-26 RX ORDER — DIPHENHYDRAMINE HCL 25 MG
25 CAPSULE ORAL NIGHTLY PRN
Status: DISCONTINUED | OUTPATIENT
Start: 2022-09-26 | End: 2022-09-26 | Stop reason: SDUPTHER

## 2022-09-26 RX ORDER — ROPIVACAINE HYDROCHLORIDE 5 MG/ML
INJECTION, SOLUTION EPIDURAL; INFILTRATION; PERINEURAL
Status: COMPLETED | OUTPATIENT
Start: 2022-09-26 | End: 2022-09-26

## 2022-09-26 RX ORDER — SODIUM CHLORIDE 0.9 % (FLUSH) 0.9 %
5-40 SYRINGE (ML) INJECTION PRN
Status: DISCONTINUED | OUTPATIENT
Start: 2022-09-26 | End: 2022-09-26 | Stop reason: HOSPADM

## 2022-09-26 RX ORDER — EZETIMIBE 10 MG/1
10 TABLET ORAL DAILY
Status: DISCONTINUED | OUTPATIENT
Start: 2022-09-27 | End: 2022-09-28 | Stop reason: HOSPADM

## 2022-09-26 RX ORDER — ONDANSETRON 2 MG/ML
INJECTION INTRAMUSCULAR; INTRAVENOUS PRN
Status: DISCONTINUED | OUTPATIENT
Start: 2022-09-26 | End: 2022-09-26 | Stop reason: SDUPTHER

## 2022-09-26 RX ORDER — SODIUM CHLORIDE 0.9 % (FLUSH) 0.9 %
5-40 SYRINGE (ML) INJECTION EVERY 12 HOURS SCHEDULED
Status: DISCONTINUED | OUTPATIENT
Start: 2022-09-26 | End: 2022-09-26 | Stop reason: HOSPADM

## 2022-09-26 RX ORDER — DEXAMETHASONE SODIUM PHOSPHATE 10 MG/ML
10 INJECTION, SOLUTION INTRAMUSCULAR; INTRAVENOUS ONCE
Status: DISCONTINUED | OUTPATIENT
Start: 2022-09-26 | End: 2022-09-26 | Stop reason: HOSPADM

## 2022-09-26 RX ORDER — HYDROMORPHONE HYDROCHLORIDE 1 MG/ML
0.25 INJECTION, SOLUTION INTRAMUSCULAR; INTRAVENOUS; SUBCUTANEOUS
Status: DISCONTINUED | OUTPATIENT
Start: 2022-09-26 | End: 2022-09-28 | Stop reason: HOSPADM

## 2022-09-26 RX ORDER — PANTOPRAZOLE SODIUM 40 MG/1
40 TABLET, DELAYED RELEASE ORAL
Status: DISCONTINUED | OUTPATIENT
Start: 2022-09-27 | End: 2022-09-28 | Stop reason: HOSPADM

## 2022-09-26 RX ADMIN — INSULIN LISPRO 1 UNITS: 100 INJECTION, SOLUTION INTRAVENOUS; SUBCUTANEOUS at 12:37

## 2022-09-26 RX ADMIN — POLYETHYLENE GLYCOL 3350 17 G: 17 POWDER, FOR SOLUTION ORAL at 12:38

## 2022-09-26 RX ADMIN — FENTANYL CITRATE 50 MCG: 50 INJECTION, SOLUTION INTRAMUSCULAR; INTRAVENOUS at 08:23

## 2022-09-26 RX ADMIN — CEFAZOLIN 2000 MG: 2 INJECTION, POWDER, FOR SOLUTION INTRAMUSCULAR; INTRAVENOUS at 23:58

## 2022-09-26 RX ADMIN — TRAMADOL HYDROCHLORIDE 50 MG: 50 TABLET, COATED ORAL at 23:59

## 2022-09-26 RX ADMIN — ONDANSETRON 4 MG: 2 INJECTION INTRAMUSCULAR; INTRAVENOUS at 08:56

## 2022-09-26 RX ADMIN — SUGAMMADEX 100 MG: 100 INJECTION, SOLUTION INTRAVENOUS at 08:59

## 2022-09-26 RX ADMIN — ATORVASTATIN CALCIUM 10 MG: 10 TABLET, FILM COATED ORAL at 12:37

## 2022-09-26 RX ADMIN — ROCURONIUM BROMIDE 50 MG: 10 INJECTION, SOLUTION INTRAVENOUS at 07:16

## 2022-09-26 RX ADMIN — ACETAMINOPHEN 1000 MG: 500 TABLET, FILM COATED ORAL at 06:50

## 2022-09-26 RX ADMIN — ACETAMINOPHEN 650 MG: 325 TABLET ORAL at 12:37

## 2022-09-26 RX ADMIN — NITROGLYCERIN 40 MCG: 20 INJECTION INTRAVENOUS at 07:58

## 2022-09-26 RX ADMIN — INSULIN LISPRO 1 UNITS: 100 INJECTION, SOLUTION INTRAVENOUS; SUBCUTANEOUS at 17:33

## 2022-09-26 RX ADMIN — SENNOSIDES 8.6 MG: 8.6 TABLET, FILM COATED ORAL at 12:37

## 2022-09-26 RX ADMIN — LIDOCAINE HYDROCHLORIDE 10 ML: 10 INJECTION, SOLUTION INFILTRATION; PERINEURAL at 07:08

## 2022-09-26 RX ADMIN — CEFAZOLIN 2000 MG: 2 INJECTION, POWDER, FOR SOLUTION INTRAMUSCULAR; INTRAVENOUS at 16:19

## 2022-09-26 RX ADMIN — SODIUM CHLORIDE, SODIUM LACTATE, POTASSIUM CHLORIDE, AND CALCIUM CHLORIDE: 600; 310; 30; 20 INJECTION, SOLUTION INTRAVENOUS at 08:28

## 2022-09-26 RX ADMIN — MIDAZOLAM HYDROCHLORIDE 2 MG: 2 INJECTION, SOLUTION INTRAMUSCULAR; INTRAVENOUS at 07:01

## 2022-09-26 RX ADMIN — PROPOFOL 30 MG: 10 INJECTION, EMULSION INTRAVENOUS at 07:18

## 2022-09-26 RX ADMIN — FENTANYL CITRATE 50 MCG: 50 INJECTION, SOLUTION INTRAMUSCULAR; INTRAVENOUS at 07:16

## 2022-09-26 RX ADMIN — ROPIVACAINE HYDROCHLORIDE 20 ML: 5 INJECTION, SOLUTION EPIDURAL; INFILTRATION; PERINEURAL at 07:08

## 2022-09-26 RX ADMIN — FENTANYL CITRATE 50 MCG: 50 INJECTION, SOLUTION INTRAMUSCULAR; INTRAVENOUS at 07:49

## 2022-09-26 RX ADMIN — BISACODYL 5 MG: 5 TABLET, COATED ORAL at 12:37

## 2022-09-26 RX ADMIN — EPHEDRINE SULFATE 10 MG: 50 INJECTION INTRAMUSCULAR; INTRAVENOUS; SUBCUTANEOUS at 08:14

## 2022-09-26 RX ADMIN — DEXAMETHASONE SODIUM PHOSPHATE 10 MG: 10 INJECTION, SOLUTION INTRAMUSCULAR; INTRAVENOUS at 07:16

## 2022-09-26 RX ADMIN — TRANEXAMIC ACID 1000 MG: 1 INJECTION, SOLUTION INTRAVENOUS at 07:16

## 2022-09-26 RX ADMIN — SODIUM CHLORIDE, SODIUM LACTATE, POTASSIUM CHLORIDE, AND CALCIUM CHLORIDE: 600; 310; 30; 20 INJECTION, SOLUTION INTRAVENOUS at 06:51

## 2022-09-26 RX ADMIN — POLYETHYLENE GLYCOL 3350 17 G: 17 POWDER, FOR SOLUTION ORAL at 20:41

## 2022-09-26 RX ADMIN — FENTANYL CITRATE 50 MCG: 50 INJECTION, SOLUTION INTRAMUSCULAR; INTRAVENOUS at 07:40

## 2022-09-26 RX ADMIN — ACETAMINOPHEN 650 MG: 325 TABLET ORAL at 23:58

## 2022-09-26 RX ADMIN — PREGABALIN 75 MG: 75 CAPSULE ORAL at 06:51

## 2022-09-26 RX ADMIN — ACETAMINOPHEN 650 MG: 325 TABLET ORAL at 17:34

## 2022-09-26 RX ADMIN — CLINDAMYCIN IN 5 PERCENT DEXTROSE 900 MG: 18 INJECTION, SOLUTION INTRAVENOUS at 18:51

## 2022-09-26 RX ADMIN — CYANOCOBALAMIN TAB 500 MCG 1000 MCG: 500 TAB at 12:38

## 2022-09-26 RX ADMIN — NITROGLYCERIN 40 MCG: 20 INJECTION INTRAVENOUS at 08:04

## 2022-09-26 RX ADMIN — CEFAZOLIN SODIUM 2000 MG: 1 INJECTION, POWDER, FOR SOLUTION INTRAMUSCULAR; INTRAVENOUS at 07:40

## 2022-09-26 RX ADMIN — FENTANYL CITRATE 50 MCG: 50 INJECTION, SOLUTION INTRAMUSCULAR; INTRAVENOUS at 07:50

## 2022-09-26 RX ADMIN — SODIUM CHLORIDE, PRESERVATIVE FREE 10 ML: 5 INJECTION INTRAVENOUS at 20:41

## 2022-09-26 RX ADMIN — PROPOFOL 80 MG: 10 INJECTION, EMULSION INTRAVENOUS at 07:16

## 2022-09-26 RX ADMIN — SENNOSIDES 8.6 MG: 8.6 TABLET, FILM COATED ORAL at 20:41

## 2022-09-26 RX ADMIN — SUGAMMADEX 100 MG: 100 INJECTION, SOLUTION INTRAVENOUS at 08:58

## 2022-09-26 RX ADMIN — RIVAROXABAN 10 MG: 10 TABLET, FILM COATED ORAL at 17:35

## 2022-09-26 RX ADMIN — EPHEDRINE SULFATE 10 MG: 50 INJECTION INTRAMUSCULAR; INTRAVENOUS; SUBCUTANEOUS at 08:12

## 2022-09-26 RX ADMIN — TRANEXAMIC ACID 1000 MG: 1 INJECTION, SOLUTION INTRAVENOUS at 08:45

## 2022-09-26 RX ADMIN — NITROGLYCERIN 40 MCG: 20 INJECTION INTRAVENOUS at 08:02

## 2022-09-26 RX ADMIN — TRAMADOL HYDROCHLORIDE 50 MG: 50 TABLET, COATED ORAL at 17:34

## 2022-09-26 RX ADMIN — SODIUM CHLORIDE: 9 INJECTION, SOLUTION INTRAVENOUS at 11:46

## 2022-09-26 RX ADMIN — CLINDAMYCIN IN 5 PERCENT DEXTROSE 900 MG: 18 INJECTION, SOLUTION INTRAVENOUS at 11:47

## 2022-09-26 ASSESSMENT — PAIN DESCRIPTION - ORIENTATION: ORIENTATION: LEFT

## 2022-09-26 ASSESSMENT — PAIN DESCRIPTION - ONSET: ONSET: ON-GOING

## 2022-09-26 ASSESSMENT — PAIN - FUNCTIONAL ASSESSMENT
PAIN_FUNCTIONAL_ASSESSMENT: PREVENTS OR INTERFERES SOME ACTIVE ACTIVITIES AND ADLS
PAIN_FUNCTIONAL_ASSESSMENT: NONE - DENIES PAIN

## 2022-09-26 ASSESSMENT — PAIN DESCRIPTION - FREQUENCY: FREQUENCY: INTERMITTENT

## 2022-09-26 ASSESSMENT — PAIN SCALES - GENERAL: PAINLEVEL_OUTOF10: 3

## 2022-09-26 ASSESSMENT — PAIN DESCRIPTION - PAIN TYPE: TYPE: SURGICAL PAIN

## 2022-09-26 ASSESSMENT — PAIN DESCRIPTION - LOCATION: LOCATION: KNEE

## 2022-09-26 ASSESSMENT — PAIN DESCRIPTION - DESCRIPTORS: DESCRIPTORS: SORE;DISCOMFORT

## 2022-09-26 NOTE — PROGRESS NOTES
4 Eyes Skin Assessment    Sandra Morgan is being assessed upon: Admission    I agree that I, Chema Clacny RN, along with Nilam Harp performed a thorough Head to Toe Skin Assessment on the patient. ALL assessment sites listed below have been assessed. Areas assessed by both nurses:     [x]   Head, Face, and Ears   [x]   Shoulders, Back, and Chest  [x]   Arms, Elbows, and Hands   [x]   Coccyx, Sacrum, and Ischium  [x]   Legs, Feet, and Heels    Does the Patient have Skin Breakdown?  No    Behzad Prevention initiated: No  Wound Care Orders initiated: No    [facility] nurse consulted for Pressure Injury (Stage 3,4, Unstageable, DTI, NWPT, and Complex wounds) and New or Established Ostomies: No        Primary Nurse eSignature: Chema Clancy RN on 9/26/2022 at 12:50 PM      Co-Signer eSignature: Electronically signed by Tierra De La Paz RN on 9/26/22 at 6:14 PM CDT

## 2022-09-26 NOTE — ANESTHESIA PRE PROCEDURE
Department of Anesthesiology  Preprocedure Note       Name:  Dc Hernandez   Age:  68 y.o.  :  1945                                          MRN:  [id number]         Date:  2022      Surgeon: Brenton Cassidy):  Wilda Ma MD    Procedure: Procedure(s):  LEFT KNEE TOTAL ARTHROPLASTY    Medications prior to admission:   Prior to Admission medications    Medication Sig Start Date End Date Taking? Authorizing Provider   empagliflozin (JARDIANCE) 10 MG tablet Take 1 tablet by mouth daily 22   Kennedy Foster MD   aspirin 325 MG EC tablet Take 325 mg by mouth daily    Historical Provider, MD   diphenhydrAMINE (BENADRYL) 25 MG capsule Take 25 mg by mouth nightly as needed for Sleep    Historical Provider, MD   ibuprofen (ADVIL;MOTRIN) 400 MG tablet Take 400 mg by mouth every 6 hours as needed for Pain    Historical Provider, MD   vitamin D (ERGOCALCIFEROL) 1.25 MG (14552 UT) CAPS capsule TAKE 1 CAPSULE BY MOUTH EVERY 14 DAYS 22   Kennedy Foster MD   esomeprazole ([address]) 40 MG delayed release capsule TAKE 1 CAPSULE [address]  Patient taking differently: Take 40 mg by mouth every morning (before breakfast) 22   Kennedy Foster MD   lidocaine-prilocaine (EMLA) 2.5-2.5 % cream Apply topically to back and knee as needed.  22   Kennedy Foster MD   atorvastatin (LIPITOR) 80 MG tablet TAKE 1 TABLET DAILY  Patient taking differently: Take 80 mg by mouth daily 22   Kennedy Foster MD   Dulaglutide (TRULICITY) 3 TM/6.9GV SOPN Inject 3 mg into the skin once a week 22   Kennedy Foster MD   ezetimibe (ZETIA) 10 MG tablet TAKE 1 TABLET DAILY  Patient taking differently: Take 10 mg by mouth daily 22   Kennedy Foster MD   glipiZIDE (GLUCOTROL) 5 MG tablet Take 1 tablet by mouth 2 times daily 21   Kennedy Foster MD   albuterol (PROVENTIL) (2.5 MG/3ML) 0.083% nebulizer solution Take 3 mLs by nebulization 3 times daily as needed for Wheezing  Patient not taking: No sig reported 21 7/28/22  Monita Goodell, MD   blood glucose test strips (ASCENSIA AUTODISC VI;ONE TOUCH ULTRA TEST VI) strip 1 each by In Vitro route daily As needed. 9/28/21   Monita Goodell, MD   nitrofurantoin, Clorinda Shouts, (MACROBID) 100 MG capsule Take 1 tablet by mouth twice weekly 9/28/21   Monita Goodell, MD   TRULICITY 1.5 FG/7.3QY [facility]  7/15/21   Historical Provider, MD   vitamin B-12 (CYANOCOBALAMIN) 1000 MCG tablet Take 1,000 mcg by mouth daily    Historical Provider, MD   Calcium Carb-Cholecalciferol (CALCIUM 1000 + D PO) Take 1 tablet by mouth daily    Historical Provider, MD   Biotin 1000 MCG TABS Take 1,000 Units by mouth daily    Historical Provider, MD   polyethylene glycol (MIRALAX) 17 g PACK packet Take 17 g by mouth daily as needed    Historical Provider, MD   tiotropium (SPIRIVA) 18 MCG inhalation capsule Inhale 18 mcg into the lungs daily  Patient not taking: Reported on 7/27/2022 7/28/22  Historical Provider, MD       Current medications:    Current Facility-Administered Medications   Medication Dose Route Frequency Provider Last Rate Last Admin    ceFAZolin (ANCEF) 2,000 mg in sterile water 20 mL IV syringe  2,000 mg IntraVENous Once Isaias Schaumann, MD        pregabalin (LYRICA) capsule 75 mg  75 mg Oral Once Isaias Schaumann, MD        acetaminophen (TYLENOL) tablet 1,000 mg  1,000 mg Oral Once Isaias Schaumann, MD        dexamethasone (PF) (DECADRON) injection 10 mg  10 mg IntraVENous Once Isaias Schaumann, MD           Allergies:     Allergies   Allergen Reactions    Ciprofloxacin Other (See Comments)     \"Immediate cystitis\"    Codeine Other (See Comments)     Slow heart rate    Sulfa Antibiotics Hives    Amoxicillin-Pot Clavulanate Rash     Itchy rash       Problem List:    Patient Active Problem List   Diagnosis Code    Nausea R11.0    Gastroesophageal reflux disease without esophagitis K21.9    History of Helicobacter pylori infection Z86.19    History of colon polyps Z86.010    Hiatal hernia K44.9    Diverticulosis K57.90    Type 2 diabetes mellitus without complication, without long-term current use of insulin (HCC) E11.9    RLS (restless legs syndrome) G25.81    Vitamin D deficiency E55.9    Osteopenia of left hip M85.852    Acute sinusitis J01.90    Numbness and tingling of right arm R20.0, R20.2    Right arm weakness R29.898    Elevated cholesterol E78.00    Intrinsic asthma with acute exacerbation J45. 0    TIA (transient ischemic attack) G45.9    Right arm numbness R20.0       Past Medical History:        Diagnosis Date    Arthritis     Asthma     Diabetes mellitus ([facility] [address]     GERD (gastroesophageal reflux disease)     Helicobacter Pylori (H. Pylori) Infection     Hyperlipidemia     Knee pain        Past Surgical History:        Procedure Laterality Date    CHOLECYSTECTOMY      [facility]    ENDOSCOPY, COLON, DIAGNOSTIC      HYSTERECTOMY (CERVIX STATUS UNKNOWN)      ALEX BSO    KNEE GANGLION SURGERY      NECK SURGERY      Tumor removal    UPPER GASTROINTESTINAL ENDOSCOPY  ? Harry       Social History:    Social History     Tobacco Use    Smoking status: Never    Smokeless tobacco: Never   Substance Use Topics    Alcohol use: No                                Counseling given: Not Answered      Vital Signs (Current):   Vitals:    09/26/22 0558   BP: (!) 143/70   Pulse: 68   Resp: 14   Temp: 97.8 °F (36.6 °C)   TempSrc: Tympanic   SpO2: 98%   Weight: 144 lb (65.3 kg)   Height: 5' 5\" (1.651 m)                                              BP Readings from Last 3 Encounters:   09/26/22 (!) 143/70   08/31/22 126/68   08/03/22 110/72       NPO Status:                                                                                 BMI:   Wt Readings from Last 3 Encounters:   09/26/22 144 lb (65.3 kg)   09/08/22 144 lb (65.3 kg)   08/31/22 144 lb (65.3 kg)     Body mass index is 23.96 kg/m².     CBC:   Lab Results   Component Value Date/Time    WBC 5.3 09/08/2022 10:20 AM    RBC 4.55 09/08/2022 10:20 AM    HGB 13.6 09/08/2022 10:20 AM    HCT 41.1 09/08/2022 10:20 AM    MCV 90.3 09/08/2022 10:20 AM    RDW 12.1 09/08/2022 10:20 AM     09/08/2022 10:20 AM       CMP:   Lab Results   Component Value Date/Time     09/08/2022 10:20 AM    K 4.2 09/08/2022 10:20 AM     09/08/2022 10:20 AM    CO2 24 09/08/2022 10:20 AM    BUN 21 09/08/2022 10:20 AM    CREATININE 0.9 09/08/2022 10:20 AM    GFRAA >59 09/08/2022 10:20 AM    LABGLOM >60 09/08/2022 10:20 AM    GLUCOSE 172 09/08/2022 10:20 AM    PROT 6.6 09/08/2022 10:20 AM    CALCIUM 9.6 09/08/2022 10:20 AM    BILITOT 0.6 09/08/2022 10:20 AM    ALKPHOS 92 09/08/2022 10:20 AM    AST 22 09/08/2022 10:20 AM    ALT 30 09/08/2022 10:20 AM       POC Tests: No results for input(s): POCGLU, POCNA, POCK, POCCL, POCBUN, POCHEMO, POCHCT in the last 72 hours.     Coags:   Lab Results   Component Value Date/Time    PROTIME 13.7 09/08/2022 10:20 AM    INR 1.05 09/08/2022 10:20 AM    APTT 29.4 09/08/2022 10:20 AM       HCG (If Applicable): No results found for: PREGTESTUR, PREGSERUM, HCG, HCGQUANT     ABGs: No results found for: PHART, PO2ART, EIH6HMK, QFY1EKJ, BEART, W3EHBFVQ     Type & Screen (If Applicable):  No results found for: LABABO, LABRH    Drug/Infectious Status (If Applicable):  No results found for: HIV, HEPCAB    COVID-19 Screening (If Applicable):   Lab Results   Component Value Date/Time    COVID19 Not Detected 09/22/2022 12:45 PM           Anesthesia Evaluation  Patient summary reviewed and Nursing notes reviewed no history of anesthetic complications:   Airway: Mallampati: II  TM distance: >3 FB   Neck ROM: full  Mouth opening: > = 3 FB   Dental:          Pulmonary:normal exam    (+) asthma:                            Cardiovascular:Negative CV ROS          ECG reviewed               Beta Blocker:  Not on Beta Blocker         Neuro/Psych:   (+) TIA,             GI/Hepatic/Renal:   (+) GERD: well controlled,           Endo/Other:    (+) DiabetesType II DM, , .                 Abdominal:             Vascular: negative vascular ROS. Other Findings:           Anesthesia Plan      regional and general     ASA 3     (Adductor canal block  xarelto started last night)  Induction: intravenous. MIPS: Postoperative opioids intended and Prophylactic antiemetics administered. Anesthetic plan and risks discussed with patient.               Post-op pain plan if not by surgeon: single peripheral nerve block            Bashir Mcclure MD   9/26/2022

## 2022-09-26 NOTE — OP NOTE
[facility] [address] [facility]                                OPERATIVE REPORT    PATIENT NAME: Lowell Garrido                 :        1945  MED REC NO:   [id number]                              ROOM:       [facility]  ACCOUNT NO:   [de-identified]                           ADMIT DATE: 2022  PROVIDER:     Johnnie Gonzalez MD    DATE OF PROCEDURE:  2022    PREOPERATIVE DIAGNOSES:  1. Primary osteoarthritis, left knee. 2.  History of left leg DVT several years ago after open Baker's cyst  surgery. POSTOPERATIVE DIAGNOSES:  1. Primary osteoarthritis, left knee. 2.  History of left leg DVT several years ago after open Baker's cyst  surgery. OPERATION PERFORMED:  Complex primary left total knee replacement. Please note, complexity of the surgery is due to the fact that we did  the surgery without tourniquet while she is fully anticoagulated to  decrease her risk of having a blood clot postop. This added 75%  increase in difficulty in exposure and placement of implants. ANESTHESIA:  General with adductor canal block. ESTIMATED BLOOD LOSS:  290 mL. FLUIDS:  1500 mL of crystalloid. URINE OUTPUT:  185 mL. TOURNIQUET:  Not used. COMPONENTS USED:  Mera Persona knee system, femur size 9 CR TM  press-fit femur, tibia size D TM tibia which was press-fit on the  surface, but the pegs were left for bone ingrowth, polyethylene size 10  MC polyethylene. INDICATIONS:  This is a 77-year-old lady with severe arthritis of the  left knee, failing conservative care. Because of this, she elected for  the above procedure. OPERATIVE PROCEDURE:  After informed consent, she was given 2 gm of  Ancef, underwent adductor canal block and general anesthetic. Right leg  was placed in SCD. Left leg was prepped and draped in the usual sterile  fashion. The tourniquet was not used. We just made a direct midline  incision. Parapatellar approach was made. We used bipolar  electrocautery for hemostasis. Synovectomy was performed. Intramedullary canal of the femur was drilled into. Distal resection  was made. We assessed this and took an additional 2 mm distal  resection, so our initial resection was 10.5 medially and 6 laterally. We then sized the femur between a 10 and a 9 femur. We placed the 10  block first.  We made our anterior, posterior, and chamfer cuts. Posteromedial cut was 8.5 mm, posterolateral cut was 4 mm. The 7-degree  cutting guide was placed in line with the anterior tibial crest  proximally and the middle of the ankle distally and tibial resection was  made. Trial reduction was done, but this showed that we were tighter in  flexion than extension, so we downsized to a size 9 femur, which opened  up our gap by 2 mm. Following this, the trial reduction was done with a  size D tibia. We had a nice perpendicular cut to the long axis of  tibia; however, we were now tighter in extension than flexion, so  another +2 distal femoral resection was made. We then repeated our  chamfer cut while slight release was done on the IT band. There was no  need to release PCL. We then drilled our lug holes for our femur and tibia. Lateral facet  release was done. She had, I would say, marginal bone for press-fit  fixation, so I felt that we do a hybrid fixation on the tibia. We then  irrigated with 1500 mL of normal saline. We mixed 20 mL of Exparel with  30 mL of saline and 50 mL of 0.25% Marcaine. We injected the  posteromedial capsule with 40 mL and 60 mL in the subcutaneous tissues. Following this, we then mixed one batch of Palacos G cement. The size D  tibia was cemented proximally on the bone and undersurface of the tray. We then press-fit the size 9 femur. Trial liner was placed, initially a  12, this stretched the posterior capsule, then a 10.   Once the cement  had hardened, we irrigated with another 1500 mL of normal saline. The  final size 10 MC liner was locked in the tibial tray. We had excellent  extension and perfect midline tracking of the patella and very well  balanced stable knee. Parapatellar approach was closed with interrupted  #1 Vicryl suture, 2-0 Vicryl suture for subcutaneous tissues, and 2-0  Vicryl for subcuticular stitch. Prineo Dermabond and soft dressings  were applied. The patient was taken to the recovery room in stable  condition. POSTOPERATIVE PLANS:  1. She will be on typical postop protocol inpatient. 2.  She will be on 2 doses of Ancef and 6 doses of clindamycin. 3.  She will be on Xarelto 10 mg a day for 6 weeks. Please note, her ultrasound of the left leg on 08/24/2022, was  completely negative. Also note, her preoperative range of motion to the  left knee was 10 to 133 degrees with about a 1+ knee effusion. She also  had a posterior incision into the distant past from an excision of the  Baker's cyst in the past.  Lastly, please note that she had lidocaine  only injection on 06/30/2022, which took her pain by her \"from a 12/10  to a 0/10. \"        Denia Leonardo MD    D: 09/26/2022 10:03:41      T: 09/26/2022 12:26:02     [email or web address]  Job#: [id number]     Doc#: [id number]    CC:

## 2022-09-26 NOTE — PROGRESS NOTES
PHARMACY NOTE:  Arroyoraz Atkins was ordered biotin. As per the [address], herbals and certain dietary supplements will be discontinued.   The herbal or dietary supplement may be continued after discharge from the hospital.    Yu Rodriguez PHARM D, 9/26/2022, 10:26 AM

## 2022-09-26 NOTE — ANESTHESIA POSTPROCEDURE EVALUATION
Department of Anesthesiology  Postprocedure Note    Patient: Prasanth Alexander  MRN: [id number]  YOB: 1945  Date of evaluation: 9/26/2022      Procedure Summary     Date: 09/26/22 Room / Location: [address]    Anesthesia Start: 4504 Anesthesia Stop: 0922    Procedure: LEFT COMPLEX PRIMARY TOTAL KNEE ARTHROPLASTY (Left: Knee) Diagnosis:       Primary osteoarthritis of left knee      (Primary osteoarthritis of left knee [M17.12])    Surgeons: Frank Camarillo MD Responsible Provider: LETY Glynn CRNA    Anesthesia Type: regional, general ASA Status: 3          Anesthesia Type: No value filed.     Koby Phase I: Koby Score: 10    Koby Phase II:        Anesthesia Post Evaluation    Patient location during evaluation: PACU  Patient participation: waiting for patient participation  Level of consciousness: responsive to painful stimuli  Pain score: 0  Airway patency: patent  Nausea & Vomiting: no nausea and no vomiting  Complications: no  Cardiovascular status: blood pressure returned to baseline  Respiratory status: acceptable and nasal cannula  Hydration status: euvolemic

## 2022-09-26 NOTE — H&P
I have reviewed the history and physical for planned procedure. I have re-examined the patient and there are no changes to the history and physical unless noted below.     Electronically signed by Debbie Rubio MD on 9/26/2022 at 6:53 AM

## 2022-09-26 NOTE — BRIEF OP NOTE
Brief Postoperative Note      Patient: Fab Dudley  YOB: 1945  MRN: [id number]    Date of Procedure: 9/26/2022    Pre-Op Diagnosis: Primary osteoarthritis of left knee [M17.12]    Post-Op Diagnosis: Same       Procedure(s):  LEFT COMPLEX PRIMARY TOTAL KNEE ARTHROPLASTY    Surgeon(s):  Ren Foster MD    Assistant:  First Assistant: Eva Alarcon; Shashankmon SHANIKA Garland    Anesthesia: Choice    Estimated Blood Loss (mL): 748    Complications: None    Specimens:   * No specimens in log *    Implants:  Implant Name Type Inv.  Item Serial No.  Lot No. LRB No. Used Action   CEMENT BONE 40 GM W/ GENTMYCN HI VISC PALACOS R+G - [id number]  CEMENT BONE 40 GM W/ GENTMYCN HI VISC PALACOS R+G  [facility] [id number] Left 2 Implanted   PSN FEM CR POR CCR NRW SZ9 L - [id number]  PSN FEM CR POR CCR NRW SZ9 L  [employer]- [id number] Left 1 Implanted   PSN TIB POR 2 PEG SZ D L - [id number]  PSN TIB POR 2 PEG SZ D L  [employer]- [id number] Left 1 Implanted   PSN MC VE ASF L 10MM 8-9/CD - [id number]  PSN MC VE ASF L 10MM 8-9/CD  [facility] [employer]- [id number] Left 1 Implanted         Drains:   Urinary Catheter 09/26/22 2 Way (Active)       Findings:     Electronically signed by Ren Foster MD on 9/26/2022 at 9:04 AM

## 2022-09-26 NOTE — ADDENDUM NOTE
Addendum  created 09/26/22 0934 by LETY Eaton CRNA    Intraprocedure Meds edited, Orders acknowledged in Narrator

## 2022-09-26 NOTE — CARE COORDINATION
[place] Letter given to patient/spouse. Reviewed, discussed, answered all questions, and signed by patient's spouse. Signed copy placed in patient's chart.      09/26/22 1443   IMM Letter   Observation Status Letter date given: 09/26/22   Observation Status Letter time given: 1420   Observation Status Letter given to Patient/Family/Significant other/Guardian/POA/by: given to patient/spouse at bedside by Francy Jacobson RN BSN CM   Electronically signed by Jordan Liao RN, BSN on 9/26/2022 at 2:45 PM

## 2022-09-26 NOTE — ANESTHESIA PROCEDURE NOTES
Peripheral Block    Patient location during procedure: holding area  Reason for block: post-op pain management  Start time: 9/26/2022 7:08 AM  Staffing  Performed: anesthesiologist   Anesthesiologist: Florencia Silva MD  Preanesthetic Checklist  Completed: patient identified, IV checked, site marked, risks and benefits discussed, surgical/procedural consents, equipment checked, pre-op evaluation, timeout performed, anesthesia consent given, oxygen available and monitors applied/VS acknowledged  Peripheral Block   Patient position: supine  Prep: ChloraPrep  Provider prep: mask and sterile gloves  Patient monitoring: cardiac monitor, continuous pulse ox, frequent blood pressure checks and IV access  Block type: Femoral  Adductor canal  Laterality: left  Injection technique: single-shot  Guidance: ultrasound guided  Local infiltration: ropivacaine  Infiltration strength: 0.5 %  Local infiltration: ropivacaine  Dose: 20 mL    Needle   Needle type: combined needle/nerve stimulator   Needle gauge: 22 G  Needle localization: ultrasound guidance  Needle length: 10 cm  Assessment   Injection assessment: negative aspiration for heme, no paresthesia on injection and local visualized surrounding nerve on ultrasound  Paresthesia pain: none  Slow fractionated injection: yes  Hemodynamics: stable  Outcomes: patient tolerated procedure well and uncomplicated    Additional Notes  Under ultrasound (\"US\") guidance, a 22 gauge needle was inserted and placed in close proximity to the femoral nerve. Ultrasound was also used to visualize the spread of the anesthetic in close proximity to the nerve being blocked. The nerve appeared anatomically normal, and there were no abnormal pathological findings. A permanent image was recorded.      20 mL 0.5% Ropivacaine injected  Medications Administered  lidocaine 1 % - Subcuticular   10 mL - 9/26/2022 7:08:00 AM  ropivacaine (NAROPIN) injection 0.5% - Perineural   20 mL - 9/26/2022 7:08:00

## 2022-09-27 PROBLEM — M17.12 PRIMARY OSTEOARTHRITIS OF LEFT KNEE: Status: ACTIVE | Noted: 2022-09-26

## 2022-09-27 LAB
ANION GAP SERPL CALCULATED.3IONS-SCNC: 11 MMOL/L (ref 7–19)
BUN BLDV-MCNC: 20 MG/DL (ref 8–23)
CALCIUM SERPL-MCNC: 8.6 MG/DL (ref 8.8–10.2)
CHLORIDE BLD-SCNC: 104 MMOL/L (ref 98–111)
CO2: 24 MMOL/L (ref 22–29)
CREAT SERPL-MCNC: 0.7 MG/DL (ref 0.5–0.9)
FERRITIN: 143.9 NG/ML (ref 13–150)
FOLATE: 12.7 NG/ML (ref 4.8–37.3)
GFR AFRICAN AMERICAN: >59
GFR NON-AFRICAN AMERICAN: >60
GLUCOSE BLD-MCNC: 172 MG/DL (ref 70–99)
GLUCOSE BLD-MCNC: 176 MG/DL (ref 70–99)
GLUCOSE BLD-MCNC: 181 MG/DL (ref 70–99)
GLUCOSE BLD-MCNC: 194 MG/DL (ref 70–99)
GLUCOSE BLD-MCNC: 197 MG/DL (ref 74–109)
HBA1C MFR BLD: 6.9 % (ref 4–6)
HCT VFR BLD CALC: 29.2 % (ref 37–47)
HEMOGLOBIN: 10 G/DL (ref 12–16)
IRON SATURATION: 14 % (ref 14–50)
IRON: 29 UG/DL (ref 37–145)
PERFORMED ON: ABNORMAL
POTASSIUM REFLEX MAGNESIUM: 4.7 MMOL/L (ref 3.5–5)
SODIUM BLD-SCNC: 139 MMOL/L (ref 136–145)
TOTAL IRON BINDING CAPACITY: 204 UG/DL (ref 250–400)
VITAMIN B-12: >2000 PG/ML (ref 211–946)

## 2022-09-27 PROCEDURE — 97535 SELF CARE MNGMENT TRAINING: CPT

## 2022-09-27 PROCEDURE — 85018 HEMOGLOBIN: CPT

## 2022-09-27 PROCEDURE — 97116 GAIT TRAINING THERAPY: CPT

## 2022-09-27 PROCEDURE — 2500000003 HC RX 250 WO HCPCS: Performed by: ORTHOPAEDIC SURGERY

## 2022-09-27 PROCEDURE — 83540 ASSAY OF IRON: CPT

## 2022-09-27 PROCEDURE — 97165 OT EVAL LOW COMPLEX 30 MIN: CPT

## 2022-09-27 PROCEDURE — 82607 VITAMIN B-12: CPT

## 2022-09-27 PROCEDURE — G0378 HOSPITAL OBSERVATION PER HR: HCPCS

## 2022-09-27 PROCEDURE — 85014 HEMATOCRIT: CPT

## 2022-09-27 PROCEDURE — 6370000000 HC RX 637 (ALT 250 FOR IP): Performed by: STUDENT IN AN ORGANIZED HEALTH CARE EDUCATION/TRAINING PROGRAM

## 2022-09-27 PROCEDURE — 82728 ASSAY OF FERRITIN: CPT

## 2022-09-27 PROCEDURE — 82947 ASSAY GLUCOSE BLOOD QUANT: CPT

## 2022-09-27 PROCEDURE — 83036 HEMOGLOBIN GLYCOSYLATED A1C: CPT

## 2022-09-27 PROCEDURE — 97530 THERAPEUTIC ACTIVITIES: CPT

## 2022-09-27 PROCEDURE — 80048 BASIC METABOLIC PNL TOTAL CA: CPT

## 2022-09-27 PROCEDURE — 83550 IRON BINDING TEST: CPT

## 2022-09-27 PROCEDURE — 36415 COLL VENOUS BLD VENIPUNCTURE: CPT

## 2022-09-27 PROCEDURE — 82746 ASSAY OF FOLIC ACID SERUM: CPT

## 2022-09-27 PROCEDURE — 6370000000 HC RX 637 (ALT 250 FOR IP): Performed by: ORTHOPAEDIC SURGERY

## 2022-09-27 PROCEDURE — 97161 PT EVAL LOW COMPLEX 20 MIN: CPT

## 2022-09-27 PROCEDURE — 6360000002 HC RX W HCPCS: Performed by: ORTHOPAEDIC SURGERY

## 2022-09-27 RX ADMIN — ATORVASTATIN CALCIUM 10 MG: 10 TABLET, FILM COATED ORAL at 09:15

## 2022-09-27 RX ADMIN — ACETAMINOPHEN 650 MG: 325 TABLET ORAL at 06:28

## 2022-09-27 RX ADMIN — CLINDAMYCIN IN 5 PERCENT DEXTROSE 900 MG: 18 INJECTION, SOLUTION INTRAVENOUS at 18:46

## 2022-09-27 RX ADMIN — RIVAROXABAN 10 MG: 10 TABLET, FILM COATED ORAL at 18:00

## 2022-09-27 RX ADMIN — ONDANSETRON 4 MG: 2 INJECTION INTRAMUSCULAR; INTRAVENOUS at 09:15

## 2022-09-27 RX ADMIN — PANTOPRAZOLE SODIUM 40 MG: 40 TABLET, DELAYED RELEASE ORAL at 06:29

## 2022-09-27 RX ADMIN — TRAMADOL HYDROCHLORIDE 50 MG: 50 TABLET, COATED ORAL at 12:15

## 2022-09-27 RX ADMIN — EZETIMIBE 10 MG: 10 TABLET ORAL at 09:15

## 2022-09-27 RX ADMIN — OXYCODONE 10 MG: 5 TABLET ORAL at 15:19

## 2022-09-27 RX ADMIN — EMPAGLIFLOZIN 10 MG: 10 TABLET, FILM COATED ORAL at 09:15

## 2022-09-27 RX ADMIN — ACETAMINOPHEN 650 MG: 325 TABLET ORAL at 12:15

## 2022-09-27 RX ADMIN — OXYCODONE 10 MG: 5 TABLET ORAL at 21:09

## 2022-09-27 RX ADMIN — BISACODYL 5 MG: 5 TABLET, COATED ORAL at 09:15

## 2022-09-27 RX ADMIN — POLYETHYLENE GLYCOL 3350 17 G: 17 POWDER, FOR SOLUTION ORAL at 09:14

## 2022-09-27 RX ADMIN — CLINDAMYCIN IN 5 PERCENT DEXTROSE 900 MG: 18 INJECTION, SOLUTION INTRAVENOUS at 03:43

## 2022-09-27 RX ADMIN — TRAMADOL HYDROCHLORIDE 50 MG: 50 TABLET, COATED ORAL at 06:28

## 2022-09-27 RX ADMIN — CLINDAMYCIN IN 5 PERCENT DEXTROSE 900 MG: 18 INJECTION, SOLUTION INTRAVENOUS at 10:51

## 2022-09-27 ASSESSMENT — PAIN DESCRIPTION - ORIENTATION
ORIENTATION: LEFT
ORIENTATION: LEFT

## 2022-09-27 ASSESSMENT — PAIN SCALES - GENERAL
PAINLEVEL_OUTOF10: 3
PAINLEVEL_OUTOF10: 0
PAINLEVEL_OUTOF10: 6

## 2022-09-27 ASSESSMENT — PAIN DESCRIPTION - LOCATION
LOCATION: KNEE
LOCATION: KNEE

## 2022-09-27 ASSESSMENT — PAIN DESCRIPTION - FREQUENCY: FREQUENCY: CONTINUOUS

## 2022-09-27 ASSESSMENT — PAIN DESCRIPTION - DESCRIPTORS: DESCRIPTORS: ACHING;DISCOMFORT;SORE

## 2022-09-27 ASSESSMENT — PAIN DESCRIPTION - ONSET: ONSET: ON-GOING

## 2022-09-27 ASSESSMENT — PAIN DESCRIPTION - PAIN TYPE: TYPE: SURGICAL PAIN

## 2022-09-27 ASSESSMENT — PAIN - FUNCTIONAL ASSESSMENT: PAIN_FUNCTIONAL_ASSESSMENT: PREVENTS OR INTERFERES SOME ACTIVE ACTIVITIES AND ADLS

## 2022-09-27 NOTE — CARE COORDINATION
Sarai Bustamante RN Ortho Navigator  [phone]     Patient would like dme item to be delivered to Room #537. Patient will discharge home tomorrow morning. Please call if you have any questions.   Electronically signed by Delio Jorgensen RN on 9/27/2022 at 2:24 PM  Paul Garcia  9/26/2022  5:23 AM   Admission  Description: 68year old female Department: L [facility] Surg Services     Patient Demographics    Name Patient ID SSN Gender Identity Birth Date   Jumana Guerra [id number] Female [birth year] (68 yrs)     Address Phone Email     [address] [place] [address] [phone] (D)   [phone] (M) [email or web address]       Reg Status PCP Date Last Verified Next Review Date    Verified Geno Jauregui MD  [phone] 09/08/22 10/08/22      Insurance Payors as of 9/27/2022    [facility] MEDICARE    Plan: [facility] MEDICARE COMPLETE Group: [id number] Member: [id number]   Effective from: 1/1/2021 Subscriber: Carli Campbell ID: [id number]   Guarantor: Suzanne Pennington     Patient Contacts    Name Relation Home Work Mobile   [facility] D Spouse [phone]       Electronically signed by Delio Jorgensen RN on 9/27/2022 at 2:24 PM

## 2022-09-27 NOTE — PROGRESS NOTES
Physical Therapy  Facility/Department: [facility] SURG SERVICES  Physical Therapy Initial Assessment    Name: Keith Harman  : 1945  MRN: [id number]  Date of Service: 2022    Discharge Recommendations:  Continue to assess pending progress, Home with assist PRN, Home with Home health PT, Patient would benefit from continued therapy after discharge          Patient Diagnosis(es): There were no encounter diagnoses. Past Medical History:  has a past medical history of Arthritis, Asthma, Diabetes mellitus ([address] GERD (gastroesophageal reflux disease), Helicobacter Pylori (H. Pylori) Infection, Hyperlipidemia, and Knee pain. Past Surgical History:  has a past surgical history that includes Colonoscopy (2017); Upper gastrointestinal endoscopy (?); Hysterectomy; Knee ganglion surgery; Cholecystectomy; Neck surgery; Endoscopy, colon, diagnostic; and Total knee arthroplasty (Left, 2022). Assessment   Body Structures, Functions, Activity Limitations Requiring Skilled Therapeutic Intervention: Decreased functional mobility ; Decreased ROM; Decreased endurance;Decreased strength;Decreased balance; Increased pain  Assessment: Pt. will benefit from cont. PT to decrease impairments. Pt. a fall risk due to recent surgery and should not attempt mobility on her own. Pt. only CGA/Min A for mobility, so anticipate she will progress well. Pt. needs to use RW, gait belt and staff A for gait. Pt. should be safe to d/c home with spouse A.   Treatment Diagnosis: impaired gait and mobility  Therapy Prognosis: Good  Decision Making: Low Complexity  Barriers to Learning: none  Requires PT Follow-Up: Yes  Activity Tolerance  Activity Tolerance: Patient tolerated treatment well     Plan   Plan  Plan: 6-7 times per week  Plan weeks: 2  Current Treatment Recommendations: Strengthening, ROM, Balance training, Functional mobility training, Transfer training, Stair training, Gait training, Safety education & training, Patient/Caregiver education & training, Equipment evaluation, education, & procurement, Therapeutic activities, Positioning  Plan Comment: cont. PT per POC. Safety Devices  Type of Devices: Call light within reach, Left in chair, Patient at risk for falls, Gait belt, Nurse notified (reclined, ice pack on L knee and towel roll under L ankle)     Restrictions  Restrictions/Precautions  Restrictions/Precautions: Fall Risk  Required Braces or Orthoses?: No     Subjective   Pain: 4/10 L knee  General  Chart Reviewed: Yes  Patient assessed for rehabilitation services?: Yes  Response To Previous Treatment: Not applicable  Family / Caregiver Present: Yes (spouse)  Referring Practitioner: Teri Casas MD  Referral Date : 09/26/22  Diagnosis: Teri Casas MD  Follows Commands: Within Functional Limits  General Comment  Comments: RN, jorgito Suero PT. 9/26/22 L TKA  Subjective  Subjective: Pt. willing to work with therapy. Wants to go to the bathroom. States she is feeling like she has an upset stomach after whatever pain med the RN gave her earlier.          Social/Functional History  Social/Functional History  Lives With: Spouse  Type of Home: House  Home Layout: One level  Bathroom Shower/Tub: Walk-in shower  Bathroom Toilet: Handicap height  Bathroom Equipment: Built-in shower seat  Home Equipment: Rollator (pt getting RW)  Has the patient had two or more falls in the past year or any fall with injury in the past year?: No  ADL Assistance: Independent  Homemaking Assistance: Independent  Ambulation Assistance: Independent  Transfer Assistance: Independent  Active : Yes  Occupation: Retired  Vision/Hearing  Vision  Vision: Impaired  Vision Exceptions: Wears glasses at all times  Hearing  Hearing: Within functional limits    Cognition   Orientation  Overall Orientation Status: Within Functional Limits  Cognition  Overall Cognitive Status: WFL     Objective   Heart Rate: 73  Heart Rate Source: Monitor  BP: 115/74  BP Location: Left upper arm  Patient Position: Supine  MAP (Calculated): 87.67  Resp: 14  SpO2: 93 %  O2 Device: None (Room air)     Observation/Palpation  Posture: Good  Observation: IV  Gross Assessment  Sensation: Intact     AROM RLE (degrees)  RLE AROM: WFL  AROM LLE (degrees)  LLE AROM : Exceptions  LLE General AROM: knee and hip flex to 90, pt has painful ROM past 90. ankle WFLs  Strength RLE  Strength RLE: WFL  Comment: grossly 5/5  Strength LLE  Strength LLE: Exception  Comment: ankle 3/5, knee and hip 3-/5           Bed mobility  Supine to Sit: Supervision  Bed Mobility Comments: pt sat on EOB x 5 mins SBA due to feeling a little dizzy  Transfers  Sit to Stand: Contact guard assistance  Stand to sit: Contact guard assistance  Comment: v. cues for hand placement on bed or seated surface  Ambulation  WB Status: FWBAT LLE  Ambulation  Surface: level tile  Device: Rolling Walker  Assistance: Minimal assistance  Quality of Gait: antalgic  Gait Deviations: Slow Marisol;Decreased step length;Decreased step height  Distance: 15' x 2  Comments: pt able to stand at sink to wash and dry hands and brush teeth x 4 mins CGA     Balance  Posture: Good  Sitting - Static: Good;+  Sitting - Dynamic: Good;-  Standing - Static: Fair;-  Standing - Dynamic: Poor;+           OutComes Score                                                  AM-PAC Score             Tinneti Score       Goals  Short Term Goals  Time Frame for Short term goals: 2 wks  Short term goal 1: supine to sit indep  Short term goal 2: sit to stand indep  Short term goal 3: amb. 100' with RW SBA  Short term goal 4: up/down 2-3 stairs SBA with rail  Patient Goals   Patient goals : go home       Education  Patient Education  Education Given To: Patient; Family  Education Provided: Role of Therapy;Plan of Care;Transfer Training  Education Provided Comments: no pillow under knee, use towel roll to promote knee extension, use ice pack PRN for pain and swelling  Education Method: Verbal  Barriers to Learning: None  Education Outcome: Verbalized understanding;Continued education needed      Therapy Time   Individual Concurrent Group Co-treatment   Time In           Time Out           Minutes                   Soto Alvarez PT   Electronically signed by Soto Alvarez PT on 9/27/2022 at 9:35 AM

## 2022-09-27 NOTE — CONSULTS
Referring Provider: Dr. Unique Rodrigez  Reason for Consultation: Medical comangement    Patient Care Team:  Jeramie Zepeda MD as PCP - General (Internal Medicine)  Jeramie Zepeda [place] as PCP [place] [facility] [place] [place] [facility]    Chief complaint Left TKA    Subjective . History of present illness:      69 yo F w/ PMH/o T2DM, HTN, and HLD who presents as an outpatient for total left knee arthroplasty. She underwent uncomplicated operation on 5/26 and is doing well postoperatively. Her labs and vitals have been reviewed and are within acceptable limits. She has no new significant complaints.       REVIEW OF SYSTEMS:    CONSTITUTIONAL:  Negative for anorexia, chills, fevers, night sweats and weight loss  EYES:  negative for eye dryness, icterus and redness  HEENT:   negative for dental problems, epistaxis, facial trauma and thrush  RESPIRATORY:  negative for chest tightness, cough, dyspnea on exertion, pneumonia and sputum  CARDIOVASCULAR: negative for chest pain, dyspnea, exertional chest pressure/discomfort, irregular heart beat, palpitations, paroxysmal nocturnal dyspnea and syncope  GASTROINTESTINAL:  negative for abdominal pain, hematemesis, jaundice, melena and rectal bleeding  MUSCULOSKELETAL:  negative for muscle weakness, myalgias and neck pain, chronic joint pain noted  NEUROLOGICAL:   negative for dizziness, headaches, seizures, speech problems, tremors and vertigo  INTEGUMENT: negative for pruritus, rash, skin color change and skin lesion(s)   A Full 14 point review of systems is negative outside those listed above and in the HPI      History    Past Medical History:   Diagnosis Date    Arthritis     Asthma     Diabetes mellitus ([address]     GERD (gastroesophageal reflux disease)     Helicobacter Pylori (H. Pylori) Infection     Hyperlipidemia     Knee pain      Past Surgical History:   Procedure Laterality Date    CHOLECYSTECTOMY      COLONOSCOPY  2017    [facility]    ENDOSCOPY, COLON, DIAGNOSTIC      HYSTERECTOMY (CERVIX STATUS UNKNOWN)      ALEX BSO    KNEE GANGLION SURGERY      NECK SURGERY      Tumor removal    TOTAL KNEE ARTHROPLASTY Left 9/26/2022    LEFT COMPLEX PRIMARY TOTAL KNEE ARTHROPLASTY performed by Lisa Alcazar MD at [address]  ? [address]     Family History   Problem Relation Age of Onset    Stroke Mother     Diabetes Mother     Liver Cancer Father     Kidney Cancer Father     Breast Cancer Sister     Liver Cancer Maternal Grandfather     Colon Cancer Neg Hx     Colon Polyps Neg Hx      Social History     Tobacco Use    Smoking status: Never    Smokeless tobacco: Never   Vaping Use    Vaping Use: Never used   Substance Use Topics    Alcohol use: No    Drug use: No     Medications Prior to Admission: empagliflozin (JARDIANCE) 10 MG tablet, Take 1 tablet by mouth daily  aspirin 325 MG EC tablet, Take 325 mg by mouth daily  diphenhydrAMINE (BENADRYL) 25 MG capsule, Take 25 mg by mouth nightly as needed for Sleep  ibuprofen (ADVIL;MOTRIN) 400 MG tablet, Take 400 mg by mouth every 6 hours as needed for Pain  vitamin D (ERGOCALCIFEROL) 1.25 MG (17055 UT) CAPS capsule, TAKE 1 CAPSULE BY MOUTH EVERY 14 DAYS  esomeprazole (NEXIUM) 40 MG delayed release capsule, TAKE 1 CAPSULE EVERY MORNING BEFORE BREAKFAST (Patient taking differently: Take 40 mg by mouth every morning (before breakfast))  lidocaine-prilocaine (EMLA) 2.5-2.5 % cream, Apply topically to back and knee as needed. atorvastatin (LIPITOR) 80 MG tablet, TAKE 1 TABLET DAILY (Patient taking differently: Take 80 mg by mouth daily)  Dulaglutide (TRULICITY) 3 WW/3.7OY SOPN, Inject 3 mg into the skin once a week  ezetimibe (ZETIA) 10 MG tablet, TAKE 1 TABLET DAILY (Patient taking differently: Take 10 mg by mouth daily)  glipiZIDE (GLUCOTROL) 5 MG tablet, Take 1 tablet by mouth 2 times daily  blood glucose test strips (ASCENSIA AUTODISC VI;ONE TOUCH ULTRA TEST VI) strip, 1 each by In Vitro route daily As needed.   nitrofurantoin, macrocrystal-monohydrate, (MACROBID) 100 MG capsule, Take 1 tablet by mouth twice weekly  vitamin B-12 (CYANOCOBALAMIN) 1000 MCG tablet, Take 1,000 mcg by mouth daily  Calcium Carb-Cholecalciferol (CALCIUM 1000 + D PO), Take 1 tablet by mouth daily  Biotin 1000 MCG TABS, Take 1,000 Units by mouth daily  polyethylene glycol (MIRALAX) 17 g PACK packet, Take 17 g by mouth daily as needed  Ciprofloxacin, Codeine, Sulfa antibiotics, and Amoxicillin-pot clavulanate  Objective     Vital Signs   All pre-op and post op vitals reviewed           Physical Exam:  Constitutional: oriented to person, place, and time. appears well-developed. HEENT:   Head: Normocephalic and atraumatic. Eyes: Pupils are equal, round, and reactive to light. Neck: Neck supple. Cardiovascular: Regular rhythm and normal heart sounds. No lift or rub appreciated. Pulmonary/Chest: Effort normal and breath sounds normal. CTAB. No labored breating. Abdominal: Soft. Bowel sounds are normal. There is no appreciable  distension. There is no point tenderness. no rebounding or guarding. Musculoskeletal: Normal range of motion on other than surgically repaired joint . no edema or tenderness other than surgical area. Post-op changes noted  Neurological:  alert and oriented to person, place, and time. normal reflexes. No focal deficits  Skin: Skin is warm and dry. No new rashes appreciated. Results Review:   I reviewed the patient's new imaging results and agree with the interpretation. Principal Problem:    Primary osteoarthritis of left knee  Active Problems:    HLD (hyperlipidemia)    TIA (transient ischemic attack)    COPD (chronic obstructive pulmonary disease) ([facility])    GERD (gastroesophageal reflux disease)    Type 2 diabetes mellitus without complication, without long-term current use of insulin ([facility])  Resolved Problems:    * No resolved hospital problems.  *      Assessment: 59-year-old female with past medical history of type 2 diabetes, hypertension, hyperlipidemia who presents for left TKA. Plan:    Perioperative care  Starting bowel regimen, encourage ambulation with physical occupational therapy, encourage incentive spirometry. Anemia  Likely related to surgical blood loss, continue to monitor. No intervention at this time    Type 2 diabetes  Continue Jardiance, continue GLP-1, hold home glipizide and start sliding scale insulin while inpatient. Okay to discharge on previous outpatient regimen. Hypertension  Continue home meds without change. Hyperlipidemia  Continue home statin.     I discussed the patients findings and my recommendations with patient/family, staff and the Orthopaedic team.  Abel Bliss MD  09/27/22  7:38 AM

## 2022-09-27 NOTE — PROGRESS NOTES
Physical Therapy  Name: Fab Dudley  MRN:  [id number]  Date of service:  9/27/2022 09/27/22 1417   Subjective   Subjective Attempt: Pt had just been assisted to/from the BR with PCA, declines further mobility at this time.          Electronically signed by Karl Leigh PTA on 9/27/2022 at 2:18 PM

## 2022-09-27 NOTE — PROGRESS NOTES
Subjective:     Post-Operative Day: 1 Status Post left TKA. Pt is awake and alert. Ox3. Doing well this am.   No new issues. Systemic or Specific Complaints:No Complaints  no nausea and no vomiting Pain denies    Objective:     Patient Vitals for the past 24 hrs:   BP Temp Temp src Pulse Resp SpO2   09/27/22 0104 (!) 105/59 97.3 °F (36.3 °C) -- 82 16 91 %   09/26/22 2015 104/70 -- -- -- -- --   09/26/22 2010 (!) 92/57 97.3 °F (36.3 °C) Temporal 88 16 91 %   09/26/22 1654 110/61 97 °F (36.1 °C) Temporal 92 16 93 %   09/26/22 1112 -- -- -- 80 16 92 %   09/26/22 1035 (!) 147/77 97.3 °F (36.3 °C) Temporal 81 14 98 %   09/26/22 1017 (!) 147/82 97.2 °F (36.2 °C) -- 77 14 90 %   09/26/22 1007 (!) 155/76 -- -- 79 16 98 %   09/26/22 1002 (!) 161/76 97.6 °F (36.4 °C) -- 79 18 96 %   09/26/22 1000 -- -- -- 78 -- --   09/26/22 0957 (!) 157/76 -- -- 79 16 99 %   09/26/22 0952 (!) 162/75 -- -- 78 16 100 %   09/26/22 0947 (!) 159/77 -- -- 79 16 100 %   09/26/22 0942 (!) 159/74 -- -- 79 17 100 %   09/26/22 0937 (!) 155/73 -- -- 77 18 100 %   09/26/22 0932 (!) 156/75 -- -- 77 28 100 %   09/26/22 0927 (!) 156/75 -- -- 76 19 100 %   09/26/22 0922 (!) 144/78 97.5 °F (36.4 °C) Temporal 79 20 92 %       General: alert, appears stated age, cooperative, and no distress   Exam: Fair active motion to left lower extremity   Wound: Wound clean and dry no evidence of infection. , No Drainage and Wound Intact   Neurovascular: Exam normal     DVT Exam: No evidence of DVT seen on physical exam.   Xray:  left Knee implants in good position        Data Review:  Recent Labs     09/27/22 0201   HGB 10.0*     Recent Labs     09/27/22 0201      K 4.7   CREATININE 0.7     Recent Labs     09/27/22 0201   LABGLOM >60     No results for input(s): INR in the last 72 hours. Assessment:     Status Post left TKA. Plan:     Pt is doing fairly well this am.  She will continue with her current nursing cares and therapy as tolerated.   Plan for home with home health tomorrow if stable.       Electronically signed by Willard Rios PA-C on 9/27/2022 at 7:17 AM

## 2022-09-27 NOTE — CARE COORDINATION
Spoke with patient regarding MD orders for [facility] services. Patient agreeable and has chosen [facility]. Referral Faxed. [address] [place] [address] [phone]. [phone]. Please notify [facility] when patient discharges and fax DC Summary,  DC med list and any new [facility] orders. The Patient and/or patient representative was provided with a choice of provider and agrees   with the discharge plan. [x] Yes [] No    Freedom of choice list was provided with basic dialogue that supports the patient's individualized plan of care/goals, treatment preferences and shares the quality data associated with the providers.  [x] Yes [] No  Electronically signed by Lori Manrique on 9/27/2022 at 10:18 AM

## 2022-09-27 NOTE — DISCHARGE INSTRUCTIONS
Dr Moreno Pel Total Knee Replacement Discharge Instructions     *Elevate legs at all times when not walking     * Use Ice Pack to affected extremity as needed for swelling and pain     * HOMEWORK          Be able to fully straighten leg by post-op appointment. This is the most important thing to work on!!!          Use the ankle pillow or a towel roll under the ankle to work on straightening          You may work on bending the knee also    *Surgical Site Care: The adhesive (glue strip) dressing can be removed in 3 weeks. May shower on Wednesday and clean wound but do not scrub incision. Pat dry. NO tub bathing or swimming. Wash hands frequently during the day. *Activity:      SAFETY FIRST ! [address] !!!                 Home Health therapy will come to the house three times a week:                    Get in and out of your bed                                                                                                     Getting up and down out of the chair                                                                    Bathroom  / bathing activities                                                                               Do NOT walk for exercise ( it will increase pain and swelling )                             Walk several times a day but only for short distances             *Pain Medicines:          Keep a LOG of your medicines to track when pain med is due          Take prescribed medicine as needed for pain          Take Tylenol as your pain decreases          Take a stool softener of your choice while taking pain medications as they are very constipating ( examples:  colace  dulcolax  fiber   prune juice )      *To prevent blood clots: Take prescribed blood thinner as directed.   (Xarelto 10 mg daily for 6 weeks)           While taking the blood thinner, DO NOT take any other NSAIDS like Naprosyn, Ibuprofen, etc.          Do ankle pump exercises when sitting in the chair           *To Prevent Pneumonia:           Sit up and take deep breaths several times a day and use the incentive spirometer     *Call the office if:           Increased redness, any drainage, severe pain, new onset fever or chills. Also notify of any calf pain, tenderness, swelling or redness. Any new rash, nausea or vomiting     **If you have any questions or problems please call our office at 1 21 [phone]**   or contact Ortho Navigator Angel Anderson) at 1 [phone].   Thank you

## 2022-09-27 NOTE — DISCHARGE INSTR - DIET

## 2022-09-27 NOTE — PROGRESS NOTES
Occupational Therapy  Facility/Department: [facility] Initial Assessment    Name: Liya Gloria  : 1945  MRN: [id number]  Date of Service: 2022    Discharge Recommendations:  Continue to assess pending progress          Patient Diagnosis(es): There were no encounter diagnoses. Past Medical History:  has a past medical history of Arthritis, Asthma, Diabetes mellitus ([address] GERD (gastroesophageal reflux disease), Helicobacter Pylori (H. Pylori) Infection, Hyperlipidemia, and Knee pain. Past Surgical History:  has a past surgical history that includes Colonoscopy (2017); Upper gastrointestinal endoscopy (?); Hysterectomy; Knee ganglion surgery; Cholecystectomy; Neck surgery; Endoscopy, colon, diagnostic; and Total knee arthroplasty (Left, 2022). Treatment Diagnosis: primary osteoarthritis L knee      Assessment   Performance deficits / Impairments: Decreased ADL status; Decreased endurance;Decreased functional mobility   Assessment: Evaluation complete and tx initiated. Pt CGA with functional transfers and mobility. Pt stands at sink for grooming with SBA. min A overall LB dressing due to decrease L LE reach. Ed pt in AE options. No further skilled OT recommended at this time.   Treatment Diagnosis: primary osteoarthritis L knee  REQUIRES OT FOLLOW-UP: No  Activity Tolerance  Activity Tolerance: Patient Tolerated treatment well        Plan   Plan  Plan Comment: no skilled OT needs at this time     Restrictions  Restrictions/Precautions  Restrictions/Precautions: Fall Risk  Required Braces or Orthoses?: No    Subjective   General  Chart Reviewed: Yes  Patient assessed for rehabilitation services?: Yes  Family / Caregiver Present: Yes     Social/Functional History  Social/Functional History  Lives With: Spouse  Type of Home: House  Home Layout: One level  Bathroom Shower/Tub: Walk-in shower  Bathroom Toilet: Handicap height  Bathroom Equipment: Built-in shower seat  ADL Assistance: Independent  Homemaking Assistance: Independent  Ambulation Assistance: Independent  Transfer Assistance: Independent  Active : Yes       Objective   Heart Rate: 73  Heart Rate Source: Monitor  BP: 115/74  BP Location: Left upper arm  Patient Position: Supine  MAP (Calculated): 87.67  Resp: 14  SpO2: 93 %  O2 Device: None (Room air)             Safety Devices  Type of Devices: Call light within reach; Left in chair;Patient at risk for falls     Toilet Transfers  Toilet - Technique: Ambulating (with RW)  Toilet Transfer: Contact guard assistance     ADL  Feeding: Independent  Grooming: Stand by assistance (in standing)  UE Bathing: Setup  LE Bathing: Minimal assistance  UE Dressing: Setup  LE Dressing: Minimal assistance  Toileting: Contact guard assistance        Bed mobility  Supine to Sit: Supervision  Transfers  Sit to stand: Contact guard assistance  Stand to sit: Contact guard assistance  Vision  Vision: Impaired  Vision Exceptions: Wears glasses at all times  Hearing  Hearing: Within functional limits  Cognition  Overall Cognitive Status: WFL  Orientation  Overall Orientation Status: Within Functional Limits                  Education Given To: Patient  Education Provided: ADL Adaptive Strategies  Education Provided Comments: use of AE. Pt states her  will assist her  Education Method: Verbal  Barriers to Learning: None  Education Outcome: Verbalized understanding  LUE AROM (degrees)  LUE AROM : WFL  RUE AROM (degrees)  RUE AROM : WFL                       Goals  Short Term Goals  Short Term Goal 1: Pt will verbalize understanding of AE options for LB ADL after ed    Tx initiated: Standing to groom at sink, SBA, AE for LB dressing ed.  Pt met stg 1. (10 min)       Margret Martinez OT  Electronically signed by Margret Martinez OT on 9/27/2022 at 9:19 AM

## 2022-09-27 NOTE — ACP (ADVANCE CARE PLANNING)
Advance Care Planning   Healthcare Decision Maker:    Primary Decision Maker: Edel Montes [facility] - [phone]    Outcome:  Patient has already designated a Primary Decision Maker.       Electronically signed by MAINOR Mccord [place] on 9/27/2022 at 2:24 PM

## 2022-09-28 VITALS
RESPIRATION RATE: 16 BRPM | WEIGHT: 144 LBS | SYSTOLIC BLOOD PRESSURE: 142 MMHG | TEMPERATURE: 96.6 F | HEART RATE: 92 BPM | HEIGHT: 65 IN | OXYGEN SATURATION: 93 % | BODY MASS INDEX: 23.99 KG/M2 | DIASTOLIC BLOOD PRESSURE: 80 MMHG

## 2022-09-28 LAB
ANION GAP SERPL CALCULATED.3IONS-SCNC: 11 MMOL/L (ref 7–19)
BUN BLDV-MCNC: 16 MG/DL (ref 8–23)
CALCIUM SERPL-MCNC: 8.8 MG/DL (ref 8.8–10.2)
CHLORIDE BLD-SCNC: 102 MMOL/L (ref 98–111)
CO2: 25 MMOL/L (ref 22–29)
CREAT SERPL-MCNC: 0.8 MG/DL (ref 0.5–0.9)
GFR AFRICAN AMERICAN: >59
GFR NON-AFRICAN AMERICAN: >60
GLUCOSE BLD-MCNC: 136 MG/DL (ref 70–99)
GLUCOSE BLD-MCNC: 189 MG/DL (ref 70–99)
GLUCOSE BLD-MCNC: 189 MG/DL (ref 74–109)
HCT VFR BLD CALC: 28 % (ref 37–47)
HEMOGLOBIN: 9.5 G/DL (ref 12–16)
PERFORMED ON: ABNORMAL
PERFORMED ON: ABNORMAL
POTASSIUM REFLEX MAGNESIUM: 5.1 MMOL/L (ref 3.5–5)
SODIUM BLD-SCNC: 138 MMOL/L (ref 136–145)

## 2022-09-28 PROCEDURE — 85014 HEMATOCRIT: CPT

## 2022-09-28 PROCEDURE — 2500000003 HC RX 250 WO HCPCS: Performed by: ORTHOPAEDIC SURGERY

## 2022-09-28 PROCEDURE — 36415 COLL VENOUS BLD VENIPUNCTURE: CPT

## 2022-09-28 PROCEDURE — 97530 THERAPEUTIC ACTIVITIES: CPT

## 2022-09-28 PROCEDURE — G0378 HOSPITAL OBSERVATION PER HR: HCPCS

## 2022-09-28 PROCEDURE — 80048 BASIC METABOLIC PNL TOTAL CA: CPT

## 2022-09-28 PROCEDURE — 97116 GAIT TRAINING THERAPY: CPT

## 2022-09-28 PROCEDURE — 6370000000 HC RX 637 (ALT 250 FOR IP): Performed by: ORTHOPAEDIC SURGERY

## 2022-09-28 PROCEDURE — 2580000003 HC RX 258: Performed by: ORTHOPAEDIC SURGERY

## 2022-09-28 PROCEDURE — 85018 HEMOGLOBIN: CPT

## 2022-09-28 PROCEDURE — 82947 ASSAY GLUCOSE BLOOD QUANT: CPT

## 2022-09-28 RX ORDER — OXYCODONE HYDROCHLORIDE 5 MG/1
5 TABLET ORAL EVERY 4 HOURS PRN
Qty: 60 TABLET | Refills: 0 | Status: SHIPPED | OUTPATIENT
Start: 2022-09-28 | End: 2022-10-28

## 2022-09-28 RX ADMIN — SODIUM CHLORIDE, PRESERVATIVE FREE 10 ML: 5 INJECTION INTRAVENOUS at 08:53

## 2022-09-28 RX ADMIN — EMPAGLIFLOZIN 10 MG: 10 TABLET, FILM COATED ORAL at 08:48

## 2022-09-28 RX ADMIN — ATORVASTATIN CALCIUM 10 MG: 10 TABLET, FILM COATED ORAL at 08:48

## 2022-09-28 RX ADMIN — CLINDAMYCIN IN 5 PERCENT DEXTROSE 900 MG: 18 INJECTION, SOLUTION INTRAVENOUS at 04:18

## 2022-09-28 RX ADMIN — PANTOPRAZOLE SODIUM 40 MG: 40 TABLET, DELAYED RELEASE ORAL at 08:48

## 2022-09-28 RX ADMIN — EZETIMIBE 10 MG: 10 TABLET ORAL at 08:48

## 2022-09-28 RX ADMIN — OXYCODONE 10 MG: 5 TABLET ORAL at 12:10

## 2022-09-28 ASSESSMENT — ENCOUNTER SYMPTOMS
SHORTNESS OF BREATH: 0
NAUSEA: 0
WHEEZING: 0
CONSTIPATION: 0
CHEST TIGHTNESS: 0
COUGH: 0
ABDOMINAL PAIN: 0
DIARRHEA: 0

## 2022-09-28 ASSESSMENT — PAIN DESCRIPTION - PAIN TYPE: TYPE: ACUTE PAIN;SURGICAL PAIN

## 2022-09-28 ASSESSMENT — PAIN - FUNCTIONAL ASSESSMENT: PAIN_FUNCTIONAL_ASSESSMENT: ACTIVITIES ARE NOT PREVENTED

## 2022-09-28 ASSESSMENT — PAIN DESCRIPTION - ORIENTATION: ORIENTATION: LEFT

## 2022-09-28 ASSESSMENT — PAIN DESCRIPTION - LOCATION: LOCATION: KNEE

## 2022-09-28 ASSESSMENT — PAIN SCALES - GENERAL: PAINLEVEL_OUTOF10: 4

## 2022-09-28 ASSESSMENT — PAIN DESCRIPTION - DESCRIPTORS: DESCRIPTORS: ACHING

## 2022-09-28 NOTE — PROGRESS NOTES
Patient discharged home today with [facility]. Facility notified of patient's discharge and all documents were faxed. P ; F .    Electronically signed by Alyssia Ni RN on 9/28/2022 at 11:26 AM

## 2022-09-28 NOTE — DISCHARGE SUMMARY
[facility]  Dr. Corona Coma  Discharge Summary     Karina Jasmine is a 68 y.o. female underwent left total knee replacement procedure without complication. Karina Jasmine was admitted to the floor following her   recovery in the PACU.      Discharge Diagnosis   Left Knee Replacement    Current Inpatient Medications    Current Facility-Administered Medications: vitamin B-12 (CYANOCOBALAMIN) tablet 1,000 mcg, 1,000 mcg, Oral, Daily  ezetimibe (ZETIA) tablet 10 mg, 10 mg, Oral, Daily  [START ON 10/1/2022] Dulaglutide SOPN 3 mg (Patient Supplied), 3 mg, SubCUTAneous, Weekly  atorvastatin (LIPITOR) tablet 10 mg, 10 mg, Oral, Daily  pantoprazole (PROTONIX) tablet 40 mg, 40 mg, Oral, QAM AC  [START ON 10/9/2022] vitamin D (ERGOCALCIFEROL) capsule 50,000 Units, 50,000 Units, Oral, Q14 Days  empagliflozin (JARDIANCE) tablet 10 mg, 10 mg, Oral, Daily  glucose chewable tablet 16 g, 4 tablet, Oral, PRN  dextrose bolus 10% 125 mL, 125 mL, IntraVENous, PRN **OR** dextrose bolus 10% 250 mL, 250 mL, IntraVENous, PRN  glucagon (rDNA) injection 1 mg, 1 mg, SubCUTAneous, PRN  dextrose 10 % infusion, , IntraVENous, Continuous PRN  0.9 % sodium chloride bolus, 500 mL, IntraVENous, PRN  sodium chloride flush 0.9 % injection 5-40 mL, 5-40 mL, IntraVENous, 2 times per day  sodium chloride flush 0.9 % injection 5-40 mL, 5-40 mL, IntraVENous, PRN  0.9 % sodium chloride infusion, , IntraVENous, PRN  acetaminophen (TYLENOL) tablet 650 mg, 650 mg, Oral, Q6H  bisacodyl (DULCOLAX) EC tablet 5 mg, 5 mg, Oral, Daily  ondansetron (ZOFRAN-ODT) disintegrating tablet 4 mg, 4 mg, Oral, Q8H PRN **OR** ondansetron (ZOFRAN) injection 4 mg, 4 mg, IntraVENous, Q6H PRN  insulin lispro (HUMALOG) injection vial 0-4 Units, 0-4 Units, SubCUTAneous, TID WC  insulin lispro (HUMALOG) injection vial 0-4 Units, 0-4 Units, SubCUTAneous, Nightly  oxyCODONE (ROXICODONE) immediate release tablet 5 mg, 5 mg, Oral, Q4H PRN **OR** oxyCODONE (ROXICODONE) immediate release tablet 10 mg, 10 mg, Oral, Q4H PRN **OR** oxyCODONE (ROXICODONE) immediate release tablet 15 mg, 15 mg, Oral, Q4H PRN  HYDROmorphone HCl PF (DILAUDID) injection 0.25 mg, 0.25 mg, IntraVENous, Q3H PRN **OR** HYDROmorphone HCl PF (DILAUDID) injection 0.5 mg, 0.5 mg, IntraVENous, Q3H PRN **OR** HYDROmorphone HCl PF (DILAUDID) injection 1 mg, 1 mg, IntraVENous, Q3H PRN  traMADol (ULTRAM) tablet 50 mg, 50 mg, Oral, Q6H  diphenhydrAMINE (BENADRYL) tablet 25 mg, 25 mg, Oral, Q6H PRN  rivaroxaban (XARELTO) tablet 10 mg, 10 mg, Oral, Daily  senna (SENOKOT) tablet 8.6 mg, 1 tablet, Oral, BID  polyethylene glycol (GLYCOLAX) packet 17 g, 17 g, Oral, BID    Post-operatively the patients diet was advanced as tolerated and their incision was checked on POD #1. The incision was clean, dry and intact with no signs of infection. The patient remained neurovascularly intact in the lower extremity and had intact pulses distally. Patients calf remained soft and showed no evidence of DVT. The patient was able to move her left leg and ankle/foot without any problems post-operatively. Physical therapy and occupational therapy were consulted and began working with the patient post-operatively. The patient progressed with PT/OT as would be expected and continued to improve through their stay. The patients pain was initially controlled with IV medications but we were able to transition to oral pain medications soon after arrival to the floor and their pain remained under good control through their hospital stay. From a medical standpoint the patient remained stable and continued to have the medicine team follow throughout their stay. The patients dressing was changed/incison was checked on day of d/c.   The patient will be discharged at this time to home with home health as per total knee protocol with their current diet restrictions and will continue to follow the total knee precautions outlined to them by us and PT/OT. Condition on Discharge: Stable    Plan  Followup at scheduled appointment time (1 month post-op). Patient was instructed on the use of pain medications, the signs and symptoms of infection, and was given our number to call should they have any questions or concerns following discharge.

## 2022-09-28 NOTE — PROGRESS NOTES
Physical Therapy  Name: Manuela Wallace  MRN:  [id number]  Date of service:  9/28/2022 09/28/22 1151   Restrictions/Precautions   Restrictions/Precautions Fall Risk   General   Chart Reviewed Yes   Subjective   Subjective Sure, I will walk. I am going home today. Bed Mobility   Supine to Sit Stand by assistance   Transfers   Sit to Stand Contact guard assistance   Stand to sit Contact guard assistance   Comment pt stood from bedside, from toilet,with sb-cga   Ambulation   Surface level tile   Device Rolling Walker   Assistance Contact guard assistance   Quality of Gait slightly antalgic, step-to pattern   Distance 40'   Comments ambulated in room only   Patient Goals    Patient goals  go home   Short Term Goals   Time Frame for Short term goals 2 wks   Short term goal 1 supine to sit indep   Short term goal 2 sit to stand indep   Short term goal 3 amb. 100' with RW SBA   Short term goal 4 up/down 2-3 stairs SBA with rail   Conditions Requiring Skilled Therapeutic Intervention   Assessment Patient did well with ambulation. Slow and antalgic but steady and safe. Assisted her to and from the bathroom as well. Activity Tolerance   Activity Tolerance Patient tolerated treatment well   Plan   Plan 6-7 times per week   Plan weeks 2   Current Treatment Recommendations Strengthening;ROM;Balance training;Functional mobility training;Transfer training;Stair training;Gait training; Safety education & training;Patient/Caregiver education & training;Equipment evaluation, education, & procurement; Therapeutic activities; Positioning   Plan Comment cont. PT per POC.    Safety Devices   Type of Devices Bed alarm in place;Call light within reach;Gait belt;Left in bed   PT Whiteboard Notes   Therapy Whiteboard RE 10/11 L TKA, FWBAT IVETTEEClifford       Electronically signed by Bartolome Ramírez PTA on 9/28/2022 at 11:58 AM

## 2022-09-28 NOTE — PROGRESS NOTES
CLINICAL PHARMACY NOTE: MEDS TO BEDS    Total # of Prescriptions Filled: 1   The following medications were delivered to the patient:  Oxycodone 5 mg    Additional Documentation:   Delivered Rx to patients room. Gave Rx to patients spouse No Patel.

## 2022-09-28 NOTE — PROGRESS NOTES
Patient discharged home today with [facility]. Went over all discharge instructions and new medications with patient and provided a copy of all new medications to take home. Patient verbalized understanding. Patient was stable upon discharge.    Electronically signed by Rhonda Barone RN on 9/28/2022 at 11:26 AM

## 2022-09-28 NOTE — PROGRESS NOTES
Daily Progress Note  Radha Sr  MRN: [id number] LOS: 0    Admit Date: 2022 7:43 AM    Subjective: Interval History:    Reviewed overnight events and nursing notes. Doing well this morning. Has had bowel movement. No new complaints. Anticipating being discharged home. Review of Systems   Constitutional:  Negative for chills and fever. Respiratory:  Negative for cough, chest tightness, shortness of breath and wheezing. Cardiovascular:  Negative for chest pain, palpitations and leg swelling. Gastrointestinal:  Negative for abdominal pain, constipation, diarrhea and nausea. DIET:  ADULT DIET;  Regular; 3 carb choices (45 gm/meal)    Medications:      dextrose      sodium chloride        vitamin B-12  1,000 mcg Oral Daily    ezetimibe  10 mg Oral Daily    [START ON 10/1/2022] Dulaglutide  3 mg SubCUTAneous Weekly    atorvastatin  10 mg Oral Daily    pantoprazole  40 mg Oral QAM AC    [START ON 10/9/2022] vitamin D  50,000 Units Oral Q14 Days    empagliflozin  10 mg Oral Daily    sodium chloride flush  5-40 mL IntraVENous 2 times per day    acetaminophen  650 mg Oral Q6H    bisacodyl  5 mg Oral Daily    insulin lispro  0-4 Units SubCUTAneous TID WC    insulin lispro  0-4 Units SubCUTAneous Nightly    traMADol  50 mg Oral Q6H    rivaroxaban  10 mg Oral Daily    senna  1 tablet Oral BID    polyethylene glycol  17 g Oral BID         Objective:     Vitals: /66   Pulse 94   Temp 98.3 °F (36.8 °C)   Resp 18   Ht 5' 5\" (1.651 m)   Wt 144 lb (65.3 kg)   SpO2 93%   BMI 23.96 kg/m²    Intake/Output Summary (Last 24 hours) at 2022 0743  Last data filed at 2022 1838  Gross per 24 hour   Intake 690 ml   Output --   Net 690 ml    Temp (24hrs), Av.8 °F (36.6 °C), Min:96.6 °F (35.9 °C), Max:98.8 °F (37.1 °C)    Glucose:  Lab Results   Component Value Date    POCGLU 194 (H) 2022    POCGLU 181 (H) 2022    POCGLU 176 (H) 2022    POCGLU 172 (H) 09/27/2022     Physical Examination:   Objective:  General Appearance:  Comfortable and well-appearing. Vital signs: (most recent): Blood pressure (!) 142/80, pulse 92, temperature (!) 96.6 °F (35.9 °C), temperature source Temporal, resp. rate 16, height 5' 5\" (1.651 m), weight 144 lb (65.3 kg), SpO2 93 %. No fever. Lungs:  Normal effort and normal respiratory rate. Breath sounds clear to auscultation. She is not in respiratory distress. Heart: Normal rate. Regular rhythm. No murmur. Abdomen: Abdomen is soft and non-distended. Bowel sounds are normal.   There is no abdominal tenderness. Labs:  No results found for: CHEMIS, CBC     Imaging:  XR KNEE LEFT (1-2 VIEWS)  Narrative: Finding: Left knee two view postop radiographs demonstrate patient is status  post knee arthroplasty, which appears to be in anatomical position. There is no  acute fracture or dislocation. There is subcutaneous air from recent postop  especially seen along the lateral aspect of the left knee joint. Impression: Status post left knee arthroplasty, which appears to be in anatomical position. Assessment and Plan:     Primary Problem:  Primary osteoarthritis of left knee    Hospital Problem list:  Principal Problem:    Primary osteoarthritis of left knee  Active Problems:    HLD (hyperlipidemia)    TIA (transient ischemic attack)    COPD (chronic obstructive pulmonary disease) (HCC)    GERD (gastroesophageal reflux disease)    Type 2 diabetes mellitus without complication, without long-term current use of insulin (HCC)  Resolved Problems:    * No resolved hospital problems. *    Assessment: 70-year-old female with past medical history of type 2 diabetes, hypertension, hyperlipidemia who presents for left TKA. Plan:    Perioperative care  Continue bowel regimen, encourage ambulation with physical/occupational therapy, encourage incentive spirometry. Anemia  Controlled, no intervention needed at this time. Type 2 diabetes  Continue Jardiance, continue GLP-1, hold home glipizide and start sliding scale insulin while inpatient. Glucose at appropriate level and at goal of 140-180, no changes. Okay to resume home regimen on discharge. Medically stable for discharge. Discharge planning:   Home    Reviewed treatment plans with the patient and/or family.      Code Status: Full Code    Electronically signed by Shelia Goodson MD on 9/28/2022 at 7:43 AM

## 2022-09-28 NOTE — PROGRESS NOTES
Physical Therapy  Name: Neeraj Al  MRN:  [id number]  Date of service:  9/28/2022 09/28/22 0854   Restrictions/Precautions   Restrictions/Precautions Fall Risk   Required Braces or Orthoses? No   General   Chart Reviewed Yes   Subjective   Subjective Pt agreeable to work with therapy. Pain Assessment   Pain Assessment 0-10   Pain Level 4   Pain Location Knee   Pain Orientation Left   Pain Descriptors Aching   Functional Pain Assessment Activities are not prevented   Pain Type Acute pain;Surgical pain   Bed Mobility   Supine to Sit Stand by assistance   Transfers   Sit to Stand Contact guard assistance   Stand to sit Contact guard assistance   Comment pt stood from bedside, from toilet, and from transport chair with CGA   Ambulation   WB Status FWBAT LLE   Ambulation   Surface level tile   Device Rolling Walker   Assistance Contact guard assistance   Quality of Gait slightly antalgic, step-to pattern   Gait Deviations Slow Marisol;Decreased step length;Decreased step height   Distance 10', 20' x2   Stairs/Curb   Stairs? Yes   Stairs   # Steps  5  (x2 reps)   Stairs Height 4\"  (6\")   Rails Bilateral   Device No Device   Assistance Contact guard assistance   Comment vc's for correct sequencing   Short Term Goals   Time Frame for Short term goals 2 wks   Short term goal 1 supine to sit indep   Short term goal 2 sit to stand indep   Short term goal 3 amb. 100' with RW SBA   Short term goal 4 up/down 2-3 stairs SBA with rail   Conditions Requiring Skilled Therapeutic Intervention   Body Structures, Functions, Activity Limitations Requiring Skilled Therapeutic Intervention Decreased functional mobility ; Decreased ROM; Decreased endurance;Decreased strength;Decreased balance; Increased pain   Assessment Pt tolerated short amb distance well with rwx, able to instruct pt and spouse in ascending/descending steps with CGA and vc's provided for correct sequencing.  Pt declined up to the chair, returned to supine with all needs in reach post gait.    Activity Tolerance   Activity Tolerance Patient tolerated treatment well   PT Plan of Care   Wednesday X   Safety Devices   Type of Devices Bed alarm in place;Call light within reach;Gait belt;Left in bed         Electronically signed by Alida Mcwilliams PTA on 9/28/2022 at 8:59 AM

## 2022-09-29 ENCOUNTER — TELEPHONE (OUTPATIENT)
Dept: INTERNAL MEDICINE | Age: 77
End: 2022-09-29

## 2022-09-29 ENCOUNTER — TELEPHONE (OUTPATIENT)
Dept: INPATIENT UNIT | Age: 77
End: 2022-09-29

## 2022-09-29 NOTE — TELEPHONE ENCOUNTER
[facility] Trans[facility] Initial Follow Up Call    Outreach made within 2 business days of discharge: Yes    Patient: Cuate Mccollum   Patient : 1945 MRN: [id number]    Reason for Admission: Left knee replacement    Discharge Date: 22      Discharge Diagnosis   Left Knee Replacement      Spoke with: Patient    Discharge department/facility: [place] [id number]    TCM Interactive Patient Contact:  Was patient able to fill all prescriptions: Yes  Was patient instructed to bring all medications to the follow-up visit: Yes  Is patient taking all medications as directed in the discharge summary? Yes  Does patient understand their discharge instructions: Yes  Does patient have questions or concerns that need addressed prior to 7-14 day follow up office visit: no    Pt states she is doing ok, she has been up moving around some. She has pain medication, but she said she is trying not to use that unless she has to. She said that she is using tylenol or ibuprofen for pain control. She will have home health coming out today. She did not need anything from us at this time. I did try to schedule her for a follow up but she did not want to do that at this time. I also let her know we could do a virtual and she declined at this time.     Scheduled appointment with PCP within 7-14 days    Follow Up  Future Appointments   Date Time Provider Kat Obando   2022  9:00 AM Jennifer Ivy [facility] [place] [place]

## 2022-10-08 DIAGNOSIS — N39.0 RECURRENT UTI: ICD-10-CM

## 2022-10-10 RX ORDER — NITROFURANTOIN 25; 75 MG/1; MG/1
CAPSULE ORAL
Qty: 24 CAPSULE | Refills: 6 | OUTPATIENT
Start: 2022-10-10

## 2022-10-10 RX ORDER — NITROFURANTOIN 25; 75 MG/1; MG/1
100 CAPSULE ORAL 2 TIMES WEEKLY
Qty: 24 CAPSULE | Refills: 6 | OUTPATIENT
Start: 2022-10-10 | End: 2023-01-08

## 2022-10-11 RX ORDER — NITROFURANTOIN 25; 75 MG/1; MG/1
CAPSULE ORAL
Qty: 24 CAPSULE | Refills: 2 | Status: SHIPPED | OUTPATIENT
Start: 2022-10-11

## 2022-10-13 NOTE — TELEPHONE ENCOUNTER
Patient notified verbally on 10/13 @ 2:28PM .   S/w pt, she needs a refill on Jardiance sent to [facility].

## 2022-10-24 RX ORDER — GLIPIZIDE 5 MG/1
TABLET ORAL
Qty: 180 TABLET | Refills: 3 | Status: SHIPPED | OUTPATIENT
Start: 2022-10-24

## 2022-10-25 ENCOUNTER — TELEPHONE (OUTPATIENT)
Dept: INTERNAL MEDICINE | Age: 77
End: 2022-10-25

## 2022-10-25 RX ORDER — NITROFURANTOIN 25; 75 MG/1; MG/1
100 CAPSULE ORAL 2 TIMES DAILY
Qty: 16 CAPSULE | Refills: 0 | Status: SHIPPED | OUTPATIENT
Start: 2022-10-25 | End: 2022-11-02

## 2022-10-25 NOTE — TELEPHONE ENCOUNTER
Patient stated she has cystitis and needs medication called in. Offered appointment for patient but she stated she could not come in.

## 2022-11-02 ENCOUNTER — TELEPHONE (OUTPATIENT)
Dept: INTERNAL MEDICINE | Age: 77
End: 2022-11-02

## 2022-11-02 NOTE — TELEPHONE ENCOUNTER
S/w pt, she has been constipated since Saturday; denies any other sx. Please advise.   [facility]

## 2022-11-02 NOTE — TELEPHONE ENCOUNTER
Pt refused appt she takes miralax already and did to enema and got a little out I told her to try milk of mag a half a bottle and if no relief she has to be seen

## 2022-11-14 ENCOUNTER — OFFICE VISIT (OUTPATIENT)
Dept: INTERNAL MEDICINE | Age: 77
End: 2022-11-14
Payer: MEDICARE

## 2022-11-14 VITALS
DIASTOLIC BLOOD PRESSURE: 60 MMHG | WEIGHT: 135.7 LBS | OXYGEN SATURATION: 92 % | BODY MASS INDEX: 22.61 KG/M2 | SYSTOLIC BLOOD PRESSURE: 122 MMHG | HEIGHT: 65 IN | HEART RATE: 55 BPM

## 2022-11-14 DIAGNOSIS — N89.8 VAGINAL ITCHING: ICD-10-CM

## 2022-11-14 DIAGNOSIS — N89.8 VAGINAL ITCHING: Primary | ICD-10-CM

## 2022-11-14 DIAGNOSIS — Z96.652 S/P TOTAL KNEE ARTHROPLASTY, LEFT: ICD-10-CM

## 2022-11-14 DIAGNOSIS — R30.0 DYSURIA: ICD-10-CM

## 2022-11-14 DIAGNOSIS — E11.9 TYPE 2 DIABETES MELLITUS WITHOUT COMPLICATION, WITHOUT LONG-TERM CURRENT USE OF INSULIN (HCC): ICD-10-CM

## 2022-11-14 LAB
APPEARANCE FLUID: CLEAR
BACTERIA WET PREP: NORMAL
BILIRUBIN, POC: NORMAL
BLOOD URINE, POC: NORMAL
CLARITY, POC: CLEAR
CLUE CELLS: NORMAL
COLOR, POC: YELLOW
EPITHELIAL CELLS WET PREP: NORMAL
GLUCOSE URINE, POC: 500
KETONES, POC: NORMAL
LEUKOCYTE EST, POC: NORMAL
NITRITE, POC: NORMAL
PH, POC: 6
PROTEIN, POC: NORMAL
RBC WET PREP: NORMAL
SOURCE WET PREP: NORMAL
SPECIFIC GRAVITY, POC: 1.03
TRICHOMONAS PREP: NORMAL
UROBILINOGEN, POC: 0.2
WBC WET PREP: NORMAL
YEAST WET PREP: NORMAL

## 2022-11-14 PROCEDURE — 1123F ACP DISCUSS/DSCN MKR DOCD: CPT | Performed by: NURSE PRACTITIONER

## 2022-11-14 PROCEDURE — G8399 PT W/DXA RESULTS DOCUMENT: HCPCS | Performed by: NURSE PRACTITIONER

## 2022-11-14 PROCEDURE — 81002 URINALYSIS NONAUTO W/O SCOPE: CPT | Performed by: NURSE PRACTITIONER

## 2022-11-14 PROCEDURE — G8420 CALC BMI NORM PARAMETERS: HCPCS | Performed by: NURSE PRACTITIONER

## 2022-11-14 PROCEDURE — G8484 FLU IMMUNIZE NO ADMIN: HCPCS | Performed by: NURSE PRACTITIONER

## 2022-11-14 PROCEDURE — 1036F TOBACCO NON-USER: CPT | Performed by: NURSE PRACTITIONER

## 2022-11-14 PROCEDURE — G8427 DOCREV CUR MEDS BY ELIG CLIN: HCPCS | Performed by: NURSE PRACTITIONER

## 2022-11-14 PROCEDURE — 3044F HG A1C LEVEL LT 7.0%: CPT | Performed by: NURSE PRACTITIONER

## 2022-11-14 PROCEDURE — 1090F PRES/ABSN URINE INCON ASSESS: CPT | Performed by: NURSE PRACTITIONER

## 2022-11-14 PROCEDURE — 99214 OFFICE O/P EST MOD 30 MIN: CPT | Performed by: NURSE PRACTITIONER

## 2022-11-14 RX ORDER — CEFDINIR 300 MG/1
300 CAPSULE ORAL 2 TIMES DAILY
Qty: 14 CAPSULE | Refills: 0 | Status: SHIPPED | OUTPATIENT
Start: 2022-11-14 | End: 2022-11-21

## 2022-11-14 ASSESSMENT — ENCOUNTER SYMPTOMS
TROUBLE SWALLOWING: 0
VOMITING: 0
SHORTNESS OF BREATH: 0
CHOKING: 0
SORE THROAT: 0
BLOOD IN STOOL: 0
DIARRHEA: 0
EYE DISCHARGE: 0
COUGH: 0
COLOR CHANGE: 0
STRIDOR: 0
CONSTIPATION: 0
ABDOMINAL DISTENTION: 0
NAUSEA: 0
WHEEZING: 0
EYE ITCHING: 0
ABDOMINAL PAIN: 0

## 2022-11-14 NOTE — PROGRESS NOTES
[facility] PHYSICIAN SERVICES  [facility] INTERNAL MEDICINE  [id number] [facility] [address] [address]  Dept: [phone]  Dept Fax: [phone]: [phone]    Edu Marr (:  1945) is a 68 y.o. female,Established patient  with green, here for evaluation of the following chief complaint(s): Vaginal Itching (2 days ) and Sweats (Night sweats times 8 days )      Edu Marr is a 68 y.o. female who presents today for her medical conditions/complaints as noted below. Edu Marr is c/yulia Vaginal Itching (2 days ) and Sweats (Night sweats times 8 days )        HPI:     Chief Complaint   Patient presents with    Vaginal Itching     2 days     Sweats     Night sweats times 8 days        HPI    Dysuria; she has been on macrobid BID for years;  she has been doing this for over 10 years ; she was having dysuria for a week;  she has been having night sweats , she has not had a urinary tract infection in a long time. She can even remember the last time she was on antibiotics. It is likely she has become resistant to the Macrobid  Left TKA recently she was on Xarelto and oxycodone. She has not had an oxycodone in a few days. She is concerned about the night sweats which I told her could possibly be from that. She reported that she did have nausea with Xarelto requiring Zofran to be able to complete the course postoperatively. #3 vaginal itching she has been using some over-the-counter Monistat internally and externally but has not seemed to help  4. Type 2 diabetes mellitus we did talk about this being the cause of her a night sweats not sugars but she says they have not been very high at all.   But she has not been checking at night when she wakes up with these night sweats  Past Medical History:   Diagnosis Date    Arthritis     Asthma     Diabetes mellitus ([address]     GERD (gastroesophageal reflux disease)     Helicobacter Pylori (H. Pylori) Infection     Hyperlipidemia Knee pain       Past Surgical History:   Procedure Laterality Date    CHOLECYSTECTOMY      COLONOSCOPY  2017    [facility]    ENDOSCOPY, COLON, DIAGNOSTIC      HYSTERECTOMY (CERVIX STATUS UNKNOWN)      ALEX BSO    KNEE GANGLION SURGERY      NECK SURGERY      Tumor removal    TOTAL KNEE ARTHROPLASTY Left 9/26/2022    LEFT COMPLEX PRIMARY TOTAL KNEE ARTHROPLASTY performed by Tamara Bates MD at [address] [facility]  ?     [place]       Vitals 11/14/2022 9/28/2022 9/28/2022 9/28/2022 9/28/2022 4/96/5998   SYSTOLIC 175 - 731 982 279 -   DIASTOLIC 60 - 80 66 68 -   Pulse 55 - 92 94 92 -   Temp - - 96.6 98.3 98.8 -   Resp - - 16 18 18 -   SpO2 92 - 93 93 90 -   Weight 135 lb 11.2 oz - - - - -   Height 5' 5\" - - - - -   Body mass index 22.58 kg/m2 - - - - -   Pain Level - 4 - - - 3   Some recent data might be hidden       Family History   Problem Relation Age of Onset    Stroke Mother     Diabetes Mother     Liver Cancer Father     Kidney Cancer Father     Breast Cancer Sister     Liver Cancer Maternal Grandfather     Colon Cancer Neg Hx     Colon Polyps Neg Hx        Social History     Tobacco Use    Smoking status: Never    Smokeless tobacco: Never   Substance Use Topics    Alcohol use: No      Current Outpatient Medications   Medication Sig Dispense Refill    cefdinir (OMNICEF) 300 MG capsule Take 1 capsule by mouth 2 times daily for 7 days 14 capsule 0    glipiZIDE (GLUCOTROL) 5 MG tablet TAKE 1 TABLET TWICE A  tablet 3    empagliflozin (JARDIANCE) 10 MG tablet Take 1 tablet by mouth daily 90 tablet 1    diphenhydrAMINE (BENADRYL) 25 MG capsule Take 25 mg by mouth nightly as needed for Sleep      vitamin D (ERGOCALCIFEROL) 1.25 MG (76496 UT) CAPS capsule TAKE 1 CAPSULE BY MOUTH EVERY 14 DAYS 6 capsule 3    esomeprazole (NEXIUM) 40 MG delayed release capsule TAKE 1 CAPSULE EVERY MORNING BEFORE BREAKFAST (Patient taking differently: Take 40 mg by mouth every morning (before breakfast)) 90 capsule 3    atorvastatin (LIPITOR) 80 MG tablet TAKE 1 TABLET DAILY (Patient taking differently: Take 80 mg by mouth daily) 90 tablet 3    Dulaglutide (TRULICITY) 3 FF/3.2UJ SOPN Inject 3 mg into the skin once a week 12 pen 2    ezetimibe (ZETIA) 10 MG tablet TAKE 1 TABLET DAILY (Patient taking differently: Take 10 mg by mouth daily) 90 tablet 3    blood glucose test strips (ASCENSIA AUTODISC VI;ONE TOUCH ULTRA TEST VI) strip 1 each by In Vitro route daily As needed. 100 each 3    vitamin B-12 (CYANOCOBALAMIN) 1000 MCG tablet Take 1,000 mcg by mouth daily      Calcium Carb-Cholecalciferol (CALCIUM 1000 + D PO) Take 1 tablet by mouth daily      Biotin 1000 MCG TABS Take 1,000 Units by mouth daily      polyethylene glycol (MIRALAX) 17 g PACK packet Take 17 g by mouth daily as needed      nitrofurantoin, macrocrystal-monohydrate, (MACROBID) 100 MG capsule Take 1 tablet by mouth twice weekly (Patient not taking: Reported on 11/14/2022) 24 capsule 2    lidocaine-prilocaine (EMLA) 2.5-2.5 % cream Apply topically to back and knee as needed. (Patient not taking: Reported on 11/14/2022) 30 g 2     No current facility-administered medications for this visit.      Allergies   Allergen Reactions    Ciprofloxacin Other (See Comments)     \"Immediate cystitis\"    Codeine Other (See Comments)     Slow heart rate    Sulfa Antibiotics Hives    Amoxicillin-Pot Clavulanate Rash     Itchy rash       Health Maintenance   Topic Date Due    DTaP/Tdap/Td vaccine (1 - Tdap) 10/28/2005    Flu vaccine (1) 08/01/2022    COVID-19 Vaccine (5 - Booster for Moderna series) 08/26/2022    Depression Screen  03/28/2023    Annual Wellness Visit (AWV)  03/29/2023    Lipids  07/28/2023    DEXA (modify frequency per FRAX score)  Completed    Shingles vaccine  Completed    Pneumococcal 65+ years Vaccine  Completed    Hepatitis C screen  Completed    Hepatitis A vaccine  Aged Out    Hib vaccine  Aged Out    Meningococcal (ACWY) vaccine  Aged [facility] Results   Component Value Date    LABA1C 6.9 (H) 09/27/2022     No results found for: PSA, PSADIA  TSH   Date Value Ref Range Status   03/24/2022 0.877 0.270 - 4.200 uIU/mL Final   ]  Lab Results   Component Value Date     09/28/2022    K 5.1 (H) 09/28/2022     09/28/2022    CO2 25 09/28/2022    BUN 16 09/28/2022    CREATININE 0.8 09/28/2022    GLUCOSE 189 (H) 09/28/2022    CALCIUM 8.8 09/28/2022    PROT 6.6 09/08/2022    LABALBU 4.4 09/08/2022    BILITOT 0.6 09/08/2022    ALKPHOS 92 09/08/2022    AST 22 09/08/2022    ALT 30 09/08/2022    LABGLOM >60 09/28/2022    GFRAA >59 09/28/2022     Lab Results   Component Value Date    CHOL 124 (L) 07/28/2022    CHOL 146 (L) 03/24/2022    CHOL 186 09/23/2021     Lab Results   Component Value Date    TRIG 227 (H) 07/28/2022    TRIG 137 03/24/2022    TRIG 197 (H) 09/23/2021     Lab Results   Component Value Date    HDL 27 (L) 07/28/2022    HDL 36 (L) 03/24/2022    HDL 34 (L) 09/23/2021     Lab Results   Component Value Date    LDLCALC 52 07/28/2022    LDLCALC 83 03/24/2022    LDLCALC 113 09/23/2021     Lab Results   Component Value Date/Time     09/28/2022 01:32 AM    K 5.1 09/28/2022 01:32 AM     09/28/2022 01:32 AM    CO2 25 09/28/2022 01:32 AM    BUN 16 09/28/2022 01:32 AM    CREATININE 0.8 09/28/2022 01:32 AM    GLUCOSE 189 09/28/2022 01:32 AM    CALCIUM 8.8 09/28/2022 01:32 AM      Lab Results   Component Value Date    WBC 5.3 09/08/2022    HGB 9.5 (L) 09/28/2022    HCT 28.0 (L) 09/28/2022    MCV 90.3 09/08/2022     09/08/2022    LABLYMP 1.42 07/19/2012    LYMPHOPCT 30.9 09/08/2022    RBC 4.55 09/08/2022    MCH 29.9 09/08/2022    MCHC 33.1 09/08/2022    RDW 12.1 09/08/2022     Lab Results   Component Value Date    VITD25 61.1 03/24/2022     Labs reviewed from 3/22 and today    Subjective:      Review of Systems   Constitutional:  Negative for fatigue, fever and unexpected weight change.    HENT:  Negative for ear discharge, ear pain, mouth sores, sore throat and trouble swallowing. Eyes:  Negative for discharge, itching and visual disturbance. Respiratory:  Negative for cough, choking, shortness of breath, wheezing and stridor. Cardiovascular:  Negative for chest pain, palpitations and leg swelling. Gastrointestinal:  Negative for abdominal distention, abdominal pain, blood in stool, constipation, diarrhea, nausea and vomiting. Endocrine: Negative for cold intolerance, polydipsia and polyuria. Genitourinary:  Negative for difficulty urinating, dysuria, frequency and urgency. Vaginal itching  Dysuria   Musculoskeletal:  Negative for arthralgias and gait problem. Incision is healing left TKA with mild edema   Skin:  Negative for color change and rash. Allergic/Immunologic: Negative for food allergies and immunocompromised state. Neurological:  Negative for dizziness, tremors, syncope, speech difficulty, weakness and headaches. Hematological:  Negative for adenopathy. Does not bruise/bleed easily. Psychiatric/Behavioral:  Negative for confusion and hallucinations. Objective:     Physical Exam  Constitutional:       General: She is not in acute distress. Appearance: She is well-developed. HENT:      Head: Normocephalic and atraumatic. Eyes:      General: No scleral icterus. Right eye: No discharge. Left eye: No discharge. Pupils: Pupils are equal, round, and reactive to light. Neck:      Thyroid: No thyromegaly. Vascular: No JVD. Cardiovascular:      Rate and Rhythm: Normal rate and regular rhythm. Heart sounds: Normal heart sounds. No murmur heard. Pulmonary:      Effort: Pulmonary effort is normal. No respiratory distress. Breath sounds: Normal breath sounds. No wheezing or rales. Abdominal:      General: Bowel sounds are normal. There is no distension. Palpations: Abdomen is soft. There is no mass. Tenderness: There is no abdominal tenderness.  There is no guarding or rebound. Genitourinary:     Comments: She has some local swelling her vulva. Inflamed and somewhat reddened  Musculoskeletal:         General: No tenderness. Normal range of motion. Cervical back: Normal range of motion and neck supple. Skin:     General: Skin is warm and dry. Findings: No erythema or rash. Neurological:      Mental Status: She is alert and oriented to person, place, and time. Cranial Nerves: No cranial nerve deficit. Coordination: Coordination normal.      Deep Tendon Reflexes: Reflexes are normal and symmetric. Reflexes normal.   Psychiatric:         Mood and Affect: Mood is not depressed. Behavior: Behavior normal.         Thought Content: Thought content normal.         Judgment: Judgment normal.     /60   Pulse 55   Ht 5' 5\" (1.651 m)   Wt 135 lb 11.2 oz (61.6 kg)   SpO2 92%   BMI 22.58 kg/m²           Assessment:      Problem List       Dysuria     UTI we will start cefdinir 300 twice a day for 7 days and send for culture and sensitivity          Relevant Orders    Culture, Urine    Wet Prep, Genital    S/P total knee arthroplasty, left     Dr. Judith Miramontes has completed Xarelto has been off oxycodone about a week          Vaginal itching - Primary     We collected a wet prep today we will see what the results of those are and likely needs treating          Relevant Orders    POCT Urinalysis no Micro (Completed)    Wet Prep, Genital    Type 2 diabetes mellitus without complication, without long-term current use of insulin ([address]     Her fastings have been high but her A1c is 6.9          Relevant Medications    blood glucose test strips (ASCENSIA AUTODISC VI;ONE TOUCH ULTRA TEST VI) strip    Dulaglutide (TRULICITY) 3 KX/6.1OI SOPN    empagliflozin (JARDIANCE) 10 MG tablet    glipiZIDE (GLUCOTROL) 5 MG tablet       Plan:        Patient given educational materials - see patient instructions.   Discussed use, benefit, and side effects of prescribed medications. Allpatient questions answered. Pt voiced understanding. Reviewed health maintenance. Instructed to continue current medications, diet and exercise. Patient agreed with treatment plan. Follow up as directed. MEDICATIONS:  Orders Placed This Encounter   Medications    cefdinir (OMNICEF) 300 MG capsule     Sig: Take 1 capsule by mouth 2 times daily for 7 days     Dispense:  14 capsule     Refill:  0           ORDERS:  Orders Placed This Encounter   Procedures    Culture, Urine    Wet Prep, Genital    POCT Urinalysis no Micro         Follow-up:  No follow-ups on file. PATIENT INSTRUCTIONS:  Patient Instructions   1. Dysuria we will will send for culture and have started cefdinir 300 twice a day for 7 days  2.  to s/p left total knee completed Xarelto off of oxycodone  #3 vaginal itching did collect a wet prep we will see what that is and may likely need Diflucan No  . 4 type 2 diabetes mellitus please start checking blood sugars at night when you wake up with the sweats please just make sure it is not because  Electronically signed by LETY Edwarsd on 11/14/2022 at 3:18 PM    @    Ramón/transcription disclaimer:  Much of this encounter note is electronic transcription/translation of spoken language to printed texts. The electronic translation of spoken language may be erroneous, or at times,nonsensical words or phrases may be inadvertently transcribed.   Although I have reviewed the note for such errors, some may still exist.

## 2022-11-14 NOTE — PATIENT INSTRUCTIONS
1.  Dysuria we will will send for culture and have started cefdinir 300 twice a day for 7 days  2.  to s/p left total knee completed Xarelto off of oxycodone  #3 vaginal itching did collect a wet prep we will see what that is and may likely need Diflucan No  . 4 type 2 diabetes mellitus please start checking blood sugars at night when you wake up with the sweats please just make sure it is not because

## 2022-11-15 RX ORDER — METRONIDAZOLE 250 MG/1
250 TABLET ORAL 3 TIMES DAILY
Qty: 21 TABLET | Refills: 0 | Status: SHIPPED | OUTPATIENT
Start: 2022-11-15 | End: 2022-11-22

## 2022-11-16 LAB
ORGANISM: ABNORMAL
URINE CULTURE, ROUTINE: ABNORMAL
URINE CULTURE, ROUTINE: ABNORMAL

## 2022-11-18 ENCOUNTER — OFFICE VISIT (OUTPATIENT)
Dept: INTERNAL MEDICINE | Age: 77
End: 2022-11-18
Payer: MEDICARE

## 2022-11-18 VITALS
BODY MASS INDEX: 22.66 KG/M2 | DIASTOLIC BLOOD PRESSURE: 76 MMHG | RESPIRATION RATE: 18 BRPM | WEIGHT: 136 LBS | SYSTOLIC BLOOD PRESSURE: 110 MMHG | HEIGHT: 65 IN | OXYGEN SATURATION: 98 % | HEART RATE: 82 BPM

## 2022-11-18 DIAGNOSIS — R21 SKIN RASH: ICD-10-CM

## 2022-11-18 DIAGNOSIS — R63.0 DECREASED APPETITE: ICD-10-CM

## 2022-11-18 DIAGNOSIS — B96.89 UTI DUE TO KLEBSIELLA SPECIES: ICD-10-CM

## 2022-11-18 DIAGNOSIS — R20.0 RIGHT ARM NUMBNESS: ICD-10-CM

## 2022-11-18 DIAGNOSIS — N76.0 BACTERIAL VAGINOSIS: ICD-10-CM

## 2022-11-18 DIAGNOSIS — E11.9 TYPE 2 DIABETES MELLITUS WITHOUT COMPLICATION, WITHOUT LONG-TERM CURRENT USE OF INSULIN (HCC): ICD-10-CM

## 2022-11-18 DIAGNOSIS — G45.9 TIA (TRANSIENT ISCHEMIC ATTACK): ICD-10-CM

## 2022-11-18 DIAGNOSIS — R61 NIGHT SWEATS: ICD-10-CM

## 2022-11-18 DIAGNOSIS — E55.9 VITAMIN D DEFICIENCY: ICD-10-CM

## 2022-11-18 DIAGNOSIS — R11.0 NAUSEA: ICD-10-CM

## 2022-11-18 DIAGNOSIS — K59.09 OTHER CONSTIPATION: ICD-10-CM

## 2022-11-18 DIAGNOSIS — N39.0 UTI DUE TO KLEBSIELLA SPECIES: ICD-10-CM

## 2022-11-18 DIAGNOSIS — N89.8 VAGINAL ITCHING: ICD-10-CM

## 2022-11-18 DIAGNOSIS — R63.4 WEIGHT LOSS: ICD-10-CM

## 2022-11-18 DIAGNOSIS — B96.89 BACTERIAL VAGINOSIS: ICD-10-CM

## 2022-11-18 DIAGNOSIS — B96.89 UTI DUE TO KLEBSIELLA SPECIES: Primary | ICD-10-CM

## 2022-11-18 DIAGNOSIS — E78.00 PURE HYPERCHOLESTEROLEMIA: ICD-10-CM

## 2022-11-18 DIAGNOSIS — N39.0 UTI DUE TO KLEBSIELLA SPECIES: Primary | ICD-10-CM

## 2022-11-18 LAB
ALBUMIN SERPL-MCNC: 4.9 G/DL (ref 3.5–5.2)
ALP BLD-CCNC: 87 U/L (ref 35–104)
ALT SERPL-CCNC: 24 U/L (ref 5–33)
ANION GAP SERPL CALCULATED.3IONS-SCNC: 12 MMOL/L (ref 7–19)
AST SERPL-CCNC: 26 U/L (ref 5–32)
BASOPHILS ABSOLUTE: 0 K/UL (ref 0–0.2)
BASOPHILS RELATIVE PERCENT: 0.9 % (ref 0–1)
BILIRUB SERPL-MCNC: 0.4 MG/DL (ref 0.2–1.2)
BUN BLDV-MCNC: 14 MG/DL (ref 8–23)
CALCIUM SERPL-MCNC: 9.9 MG/DL (ref 8.8–10.2)
CHLORIDE BLD-SCNC: 102 MMOL/L (ref 98–111)
CO2: 27 MMOL/L (ref 22–29)
CREAT SERPL-MCNC: 0.7 MG/DL (ref 0.5–0.9)
EOSINOPHILS ABSOLUTE: 0.1 K/UL (ref 0–0.6)
EOSINOPHILS RELATIVE PERCENT: 2.4 % (ref 0–5)
FERRITIN: 110.8 NG/ML (ref 13–150)
GFR SERPL CREATININE-BSD FRML MDRD: >60 ML/MIN/{1.73_M2}
GLUCOSE BLD-MCNC: 78 MG/DL (ref 74–109)
HBA1C MFR BLD: 5.7 % (ref 4–6)
HCT VFR BLD CALC: 41.9 % (ref 37–47)
HEMOGLOBIN: 13.4 G/DL (ref 12–16)
IMMATURE GRANULOCYTES #: 0 K/UL
LYMPHOCYTES ABSOLUTE: 1.6 K/UL (ref 1.1–4.5)
LYMPHOCYTES RELATIVE PERCENT: 34 % (ref 20–40)
MCH RBC QN AUTO: 29.1 PG (ref 27–31)
MCHC RBC AUTO-ENTMCNC: 32 G/DL (ref 33–37)
MCV RBC AUTO: 90.9 FL (ref 81–99)
MONOCYTES ABSOLUTE: 0.3 K/UL (ref 0–0.9)
MONOCYTES RELATIVE PERCENT: 7.2 % (ref 0–10)
NEUTROPHILS ABSOLUTE: 2.5 K/UL (ref 1.5–7.5)
NEUTROPHILS RELATIVE PERCENT: 55.3 % (ref 50–65)
PDW BLD-RTO: 12.4 % (ref 11.5–14.5)
PLATELET # BLD: 244 K/UL (ref 130–400)
PMV BLD AUTO: 9.9 FL (ref 9.4–12.3)
POTASSIUM SERPL-SCNC: 4 MMOL/L (ref 3.5–5)
RBC # BLD: 4.61 M/UL (ref 4.2–5.4)
SODIUM BLD-SCNC: 141 MMOL/L (ref 136–145)
TOTAL PROTEIN: 6.7 G/DL (ref 6.6–8.7)
TSH SERPL DL<=0.05 MIU/L-ACNC: 0.11 UIU/ML (ref 0.27–4.2)
VITAMIN D 25-HYDROXY: 69.1 NG/ML
WBC # BLD: 4.6 K/UL (ref 4.8–10.8)

## 2022-11-18 PROCEDURE — G8420 CALC BMI NORM PARAMETERS: HCPCS | Performed by: INTERNAL MEDICINE

## 2022-11-18 PROCEDURE — 99214 OFFICE O/P EST MOD 30 MIN: CPT | Performed by: INTERNAL MEDICINE

## 2022-11-18 PROCEDURE — 1036F TOBACCO NON-USER: CPT | Performed by: INTERNAL MEDICINE

## 2022-11-18 PROCEDURE — G8399 PT W/DXA RESULTS DOCUMENT: HCPCS | Performed by: INTERNAL MEDICINE

## 2022-11-18 PROCEDURE — 1090F PRES/ABSN URINE INCON ASSESS: CPT | Performed by: INTERNAL MEDICINE

## 2022-11-18 PROCEDURE — 1123F ACP DISCUSS/DSCN MKR DOCD: CPT | Performed by: INTERNAL MEDICINE

## 2022-11-18 PROCEDURE — G8484 FLU IMMUNIZE NO ADMIN: HCPCS | Performed by: INTERNAL MEDICINE

## 2022-11-18 PROCEDURE — 3044F HG A1C LEVEL LT 7.0%: CPT | Performed by: INTERNAL MEDICINE

## 2022-11-18 PROCEDURE — G8427 DOCREV CUR MEDS BY ELIG CLIN: HCPCS | Performed by: INTERNAL MEDICINE

## 2022-11-18 RX ORDER — DOCUSATE SODIUM 100 MG/1
100 CAPSULE, LIQUID FILLED ORAL 2 TIMES DAILY
COMMUNITY

## 2022-11-18 RX ORDER — CALCIUM CARBONATE 500(1250)
500 TABLET ORAL DAILY
COMMUNITY

## 2022-11-18 ASSESSMENT — ENCOUNTER SYMPTOMS
ABDOMINAL PAIN: 0
CHEST TIGHTNESS: 0
WHEEZING: 0
CONSTIPATION: 1
SORE THROAT: 0
NAUSEA: 1
COUGH: 0

## 2022-11-18 NOTE — PROGRESS NOTES
Chief Complaint   Patient presents with    Urinary Tract Infection     PT seen Harjit Peterson and had some cultures done and states that the report has come back and she is on the appropriate meds, but states Quin Ana Rosa thinks the pt needs to see Dr. Ruby Maki, also pt is having some night sweats that are bothering her. Also wants to discuss her Glipizide dose and B-12. B-12 was stopped while in hospital, wants to know if she can start this back, also has only been taking the Glipizide in the morning and not at night due to bottoming out in the middle of the night      History of presenting illness:  Jeromy Rider is a65 y.o. female who presents today for follow up on her chronic medical conditions as noted below.       Patient seen Harjit Peterson on 11/14/2022 with concerns for night sweats and dysuria  Patient has been on Macrobid for years for recurring UTIs  Urine culture was done on Monday 11/14  Wet Prep was done  Results showed bacterial vaginosis and Flagyl was started  Culture showed presence of Klebsiella resistant to ampicillin and Macrobid  Has been treated with Ceftin affair since 11/14/2022      Patient Active Problem List    Diagnosis Date Noted    Right arm numbness 08/12/2022     Priority: High    Vaginal itching 11/14/2022     Priority: Medium    Dysuria 11/14/2022     Priority: Medium    S/P total knee arthroplasty, left 11/14/2022     Priority: Medium    Primary osteoarthritis of left knee 09/26/2022     Priority: Medium    COPD (chronic obstructive pulmonary disease) ([facility] [address] 09/26/2022     Priority: Medium    TIA (transient ischemic attack) 07/28/2022     Priority: Medium    Numbness and tingling of right arm 07/27/2022     Priority: Medium    Right arm weakness 07/27/2022     Priority: Medium    HLD (hyperlipidemia) 05/15/2020     Priority: Medium    Intrinsic asthma with acute exacerbation 05/15/2020     Priority: Medium    Acute sinusitis 10/25/2021    Osteopenia of left hip 02/09/2021     Overview Note:     2/2021 hip neck -1.7      Type 2 diabetes mellitus without complication, without long-term current use of insulin (HCC) 11/23/2020    RLS (restless legs syndrome) 11/23/2020    Vitamin D deficiency 11/23/2020    Nausea 08/22/2013    GERD (gastroesophageal reflux disease) 08/22/2013    History of Helicobacter pylori infection 08/22/2013    History of colon polyps 08/22/2013    Hiatal hernia 08/22/2013    Diverticulosis 08/22/2013     Past Medical History:   Diagnosis Date    Arthritis     Asthma     Diabetes mellitus (HCC)     GERD (gastroesophageal reflux disease)     Helicobacter Pylori (H. Pylori) Infection     Hyperlipidemia     Knee pain       Past Surgical History:   Procedure Laterality Date    CHOLECYSTECTOMY      COLONOSCOPY  2017    [facility]    ENDOSCOPY, COLON, DIAGNOSTIC      HYSTERECTOMY (CERVIX STATUS UNKNOWN)      ALEX BSO    KNEE GANGLION SURGERY      NECK SURGERY      Tumor removal    TOTAL KNEE ARTHROPLASTY Left 9/26/2022    LEFT COMPLEX PRIMARY TOTAL KNEE ARTHROPLASTY performed by Arden Jenkins MD at [address]  ?     [facility]     Current Outpatient Medications   Medication Sig Dispense Refill    calcium carbonate (OSCAL) 500 MG TABS tablet Take 500 mg by mouth daily      docusate sodium (COLACE) 100 MG capsule Take 100 mg by mouth 2 times daily      metroNIDAZOLE (FLAGYL) 250 MG tablet Take 1 tablet by mouth 3 times daily for 7 days 21 tablet 0    cefdinir (OMNICEF) 300 MG capsule Take 1 capsule by mouth 2 times daily for 7 days 14 capsule 0    glipiZIDE (GLUCOTROL) 5 MG tablet TAKE 1 TABLET TWICE A DAY (Patient taking differently: Has only been taking this in the morning) 180 tablet 3    empagliflozin (JARDIANCE) 10 MG tablet Take 1 tablet by mouth daily 90 tablet 1    diphenhydrAMINE (BENADRYL) 25 MG capsule Take 25 mg by mouth nightly as needed for Sleep      vitamin D (ERGOCALCIFEROL) 1.25 MG (48834 UT) CAPS capsule TAKE 1 CAPSULE BY MOUTH EVERY 14 DAYS 6 capsule 3    esomeprazole (NEXIUM) 40 MG delayed release capsule TAKE 1 CAPSULE EVERY MORNING BEFORE BREAKFAST (Patient taking differently: Take 40 mg by mouth every morning (before breakfast)) 90 capsule 3    atorvastatin (LIPITOR) 80 MG tablet TAKE 1 TABLET DAILY (Patient taking differently: Take 80 mg by mouth daily) 90 tablet 3    Dulaglutide (TRULICITY) 3 TL/8.3FZ SOPN Inject 3 mg into the skin once a week 12 pen 2    ezetimibe (ZETIA) 10 MG tablet TAKE 1 TABLET DAILY (Patient taking differently: Take 10 mg by mouth daily) 90 tablet 3    blood glucose test strips (ASCENSIA AUTODISC VI;ONE TOUCH ULTRA TEST VI) strip 1 each by In Vitro route daily As needed. 100 each 3    Biotin 1000 MCG TABS Take 1,000 Units by mouth daily       No current facility-administered medications for this visit. Allergies   Allergen Reactions    Ciprofloxacin Other (See Comments)     \"Immediate cystitis\"    Codeine Other (See Comments)     Slow heart rate    Sulfa Antibiotics Hives    Amoxicillin-Pot Clavulanate Rash     Itchy rash     Social History     Tobacco Use    Smoking status: Never    Smokeless tobacco: Never   Substance Use Topics    Alcohol use: No      Family History   Problem Relation Age of Onset    Stroke Mother     Diabetes Mother     Liver Cancer Father     Kidney Cancer Father     Breast Cancer Sister     Liver Cancer Maternal Grandfather     Colon Cancer Neg Hx     Colon Polyps Neg Hx        Review of Systems   Constitutional:  Positive for appetite change, fatigue and unexpected weight change. Negative for chills and fever. HENT:  Negative for congestion, ear pain, nosebleeds, postnasal drip and sore throat. Respiratory:  Negative for cough, chest tightness and wheezing. Cardiovascular:  Negative for chest pain, palpitations and leg swelling. Gastrointestinal:  Positive for constipation and nausea. Negative for abdominal pain. Genitourinary:  Negative for dysuria and urgency. Musculoskeletal: Negative. Negative for arthralgias. Skin:  Negative for rash. Neurological:  Negative for dizziness and headaches. Psychiatric/Behavioral: Negative. Vitals:    11/18/22 1131   BP: 110/76   Site: Left Upper Arm   Position: Sitting   Cuff Size: Large Adult   Pulse: 82   Resp: 18   SpO2: 98%   Weight: 136 lb (61.7 kg)   Height: 5' 5\" (1.651 m)     Body mass index is 22.63 kg/m². Physical Exam  Constitutional:       Appearance: She is well-developed. HENT:      Right Ear: External ear normal.      Left Ear: External ear normal.      Mouth/Throat:      Pharynx: No oropharyngeal exudate. Eyes:      Conjunctiva/sclera: Conjunctivae normal.      Pupils: Pupils are equal, round, and reactive to light. Neck:      Thyroid: No thyromegaly. Vascular: No JVD. Cardiovascular:      Rate and Rhythm: Normal rate. Heart sounds: Normal heart sounds. No murmur heard. Pulmonary:      Effort: No respiratory distress. Breath sounds: Normal breath sounds. No wheezing or rales. Chest:      Chest wall: No tenderness. Abdominal:      General: Bowel sounds are normal.      Palpations: Abdomen is soft. Musculoskeletal:      Cervical back: Neck supple. Lymphadenopathy:      Cervical: No cervical adenopathy. Skin:     Findings: No rash. Neurological:      Mental Status: She is oriented to person, place, and time.        Lab Review   Orders Only on 11/14/2022   Component Date Value    Urine Culture, Routine 11/14/2022 >100,000 CFU/ml (A)     Organism 11/14/2022 Klebsiella pneumoniae (A)     Urine Culture, Routine 11/14/2022 Heavy growth    Office Visit on 11/14/2022   Component Date Value    Color, UA 11/14/2022 yellow     Clarity, UA 11/14/2022 clear     Glucose, UA POC 11/14/2022 500     Bilirubin, UA 11/14/2022 neg     Ketones, UA 11/14/2022 neg     Spec Grav, UA 11/14/2022 1.030     Blood, UA POC 11/14/2022 neg     pH, UA 11/14/2022 6.0     Protein, UA POC 11/14/2022 neg Urobilinogen, UA 11/14/2022 0.2     Leukocytes, UA 11/14/2022 neg     Nitrite, UA 11/14/2022 pos     Appearance, Fluid 11/14/2022 Clear     Trichomonas Prep 11/14/2022 None Seen     Yeast, Wet Prep 11/14/2022 None Seen     Clue Cells, Wet Prep 11/14/2022 None Seen     WBC, Wet Prep 11/14/2022 2+     RBC, Wet Prep 11/14/2022 None Seen     Epi Cells 11/14/2022 1+     Bacteria 11/14/2022 <1+     Source Wet Prep 11/14/2022 Vaginal    Admission on 09/26/2022, Discharged on 09/28/2022   Component Date Value    POC Glucose 09/26/2022 137 (A)     Performed on 09/26/2022 [facility]     Hemoglobin A1C 09/26/2022 6.9 (A)     POC Glucose 09/26/2022 224 (A)     Performed on 09/26/2022 [facility]k     POC Glucose 09/26/2022 224 (A)     Performed on 09/26/2022 [facility]k     Sodium 09/27/2022 139     Potassium reflex Magnesi* 09/27/2022 4.7     Chloride 09/27/2022 104     CO2 09/27/2022 24     Anion Gap 09/27/2022 11     Glucose 09/27/2022 197 (A)     BUN 09/27/2022 20     Creatinine 09/27/2022 0.7     GFR Non- 09/27/2022 >60     GFR  09/27/2022 >59     Calcium 09/27/2022 8.6 (A)     Hemoglobin 09/27/2022 10.0 (A)     Hematocrit 09/27/2022 29.2 (A)     Ferritin 09/27/2022 143.9     Iron 09/27/2022 29 (A)     TIBC 09/27/2022 204 (A)     Iron Saturation 09/27/2022 14     Vitamin B-12 09/27/2022 >2000 (A)     Folate 09/27/2022 12.7     Hemoglobin A1C 09/27/2022 6.9 (A)     POC Glucose 09/26/2022 215 (A)     Performed on 09/26/2022 [facility]k     POC Glucose 09/27/2022 172 (A)     Performed on 09/27/2022 [facility]k     POC Glucose 09/27/2022 176 (A)     Performed on 09/27/2022 [facility]     POC Glucose 09/27/2022 181 (A)     Performed on 09/27/2022 [facility]k     Sodium 09/28/2022 138     Potassium reflex Magnesi* 09/28/2022 5.1 (A)     Chloride 09/28/2022 102     CO2 09/28/2022 25     Anion Gap 09/28/2022 11     Glucose 09/28/2022 189 (A)     BUN 09/28/2022 16     Creatinine 09/28/2022 0.8     GFR Non- American 09/28/2022 >60     GFR  09/28/2022 >59     Calcium 09/28/2022 8.8     Hemoglobin 09/28/2022 9.5 (A)     Hematocrit 09/28/2022 28.0 (A)     POC Glucose 09/27/2022 194 (A)     Performed on 09/27/2022 [facility]k     POC Glucose 09/28/2022 136 (A)     Performed on 09/28/2022 [facility]k     POC Glucose 09/28/2022 189 (A)     Performed on 09/28/2022 [facility] Outpatient Visit on 09/22/2022   Component Date Value    SARS-CoV-2, PCR 09/22/2022 Not Detected            ASSESSMENT/PLAN:  UTI due to Klebsiella species    Bacterial vaginosis    Vaginal itching    Patient seen Teressa Petersen on 11/14/2022 with concerns for night sweats and dysuria  Patient has been on Macrobid for years for recurring UTIs  Urine culture was done on Monday 11/14  Wet Prep was done  Results showed bacterial vaginosis and Flagyl was started  Culture showed presence of Klebsiella resistant to ampicillin and Macrobid  Has been treated with Omnicef since 11/14/2022    Patient's for now  Complete Omnicef  Completed Flagyl course  If any dysuria symptoms continue would need to let us know  Repeat urinalysis in 2 weeks with culture        Night sweats  Type 2 diabetes  [id number]  Weight loss nearly 20 pounds since March 2022, patient states that all of weight loss since her knee surgery/had been feeling better after knee surgery with poor appetite  Significantly elevated hemoglobin A1c at 7.7 in 3/2022  Time we had increased Trulicity dosing to 3 mg weekly  Patient also has been taking glyburide 5 twice daily and Januvia 50 twice daily  Since her visit here earlier this week with Teressa Petersen she checked her nighttime sugars which were low and she has not discontinued at bedtime glipizide pill  Last night still had night sweats  Recommend following  1. Trulicity dose decrease to 1.5  2. Glipizide dosing change  Take 2.5 in a.m. and none at night  3. Januvia will be 50 twice a day  4.   Close follow-up here with sugar readings in 2 weeks  5. Obtain labs today, orders placed  6. Monitor weight, repeat in 2 weeks      Weight loss  Constipation  Decreased appetite  Patient states mostly this started happening after her knee surgery  However weight loss is quite significant  We are making some diabetic regimen changes as above  Suggest appointment with GI for evaluation  In past has seen Dr. Millie Santizo  Referral faxed      Orders Placed This Encounter   Procedures    Culture, Urine    CBC with Auto Differential    Comprehensive Metabolic Panel    TSH    Hemoglobin A1C    Ferritin    Vitamin D 25 Hydroxy    Urinalysis       New Prescriptions    No medications on file         No follow-ups on file. There are no Patient Instructions on file for this visit. EMR Dragon/transcription disclaimer:Significant part of this  encounter note is electronic transcription/translationof spoken language to printed text. The electronic translation of spoken language may be erroneous, or at times, nonsensical words or phrases may be inadvertently transcribed.  Although I have reviewed the note for sucherrors, some may still exist.

## 2022-11-21 ENCOUNTER — TELEPHONE (OUTPATIENT)
Dept: INTERNAL MEDICINE | Age: 77
End: 2022-11-21

## 2022-11-21 RX ORDER — FLUCONAZOLE 100 MG/1
100 TABLET ORAL DAILY
Qty: 3 TABLET | Refills: 0 | Status: SHIPPED | OUTPATIENT
Start: 2022-11-21 | End: 2022-11-24

## 2022-11-21 RX ORDER — DULAGLUTIDE 1.5 MG/.5ML
1.5 INJECTION, SOLUTION SUBCUTANEOUS WEEKLY
Qty: 4 ADJUSTABLE DOSE PRE-FILLED PEN SYRINGE | Refills: 2 | Status: SHIPPED | OUTPATIENT
Start: 2022-11-21

## 2022-11-21 NOTE — TELEPHONE ENCOUNTER
Patient was seen on 11/18 and treated for a   bacterial and kidney infection by Dr. Papo De La Paz. Today is on the last day of her antibiotics. Patient is still having buring and itching in her vagina, and would like something else called in for her symtpoms to [facility].

## 2022-11-21 NOTE — TELEPHONE ENCOUNTER
----- Message from April Leti sent at 11/21/2022  9:31 AM CST -----  Subject: Message to Provider    QUESTIONS  Information for Provider? Patient was seen on 11/18 and treated for a   bacterial and kidney infection by Dr. Heavenly Ware. Today is on the last day of   her antibiotics. Patient is still having buring and itching in her vagina,   and would like something else called in for her symtpoms to [facility]. please call patient back to advise. thank you.   ---------------------------------------------------------------------------  --------------  Rolando COLEMAN  [phone]; OK to leave message on voicemail  ---------------------------------------------------------------------------  --------------  SCRIPT ANSWERS  Relationship to Patient?  Self

## 2022-11-28 ENCOUNTER — OFFICE VISIT (OUTPATIENT)
Dept: GASTROENTEROLOGY | Facility: CLINIC | Age: 77
End: 2022-11-28

## 2022-11-28 VITALS
TEMPERATURE: 97.5 F | DIASTOLIC BLOOD PRESSURE: 72 MMHG | SYSTOLIC BLOOD PRESSURE: 142 MMHG | HEART RATE: 99 BPM | WEIGHT: 135 LBS | HEIGHT: 66 IN | OXYGEN SATURATION: 98 % | BODY MASS INDEX: 21.69 KG/M2

## 2022-11-28 DIAGNOSIS — R19.4 CHANGE IN BOWEL HABITS: ICD-10-CM

## 2022-11-28 DIAGNOSIS — K59.04 CHRONIC IDIOPATHIC CONSTIPATION: Primary | ICD-10-CM

## 2022-11-28 DIAGNOSIS — R63.4 WEIGHT LOSS, ABNORMAL: ICD-10-CM

## 2022-11-28 DIAGNOSIS — Z78.9 NONSMOKER: ICD-10-CM

## 2022-11-28 DIAGNOSIS — R61 NIGHT SWEATS: ICD-10-CM

## 2022-11-28 PROCEDURE — 99214 OFFICE O/P EST MOD 30 MIN: CPT | Performed by: CLINICAL NURSE SPECIALIST

## 2022-11-28 RX ORDER — GLIPIZIDE 5 MG/1
5 TABLET ORAL
COMMUNITY

## 2022-11-28 NOTE — PROGRESS NOTES
Becky Smiley  1945 11/28/2022  Chief Complaint   Patient presents with   • GI Problem     Constipation and weight loss     Subjective   HPI  Becky Smiley is a 77 y.o. female who presents with a complaint of ongoing progressive contipation that has worsened since she had knee surgery in September and this is when things changed for her. She says that after surgery she lost her appetite. She says that food tastes terrible with more of a metallic taste and her bowels are not moving since the pain medication. She has had a UTI and was treated with an antibiotic. She says that Miralax does not work for her. She did take this for approx 5 years and says it is not working so she quit taking it. She has been on Colace and works intermittently. She is only going every 2-3 days. She has had a colonoscopy and is up to date. She does not desire another one. She denies any rectal bleeding. No fever. She has been having moderate to severe night sweats. She has had weight loss of approx 16 lbs in   She weighed 151 in April this year. Today she is at 135. She says she knows why she has lost weight she has no appetite and food does not taste well.   She also had had some diabetes medication   Past Medical History:   Diagnosis Date   • Asthma    • Diabetes mellitus (HCC)     Type 2   • GERD (gastroesophageal reflux disease)    • HH (hiatus hernia)    • Hx of colonic polyps    • Insomnia    • Vitamin D deficiency      Past Surgical History:   Procedure Laterality Date   • CATARACT EXTRACTION     • CHOLECYSTECTOMY     • COLONOSCOPY  05/26/2011    Isolated area of ischemic colitis, Diverticulosis repeat exam in 5 years Dr. Sommer   • COLONOSCOPY N/A 06/16/2017    Hyperplastic polyp at 20 cm, tics repeat exam in 10 years   • ENDOSCOPY  09/18/2013    Benign hyperplastic polyp with moderate chronic active inflammation stomach, Mild gastritis repeat exam 3 years Dr. Christie   • ENDOSCOPY N/A 06/01/2017     Multiple fundic gland polyps   • HYSTERECTOMY     • KNEE SURGERY Left     baker's cyst    • TUMOR EXCISION      Throat benign   • UPPER GASTROINTESTINAL ENDOSCOPY  09/17/2013    small cardia polyp (benign hyperplastic),mild gastritis       Outpatient Medications Marked as Taking for the 11/28/22 encounter (Office Visit) with Macie Segura APRN   Medication Sig Dispense Refill   • albuterol (ACCUNEB) 1.25 MG/3ML nebulizer solution Inhale 3 mL As Needed.     • ascorbic acid (VITAMIN C) 1000 MG tablet Take 1,000 mg by mouth Daily.     • aspirin 81 MG EC tablet Take 81 mg by mouth Daily.     • atorvastatin (LIPITOR) 80 MG tablet Take 1 tablet by mouth Daily. 90 tablet 3   • Biotin 1000 MCG tablet Take 1,000 Units by mouth.     • cetirizine (zyrTEC) 10 MG tablet Take 10 mg by mouth Daily.     • docusate sodium (COLACE) 50 MG capsule Take  by mouth 2 (Two) Times a Day.     • Dulaglutide (Trulicity) 0.75 MG/0.5ML solution pen-injector Inject 0.75 mg under the skin into the appropriate area as directed 1 (One) Time Per Week. (Patient taking differently: Inject 1.5 mg under the skin into the appropriate area as directed 1 (One) Time Per Week.) 13 pen 3   • Dulaglutide (Trulicity) 3 MG/0.5ML solution pen-injector Inject 3 mg under the skin into the appropriate area as directed.     • empagliflozin (Jardiance) 10 MG tablet tablet Take  by mouth Daily.     • ezetimibe (Zetia) 10 MG tablet Take 1 tablet by mouth Daily. 90 tablet 3   • fluticasone (FLONASE) 50 MCG/ACT nasal spray 2 sprays into the nostril(s) as directed by provider Daily. 16 g 11   • glipizide (GLUCOTROL) 5 MG tablet Take 5 mg by mouth. 1/2 tablet twice a day     • glucose monitor monitoring kit 1 each Take As Directed. Accu-chek monitor 1 each 0   • pantoprazole (PROTONIX) 40 MG EC tablet Take 1 tablet by mouth Daily. 90 tablet 3   • tiotropium (SPIRIVA) 18 MCG per inhalation capsule Place 18 mcg into inhaler and inhale.     • vitamin B-12  (CYANOCOBALAMIN) 1000 MCG tablet Take 1,000 mcg by mouth Daily.     • vitamin D (ERGOCALCIFEROL) 1.25 MG (56047 UT) capsule capsule TAKE 1 CAPSULE BY MOUTH 1 (ONE) TIME PER WEEK. 12 capsule 3     Allergies   Allergen Reactions   • Ciprofloxacin Hcl Other (See Comments)     Immediate cystitis   • Codeine Other (See Comments)     Drops heart rate   • Metformin And Related Other (See Comments)     lethargic   • Augmentin [Amoxicillin-Pot Clavulanate] Rash   • Sulfa Antibiotics Rash     Social History     Socioeconomic History   • Marital status:    Tobacco Use   • Smoking status: Never   • Smokeless tobacco: Never   Vaping Use   • Vaping Use: Never used   Substance and Sexual Activity   • Alcohol use: No   • Drug use: No   • Sexual activity: Defer     Family History   Problem Relation Age of Onset   • Stroke Mother    • Cancer Father    • Colon cancer Neg Hx    • Colon polyps Neg Hx      Health Maintenance   Topic Date Due   • TDAP/TD VACCINES (2 - Tdap) 10/27/2015   • Pneumococcal Vaccine 65+ (2 - PPSV23 if available, else PCV20) 11/01/2017   • DXA SCAN  12/23/2018   • DIABETIC EYE EXAM  10/28/2021   • INFLUENZA VACCINE  08/01/2022   • COVID-19 Vaccine (5 - Booster for Moderna series) 08/26/2022   • URINE MICROALBUMIN  03/24/2023   • ANNUAL WELLNESS VISIT  03/28/2023   • HEMOGLOBIN A1C  05/18/2023   • LIPID PANEL  07/28/2023   • MAMMOGRAM  02/23/2024   • COLORECTAL CANCER SCREENING  06/16/2027   • HEPATITIS C SCREENING  Completed   • ZOSTER VACCINE  Completed     Review of Systems   Constitutional: Positive for appetite change and unexpected weight change. Negative for activity change, chills, diaphoresis, fatigue and fever.   HENT: Negative for ear pain, hearing loss, mouth sores, sore throat, trouble swallowing and voice change.    Eyes: Negative.    Respiratory: Negative for cough, choking, shortness of breath and wheezing.    Cardiovascular: Negative for chest pain and palpitations.   Gastrointestinal:  "Positive for constipation. Negative for abdominal pain, blood in stool, diarrhea, nausea and vomiting.   Endocrine: Negative for cold intolerance and heat intolerance.   Genitourinary: Negative for decreased urine volume, dysuria, frequency, hematuria and urgency.   Musculoskeletal: Negative for back pain, gait problem and myalgias.   Skin: Negative for color change, pallor and rash.   Allergic/Immunologic: Negative for food allergies and immunocompromised state.   Neurological: Negative for dizziness, tremors, seizures, syncope, weakness, light-headedness, numbness and headaches.   Hematological: Negative for adenopathy. Does not bruise/bleed easily.   Psychiatric/Behavioral: Negative for agitation and confusion. The patient is not nervous/anxious.    All other systems reviewed and are negative.    Objective   Vitals:    11/28/22 0916   BP: 142/72   Pulse: 99   Temp: 97.5 °F (36.4 °C)   TempSrc: Temporal   SpO2: 98%   Weight: 61.2 kg (135 lb)   Height: 167.6 cm (66\")     Body mass index is 21.79 kg/m².  Physical Exam  Constitutional:       Appearance: She is well-developed.   HENT:      Head: Normocephalic and atraumatic.   Eyes:      Pupils: Pupils are equal, round, and reactive to light.   Neck:      Trachea: No tracheal deviation.   Cardiovascular:      Rate and Rhythm: Normal rate and regular rhythm.      Heart sounds: Normal heart sounds. No murmur heard.    No friction rub. No gallop.   Pulmonary:      Effort: Pulmonary effort is normal. No respiratory distress.      Breath sounds: Normal breath sounds. No wheezing or rales.   Chest:      Chest wall: No tenderness.   Abdominal:      General: Bowel sounds are normal. There is no distension.      Palpations: Abdomen is soft. Abdomen is not rigid.      Tenderness: There is no abdominal tenderness. There is no guarding or rebound.   Musculoskeletal:         General: No tenderness or deformity. Normal range of motion.      Cervical back: Normal range of motion and " neck supple.   Skin:     General: Skin is warm and dry.      Coloration: Skin is not pale.      Findings: No rash.   Neurological:      Mental Status: She is alert and oriented to person, place, and time.      Deep Tendon Reflexes: Reflexes are normal and symmetric.   Psychiatric:         Behavior: Behavior normal.         Thought Content: Thought content normal.         Judgment: Judgment normal.       Assessment & Plan   Diagnoses and all orders for this visit:    1. Chronic idiopathic constipation (Primary)    2. Change in bowel habits    3. Weight loss, abnormal  Comments:  16 lbs since April 2022 she weighed 151 at that time and now 135.   Orders:  -     CT Abdomen Pelvis With Contrast; Future    4. Night sweats    5. Nonsmoker    discussed her bowels and constipation. She can do Miralax up to twice per day. Increase water intake. She is increasing her activity level hortensia after her surgery. She has stopped her pain medication approx 3 weeks ago.   Given her weight loss and night sweats will get a CT scan.   Also discussed her UTI and the effects it has on taste and stomach in some cases. She agrees    Part of this note may be an electronic transcription/translation of spoken language to printed text using the Dragon Dictation System.  Body mass index is 21.79 kg/m².  Return in about 2 weeks (around 12/12/2022).    BMI is within normal parameters. No other follow-up for BMI required.      All risks, benefits, alternatives, and indications of colonoscopy and/or Endoscopy procedure have been discussed with the patient. Risks to include perforation of the colon requiring possible surgery or colostomy, risk of bleeding from biopsies or removal of colon tissue, possibility of missing a colon polyp or cancer, or adverse drug reaction.  Benefits to include the diagnosis and management of disease of the colon and rectum. Alternatives to include barium enema, radiographic evaluation, lab testing or no intervention. Pt  verbalizes understanding and agrees.     Macie Segura, APRN  11/28/2022  09:47 CST          If you smoke or use tobacco, 4 minutes reading provided  Steps to Quit Smoking  Smoking tobacco can be harmful to your health and can affect almost every organ in your body. Smoking puts you, and those around you, at risk for developing many serious chronic diseases. Quitting smoking is difficult, but it is one of the best things that you can do for your health. It is never too late to quit.  What are the benefits of quitting smoking?  When you quit smoking, you lower your risk of developing serious diseases and conditions, such as:  · Lung cancer or lung disease, such as COPD.  · Heart disease.  · Stroke.  · Heart attack.  · Infertility.  · Osteoporosis and bone fractures.  Additionally, symptoms such as coughing, wheezing, and shortness of breath may get better when you quit. You may also find that you get sick less often because your body is stronger at fighting off colds and infections. If you are pregnant, quitting smoking can help to reduce your chances of having a baby of low birth weight.  How do I get ready to quit?  When you decide to quit smoking, create a plan to make sure that you are successful. Before you quit:  · Pick a date to quit. Set a date within the next two weeks to give you time to prepare.  · Write down the reasons why you are quitting. Keep this list in places where you will see it often, such as on your bathroom mirror or in your car or wallet.  · Identify the people, places, things, and activities that make you want to smoke (triggers) and avoid them. Make sure to take these actions:  ¨ Throw away all cigarettes at home, at work, and in your car.  ¨ Throw away smoking accessories, such as ashtrays and lighters.  ¨ Clean your car and make sure to empty the ashtray.  ¨ Clean your home, including curtains and carpets.  · Tell your family, friends, and coworkers that you are quitting. Support  from your loved ones can make quitting easier.  · Talk with your health care provider about your options for quitting smoking.  · Find out what treatment options are covered by your health insurance.  What strategies can I use to quit smoking?  Talk with your healthcare provider about different strategies to quit smoking. Some strategies include:  · Quitting smoking altogether instead of gradually lessening how much you smoke over a period of time. Research shows that quitting “cold turkey” is more successful than gradually quitting.  · Attending in-person counseling to help you build problem-solving skills. You are more likely to have success in quitting if you attend several counseling sessions. Even short sessions of 10 minutes can be effective.  · Finding resources and support systems that can help you to quit smoking and remain smoke-free after you quit. These resources are most helpful when you use them often. They can include:  ¨ Online chats with a counselor.  ¨ Telephone quitlines.  ¨ Printed self-help materials.  ¨ Support groups or group counseling.  ¨ Text messaging programs.  ¨ Mobile phone applications.  · Taking medicines to help you quit smoking. (If you are pregnant or breastfeeding, talk with your health care provider first.) Some medicines contain nicotine and some do not. Both types of medicines help with cravings, but the medicines that include nicotine help to relieve withdrawal symptoms. Your health care provider may recommend:  ¨ Nicotine patches, gum, or lozenges.  ¨ Nicotine inhalers or sprays.  ¨ Non-nicotine medicine that is taken by mouth.  Talk with your health care provider about combining strategies, such as taking medicines while you are also receiving in-person counseling. Using these two strategies together makes you more likely to succeed in quitting than if you used either strategy on its own.  If you are pregnant or breastfeeding, talk with your health care provider about  finding counseling or other support strategies to quit smoking. Do not take medicine to help you quit smoking unless told to do so by your health care provider.  What things can I do to make it easier to quit?  Quitting smoking might feel overwhelming at first, but there is a lot that you can do to make it easier. Take these important actions:  · Reach out to your family and friends and ask that they support and encourage you during this time. Call telephone quitlines, reach out to support groups, or work with a counselor for support.  · Ask people who smoke to avoid smoking around you.  · Avoid places that trigger you to smoke, such as bars, parties, or smoke-break areas at work.  · Spend time around people who do not smoke.  · Lessen stress in your life, because stress can be a smoking trigger for some people. To lessen stress, try:  ¨ Exercising regularly.  ¨ Deep-breathing exercises.  ¨ Yoga.  ¨ Meditating.  ¨ Performing a body scan. This involves closing your eyes, scanning your body from head to toe, and noticing which parts of your body are particularly tense. Purposefully relax the muscles in those areas.  · Download or purchase mobile phone or tablet apps (applications) that can help you stick to your quit plan by providing reminders, tips, and encouragement. There are many free apps, such as QuitGuide from the CDC (Centers for Disease Control and Prevention). You can find other support for quitting smoking (smoking cessation) through smokefree.gov and other websites.  How will I feel when I quit smoking?  Within the first 24 hours of quitting smoking, you may start to feel some withdrawal symptoms. These symptoms are usually most noticeable 2-3 days after quitting, but they usually do not last beyond 2-3 weeks. Changes or symptoms that you might experience include:  · Mood swings.  · Restlessness, anxiety, or irritation.  · Difficulty concentrating.  · Dizziness.  · Strong cravings for sugary foods in  addition to nicotine.  · Mild weight gain.  · Constipation.  · Nausea.  · Coughing or a sore throat.  · Changes in how your medicines work in your body.  · A depressed mood.  · Difficulty sleeping (insomnia).  After the first 2-3 weeks of quitting, you may start to notice more positive results, such as:  · Improved sense of smell and taste.  · Decreased coughing and sore throat.  · Slower heart rate.  · Lower blood pressure.  · Clearer skin.  · The ability to breathe more easily.  · Fewer sick days.  Quitting smoking is very challenging for most people. Do not get discouraged if you are not successful the first time. Some people need to make many attempts to quit before they achieve long-term success. Do your best to stick to your quit plan, and talk with your health care provider if you have any questions or concerns.  This information is not intended to replace advice given to you by your health care provider. Make sure you discuss any questions you have with your health care provider.  Document Released: 12/12/2002 Document Revised: 08/15/2017 Document Reviewed: 05/03/2016  [employer] Interactive Patient Education © 2017 [employer]

## 2022-11-30 ENCOUNTER — TELEPHONE (OUTPATIENT)
Dept: GASTROENTEROLOGY | Facility: CLINIC | Age: 77
End: 2022-11-30

## 2022-12-01 ENCOUNTER — APPOINTMENT (OUTPATIENT)
Dept: CT IMAGING | Facility: HOSPITAL | Age: 77
End: 2022-12-01

## 2022-12-01 DIAGNOSIS — N39.0 UTI DUE TO KLEBSIELLA SPECIES: ICD-10-CM

## 2022-12-01 DIAGNOSIS — B96.89 UTI DUE TO KLEBSIELLA SPECIES: ICD-10-CM

## 2022-12-01 LAB
BACTERIA: ABNORMAL /HPF
BILIRUBIN URINE: NEGATIVE
BLOOD, URINE: NEGATIVE
CLARITY: ABNORMAL
COLOR: ABNORMAL
CRYSTALS, UA: ABNORMAL /HPF
EPITHELIAL CELLS, UA: 1 /HPF (ref 0–5)
GLUCOSE URINE: =>1000 MG/DL
HYALINE CASTS: 1 /HPF (ref 0–8)
KETONES, URINE: NEGATIVE MG/DL
LEUKOCYTE ESTERASE, URINE: NEGATIVE
NITRITE, URINE: POSITIVE
PH UA: 8 (ref 5–8)
PROTEIN UA: NEGATIVE MG/DL
RBC UA: 1 /HPF (ref 0–4)
SPECIFIC GRAVITY UA: 1.02 (ref 1–1.03)
UROBILINOGEN, URINE: 0.2 E.U./DL
WBC UA: 7 /HPF (ref 0–5)

## 2022-12-03 LAB
ORGANISM: ABNORMAL
URINE CULTURE, ROUTINE: ABNORMAL
URINE CULTURE, ROUTINE: ABNORMAL

## 2022-12-05 ENCOUNTER — OFFICE VISIT (OUTPATIENT)
Dept: INTERNAL MEDICINE | Age: 77
End: 2022-12-05
Payer: MEDICARE

## 2022-12-05 VITALS
DIASTOLIC BLOOD PRESSURE: 70 MMHG | WEIGHT: 135 LBS | BODY MASS INDEX: 22.49 KG/M2 | RESPIRATION RATE: 18 BRPM | SYSTOLIC BLOOD PRESSURE: 138 MMHG | HEIGHT: 65 IN | HEART RATE: 80 BPM | OXYGEN SATURATION: 97 %

## 2022-12-05 DIAGNOSIS — M85.852 OSTEOPENIA OF LEFT HIP: ICD-10-CM

## 2022-12-05 DIAGNOSIS — E78.00 PURE HYPERCHOLESTEROLEMIA: ICD-10-CM

## 2022-12-05 DIAGNOSIS — N39.0 UTI DUE TO KLEBSIELLA SPECIES: ICD-10-CM

## 2022-12-05 DIAGNOSIS — B96.89 UTI DUE TO KLEBSIELLA SPECIES: ICD-10-CM

## 2022-12-05 DIAGNOSIS — B96.89 BACTERIAL VAGINOSIS: ICD-10-CM

## 2022-12-05 DIAGNOSIS — N76.0 BACTERIAL VAGINOSIS: ICD-10-CM

## 2022-12-05 DIAGNOSIS — N89.8 VAGINAL ITCHING: ICD-10-CM

## 2022-12-05 DIAGNOSIS — E11.9 TYPE 2 DIABETES MELLITUS WITHOUT COMPLICATION, WITHOUT LONG-TERM CURRENT USE OF INSULIN (HCC): Primary | ICD-10-CM

## 2022-12-05 DIAGNOSIS — E55.9 VITAMIN D DEFICIENCY: ICD-10-CM

## 2022-12-05 DIAGNOSIS — K59.09 OTHER CONSTIPATION: ICD-10-CM

## 2022-12-05 PROCEDURE — G8484 FLU IMMUNIZE NO ADMIN: HCPCS | Performed by: INTERNAL MEDICINE

## 2022-12-05 PROCEDURE — 1036F TOBACCO NON-USER: CPT | Performed by: INTERNAL MEDICINE

## 2022-12-05 PROCEDURE — G8399 PT W/DXA RESULTS DOCUMENT: HCPCS | Performed by: INTERNAL MEDICINE

## 2022-12-05 PROCEDURE — G8420 CALC BMI NORM PARAMETERS: HCPCS | Performed by: INTERNAL MEDICINE

## 2022-12-05 PROCEDURE — 1123F ACP DISCUSS/DSCN MKR DOCD: CPT | Performed by: INTERNAL MEDICINE

## 2022-12-05 PROCEDURE — 1090F PRES/ABSN URINE INCON ASSESS: CPT | Performed by: INTERNAL MEDICINE

## 2022-12-05 PROCEDURE — G8428 CUR MEDS NOT DOCUMENT: HCPCS | Performed by: INTERNAL MEDICINE

## 2022-12-05 PROCEDURE — 3044F HG A1C LEVEL LT 7.0%: CPT | Performed by: INTERNAL MEDICINE

## 2022-12-05 PROCEDURE — 99214 OFFICE O/P EST MOD 30 MIN: CPT | Performed by: INTERNAL MEDICINE

## 2022-12-05 RX ORDER — CEFUROXIME AXETIL 250 MG/1
250 TABLET ORAL 2 TIMES DAILY
Qty: 14 TABLET | Refills: 0 | Status: SHIPPED | OUTPATIENT
Start: 2022-12-05 | End: 2022-12-12

## 2022-12-05 RX ORDER — DULAGLUTIDE 1.5 MG/.5ML
1.5 INJECTION, SOLUTION SUBCUTANEOUS WEEKLY
Qty: 9 ML | Refills: 1 | Status: SHIPPED | OUTPATIENT
Start: 2022-12-05 | End: 2023-03-05

## 2022-12-05 ASSESSMENT — ENCOUNTER SYMPTOMS
SORE THROAT: 0
COUGH: 0
CHEST TIGHTNESS: 0
ABDOMINAL PAIN: 0
WHEEZING: 0
CONSTIPATION: 0

## 2022-12-05 NOTE — PROGRESS NOTES
Chief Complaint   Patient presents with    Follow-up     2 week follow up. Was seen on 11/18/2022 fore recurrent uti's     History of presenting illness:  Stefania Che is a65 y.o. female who presents today for follow up on her chronic medical conditions as noted below.     Patient Active Problem List    Diagnosis Date Noted    Right arm numbness 08/12/2022     Priority: High    Vaginal itching 11/14/2022     Priority: Medium    Dysuria 11/14/2022     Priority: Medium    S/P total knee arthroplasty, left 11/14/2022     Priority: Medium    Primary osteoarthritis of left knee 09/26/2022     Priority: Medium    COPD (chronic obstructive pulmonary disease) ([facility] [address] 09/26/2022     Priority: Medium    TIA (transient ischemic attack) 07/28/2022     Priority: Medium    Numbness and tingling of right arm 07/27/2022     Priority: Medium    Right arm weakness 07/27/2022     Priority: Medium    HLD (hyperlipidemia) 05/15/2020     Priority: Medium    Intrinsic asthma with acute exacerbation 05/15/2020     Priority: Medium    Acute sinusitis 10/25/2021    Osteopenia of left hip 02/09/2021     Overview Note:     2/2021 hip neck -1.7      Type 2 diabetes mellitus without complication, without long-term current use of insulin ([facility] [address] 11/23/2020    RLS (restless legs syndrome) 11/23/2020    Vitamin D deficiency 11/23/2020    Nausea 08/22/2013    GERD (gastroesophageal reflux disease) 08/22/2013    History of Helicobacter pylori infection 08/22/2013    History of colon polyps 08/22/2013    Hiatal hernia 08/22/2013    Diverticulosis 08/22/2013     Past Medical History:   Diagnosis Date    Arthritis     Asthma     Diabetes mellitus (HCC)     GERD (gastroesophageal reflux disease)     Helicobacter Pylori (H. Pylori) Infection     Hyperlipidemia     Knee pain       Past Surgical History:   Procedure Laterality Date    CHOLECYSTECTOMY      COLONOSCOPY  2017    [facility]    ENDOSCOPY, COLON, DIAGNOSTIC      HYSTERECTOMY (CERVIX STATUS UNKNOWN)      [facility]    KNEE GANGLION SURGERY      NECK SURGERY      Tumor removal    TOTAL KNEE ARTHROPLASTY Left 9/26/2022    LEFT COMPLEX PRIMARY TOTAL KNEE ARTHROPLASTY performed by Marlene Merritt MD at [address]  ? [facility]     Current Outpatient Medications   Medication Sig Dispense Refill    dulaglutide (TRULICITY) 1.5 UX/6.1ZK SC injection Inject 0.5 mLs into the skin once a week 9 mL 1    cefUROXime (CEFTIN) 250 MG tablet Take 1 tablet by mouth 2 times daily for 7 days 14 tablet 0    calcium carbonate (OSCAL) 500 MG TABS tablet Take 500 mg by mouth daily      docusate sodium (COLACE) 100 MG capsule Take 100 mg by mouth 2 times daily      glipiZIDE (GLUCOTROL) 5 MG tablet TAKE 1 TABLET TWICE A DAY (Patient taking differently: Take 2.5 mg by mouth every morning) 180 tablet 3    empagliflozin (JARDIANCE) 10 MG tablet Take 1 tablet by mouth daily 90 tablet 1    diphenhydrAMINE (BENADRYL) 25 MG capsule Take 25 mg by mouth nightly as needed for Sleep      vitamin D (ERGOCALCIFEROL) 1.25 MG (94818 UT) CAPS capsule TAKE 1 CAPSULE BY MOUTH EVERY 14 DAYS 6 capsule 3    esomeprazole (NEXIUM) 40 MG delayed release capsule TAKE 1 CAPSULE EVERY MORNING BEFORE BREAKFAST (Patient taking differently: Take 40 mg by mouth every morning (before breakfast)) 90 capsule 3    atorvastatin (LIPITOR) 80 MG tablet TAKE 1 TABLET DAILY (Patient taking differently: Take 80 mg by mouth daily) 90 tablet 3    ezetimibe (ZETIA) 10 MG tablet TAKE 1 TABLET DAILY (Patient taking differently: Take 10 mg by mouth daily) 90 tablet 3    blood glucose test strips (ASCENSIA AUTODISC VI;ONE TOUCH ULTRA TEST VI) strip 1 each by In Vitro route daily As needed. 100 each 3    Biotin 1000 MCG TABS Take 1,000 Units by mouth daily       No current facility-administered medications for this visit.      Allergies   Allergen Reactions    Ciprofloxacin Other (See Comments)     \"Immediate cystitis\"    Codeine Other (See Comments)     Slow heart rate    Sulfa Antibiotics Hives    Amoxicillin-Pot Clavulanate Rash     Itchy rash     Social History     Tobacco Use    Smoking status: Never    Smokeless tobacco: Never   Substance Use Topics    Alcohol use: No      Family History   Problem Relation Age of Onset    Stroke Mother     Diabetes Mother     Liver Cancer Father     Kidney Cancer Father     Breast Cancer Sister     Liver Cancer Maternal Grandfather     Colon Cancer Neg Hx     Colon Polyps Neg Hx        Review of Systems   Constitutional:  Negative for chills, fatigue and fever. HENT:  Negative for congestion, ear pain, nosebleeds, postnasal drip and sore throat. Respiratory:  Negative for cough, chest tightness and wheezing. Cardiovascular:  Negative for chest pain, palpitations and leg swelling. Gastrointestinal:  Negative for abdominal pain and constipation. Genitourinary:  Negative for dysuria and urgency. Musculoskeletal: Negative. Negative for arthralgias. Skin:  Negative for rash. Neurological:  Negative for dizziness and headaches. Psychiatric/Behavioral: Negative. Vitals:    12/05/22 1150   BP: 138/70   Site: Left Upper Arm   Position: Sitting   Cuff Size: Large Adult   Pulse: 80   Resp: 18   SpO2: 97%   Weight: 135 lb (61.2 kg)   Height: 5' 5\" (1.651 m)     Body mass index is 22.47 kg/m². Physical Exam  Constitutional:       Appearance: She is well-developed. HENT:      Right Ear: External ear normal.      Left Ear: External ear normal.      Mouth/Throat:      Pharynx: No oropharyngeal exudate. Eyes:      Conjunctiva/sclera: Conjunctivae normal.      Pupils: Pupils are equal, round, and reactive to light. Neck:      Thyroid: No thyromegaly. Vascular: No JVD. Cardiovascular:      Rate and Rhythm: Normal rate. Heart sounds: Normal heart sounds. No murmur heard. Pulmonary:      Effort: No respiratory distress. Breath sounds: Normal breath sounds. No wheezing or rales. Chest:      Chest wall: No tenderness. Abdominal:      General: Bowel sounds are normal.      Palpations: Abdomen is soft. Musculoskeletal:      Cervical back: Neck supple. Lymphadenopathy:      Cervical: No cervical adenopathy. Skin:     Findings: No rash.        Lab Review   Orders Only on 12/01/2022   Component Date Value    Urine Culture, Routine 12/01/2022 >100,000 CFU/ml (A)     Organism 12/01/2022 Klebsiella pneumoniae (A)     Urine Culture, Routine 12/01/2022 Heavy growth     Color, UA 12/01/2022 ORANGE (A)     Clarity, UA 12/01/2022 CLOUDY (A)     Glucose, Ur 12/01/2022 =>1000     Bilirubin Urine 12/01/2022 Negative     Ketones, Urine 12/01/2022 Negative     Specific Gravity, UA 12/01/2022 1.020     Blood, Urine 12/01/2022 Negative     pH, UA 12/01/2022 8.0     Protein, UA 12/01/2022 Negative     Urobilinogen, Urine 12/01/2022 0.2     Nitrite, Urine 12/01/2022 POSITIVE (A)     Leukocyte Esterase, Urine 12/01/2022 Negative     Bacteria, UA 12/01/2022 4+ (A)     Crystals, UA 12/01/2022 NEG (A)     Hyaline Casts, UA 12/01/2022 1     WBC, UA 12/01/2022 7 (A)     RBC, UA 12/01/2022 1     Epithelial Cells, UA 12/01/2022 1    Orders Only on 11/18/2022   Component Date Value    Vit D, 25-Hydroxy 11/18/2022 69.1     Ferritin 11/18/2022 110.8     TSH 11/18/2022 0.111 (A)     Sodium 11/18/2022 141     Potassium 11/18/2022 4.0     Chloride 11/18/2022 102     CO2 11/18/2022 27     Anion Gap 11/18/2022 12     Glucose 11/18/2022 78     BUN 11/18/2022 14     Creatinine 11/18/2022 0.7     Est, Glom Filt Rate 11/18/2022 >60     Calcium 11/18/2022 9.9     Total Protein 11/18/2022 6.7     Albumin 11/18/2022 4.9     Total Bilirubin 11/18/2022 0.4     Alkaline Phosphatase 11/18/2022 87     ALT 11/18/2022 24     AST 11/18/2022 26     WBC 11/18/2022 4.6 (A)     RBC 11/18/2022 4.61     Hemoglobin 11/18/2022 13.4     Hematocrit 11/18/2022 41.9     MCV 11/18/2022 90.9     MCH 11/18/2022 29.1     MCHC 11/18/2022 32.0 (A) RDW 11/18/2022 12.4     Platelets 29/92/8743 244     MPV 11/18/2022 9.9     Neutrophils % 11/18/2022 55.3     Lymphocytes % 11/18/2022 34.0     Monocytes % 11/18/2022 7.2     Eosinophils % 11/18/2022 2.4     Basophils % 11/18/2022 0.9     Neutrophils Absolute 11/18/2022 2.5     Immature Granulocytes # 11/18/2022 0.0     Lymphocytes Absolute 11/18/2022 1.6     Monocytes Absolute 11/18/2022 0.30     Eosinophils Absolute 11/18/2022 0.10     Basophils Absolute 11/18/2022 0.00     Hemoglobin A1C 11/18/2022 5.7    Orders Only on 11/14/2022   Component Date Value    Urine Culture, Routine 11/14/2022 >100,000 CFU/ml (A)     Organism 11/14/2022 Klebsiella pneumoniae (A)     Urine Culture, Routine 11/14/2022 Heavy growth    Office Visit on 11/14/2022   Component Date Value    Color, UA 11/14/2022 yellow     Clarity, UA 11/14/2022 clear     Glucose, UA POC 11/14/2022 500     Bilirubin, UA 11/14/2022 neg     Ketones, UA 11/14/2022 neg     Spec Grav, UA 11/14/2022 1.030     Blood, UA POC 11/14/2022 neg     pH, UA 11/14/2022 6.0     Protein, UA POC 11/14/2022 neg     Urobilinogen, UA 11/14/2022 0.2     Leukocytes, UA 11/14/2022 neg     Nitrite, UA 11/14/2022 pos     Appearance, Fluid 11/14/2022 Clear     Trichomonas Prep 11/14/2022 None Seen     Yeast, Wet Prep 11/14/2022 None Seen     Clue Cells, Wet Prep 11/14/2022 None Seen     WBC, Wet Prep 11/14/2022 2+     RBC, Wet Prep 11/14/2022 None Seen     Epi Cells 11/14/2022 1+     Bacteria 11/14/2022 <1+     Source Wet Prep 11/14/2022 Vaginal            ASSESSMENT/PLAN:      UTI due to Klebsiella species  Repeat urinalysis culture positive for Klebsiella  Patient is asymptomatic  Will Rx low-dose Ceftin 250 twice daily x7 days  Repeat urine culture 2 weeks  Take act\ivia yogurt antibiotic       Type 2 diabetes  Hyperglycemia  Improved a1c 5.7  Was  elevated hemoglobin A1c at 7.7 in 3/2022  Recommendations   Recommend following  1. Trulicity dose decrease to 1.5  2.   Glipizide dosing change  Take 2.5 in a.m. and none at night  3. Januvia will be 50 twice a day  4. Close follow-up here with sugar readings in 2 weeks  5. Obtain labs today, orders placed  6. Monitor weight, repeat in 2 weeks        RLS (restless legs syndrome)  Has been well controlled/currently on no medications  rx made her constipated     Vitamin D deficiency  Lab results reviewed with patient  vitamin D level 69  Rx vit d 50,000 weekly         Interstitial cystitis  previously followed with Dr. Jermaine Arevalo, prior history of frequent UTIs. RX  Macrobid 100 twice weekly        Primary insomnia-   Sleep hygiene recommendations discussed with patient     Pure hypercholesterolemia  Lab results reviewed and discussed with patient  Liver functions normal  LDL is 83 in 3/2022  RX Zetia 10 mg daily  Rx Lipitor 80 mg daily  Recommend diet lower in saturated fats     Asthma  rx  dulera + spiriva ( never smoker)  Monitor closely  During spring months also suggest patient add Xyzal or Zyrtec and uses Flonase no spray and carries albuterol for as needed use     Osteopenia          Osteopenia of left hip 02/09/2021     Overview Note:     2/2021 hip neck -1.7      Recommendations at this time  #1 make sure  she takes her vitamin D supplement  #2 exercise, specifically weightbearing exercise is recommended     Repeat 3-4 yrs                Orders Placed This Encounter   Procedures    Culture, Urine    CBC with Auto Differential    Comprehensive Metabolic Panel    Hemoglobin A1C    Lipid Panel    Microalbumin / Creatinine Urine Ratio    Urinalysis    TSH    Vitamin D 25 Hydroxy    Urinalysis     New Prescriptions    CEFUROXIME (CEFTIN) 250 MG TABLET    Take 1 tablet by mouth 2 times daily for 7 days         Return for Annual Physical.   There are no Patient Instructions on file for this visit.   EMR Dragon/transcription disclaimer:Significant part of this  encounter note is electronic transcription/translationof spoken language to printed text. The electronic translation of spoken language may be erroneous, or at times, nonsensical words or phrases may be inadvertently transcribed.  Although I have reviewed the note for sucherrors, some may still exist.

## 2022-12-14 DIAGNOSIS — E11.9 TYPE 2 DIABETES MELLITUS WITHOUT COMPLICATION, WITHOUT LONG-TERM CURRENT USE OF INSULIN (HCC): ICD-10-CM

## 2022-12-14 RX ORDER — BLOOD SUGAR DIAGNOSTIC
STRIP MISCELLANEOUS
Qty: 100 STRIP | Refills: 3 | Status: SHIPPED | OUTPATIENT
Start: 2022-12-14

## 2022-12-14 NOTE — TELEPHONE ENCOUNTER
Ad Feng called to request a refill on her medication.       Last office visit : 12/5/2022   Next office visit : 4/10/2023     Requested Prescriptions     Pending Prescriptions Disp Refills    ACCU-CHEK TINO PLUS strip [Pharmacy Med Name: ACCU-CHEK TINO PLUS TEST S Strip] 100 strip 3     Sig: TEST BLOOD SUGAR DAILY AS NEEDED            Gerry Saint, MA

## 2022-12-15 ENCOUNTER — TELEPHONE (OUTPATIENT)
Dept: INTERNAL MEDICINE | Age: 77
End: 2022-12-15

## 2022-12-15 RX ORDER — FLUCONAZOLE 150 MG/1
150 TABLET ORAL DAILY
Qty: 2 TABLET | Refills: 0 | Status: SHIPPED | OUTPATIENT
Start: 2022-12-15 | End: 2022-12-17

## 2022-12-15 NOTE — TELEPHONE ENCOUNTER
----- Message from Apurva Ascencio sent at 12/15/2022  3:11 PM CST -----  Subject: Message to Provider    QUESTIONS  Information for Provider? Pt is calling in wanting Rn or PCP to call her   states she is still experiencing vaginal itching and wants a cream called   into the pharmacy. Please advise.   ---------------------------------------------------------------------------  --------------  Maribel NIÑO  [phone]; OK to leave message on voicemail  ---------------------------------------------------------------------------  --------------  SCRIPT ANSWERS  Relationship to Patient?  Self

## 2022-12-15 NOTE — TELEPHONE ENCOUNTER
Pt is calling in wanting Rn or PCP to call her   states she is still experiencing vaginal itching and wants a cream called into the pharmacy. Please advise.    She states she is eating yogurt daily and it will get better and now it is back she is asking if there is a cream or something she can use she is bringing in a UA on Monday

## 2022-12-19 DIAGNOSIS — N39.0 UTI DUE TO KLEBSIELLA SPECIES: ICD-10-CM

## 2022-12-19 DIAGNOSIS — B96.89 UTI DUE TO KLEBSIELLA SPECIES: ICD-10-CM

## 2022-12-19 LAB
BACTERIA: ABNORMAL /HPF
BILIRUBIN URINE: NEGATIVE
BLOOD, URINE: NEGATIVE
CLARITY: ABNORMAL
COLOR: YELLOW
CRYSTALS, UA: ABNORMAL /HPF
EPITHELIAL CELLS, UA: 1 /HPF (ref 0–5)
GLUCOSE URINE: =>1000 MG/DL
HYALINE CASTS: 2 /HPF (ref 0–8)
KETONES, URINE: NEGATIVE MG/DL
LEUKOCYTE ESTERASE, URINE: NEGATIVE
NITRITE, URINE: POSITIVE
PH UA: 5 (ref 5–8)
PROTEIN UA: NEGATIVE MG/DL
RBC UA: 1 /HPF (ref 0–4)
SPECIFIC GRAVITY UA: 1.04 (ref 1–1.03)
UROBILINOGEN, URINE: 0.2 E.U./DL
WBC UA: 4 /HPF (ref 0–5)

## 2022-12-21 ENCOUNTER — TELEPHONE (OUTPATIENT)
Dept: INTERNAL MEDICINE | Age: 77
End: 2022-12-21

## 2022-12-21 LAB
ORGANISM: ABNORMAL
URINE CULTURE, ROUTINE: ABNORMAL
URINE CULTURE, ROUTINE: ABNORMAL

## 2022-12-21 RX ORDER — PENICILLIN V POTASSIUM 250 MG/1
250 TABLET ORAL 2 TIMES DAILY
Qty: 16 TABLET | Refills: 0 | Status: SHIPPED | OUTPATIENT
Start: 2022-12-21 | End: 2022-12-29

## 2022-12-21 NOTE — TELEPHONE ENCOUNTER
Per Dr. Rossi Viramontes we are sending in Penicillin 250 BID for 8 days due to her Urine Culture results.  At this time since the pt is not having any symptoms we will not repeat this urine test.

## 2023-01-24 ENCOUNTER — TELEMEDICINE (OUTPATIENT)
Dept: INTERNAL MEDICINE | Age: 78
End: 2023-01-24
Payer: MEDICARE

## 2023-01-24 DIAGNOSIS — J20.8 ACUTE BRONCHITIS DUE TO COVID-19 VIRUS: Primary | ICD-10-CM

## 2023-01-24 DIAGNOSIS — R05.8 DRY COUGH: ICD-10-CM

## 2023-01-24 DIAGNOSIS — E78.00 PURE HYPERCHOLESTEROLEMIA: ICD-10-CM

## 2023-01-24 DIAGNOSIS — R53.83 OTHER FATIGUE: ICD-10-CM

## 2023-01-24 DIAGNOSIS — R09.81 SINUS CONGESTION: ICD-10-CM

## 2023-01-24 DIAGNOSIS — Z79.899 ON STATIN THERAPY: ICD-10-CM

## 2023-01-24 DIAGNOSIS — U07.1 ACUTE BRONCHITIS DUE TO COVID-19 VIRUS: Primary | ICD-10-CM

## 2023-01-24 DIAGNOSIS — R42 DIZZINESS: ICD-10-CM

## 2023-01-24 DIAGNOSIS — E11.9 TYPE 2 DIABETES MELLITUS WITHOUT COMPLICATION, WITHOUT LONG-TERM CURRENT USE OF INSULIN (HCC): ICD-10-CM

## 2023-01-24 PROCEDURE — G8428 CUR MEDS NOT DOCUMENT: HCPCS | Performed by: INTERNAL MEDICINE

## 2023-01-24 PROCEDURE — 99214 OFFICE O/P EST MOD 30 MIN: CPT | Performed by: INTERNAL MEDICINE

## 2023-01-24 PROCEDURE — 1123F ACP DISCUSS/DSCN MKR DOCD: CPT | Performed by: INTERNAL MEDICINE

## 2023-01-24 PROCEDURE — 1090F PRES/ABSN URINE INCON ASSESS: CPT | Performed by: INTERNAL MEDICINE

## 2023-01-24 PROCEDURE — 1036F TOBACCO NON-USER: CPT | Performed by: INTERNAL MEDICINE

## 2023-01-24 PROCEDURE — G8399 PT W/DXA RESULTS DOCUMENT: HCPCS | Performed by: INTERNAL MEDICINE

## 2023-01-24 PROCEDURE — G8420 CALC BMI NORM PARAMETERS: HCPCS | Performed by: INTERNAL MEDICINE

## 2023-01-24 PROCEDURE — G8484 FLU IMMUNIZE NO ADMIN: HCPCS | Performed by: INTERNAL MEDICINE

## 2023-01-24 ASSESSMENT — PATIENT HEALTH QUESTIONNAIRE - PHQ9
SUM OF ALL RESPONSES TO PHQ9 QUESTIONS 1 & 2: 0
SUM OF ALL RESPONSES TO PHQ QUESTIONS 1-9: 0
1. LITTLE INTEREST OR PLEASURE IN DOING THINGS: 0
SUM OF ALL RESPONSES TO PHQ QUESTIONS 1-9: 0
2. FEELING DOWN, DEPRESSED OR HOPELESS: 0

## 2023-01-24 ASSESSMENT — ENCOUNTER SYMPTOMS
SORE THROAT: 0
WHEEZING: 0
SINUS PAIN: 1
CONSTIPATION: 0
CHEST TIGHTNESS: 0
SINUS PRESSURE: 1
COUGH: 1
RHINORRHEA: 1
ABDOMINAL PAIN: 0

## 2023-01-24 NOTE — PROGRESS NOTES
Chief Complaint   Patient presents with    Fatigue    Head Congestion     Started 2 days ago, states that se tested positive for COVID today     Drainage     History of presenting illness:  Anette Roldan is a65 y.o. female     TELEHEALTH EVALUATION -- Audio/Visual (During VOLBE-69 public health emergency)  Patient location-home  Pursuant to the emergency declaration under the [address] [place] [address] authority and the [employer] Act, this Virtual  Visit was conducted, with patient's consent, to reduce the patient's risk of exposure to COVID-19 and provide continuity of care for an established patient. Services were provided through a video synchronous discussion virtually to substitute for in-person clinic visit.     Sick 2 days  Congestion  cough  Patient Active Problem List    Diagnosis Date Noted    Right arm numbness 08/12/2022     Priority: High    Vaginal itching 11/14/2022     Priority: Medium    Dysuria 11/14/2022     Priority: Medium    S/P total knee arthroplasty, left 11/14/2022     Priority: Medium    Primary osteoarthritis of left knee 09/26/2022     Priority: Medium    COPD (chronic obstructive pulmonary disease) ([address] 09/26/2022     Priority: Medium    TIA (transient ischemic attack) 07/28/2022     Priority: Medium    Numbness and tingling of right arm 07/27/2022     Priority: Medium    Right arm weakness 07/27/2022     Priority: Medium    HLD (hyperlipidemia) 05/15/2020     Priority: Medium    Intrinsic asthma with acute exacerbation 05/15/2020     Priority: Medium    Acute sinusitis 10/25/2021    Osteopenia of left hip 02/09/2021     Overview Note:     2/2021 hip neck -1.7      Type 2 diabetes mellitus without complication, without long-term current use of insulin ([address] 11/23/2020    RLS (restless legs syndrome) 11/23/2020    Vitamin D deficiency 11/23/2020    Nausea 08/22/2013    GERD (gastroesophageal reflux disease) 08/22/2013    History of Helicobacter pylori infection 08/22/2013    History of colon polyps 08/22/2013    Hiatal hernia 08/22/2013    Diverticulosis 08/22/2013     Past Medical History:   Diagnosis Date    Arthritis     Asthma     Diabetes mellitus (HCC)     GERD (gastroesophageal reflux disease)     Helicobacter Pylori (H. Pylori) Infection     Hyperlipidemia     Knee pain       Past Surgical History:   Procedure Laterality Date    CHOLECYSTECTOMY      COLONOSCOPY  2017    [facility]    ENDOSCOPY, COLON, DIAGNOSTIC      HYSTERECTOMY (CERVIX STATUS UNKNOWN)      [facility]    KNEE GANGLION SURGERY      NECK SURGERY      Tumor removal    TOTAL KNEE ARTHROPLASTY Left 9/26/2022    LEFT COMPLEX PRIMARY TOTAL KNEE ARTHROPLASTY performed by Gillian Woods MD at [address]  ? [facility]     Current Outpatient Medications   Medication Sig Dispense Refill    nirmatrelvir/ritonavir (PAXLOVID) 20 x 150 MG & 10 x 100MG TBPK Take 3 tablets (two 150 mg nirmatrelvir and one 100 mg ritonavir tablets) by mouth every 12 hours for 5 days.  30 tablet 0    ACCU-CHEK TINO PLUS strip TEST BLOOD SUGAR DAILY AS NEEDED 100 strip 3    dulaglutide (TRULICITY) 1.5 RC/0.9WI SC injection Inject 0.5 mLs into the skin once a week 9 mL 1    calcium carbonate (OSCAL) 500 MG TABS tablet Take 500 mg by mouth daily      glipiZIDE (GLUCOTROL) 5 MG tablet TAKE 1 TABLET TWICE A DAY (Patient taking differently: Take 2.5 mg by mouth every morning) 180 tablet 3    empagliflozin (JARDIANCE) 10 MG tablet Take 1 tablet by mouth daily 90 tablet 1    diphenhydrAMINE (BENADRYL) 25 MG capsule Take 25 mg by mouth nightly as needed for Sleep      vitamin D (ERGOCALCIFEROL) 1.25 MG (29902 UT) CAPS capsule TAKE 1 CAPSULE BY MOUTH EVERY 14 DAYS 6 capsule 3    esomeprazole (NEXIUM) 40 MG delayed release capsule TAKE 1 CAPSULE EVERY MORNING BEFORE BREAKFAST (Patient taking differently: Take 40 mg by mouth every morning (before breakfast)) 90 capsule 3    atorvastatin (LIPITOR) 80 MG tablet TAKE 1 TABLET DAILY (Patient taking differently: Take 80 mg by mouth daily) 90 tablet 3    ezetimibe (ZETIA) 10 MG tablet TAKE 1 TABLET DAILY (Patient taking differently: Take 10 mg by mouth daily) 90 tablet 3    Biotin 1000 MCG TABS Take 1,000 Units by mouth daily       No current facility-administered medications for this visit. Allergies   Allergen Reactions    Ciprofloxacin Other (See Comments)     \"Immediate cystitis\"    Codeine Other (See Comments)     Slow heart rate    Sulfa Antibiotics Hives     Social History     Tobacco Use    Smoking status: Never    Smokeless tobacco: Never   Substance Use Topics    Alcohol use: No      Family History   Problem Relation Age of Onset    Stroke Mother     Diabetes Mother     Liver Cancer Father     Kidney Cancer Father     Breast Cancer Sister     Liver Cancer Maternal Grandfather     Colon Cancer Neg Hx     Colon Polyps Neg Hx        Review of Systems   Constitutional:  Positive for fatigue. Negative for chills and fever. HENT:  Positive for congestion, postnasal drip, rhinorrhea, sinus pressure, sinus pain and sneezing. Negative for ear pain, nosebleeds and sore throat. Respiratory:  Positive for cough. Negative for chest tightness and wheezing. Cardiovascular:  Negative for chest pain, palpitations and leg swelling. Gastrointestinal:  Negative for abdominal pain and constipation. Genitourinary:  Negative for dysuria and urgency. Musculoskeletal: Negative. Negative for arthralgias. Skin:  Negative for rash. Neurological:  Negative for dizziness and headaches. Psychiatric/Behavioral: Negative. There were no vitals filed for this visit. There is no height or weight on file to calculate BMI.   Patient-Reported Vitals 1/24/2023   Patient-Reported Weight 135   Patient-Reported Height 5ft 5in   Patient-Reported Systolic 458   Patient-Reported Diastolic 70 Patient-Reported Pulse 80   Patient-Reported Temperature 98        Physical Exam  PHYSICAL EXAMINATION:  [ INSTRUCTIONS:  \"[x]\" Indicates a positive item  \"[]\" Indicates a negative item  -- DELETE ALL ITEMS NOT EXAMINED]  [x] Alert  [x] Oriented to person/place/time    [x] No apparent distress  [] Toxic appearing    [] Face flushed appearing [] Sclera clear  [] Lips are cyanotic      [x] Breathing appears normal  [] Appears tachypneic      [] Rash on visible skin    [x] Cranial Nerves II-XII grossly intact    [x] Motor grossly intact in visible upper extremities    [x] Motor grossly intact in visible lower extremities    [x] Normal Mood  [] Anxious appearing    [] Depressed appearing  [] Confused appearing      [] Poor short term memory  [] Poor long term memory    [] OTHER:      Due to this being a TeleHealth encounter, evaluation of the following organ systems is limited: Vitals/Constitutional/EENT/Resp/CV/GI//MS/Neuro/Skin/Heme-Lymph-Imm.     Lab Review   Orders Only on 12/19/2022   Component Date Value    Urine Culture, Routine 12/19/2022 >100,000 CFU/ml (A)     Organism 12/19/2022 Klebsiella pneumoniae (A)     Urine Culture, Routine 12/19/2022 Heavy growth     Color, UA 12/19/2022 YELLOW     Clarity, UA 12/19/2022 CLOUDY (A)     Glucose, Ur 12/19/2022 =>1000     Bilirubin Urine 12/19/2022 Negative     Ketones, Urine 12/19/2022 Negative     Specific Gravity, UA 12/19/2022 1.038     Blood, Urine 12/19/2022 Negative     pH, UA 12/19/2022 5.0     Protein, UA 12/19/2022 Negative     Urobilinogen, Urine 12/19/2022 0.2     Nitrite, Urine 12/19/2022 POSITIVE (A)     Leukocyte Esterase, Urine 12/19/2022 Negative     Bacteria, UA 12/19/2022 4+ (A)     Crystals, UA 12/19/2022 NEG (A)     Hyaline Casts, UA 12/19/2022 2     WBC, UA 12/19/2022 4     RBC, UA 12/19/2022 1     Epithelial Cells, UA 12/19/2022 1    Orders Only on 12/01/2022   Component Date Value    Urine Culture, Routine 12/01/2022 >100,000 CFU/ml (A) Organism 12/01/2022 Klebsiella pneumoniae (A)     Urine Culture, Routine 12/01/2022 Heavy growth     Color, UA 12/01/2022 ORANGE (A)     Clarity, UA 12/01/2022 CLOUDY (A)     Glucose, Ur 12/01/2022 =>1000     Bilirubin Urine 12/01/2022 Negative     Ketones, Urine 12/01/2022 Negative     Specific Gravity, UA 12/01/2022 1.020     Blood, Urine 12/01/2022 Negative     pH, UA 12/01/2022 8.0     Protein, UA 12/01/2022 Negative     Urobilinogen, Urine 12/01/2022 0.2     Nitrite, Urine 12/01/2022 POSITIVE (A)     Leukocyte Esterase, Urine 12/01/2022 Negative     Bacteria, UA 12/01/2022 4+ (A)     Crystals, UA 12/01/2022 NEG (A)     Hyaline Casts, UA 12/01/2022 1     WBC, UA 12/01/2022 7 (A)     RBC, UA 12/01/2022 1     Epithelial Cells, UA 12/01/2022 1            ASSESSMENT/PLAN:    Acute bronchitis due to COVID-19 virus    Dry cough    Other fatigue    Dizziness    Sinus congestion    Complicating medical diagnosis  Type 2 diabetes mellitus without complication, without long-term current use of insulin (HCC)  Hyperlipidemia  Statin therapy    RX    -     nirmatrelvir/ritonavir (PAXLOVID) 20 x 150 MG & 10 x 100MG TBPK; Take 3 tablets (two 150 mg nirmatrelvir and one 100 mg ritonavir tablets) by mouth every 12 hours for 5 days. Additional recommendations  I have reviewed all her prescriptions and possible interactions with Rx of paxlovid  Currently these medications will be placed on home for the time patient taking paxlovid  Hold atorvastatin  Hold Zetia  Hold glipizide 2.5 every a.m. Increase fluid intake! Patient denies shortness of breath  She would need to check her oxygen saturation at home today and call us with oxygen saturation results    If sx not improving and resolving , if sx continue or re-occur pt has been instructed to call us and / or return here for follow- up evaluation      No orders of the defined types were placed in this encounter.     New Prescriptions    NIRMATRELVIR/RITONAVIR (PAXLOVID) 20 X 150 MG & 10 X 100MG TBPK    Take 3 tablets (two 150 mg nirmatrelvir and one 100 mg ritonavir tablets) by mouth every 12 hours for 5 days. No follow-ups on file. There are no Patient Instructions on file for this visit. EMR Dragon/transcription disclaimer:Significant part of this  encounter note is electronic transcription/translationof spoken language to printed text. The electronic translation of spoken language may be erroneous, or at times, nonsensical words or phrases may be inadvertently transcribed.  Although I have reviewed the note for sucherrors, some may still exist.

## 2023-01-26 ENCOUNTER — TELEPHONE (OUTPATIENT)
Dept: INTERNAL MEDICINE | Age: 78
End: 2023-01-26

## 2023-01-26 NOTE — TELEPHONE ENCOUNTER
Pt called cant take the paxlovid makes her puke but wants something called in says its going down to her chest

## 2023-01-29 RX ORDER — METHYLPREDNISOLONE 4 MG/1
TABLET ORAL
Qty: 1 KIT | Refills: 0 | Status: CANCELLED | OUTPATIENT
Start: 2023-01-29 | End: 2023-02-04

## 2023-01-29 RX ORDER — DOXYCYCLINE HYCLATE 100 MG
100 TABLET ORAL 2 TIMES DAILY
Qty: 20 TABLET | Refills: 0 | Status: CANCELLED | OUTPATIENT
Start: 2023-01-29 | End: 2023-02-08

## 2023-01-29 RX ORDER — DOXYCYCLINE HYCLATE 100 MG
100 TABLET ORAL 2 TIMES DAILY
Qty: 20 TABLET | Refills: 0 | Status: SHIPPED | OUTPATIENT
Start: 2023-01-29 | End: 2023-02-08

## 2023-01-29 RX ORDER — METHYLPREDNISOLONE 4 MG/1
TABLET ORAL
Qty: 1 KIT | Refills: 0 | Status: SHIPPED | OUTPATIENT
Start: 2023-01-29 | End: 2023-02-04

## 2023-01-29 NOTE — TELEPHONE ENCOUNTER
Patient called the answering service. Was recently diagnosed with COVID. Thinks that she is having an asthma flare. She denies any difficulty breathing at this time. Also needs her inhaler called in. She is allergic to Cipro, Sulfa and Augmentin. She could not tolerate the Paxlovid. Doxycycline and Medrol Dose Pack. She went to Urgent Care at [place], but they would not see her because she had COVID. She was told to go to the ER or to call her doctor. She is not happy about this.      Electronically signed by Manuela Monteiro MD on 1/29/2023 at 8:32 AM

## 2023-02-27 RX ORDER — EZETIMIBE 10 MG/1
10 TABLET ORAL DAILY
Qty: 90 TABLET | Refills: 1 | Status: SHIPPED | OUTPATIENT
Start: 2023-02-27

## 2023-02-27 RX ORDER — EMPAGLIFLOZIN 10 MG/1
TABLET, FILM COATED ORAL
Qty: 90 TABLET | Refills: 3 | Status: SHIPPED | OUTPATIENT
Start: 2023-02-27

## 2023-02-27 NOTE — TELEPHONE ENCOUNTER
Latoya Isabel called requesting a refill of the below medication which has been pended for you:     Requested Prescriptions     Pending Prescriptions Disp Refills    ezetimibe (ZETIA) 10 MG tablet [Pharmacy Med Name: EZETIMIBE TABS 10MG] 90 tablet 1     Sig: Take 1 tablet by mouth daily    JARDIANCE 10 MG tablet [Pharmacy Med Name: [place] 10MG] 90 tablet 3     Sig: TAKE 1 TABLET DAILY       Last Appointment Date: 1/24/2023  Next Appointment Date: 4/10/2023    Allergies   Allergen Reactions    Ciprofloxacin Other (See Comments)     \"Immediate cystitis\"    Codeine Other (See Comments)     Slow heart rate    Sulfa Antibiotics Hives

## 2023-04-05 DIAGNOSIS — K59.09 OTHER CONSTIPATION: ICD-10-CM

## 2023-04-05 DIAGNOSIS — B96.89 BACTERIAL VAGINOSIS: ICD-10-CM

## 2023-04-05 DIAGNOSIS — E11.9 TYPE 2 DIABETES MELLITUS WITHOUT COMPLICATION, WITHOUT LONG-TERM CURRENT USE OF INSULIN (HCC): ICD-10-CM

## 2023-04-05 DIAGNOSIS — B96.89 UTI DUE TO KLEBSIELLA SPECIES: ICD-10-CM

## 2023-04-05 DIAGNOSIS — E55.9 VITAMIN D DEFICIENCY: ICD-10-CM

## 2023-04-05 DIAGNOSIS — N89.8 VAGINAL ITCHING: ICD-10-CM

## 2023-04-05 DIAGNOSIS — N39.0 UTI DUE TO KLEBSIELLA SPECIES: ICD-10-CM

## 2023-04-05 DIAGNOSIS — N76.0 BACTERIAL VAGINOSIS: ICD-10-CM

## 2023-04-05 LAB
25(OH)D3 SERPL-MCNC: 71.1 NG/ML
ALBUMIN SERPL-MCNC: 4.6 G/DL (ref 3.5–5.2)
ALP SERPL-CCNC: 109 U/L (ref 35–104)
ALT SERPL-CCNC: 25 U/L (ref 5–33)
ANION GAP SERPL CALCULATED.3IONS-SCNC: 16 MMOL/L (ref 7–19)
AST SERPL-CCNC: 22 U/L (ref 5–32)
BASOPHILS # BLD: 0 K/UL (ref 0–0.2)
BASOPHILS NFR BLD: 0.7 % (ref 0–1)
BILIRUB SERPL-MCNC: 0.9 MG/DL (ref 0.2–1.2)
BILIRUB UR QL STRIP: NEGATIVE
BUN SERPL-MCNC: 22 MG/DL (ref 8–23)
CALCIUM SERPL-MCNC: 9.8 MG/DL (ref 8.8–10.2)
CHLORIDE SERPL-SCNC: 102 MMOL/L (ref 98–111)
CHOLEST SERPL-MCNC: 160 MG/DL (ref 160–199)
CLARITY UR: CLEAR
CO2 SERPL-SCNC: 23 MMOL/L (ref 22–29)
COLOR UR: YELLOW
CREAT SERPL-MCNC: 0.7 MG/DL (ref 0.5–0.9)
CREAT UR-MCNC: 99 MG/DL (ref 4.2–622)
EOSINOPHIL # BLD: 0.1 K/UL (ref 0–0.6)
EOSINOPHIL NFR BLD: 1.8 % (ref 0–5)
ERYTHROCYTE [DISTWIDTH] IN BLOOD BY AUTOMATED COUNT: 12.8 % (ref 11.5–14.5)
GLUCOSE SERPL-MCNC: 160 MG/DL (ref 74–109)
GLUCOSE UR STRIP.AUTO-MCNC: =>1000 MG/DL
HBA1C MFR BLD: 6.7 % (ref 4–6)
HCT VFR BLD AUTO: 46.2 % (ref 37–47)
HDLC SERPL-MCNC: 41 MG/DL (ref 65–121)
HGB BLD-MCNC: 15.2 G/DL (ref 12–16)
HGB UR STRIP.AUTO-MCNC: NEGATIVE MG/L
IMM GRANULOCYTES # BLD: 0 K/UL
KETONES UR STRIP.AUTO-MCNC: ABNORMAL MG/DL
LDLC SERPL CALC-MCNC: 90 MG/DL
LEUKOCYTE ESTERASE UR QL STRIP.AUTO: NEGATIVE
LYMPHOCYTES # BLD: 1.7 K/UL (ref 1.1–4.5)
LYMPHOCYTES NFR BLD: 29.9 % (ref 20–40)
MCH RBC QN AUTO: 29.2 PG (ref 27–31)
MCHC RBC AUTO-ENTMCNC: 32.9 G/DL (ref 33–37)
MCV RBC AUTO: 88.8 FL (ref 81–99)
MICROALBUMIN UR-MCNC: <1.2 MG/DL (ref 0–19)
MICROALBUMIN/CREAT UR-RTO: NORMAL MG/G
MONOCYTES # BLD: 0.4 K/UL (ref 0–0.9)
MONOCYTES NFR BLD: 7.7 % (ref 0–10)
NEUTROPHILS # BLD: 3.3 K/UL (ref 1.5–7.5)
NEUTS SEG NFR BLD: 59.5 % (ref 50–65)
NITRITE UR QL STRIP.AUTO: NEGATIVE
PH UR STRIP.AUTO: 5.5 [PH] (ref 5–8)
PLATELET # BLD AUTO: 193 K/UL (ref 130–400)
PMV BLD AUTO: 9.3 FL (ref 9.4–12.3)
POTASSIUM SERPL-SCNC: 4 MMOL/L (ref 3.5–5)
PROT SERPL-MCNC: 7.4 G/DL (ref 6.6–8.7)
PROT UR STRIP.AUTO-MCNC: NEGATIVE MG/DL
RBC # BLD AUTO: 5.2 M/UL (ref 4.2–5.4)
SODIUM SERPL-SCNC: 141 MMOL/L (ref 136–145)
SP GR UR STRIP.AUTO: 1.03 (ref 1–1.03)
TRIGL SERPL-MCNC: 143 MG/DL (ref 0–149)
TSH SERPL DL<=0.005 MIU/L-ACNC: 1.22 UIU/ML (ref 0.27–4.2)
UROBILINOGEN UR STRIP.AUTO-MCNC: 0.2 E.U./DL
WBC # BLD AUTO: 5.6 K/UL (ref 4.8–10.8)

## 2023-04-25 ENCOUNTER — TELEPHONE (OUTPATIENT)
Dept: INTERNAL MEDICINE | Age: 78
End: 2023-04-25

## 2023-04-25 DIAGNOSIS — M85.852 OSTEOPENIA OF LEFT HIP: Primary | ICD-10-CM

## 2023-04-25 DIAGNOSIS — M85.852 OSTEOPENIA OF LEFT HIP: ICD-10-CM

## 2023-04-25 NOTE — TELEPHONE ENCOUNTER
S/w pt, she is at [facility] now and needs an order for a bone density faxed over.

## 2023-05-02 ENCOUNTER — TELEPHONE (OUTPATIENT)
Dept: INTERNAL MEDICINE | Age: 78
End: 2023-05-02

## 2023-05-02 NOTE — TELEPHONE ENCOUNTER
Spoke with patient regarding Bone Density test results and recommendations. She voiced understanding.

## 2023-05-16 DIAGNOSIS — E11.9 TYPE 2 DIABETES MELLITUS WITHOUT COMPLICATION, WITHOUT LONG-TERM CURRENT USE OF INSULIN (HCC): ICD-10-CM

## 2023-05-16 RX ORDER — ATORVASTATIN CALCIUM 80 MG/1
TABLET, FILM COATED ORAL
Qty: 90 TABLET | Refills: 3 | Status: SHIPPED | OUTPATIENT
Start: 2023-05-16

## 2023-05-16 RX ORDER — DULAGLUTIDE 1.5 MG/.5ML
INJECTION, SOLUTION SUBCUTANEOUS
Qty: 8 ML | Refills: 3 | Status: SHIPPED | OUTPATIENT
Start: 2023-05-16

## 2023-05-16 NOTE — TELEPHONE ENCOUNTER
Charley Piper called requesting a refill of the below medication which has been pended for you:     Requested Prescriptions     Pending Prescriptions Disp Refills    TRULICITY 1.5 SN/8.0KV SC injection [Pharmacy Med Name: TRULICITY SD PEN 8.2AO 4'S 1.5MG 1.5MG] 8 mL 3     Sig: INJECT 0.5 ML INTO THE SKIN ONCE A WEEK    atorvastatin (LIPITOR) 80 MG tablet [Pharmacy Med Name: ATORVASTATIN TABS 80MG] 90 tablet 3     Sig: TAKE 1 TABLET DAILY       Last Appointment Date: 4/10/2023  Next Appointment Date: 8/10/2023    Allergies   Allergen Reactions    Ciprofloxacin Other (See Comments)     \"Immediate cystitis\"    Codeine Other (See Comments)     Slow heart rate    Sulfa Antibiotics Hives

## 2023-06-23 ENCOUNTER — TELEPHONE (OUTPATIENT)
Dept: INTERNAL MEDICINE | Age: 78
End: 2023-06-23

## 2023-06-23 RX ORDER — MECLIZINE HYDROCHLORIDE 25 MG/1
25 TABLET ORAL 3 TIMES DAILY PRN
Qty: 30 TABLET | Refills: 0 | Status: SHIPPED | OUTPATIENT
Start: 2023-06-23 | End: 2023-07-03

## 2023-07-12 RX ORDER — ERGOCALCIFEROL 1.25 MG/1
CAPSULE ORAL
Qty: 6 CAPSULE | Refills: 1 | Status: SHIPPED | OUTPATIENT
Start: 2023-07-12

## 2023-07-12 NOTE — TELEPHONE ENCOUNTER
Last Appointment Date: 4/10/2023  Next Appointment Date: 8/10/2023    Allergies   Allergen Reactions    Ciprofloxacin Other (See Comments)     \"Immediate cystitis\"    Codeine Other (See Comments)     Slow heart rate    Sulfa Antibiotics Hives     Patient needs refill on   Requested Prescriptions     Pending Prescriptions Disp Refills    vitamin D (ERGOCALCIFEROL) 1.25 MG (10551 UT) CAPS capsule [Pharmacy Med Name: VIT D2 1.25 MG (50,000 UNIT 1.25 MG Capsule] 6 capsule 1     Sig: TAKE ONE CAPSULE BY MOUTH EVERY 14 DAYS

## 2023-08-15 RX ORDER — EZETIMIBE 10 MG/1
TABLET ORAL
Qty: 90 TABLET | Refills: 3 | Status: SHIPPED | OUTPATIENT
Start: 2023-08-15

## 2023-08-15 NOTE — TELEPHONE ENCOUNTER
Heather Ferreira called requesting a refill of the below medication which has been pended for you:     Requested Prescriptions     Pending Prescriptions Disp Refills    ezetimibe (ZETIA) 10 MG tablet [Pharmacy Med Name: EZETIMIBE TABS 10MG] 90 tablet 3     Sig: TAKE 1 TABLET DAILY       Last Appointment Date: 4/10/2023  Next Appointment Date: Visit date not found    Allergies   Allergen Reactions    Ciprofloxacin Other (See Comments)     \"Immediate cystitis\"    Codeine Other (See Comments)     Slow heart rate    Sulfa Antibiotics Hives

## 2023-08-16 DIAGNOSIS — Z79.899 ON STATIN THERAPY: ICD-10-CM

## 2023-08-16 DIAGNOSIS — M85.852 OSTEOPENIA OF LEFT HIP: ICD-10-CM

## 2023-08-16 DIAGNOSIS — F51.01 PRIMARY INSOMNIA: ICD-10-CM

## 2023-08-16 DIAGNOSIS — Z12.31 ENCOUNTER FOR SCREENING MAMMOGRAM FOR BREAST CANCER: ICD-10-CM

## 2023-08-16 DIAGNOSIS — E55.9 VITAMIN D DEFICIENCY: ICD-10-CM

## 2023-08-16 DIAGNOSIS — E78.00 PURE HYPERCHOLESTEROLEMIA: ICD-10-CM

## 2023-08-16 DIAGNOSIS — G25.81 RLS (RESTLESS LEGS SYNDROME): ICD-10-CM

## 2023-08-16 DIAGNOSIS — Z00.00 MEDICARE ANNUAL WELLNESS VISIT, SUBSEQUENT: ICD-10-CM

## 2023-08-16 DIAGNOSIS — E11.9 TYPE 2 DIABETES MELLITUS WITHOUT COMPLICATION, WITHOUT LONG-TERM CURRENT USE OF INSULIN (HCC): ICD-10-CM

## 2023-08-16 LAB
ALBUMIN SERPL-MCNC: 4.4 G/DL (ref 3.5–5.2)
ALP SERPL-CCNC: 109 U/L (ref 35–104)
ALT SERPL-CCNC: 20 U/L (ref 5–33)
ANION GAP SERPL CALCULATED.3IONS-SCNC: 9 MMOL/L (ref 7–19)
AST SERPL-CCNC: 17 U/L (ref 5–32)
BILIRUB SERPL-MCNC: 0.5 MG/DL (ref 0.2–1.2)
BUN SERPL-MCNC: 19 MG/DL (ref 8–23)
CALCIUM SERPL-MCNC: 9.3 MG/DL (ref 8.8–10.2)
CHLORIDE SERPL-SCNC: 104 MMOL/L (ref 98–111)
CHOLEST SERPL-MCNC: 128 MG/DL (ref 160–199)
CO2 SERPL-SCNC: 29 MMOL/L (ref 22–29)
CREAT SERPL-MCNC: 0.7 MG/DL (ref 0.5–0.9)
GLUCOSE SERPL-MCNC: 182 MG/DL (ref 74–109)
HBA1C MFR BLD: 7.1 % (ref 4–6)
HDLC SERPL-MCNC: 37 MG/DL (ref 65–121)
LDLC SERPL CALC-MCNC: 67 MG/DL
POTASSIUM SERPL-SCNC: 4.4 MMOL/L (ref 3.5–5)
PROT SERPL-MCNC: 6.7 G/DL (ref 6.6–8.7)
SODIUM SERPL-SCNC: 142 MMOL/L (ref 136–145)
TRIGL SERPL-MCNC: 118 MG/DL (ref 0–149)

## 2023-08-21 ENCOUNTER — OFFICE VISIT (OUTPATIENT)
Dept: INTERNAL MEDICINE | Age: 78
End: 2023-08-21
Payer: MEDICARE

## 2023-08-21 VITALS
BODY MASS INDEX: 23.4 KG/M2 | DIASTOLIC BLOOD PRESSURE: 72 MMHG | HEART RATE: 76 BPM | HEIGHT: 66 IN | SYSTOLIC BLOOD PRESSURE: 120 MMHG | OXYGEN SATURATION: 98 % | WEIGHT: 145.6 LBS

## 2023-08-21 DIAGNOSIS — E78.00 PURE HYPERCHOLESTEROLEMIA: ICD-10-CM

## 2023-08-21 DIAGNOSIS — E55.9 VITAMIN D DEFICIENCY: ICD-10-CM

## 2023-08-21 DIAGNOSIS — R10.13 EPIGASTRIC PAIN: ICD-10-CM

## 2023-08-21 DIAGNOSIS — M85.852 OSTEOPENIA OF LEFT HIP: ICD-10-CM

## 2023-08-21 DIAGNOSIS — Z79.899 ON STATIN THERAPY: ICD-10-CM

## 2023-08-21 DIAGNOSIS — E11.9 TYPE 2 DIABETES MELLITUS WITHOUT COMPLICATION, WITHOUT LONG-TERM CURRENT USE OF INSULIN (HCC): Primary | ICD-10-CM

## 2023-08-21 PROCEDURE — G8427 DOCREV CUR MEDS BY ELIG CLIN: HCPCS | Performed by: INTERNAL MEDICINE

## 2023-08-21 PROCEDURE — 1123F ACP DISCUSS/DSCN MKR DOCD: CPT | Performed by: INTERNAL MEDICINE

## 2023-08-21 PROCEDURE — 3051F HG A1C>EQUAL 7.0%<8.0%: CPT | Performed by: INTERNAL MEDICINE

## 2023-08-21 PROCEDURE — 1090F PRES/ABSN URINE INCON ASSESS: CPT | Performed by: INTERNAL MEDICINE

## 2023-08-21 PROCEDURE — 1036F TOBACCO NON-USER: CPT | Performed by: INTERNAL MEDICINE

## 2023-08-21 PROCEDURE — G8399 PT W/DXA RESULTS DOCUMENT: HCPCS | Performed by: INTERNAL MEDICINE

## 2023-08-21 PROCEDURE — G8420 CALC BMI NORM PARAMETERS: HCPCS | Performed by: INTERNAL MEDICINE

## 2023-08-21 PROCEDURE — 99214 OFFICE O/P EST MOD 30 MIN: CPT | Performed by: INTERNAL MEDICINE

## 2023-08-21 ASSESSMENT — ENCOUNTER SYMPTOMS
WHEEZING: 0
ABDOMINAL PAIN: 0
SORE THROAT: 0
CHEST TIGHTNESS: 0
CONSTIPATION: 0
COUGH: 0

## 2023-09-14 ENCOUNTER — OFFICE VISIT (OUTPATIENT)
Dept: GASTROENTEROLOGY | Facility: CLINIC | Age: 78
End: 2023-09-14
Payer: MEDICARE

## 2023-09-14 VITALS
BODY MASS INDEX: 23.75 KG/M2 | TEMPERATURE: 97.5 F | DIASTOLIC BLOOD PRESSURE: 84 MMHG | SYSTOLIC BLOOD PRESSURE: 130 MMHG | WEIGHT: 147.8 LBS | HEIGHT: 66 IN | HEART RATE: 68 BPM

## 2023-09-14 DIAGNOSIS — R13.19 ESOPHAGEAL DYSPHAGIA: Primary | ICD-10-CM

## 2023-09-14 DIAGNOSIS — K21.9 GASTROESOPHAGEAL REFLUX DISEASE, UNSPECIFIED WHETHER ESOPHAGITIS PRESENT: ICD-10-CM

## 2023-09-14 DIAGNOSIS — Z78.9 NONSMOKER: ICD-10-CM

## 2023-09-14 RX ORDER — DIPHENHYDRAMINE HCL 25 MG
25 CAPSULE ORAL NIGHTLY PRN
COMMUNITY

## 2023-09-14 NOTE — PROGRESS NOTES
Becky Smiley  1945 9/14/2023  Chief Complaint   Patient presents with    GI Problem     dysphagia     Subjective   HPI  Becky Smiley is a 77 y.o. female who presents with a complaint of dysphagia. She says ongoing persistent for years progressive ongoing. She says it is upper esophageal region. Worse with meat and breads. No nausea or vomiting. It improves with time.   She has a hx of GERD with gastritis previous endoscopy with Dr Sommer. She is on Protonix for this and this usually works well for her. She has a hx of hiatal hernia. She is up to date with her colonoscopy exams.  Past Medical History:   Diagnosis Date    Asthma     Diabetes mellitus     Type 2    GERD (gastroesophageal reflux disease)     HH (hiatus hernia)     Hx of colonic polyps     Insomnia     Vitamin D deficiency      Past Surgical History:   Procedure Laterality Date    CATARACT EXTRACTION      CHOLECYSTECTOMY      COLONOSCOPY  05/26/2011    Isolated area of ischemic colitis, Diverticulosis repeat exam in 5 years Dr. Sommer    COLONOSCOPY N/A 06/16/2017    Hyperplastic polyp at 20 cm, tics repeat exam in 10 years    ENDOSCOPY  09/18/2013    Benign hyperplastic polyp with moderate chronic active inflammation stomach, Mild gastritis repeat exam 3 years Dr. Christie    ENDOSCOPY N/A 06/01/2017    Multiple fundic gland polyps    HYSTERECTOMY      KNEE SURGERY Left     baker's cyst     KNEE SURGERY Left 09/2022    TUMOR EXCISION      Throat benign    UPPER GASTROINTESTINAL ENDOSCOPY  09/17/2013    small cardia polyp (benign hyperplastic),mild gastritis       Outpatient Medications Marked as Taking for the 9/14/23 encounter (Office Visit) with Macie Segura APRN   Medication Sig Dispense Refill    ascorbic acid (VITAMIN C) 1000 MG tablet Take 1 tablet by mouth Daily.      aspirin 81 MG EC tablet Take 1 tablet by mouth Daily.      atorvastatin (LIPITOR) 80 MG tablet Take 1 tablet by mouth Daily. 90 tablet 3     Biotin 1000 MCG tablet Take 1,000 Units by mouth.      cetirizine (zyrTEC) 10 MG tablet Take 1 tablet by mouth Daily.      diphenhydrAMINE (BENADRYL) 25 mg capsule Take 1 capsule by mouth At Night As Needed.      docusate sodium (COLACE) 50 MG capsule Take  by mouth 2 (Two) Times a Day.      Dulaglutide (Trulicity) 0.75 MG/0.5ML solution pen-injector Inject 0.75 mg under the skin into the appropriate area as directed 1 (One) Time Per Week. (Patient taking differently: Inject 1.5 mg under the skin into the appropriate area as directed 1 (One) Time Per Week.) 13 pen 3    Dulaglutide (Trulicity) 3 MG/0.5ML solution pen-injector Inject 0.5 mL under the skin into the appropriate area as directed.      empagliflozin (JARDIANCE) 10 MG tablet tablet Take  by mouth Daily.      ezetimibe (Zetia) 10 MG tablet Take 1 tablet by mouth Daily. 90 tablet 3    fluticasone (FLONASE) 50 MCG/ACT nasal spray 2 sprays into the nostril(s) as directed by provider Daily. 16 g 11    glipizide (GLUCOTROL) 5 MG tablet Take 1 tablet by mouth. 1/2 tablet twice a day      glucose monitor monitoring kit 1 each Take As Directed. Accu-chek monitor 1 each 0    pantoprazole (PROTONIX) 40 MG EC tablet Take 1 tablet by mouth Daily. 90 tablet 3    tiotropium (SPIRIVA) 18 MCG per inhalation capsule Place 1 capsule into inhaler and inhale.      vitamin B-12 (CYANOCOBALAMIN) 1000 MCG tablet Take 1 tablet by mouth Daily.      vitamin D (ERGOCALCIFEROL) 1.25 MG (78205 UT) capsule capsule TAKE 1 CAPSULE BY MOUTH 1 (ONE) TIME PER WEEK. 12 capsule 3     Allergies   Allergen Reactions    Ciprofloxacin Hcl Other (See Comments)     Immediate cystitis    Codeine Other (See Comments)     Drops heart rate    Metformin And Related Other (See Comments)     lethargic    Augmentin [Amoxicillin-Pot Clavulanate] Rash    Sulfa Antibiotics Rash     Social History     Socioeconomic History    Marital status:    Tobacco Use    Smoking status: Never    Smokeless tobacco:  Never   Vaping Use    Vaping Use: Never used   Substance and Sexual Activity    Alcohol use: No    Drug use: No    Sexual activity: Defer     Family History   Problem Relation Age of Onset    Stroke Mother     Cancer Father     Colon cancer Neg Hx     Colon polyps Neg Hx      Health Maintenance   Topic Date Due    TDAP/TD VACCINES (2 - Tdap) 10/27/2015    Pneumococcal Vaccine 65+ (2 - PPSV23 or PCV20) 12/27/2016    DXA SCAN  12/23/2018    DIABETIC EYE EXAM  10/28/2021    COVID-19 Vaccine (5 - Moderna series) 08/26/2022    INFLUENZA VACCINE  10/01/2023    HEMOGLOBIN A1C  02/16/2024    URINE MICROALBUMIN  04/05/2024    ANNUAL WELLNESS VISIT  04/10/2024    LIPID PANEL  08/16/2024    COLORECTAL CANCER SCREENING  06/16/2027    HEPATITIS C SCREENING  Completed    ZOSTER VACCINE  Completed     Review of Systems   Constitutional:  Negative for activity change, appetite change, chills, diaphoresis, fatigue, fever and unexpected weight change.   HENT:  Negative for ear pain, hearing loss, mouth sores, sore throat, trouble swallowing and voice change.    Eyes: Negative.    Respiratory:  Negative for cough, choking, shortness of breath and wheezing.    Cardiovascular:  Negative for chest pain and palpitations.   Gastrointestinal:  Negative for abdominal pain, blood in stool, constipation, diarrhea, nausea and vomiting.   Endocrine: Negative for cold intolerance and heat intolerance.   Genitourinary:  Negative for decreased urine volume, dysuria, frequency, hematuria and urgency.   Musculoskeletal:  Negative for back pain, gait problem and myalgias.   Skin:  Negative for color change, pallor and rash.   Allergic/Immunologic: Negative for food allergies and immunocompromised state.   Neurological:  Negative for dizziness, tremors, seizures, syncope, weakness, light-headedness, numbness and headaches.   Hematological:  Negative for adenopathy. Does not bruise/bleed easily.   Psychiatric/Behavioral:  Negative for agitation and  "confusion. The patient is not nervous/anxious.    All other systems reviewed and are negative.  Objective   Vitals:    09/14/23 0854   BP: 130/84   BP Location: Left arm   Pulse: 68   Temp: 97.5 °F (36.4 °C)   TempSrc: Temporal   Weight: 67 kg (147 lb 12.8 oz)   Height: 167.6 cm (65.98\")     Body mass index is 23.87 kg/m².  Physical Exam  Constitutional:       Appearance: She is well-developed.   HENT:      Head: Normocephalic and atraumatic.   Eyes:      Pupils: Pupils are equal, round, and reactive to light.   Neck:      Trachea: No tracheal deviation.   Cardiovascular:      Rate and Rhythm: Normal rate and regular rhythm.      Heart sounds: Normal heart sounds. No murmur heard.    No friction rub. No gallop.   Pulmonary:      Effort: Pulmonary effort is normal. No respiratory distress.      Breath sounds: Normal breath sounds. No wheezing or rales.   Chest:      Chest wall: No tenderness.   Abdominal:      General: Bowel sounds are normal. There is no distension.      Palpations: Abdomen is soft. Abdomen is not rigid.      Tenderness: There is no abdominal tenderness. There is no guarding or rebound.   Musculoskeletal:         General: No tenderness or deformity. Normal range of motion.      Cervical back: Normal range of motion and neck supple.   Skin:     General: Skin is warm and dry.      Coloration: Skin is not pale.      Findings: No rash.   Neurological:      Mental Status: She is alert and oriented to person, place, and time.      Deep Tendon Reflexes: Reflexes are normal and symmetric.   Psychiatric:         Behavior: Behavior normal.         Thought Content: Thought content normal.         Judgment: Judgment normal.     Assessment & Plan   Diagnoses and all orders for this visit:    1. Esophageal dysphagia (Primary)  -     Case Request; Standing  -     Case Request    2. Gastroesophageal reflux disease, unspecified whether esophagitis present    3. Nonsmoker    Other orders  -     Implement Anesthesia " Orders Day of Procedure; Standing  -     Obtain Informed Consent; Standing  -     Follow Anesthesia Guidelines / Protocol; Future  -     Obtain Informed Consent; Future      ESOPHAGOGASTRODUODENOSCOPY WITH ANESTHESIA (N/A)  Part of this note may be an electronic transcription/translation of spoken language to printed text using the Dragon Dictation System.  Body mass index is 23.87 kg/m².  No follow-ups on file.    BMI is within normal parameters. No other follow-up for BMI required.      All risks, benefits, alternatives, and indications of colonoscopy and/or Endoscopy procedure have been discussed with the patient. Risks to include perforation of the colon requiring possible surgery or colostomy, risk of bleeding from biopsies or removal of colon tissue, possibility of missing a colon polyp or cancer, or adverse drug reaction.  Benefits to include the diagnosis and management of disease of the colon and rectum. Alternatives to include barium enema, radiographic evaluation, lab testing or no intervention. Pt verbalizes understanding and agrees.     Macie Segura, APRN  9/14/2023  09:13 CDT          If you smoke or use tobacco, 4 minutes reading provided  Steps to Quit Smoking  Smoking tobacco can be harmful to your health and can affect almost every organ in your body. Smoking puts you, and those around you, at risk for developing many serious chronic diseases. Quitting smoking is difficult, but it is one of the best things that you can do for your health. It is never too late to quit.  What are the benefits of quitting smoking?  When you quit smoking, you lower your risk of developing serious diseases and conditions, such as:  Lung cancer or lung disease, such as COPD.  Heart disease.  Stroke.  Heart attack.  Infertility.  Osteoporosis and bone fractures.  Additionally, symptoms such as coughing, wheezing, and shortness of breath may get better when you quit. You may also find that you get sick less often  because your body is stronger at fighting off colds and infections. If you are pregnant, quitting smoking can help to reduce your chances of having a baby of low birth weight.  How do I get ready to quit?  When you decide to quit smoking, create a plan to make sure that you are successful. Before you quit:  Pick a date to quit. Set a date within the next two weeks to give you time to prepare.  Write down the reasons why you are quitting. Keep this list in places where you will see it often, such as on your bathroom mirror or in your car or wallet.  Identify the people, places, things, and activities that make you want to smoke (triggers) and avoid them. Make sure to take these actions:  Throw away all cigarettes at home, at work, and in your car.  Throw away smoking accessories, such as ashtrays and lighters.  Clean your car and make sure to empty the ashtray.  Clean your home, including curtains and carpets.  Tell your family, friends, and coworkers that you are quitting. Support from your loved ones can make quitting easier.  Talk with your health care provider about your options for quitting smoking.  Find out what treatment options are covered by your health insurance.  What strategies can I use to quit smoking?  Talk with your healthcare provider about different strategies to quit smoking. Some strategies include:  Quitting smoking altogether instead of gradually lessening how much you smoke over a period of time. Research shows that quitting “cold turkey” is more successful than gradually quitting.  Attending in-person counseling to help you build problem-solving skills. You are more likely to have success in quitting if you attend several counseling sessions. Even short sessions of 10 minutes can be effective.  Finding resources and support systems that can help you to quit smoking and remain smoke-free after you quit. These resources are most helpful when you use them often. They can include:  Online chats  with a counselor.  Telephone quitlines.  Printed self-help materials.  Support groups or group counseling.  Text messaging programs.  Mobile phone applications.  Taking medicines to help you quit smoking. (If you are pregnant or breastfeeding, talk with your health care provider first.) Some medicines contain nicotine and some do not. Both types of medicines help with cravings, but the medicines that include nicotine help to relieve withdrawal symptoms. Your health care provider may recommend:  Nicotine patches, gum, or lozenges.  Nicotine inhalers or sprays.  Non-nicotine medicine that is taken by mouth.  Talk with your health care provider about combining strategies, such as taking medicines while you are also receiving in-person counseling. Using these two strategies together makes you more likely to succeed in quitting than if you used either strategy on its own.  If you are pregnant or breastfeeding, talk with your health care provider about finding counseling or other support strategies to quit smoking. Do not take medicine to help you quit smoking unless told to do so by your health care provider.  What things can I do to make it easier to quit?  Quitting smoking might feel overwhelming at first, but there is a lot that you can do to make it easier. Take these important actions:  Reach out to your family and friends and ask that they support and encourage you during this time. Call telephone quitlines, reach out to support groups, or work with a counselor for support.  Ask people who smoke to avoid smoking around you.  Avoid places that trigger you to smoke, such as bars, parties, or smoke-break areas at work.  Spend time around people who do not smoke.  Lessen stress in your life, because stress can be a smoking trigger for some people. To lessen stress, try:  Exercising regularly.  Deep-breathing exercises.  Yoga.  Meditating.  Performing a body scan. This involves closing your eyes, scanning your body from  head to toe, and noticing which parts of your body are particularly tense. Purposefully relax the muscles in those areas.  Download or purchase mobile phone or tablet apps (applications) that can help you stick to your quit plan by providing reminders, tips, and encouragement. There are many free apps, such as QuitGuide from the CDC (Centers for Disease Control and Prevention). You can find other support for quitting smoking (smoking cessation) through smokefree.gov and other websites.  How will I feel when I quit smoking?  Within the first 24 hours of quitting smoking, you may start to feel some withdrawal symptoms. These symptoms are usually most noticeable 2-3 days after quitting, but they usually do not last beyond 2-3 weeks. Changes or symptoms that you might experience include:  Mood swings.  Restlessness, anxiety, or irritation.  Difficulty concentrating.  Dizziness.  Strong cravings for sugary foods in addition to nicotine.  Mild weight gain.  Constipation.  Nausea.  Coughing or a sore throat.  Changes in how your medicines work in your body.  A depressed mood.  Difficulty sleeping (insomnia).  After the first 2-3 weeks of quitting, you may start to notice more positive results, such as:  Improved sense of smell and taste.  Decreased coughing and sore throat.  Slower heart rate.  Lower blood pressure.  Clearer skin.  The ability to breathe more easily.  Fewer sick days.  Quitting smoking is very challenging for most people. Do not get discouraged if you are not successful the first time. Some people need to make many attempts to quit before they achieve long-term success. Do your best to stick to your quit plan, and talk with your health care provider if you have any questions or concerns.  This information is not intended to replace advice given to you by your health care provider. Make sure you discuss any questions you have with your health care provider.  Document Released: 12/12/2002 Document Revised:  08/15/2017 Document Reviewed: 05/03/2016  Elsevier Interactive Patient Education © 2017 [employer]vier Inc.

## 2023-09-21 ENCOUNTER — TELEPHONE (OUTPATIENT)
Dept: INTERNAL MEDICINE | Age: 78
End: 2023-09-21

## 2023-09-21 RX ORDER — AZITHROMYCIN 250 MG/1
250 TABLET, FILM COATED ORAL SEE ADMIN INSTRUCTIONS
Qty: 6 TABLET | Refills: 0 | Status: SHIPPED | OUTPATIENT
Start: 2023-09-21 | End: 2023-09-26

## 2023-09-21 NOTE — TELEPHONE ENCOUNTER
Patient is stating she is having drainage and cough and would like medication called into [facility]. Patient states she is unable to come in for appointment.

## 2023-10-03 ENCOUNTER — TELEPHONE (OUTPATIENT)
Dept: INTERNAL MEDICINE | Age: 78
End: 2023-10-03

## 2023-10-03 RX ORDER — ESOMEPRAZOLE MAGNESIUM 40 MG/1
40 CAPSULE, DELAYED RELEASE ORAL
Qty: 90 CAPSULE | Refills: 3 | Status: SHIPPED | OUTPATIENT
Start: 2023-10-03

## 2023-10-03 NOTE — TELEPHONE ENCOUNTER
James Kline called to request a refill on her medication.       Last office visit : 8/21/2023   Next office visit : 11/21/2023     Requested Prescriptions     Pending Prescriptions Disp Refills    esomeprazole (NEXIUM) 40 MG delayed release capsule 90 capsule 3     Sig: Take 1 capsule by mouth every morning (before breakfast)            April Judy [place], [place]

## 2023-10-04 ENCOUNTER — TELEPHONE (OUTPATIENT)
Dept: GASTROENTEROLOGY | Facility: CLINIC | Age: 78
End: 2023-10-04
Payer: MEDICARE

## 2023-10-05 ENCOUNTER — OFFICE VISIT (OUTPATIENT)
Dept: INTERNAL MEDICINE | Age: 78
End: 2023-10-05
Payer: MEDICARE

## 2023-10-05 VITALS
BODY MASS INDEX: 22.6 KG/M2 | SYSTOLIC BLOOD PRESSURE: 130 MMHG | DIASTOLIC BLOOD PRESSURE: 70 MMHG | WEIGHT: 140 LBS | OXYGEN SATURATION: 96 % | HEART RATE: 87 BPM | TEMPERATURE: 97.9 F

## 2023-10-05 DIAGNOSIS — H81.03 MENIERE'S DISEASE OF BOTH EARS: Primary | ICD-10-CM

## 2023-10-05 DIAGNOSIS — H61.22 LEFT EAR IMPACTED CERUMEN: ICD-10-CM

## 2023-10-05 PROCEDURE — 1090F PRES/ABSN URINE INCON ASSESS: CPT | Performed by: NURSE PRACTITIONER

## 2023-10-05 PROCEDURE — G8399 PT W/DXA RESULTS DOCUMENT: HCPCS | Performed by: NURSE PRACTITIONER

## 2023-10-05 PROCEDURE — 96372 THER/PROPH/DIAG INJ SC/IM: CPT | Performed by: NURSE PRACTITIONER

## 2023-10-05 PROCEDURE — G8427 DOCREV CUR MEDS BY ELIG CLIN: HCPCS | Performed by: NURSE PRACTITIONER

## 2023-10-05 PROCEDURE — G8420 CALC BMI NORM PARAMETERS: HCPCS | Performed by: NURSE PRACTITIONER

## 2023-10-05 PROCEDURE — G8484 FLU IMMUNIZE NO ADMIN: HCPCS | Performed by: NURSE PRACTITIONER

## 2023-10-05 PROCEDURE — 1036F TOBACCO NON-USER: CPT | Performed by: NURSE PRACTITIONER

## 2023-10-05 PROCEDURE — 1123F ACP DISCUSS/DSCN MKR DOCD: CPT | Performed by: NURSE PRACTITIONER

## 2023-10-05 PROCEDURE — 99213 OFFICE O/P EST LOW 20 MIN: CPT | Performed by: NURSE PRACTITIONER

## 2023-10-05 RX ORDER — DEXAMETHASONE SODIUM PHOSPHATE 10 MG/ML
10 INJECTION INTRAMUSCULAR; INTRAVENOUS ONCE
Status: COMPLETED | OUTPATIENT
Start: 2023-10-05 | End: 2023-10-05

## 2023-10-05 RX ORDER — PROMETHAZINE HYDROCHLORIDE 25 MG/ML
12.5 INJECTION, SOLUTION INTRAMUSCULAR; INTRAVENOUS ONCE
Status: COMPLETED | OUTPATIENT
Start: 2023-10-05 | End: 2023-10-05

## 2023-10-05 RX ADMIN — PROMETHAZINE HYDROCHLORIDE 12.5 MG: 25 INJECTION, SOLUTION INTRAMUSCULAR; INTRAVENOUS at 16:07

## 2023-10-05 RX ADMIN — DEXAMETHASONE SODIUM PHOSPHATE 10 MG: 10 INJECTION INTRAMUSCULAR; INTRAVENOUS at 16:06

## 2023-10-05 ASSESSMENT — ENCOUNTER SYMPTOMS: NAUSEA: 1

## 2023-10-05 NOTE — PROGRESS NOTES
After obtaining consent, and per orders of Octavia Cosby, injection of DEX10 given in Dorsogluteal Right and Phenergan given in the Dorsogluteal Left by DONY Verdin. Patient tolerated injection well and left with no complaints.

## 2023-10-05 NOTE — PROGRESS NOTES
[facility] INTERNAL [facility]  [address] [facility],[address]  [address] [address]  Dept: [phone]  Dept Fax: [phone]  Loc: [phone]    Crow Stallings is a 68 y.o. female who presents today for her medical conditions/complaintsas noted below. Crow Stallings is c/o of Dizziness and Nausea & Vomiting        HPI:       Maricruz Fierro presents today for dizziness and nausea with vomiting. She has a history of meniere's disease. This typically flares up in the fall and spring. This started around 10am this morning. Started vomiting around 11 am. She did take a meclizine around 10am but may have thrown it up. Took another one around 2pm. Usually she just toughs it out, but recently her eldest son just passed away and his  is in the morning. She wants to be stable enough for it. Wants to know if she can get a steroid shot. Typically zofran and phenergan knock her out. Has something else for nausea but doesn't remember what it is called. Past Medical History:   Diagnosis Date    Arthritis     Asthma     Diabetes mellitus (HCC)     GERD (gastroesophageal reflux disease)     Helicobacter Pylori (H. Pylori) Infection     Hyperlipidemia     Knee pain      Past Surgical History:   Procedure Laterality Date    CHOLECYSTECTOMY      COLONOSCOPY      [facility]    ENDOSCOPY, COLON, DIAGNOSTIC      HYSTERECTOMY (CERVIX STATUS UNKNOWN)      [facility]    KNEE GANGLION SURGERY      NECK SURGERY      Tumor removal    TOTAL KNEE ARTHROPLASTY Left 2022    LEFT COMPLEX PRIMARY TOTAL KNEE ARTHROPLASTY performed by Yves Haider MD at [address]  ?     [address] [facility]       Family History   Problem Relation Age of Onset    Stroke Mother     Diabetes Mother     Liver Cancer Father     Kidney Cancer Father     Breast Cancer Sister     Liver Cancer Maternal Grandfather     Colon Cancer Neg Hx     Colon Polyps Neg Hx        Social History     Tobacco Use

## 2023-10-06 ENCOUNTER — TELEPHONE (OUTPATIENT)
Dept: INTERNAL MEDICINE | Age: 78
End: 2023-10-06

## 2023-10-06 RX ORDER — ONDANSETRON 4 MG/1
4 TABLET, FILM COATED ORAL 3 TIMES DAILY PRN
Qty: 15 TABLET | Refills: 0 | Status: SHIPPED | OUTPATIENT
Start: 2023-10-06

## 2023-10-06 NOTE — TELEPHONE ENCOUNTER
Pt LVM stating nausea med she's been taking is Zofran 4mg tabs, she would like that called to [facility].  Pended --    Last OV 10/5/2023  Next OV 11/21/2023      Requested Prescriptions     Pending Prescriptions Disp Refills    ondansetron (ZOFRAN) 4 MG tablet 15 tablet 0     Sig: Take 1 tablet by mouth 3 times daily as needed for Nausea or Vomiting

## 2023-11-21 ENCOUNTER — OFFICE VISIT (OUTPATIENT)
Dept: INTERNAL MEDICINE | Age: 78
End: 2023-11-21
Payer: MEDICARE

## 2023-11-21 VITALS
DIASTOLIC BLOOD PRESSURE: 70 MMHG | WEIGHT: 144.8 LBS | SYSTOLIC BLOOD PRESSURE: 122 MMHG | OXYGEN SATURATION: 95 % | HEIGHT: 66 IN | BODY MASS INDEX: 23.27 KG/M2 | HEART RATE: 84 BPM

## 2023-11-21 DIAGNOSIS — E55.9 VITAMIN D DEFICIENCY: ICD-10-CM

## 2023-11-21 DIAGNOSIS — E11.9 TYPE 2 DIABETES MELLITUS WITHOUT COMPLICATION, WITHOUT LONG-TERM CURRENT USE OF INSULIN (HCC): Primary | ICD-10-CM

## 2023-11-21 DIAGNOSIS — F43.21 GRIEF: ICD-10-CM

## 2023-11-21 DIAGNOSIS — E78.00 PURE HYPERCHOLESTEROLEMIA: ICD-10-CM

## 2023-11-21 DIAGNOSIS — Z79.899 ON STATIN THERAPY: ICD-10-CM

## 2023-11-21 LAB — HBA1C MFR BLD: 7.6 %

## 2023-11-21 PROCEDURE — 99214 OFFICE O/P EST MOD 30 MIN: CPT | Performed by: INTERNAL MEDICINE

## 2023-11-21 PROCEDURE — G8399 PT W/DXA RESULTS DOCUMENT: HCPCS | Performed by: INTERNAL MEDICINE

## 2023-11-21 PROCEDURE — 1036F TOBACCO NON-USER: CPT | Performed by: INTERNAL MEDICINE

## 2023-11-21 PROCEDURE — 3051F HG A1C>EQUAL 7.0%<8.0%: CPT | Performed by: INTERNAL MEDICINE

## 2023-11-21 PROCEDURE — G8420 CALC BMI NORM PARAMETERS: HCPCS | Performed by: INTERNAL MEDICINE

## 2023-11-21 PROCEDURE — 1123F ACP DISCUSS/DSCN MKR DOCD: CPT | Performed by: INTERNAL MEDICINE

## 2023-11-21 PROCEDURE — G8484 FLU IMMUNIZE NO ADMIN: HCPCS | Performed by: INTERNAL MEDICINE

## 2023-11-21 PROCEDURE — G8427 DOCREV CUR MEDS BY ELIG CLIN: HCPCS | Performed by: INTERNAL MEDICINE

## 2023-11-21 PROCEDURE — 1090F PRES/ABSN URINE INCON ASSESS: CPT | Performed by: INTERNAL MEDICINE

## 2023-11-21 ASSESSMENT — ENCOUNTER SYMPTOMS
ABDOMINAL PAIN: 0
SORE THROAT: 0
CONSTIPATION: 0
CHEST TIGHTNESS: 0
WHEEZING: 0
COUGH: 0

## 2023-11-21 NOTE — PROGRESS NOTES
to person, place, and time. Lab Review   No visits with results within 2 Month(s) from this visit. Latest known visit with results is:   Orders Only on 08/16/2023   Component Date Value    Sodium 08/16/2023 142     Potassium 08/16/2023 4.4     Chloride 08/16/2023 104     CO2 08/16/2023 29     Anion Gap 08/16/2023 9     Glucose 08/16/2023 182 (H)     BUN 08/16/2023 19     Creatinine 08/16/2023 0.7     Est, Glom Filt Rate 08/16/2023 >60     Calcium 08/16/2023 9.3     Total Protein 08/16/2023 6.7     Albumin 08/16/2023 4.4     Total Bilirubin 08/16/2023 0.5     Alkaline Phosphatase 08/16/2023 109 (H)     ALT 08/16/2023 20     AST 08/16/2023 17     Hemoglobin A1C 08/16/2023 7.1 (H)     Cholesterol, Total 08/16/2023 128 (L)     Triglycerides 08/16/2023 118     HDL 08/16/2023 37 (L)     LDL Calculated 08/16/2023 67            ASSESSMENT/PLAN:    Type 2 diabetes  Hyperglycemia  Hemoglobin A1c is higher at 7.6- lots of stress 3 mo  Prior 7.1- 7/2023     Recommendations   Recommend following  1. Trulicity dose decrease to 1.5 in 2022  May need ot up dose if a1 c remains uncontrolled  2. Glipizide dosing change  Take 2.5 in a.m. and 2.5 pm  3.  Jardiance 10   4. Close follow-up here with sugar readings   5. Obtain labs today, orders placed           RLS (restless legs syndrome)  Has been well controlled/currently on no medications  rx made her constipated     Vitamin D deficiency  Lab results reviewed with patient  vitamin D level 71  Rx vit d 50,000 weekly         Interstitial cystitis  previously followed with Dr. Dorothy Littlejohn, prior history of frequent UTIs.    RX pro/prebiotic        Primary insomnia-   Sleep hygiene recommendations discussed with patient     Pure hypercholesterolemia  Lab results reviewed and discussed with patient  Liver functions normal  LDL is 67  RX Zetia 10 mg daily  Rx Lipitor 80 mg daily  Recommend diet lower in saturated fats     Asthma  rx  dulera + spiriva ( never smoker)  Monitor

## 2023-12-15 ENCOUNTER — OFFICE VISIT (OUTPATIENT)
Dept: INTERNAL MEDICINE | Age: 78
End: 2023-12-15
Payer: MEDICARE

## 2023-12-15 VITALS
DIASTOLIC BLOOD PRESSURE: 80 MMHG | TEMPERATURE: 97.8 F | OXYGEN SATURATION: 99 % | HEART RATE: 81 BPM | WEIGHT: 144 LBS | SYSTOLIC BLOOD PRESSURE: 128 MMHG | BODY MASS INDEX: 23.24 KG/M2

## 2023-12-15 DIAGNOSIS — J02.9 PHARYNGITIS, UNSPECIFIED ETIOLOGY: Primary | ICD-10-CM

## 2023-12-15 DIAGNOSIS — J06.9 URI WITH COUGH AND CONGESTION: ICD-10-CM

## 2023-12-15 PROCEDURE — 1090F PRES/ABSN URINE INCON ASSESS: CPT | Performed by: NURSE PRACTITIONER

## 2023-12-15 PROCEDURE — G8420 CALC BMI NORM PARAMETERS: HCPCS | Performed by: NURSE PRACTITIONER

## 2023-12-15 PROCEDURE — 96372 THER/PROPH/DIAG INJ SC/IM: CPT | Performed by: NURSE PRACTITIONER

## 2023-12-15 PROCEDURE — G8399 PT W/DXA RESULTS DOCUMENT: HCPCS | Performed by: NURSE PRACTITIONER

## 2023-12-15 PROCEDURE — G8427 DOCREV CUR MEDS BY ELIG CLIN: HCPCS | Performed by: NURSE PRACTITIONER

## 2023-12-15 PROCEDURE — 1123F ACP DISCUSS/DSCN MKR DOCD: CPT | Performed by: NURSE PRACTITIONER

## 2023-12-15 PROCEDURE — 99213 OFFICE O/P EST LOW 20 MIN: CPT | Performed by: NURSE PRACTITIONER

## 2023-12-15 PROCEDURE — G8484 FLU IMMUNIZE NO ADMIN: HCPCS | Performed by: NURSE PRACTITIONER

## 2023-12-15 PROCEDURE — 1036F TOBACCO NON-USER: CPT | Performed by: NURSE PRACTITIONER

## 2023-12-15 RX ORDER — METHYLPREDNISOLONE 4 MG/1
TABLET ORAL
Qty: 1 KIT | Refills: 0 | Status: SHIPPED | OUTPATIENT
Start: 2023-12-15

## 2023-12-15 RX ORDER — AZITHROMYCIN 250 MG/1
TABLET, FILM COATED ORAL
Qty: 1 PACKET | Refills: 0 | Status: SHIPPED | OUTPATIENT
Start: 2023-12-15 | End: 2023-12-25

## 2023-12-15 RX ORDER — METHYLPREDNISOLONE ACETATE 80 MG/ML
40 INJECTION, SUSPENSION INTRA-ARTICULAR; INTRALESIONAL; INTRAMUSCULAR; SOFT TISSUE ONCE
Status: COMPLETED | OUTPATIENT
Start: 2023-12-15 | End: 2023-12-15

## 2023-12-15 RX ORDER — METHYLPREDNISOLONE ACETATE 40 MG/ML
40 INJECTION, SUSPENSION INTRA-ARTICULAR; INTRALESIONAL; INTRAMUSCULAR; SOFT TISSUE ONCE
Status: DISCONTINUED | OUTPATIENT
Start: 2023-12-15 | End: 2023-12-15

## 2023-12-15 RX ADMIN — METHYLPREDNISOLONE ACETATE 40 MG: 80 INJECTION, SUSPENSION INTRA-ARTICULAR; INTRALESIONAL; INTRAMUSCULAR; SOFT TISSUE at 15:30

## 2023-12-15 NOTE — PROGRESS NOTES
After obtaining consent, and per orders of Nikki Vo, injection of Depo40 given in Dorsogluteal Right by DONY Rascon. Patient tolerated injection well and left with no complaints.
have reviewed the note for such errors, some may still exist.

## 2023-12-17 RX ORDER — ALBUTEROL SULFATE 2.5 MG/3ML
SOLUTION RESPIRATORY (INHALATION)
Qty: 90 ML | Refills: 1 | Status: SHIPPED | OUTPATIENT
Start: 2023-12-17

## 2023-12-27 ENCOUNTER — OFFICE VISIT (OUTPATIENT)
Dept: INTERNAL MEDICINE | Age: 78
End: 2023-12-27
Payer: MEDICARE

## 2023-12-27 VITALS
BODY MASS INDEX: 23.82 KG/M2 | RESPIRATION RATE: 18 BRPM | TEMPERATURE: 98 F | HEIGHT: 65 IN | HEART RATE: 68 BPM | WEIGHT: 143 LBS | OXYGEN SATURATION: 94 %

## 2023-12-27 DIAGNOSIS — R05.9 COUGH, UNSPECIFIED TYPE: Primary | ICD-10-CM

## 2023-12-27 DIAGNOSIS — B96.89 ACUTE BACTERIAL SINUSITIS: ICD-10-CM

## 2023-12-27 DIAGNOSIS — J01.90 ACUTE BACTERIAL SINUSITIS: ICD-10-CM

## 2023-12-27 LAB
INFLUENZA A ANTIGEN, POC: NEGATIVE
INFLUENZA B ANTIGEN, POC: NEGATIVE
LOT EXPIRE DATE: NORMAL
LOT KIT NUMBER: NORMAL
SARS-COV-2, POC: NORMAL
VALID INTERNAL CONTROL: NORMAL
VENDOR AND KIT NAME POC: NORMAL

## 2023-12-27 PROCEDURE — 1090F PRES/ABSN URINE INCON ASSESS: CPT | Performed by: NURSE PRACTITIONER

## 2023-12-27 PROCEDURE — 1123F ACP DISCUSS/DSCN MKR DOCD: CPT | Performed by: NURSE PRACTITIONER

## 2023-12-27 PROCEDURE — G8399 PT W/DXA RESULTS DOCUMENT: HCPCS | Performed by: NURSE PRACTITIONER

## 2023-12-27 PROCEDURE — 1036F TOBACCO NON-USER: CPT | Performed by: NURSE PRACTITIONER

## 2023-12-27 PROCEDURE — 99213 OFFICE O/P EST LOW 20 MIN: CPT | Performed by: NURSE PRACTITIONER

## 2023-12-27 PROCEDURE — G8420 CALC BMI NORM PARAMETERS: HCPCS | Performed by: NURSE PRACTITIONER

## 2023-12-27 PROCEDURE — G8427 DOCREV CUR MEDS BY ELIG CLIN: HCPCS | Performed by: NURSE PRACTITIONER

## 2023-12-27 PROCEDURE — G8484 FLU IMMUNIZE NO ADMIN: HCPCS | Performed by: NURSE PRACTITIONER

## 2023-12-27 RX ORDER — CEFDINIR 300 MG/1
300 CAPSULE ORAL 2 TIMES DAILY
Qty: 14 CAPSULE | Refills: 0 | Status: SHIPPED | OUTPATIENT
Start: 2023-12-27 | End: 2024-01-03

## 2023-12-27 NOTE — ASSESSMENT & PLAN NOTE
Acute sinusitis  Medrol   40   80  IM   Cefdinir 300 twice  a day for 7 days  get 2 doses in today    Saline nasal spray 4 times a day both nares for 7 days  Steroid spray, rhinocort, nasocort, nasonex, flonase twice daily about 5 minutes after the saline   mucinex 1200 mg twice daily for 7 days with plenty of water  Delsym cough syrup up to 3 times daily as needed for cough   Ricola cough drops, honey lemon in pink bag;  has echanesia to help build your immunity

## 2023-12-27 NOTE — PATIENT INSTRUCTIONS
Left cerumen impaction   Apply debrox 3 nights in a row at bedtime; Then next am use the included bulb syringe to irrigate in the shower; Do this monthly, like 1,2,3 of every month;     Then you can return after the first treatment and we can wash them out in the office;   2.    URI     Acute sinusitis  Cefdinir  300 mg twice daily for 7 days;  Get 2 doses in today   Saline nasal spray 4 times a day both nares for 7 days  Steroid spray, rhinocort, nasocort, nasonex, flonase twice daily about 5 minutes after the saline   mucinex 600 mg twice daily for 7 days with plenty of water

## 2023-12-27 NOTE — PROGRESS NOTES
]  Lab Results   Component Value Date     08/16/2023    K 4.4 08/16/2023     08/16/2023    CO2 29 08/16/2023    BUN 19 08/16/2023    CREATININE 0.7 08/16/2023    GLUCOSE 182 (H) 08/16/2023    CALCIUM 9.3 08/16/2023    PROT 6.7 08/16/2023    LABALBU 4.4 08/16/2023    BILITOT 0.5 08/16/2023    ALKPHOS 109 (H) 08/16/2023    AST 17 08/16/2023    ALT 20 08/16/2023    LABGLOM >60 08/16/2023    GFRAA >59 09/28/2022     Lab Results   Component Value Date    CHOL 128 (L) 08/16/2023    CHOL 160 04/05/2023    CHOL 124 (L) 07/28/2022     Lab Results   Component Value Date    TRIG 118 08/16/2023    TRIG 143 04/05/2023    TRIG 227 (H) 07/28/2022     Lab Results   Component Value Date    HDL 37 (L) 08/16/2023    HDL 41 (L) 04/05/2023    HDL 27 (L) 07/28/2022     Lab Results   Component Value Date    LDLCALC 67 08/16/2023    LDLCALC 90 04/05/2023    LDLCALC 52 07/28/2022     Lab Results   Component Value Date/Time     08/16/2023 08:32 AM    K 4.4 08/16/2023 08:32 AM    K 5.1 09/28/2022 01:32 AM     08/16/2023 08:32 AM    CO2 29 08/16/2023 08:32 AM    BUN 19 08/16/2023 08:32 AM    CREATININE 0.7 08/16/2023 08:32 AM    GLUCOSE 182 08/16/2023 08:32 AM    CALCIUM 9.3 08/16/2023 08:32 AM      Lab Results   Component Value Date    WBC 5.6 04/05/2023    HGB 15.2 04/05/2023    HCT 46.2 04/05/2023    MCV 88.8 04/05/2023     04/05/2023    LABLYMP 1.42 07/19/2012    LYMPHOPCT 29.9 04/05/2023    RBC 5.20 04/05/2023    MCH 29.2 04/05/2023    MCHC 32.9 (L) 04/05/2023    RDW 12.8 04/05/2023     Lab Results   Component Value Date    VITD25 71.1 04/05/2023     Labs reviewed from today   Diagnostics reviewed from today     Subjective:      Review of Systems   Constitutional:  Negative for fatigue, fever and unexpected weight change. HENT:  Positive for congestion. Negative for ear discharge, ear pain, mouth sores, sore throat and trouble swallowing. Eyes:  Negative for discharge, itching and visual disturbance.

## 2024-01-02 ENCOUNTER — ANESTHESIA EVENT (OUTPATIENT)
Dept: GASTROENTEROLOGY | Facility: HOSPITAL | Age: 79
End: 2024-01-02
Payer: MEDICARE

## 2024-01-02 ENCOUNTER — ANESTHESIA (OUTPATIENT)
Dept: GASTROENTEROLOGY | Facility: HOSPITAL | Age: 79
End: 2024-01-02
Payer: MEDICARE

## 2024-01-02 ENCOUNTER — HOSPITAL ENCOUNTER (OUTPATIENT)
Facility: HOSPITAL | Age: 79
Setting detail: HOSPITAL OUTPATIENT SURGERY
Discharge: HOME | End: 2024-01-02
Attending: INTERNAL MEDICINE | Admitting: INTERNAL MEDICINE
Payer: MEDICARE

## 2024-01-02 VITALS
TEMPERATURE: 97 F | HEART RATE: 75 BPM | BODY MASS INDEX: 23.99 KG/M2 | OXYGEN SATURATION: 95 % | WEIGHT: 144 LBS | RESPIRATION RATE: 21 BRPM | HEIGHT: 65 IN | SYSTOLIC BLOOD PRESSURE: 120 MMHG | DIASTOLIC BLOOD PRESSURE: 67 MMHG

## 2024-01-02 DIAGNOSIS — R13.19 ESOPHAGEAL DYSPHAGIA: ICD-10-CM

## 2024-01-02 LAB — GLUCOSE BLDC GLUCOMTR-MCNC: 148 MG/DL (ref 70–130)

## 2024-01-02 PROCEDURE — 25810000003 SODIUM CHLORIDE 0.9 % SOLUTION: Performed by: ANESTHESIOLOGY

## 2024-01-02 PROCEDURE — 43239 EGD BIOPSY SINGLE/MULTIPLE: CPT | Performed by: INTERNAL MEDICINE

## 2024-01-02 PROCEDURE — 43450 DILATE ESOPHAGUS 1/MULT PASS: CPT | Performed by: INTERNAL MEDICINE

## 2024-01-02 PROCEDURE — 88305 TISSUE EXAM BY PATHOLOGIST: CPT | Performed by: INTERNAL MEDICINE

## 2024-01-02 PROCEDURE — 87081 CULTURE SCREEN ONLY: CPT | Performed by: INTERNAL MEDICINE

## 2024-01-02 PROCEDURE — 25010000002 PROPOFOL 10 MG/ML EMULSION: Performed by: NURSE ANESTHETIST, CERTIFIED REGISTERED

## 2024-01-02 PROCEDURE — 82948 REAGENT STRIP/BLOOD GLUCOSE: CPT

## 2024-01-02 RX ORDER — SODIUM CHLORIDE 0.9 % (FLUSH) 0.9 %
10 SYRINGE (ML) INJECTION AS NEEDED
Status: DISCONTINUED | OUTPATIENT
Start: 2024-01-02 | End: 2024-01-02 | Stop reason: HOSPADM

## 2024-01-02 RX ORDER — PROPOFOL 10 MG/ML
VIAL (ML) INTRAVENOUS AS NEEDED
Status: DISCONTINUED | OUTPATIENT
Start: 2024-01-02 | End: 2024-01-02 | Stop reason: SURG

## 2024-01-02 RX ORDER — LIDOCAINE HYDROCHLORIDE 20 MG/ML
INJECTION, SOLUTION EPIDURAL; INFILTRATION; INTRACAUDAL; PERINEURAL AS NEEDED
Status: DISCONTINUED | OUTPATIENT
Start: 2024-01-02 | End: 2024-01-02 | Stop reason: SURG

## 2024-01-02 RX ORDER — SODIUM CHLORIDE 9 MG/ML
500 INJECTION, SOLUTION INTRAVENOUS CONTINUOUS PRN
Status: DISCONTINUED | OUTPATIENT
Start: 2024-01-02 | End: 2024-01-02 | Stop reason: HOSPADM

## 2024-01-02 RX ADMIN — SODIUM CHLORIDE 500 ML: 9 INJECTION, SOLUTION INTRAVENOUS at 07:49

## 2024-01-02 RX ADMIN — PROPOFOL INJECTABLE EMULSION 200 MG: 10 INJECTION, EMULSION INTRAVENOUS at 08:52

## 2024-01-02 RX ADMIN — LIDOCAINE HYDROCHLORIDE 100 MG: 20 INJECTION, SOLUTION EPIDURAL; INFILTRATION; INTRACAUDAL; PERINEURAL at 08:52

## 2024-01-02 NOTE — ANESTHESIA PREPROCEDURE EVALUATION
Anesthesia Evaluation     Patient summary reviewed   no history of anesthetic complications:   NPO Solid Status: > 8 hours             Airway   Mallampati: II  Dental      Pulmonary    (+) asthma,  Cardiovascular   Exercise tolerance: excellent (>7 METS)    (+) hypertension, hyperlipidemia      Neuro/Psych- negative ROS  GI/Hepatic/Renal/Endo    (+) GERD, diabetes mellitus    Musculoskeletal     Abdominal    Substance History      OB/GYN          Other                    Anesthesia Plan    ASA 2     MAC       Anesthetic plan, risks, benefits, and alternatives have been provided, discussed and informed consent has been obtained with: patient.    CODE STATUS:

## 2024-01-02 NOTE — ANESTHESIA POSTPROCEDURE EVALUATION
Patient: Becky Mendez Whipple    Procedure Summary       Date: 01/02/24 Room / Location: [facility] ENDOSCOPY 2 /  [facility]    Anesthesia Start: 0850 Anesthesia Stop: 0945    Procedure: ESOPHAGOGASTRODUODENOSCOPY WITH ANESTHESIA Diagnosis:       Esophageal dysphagia      (Esophageal dysphagia [R13.19])    Surgeons: Faraz Cardenas MD Provider: Fitz Hope CRNA    Anesthesia Type: MAC ASA Status: 2            Anesthesia Type: MAC    Vitals  Vitals Value Taken Time   /71 01/02/24 0916   Temp     Pulse 74 01/02/24 0919   Resp 21 01/02/24 0915   SpO2 95 % 01/02/24 0919   Vitals shown include unfiled device data.        Post Anesthesia Care and Evaluation    Patient location during evaluation: PHASE II  Patient participation: complete - patient participated  Level of consciousness: awake and alert  Pain management: adequate    Airway patency: patent  Anesthetic complications: No anesthetic complications  PONV Status: none  Cardiovascular status: acceptable  Respiratory status: acceptable  Hydration status: acceptable  No anesthesia care post op

## 2024-01-02 NOTE — H&P
[facility] Gastroenterology  Pre Procedure History & Physical    Chief Complaint:   Choking    Subjective     HPI:   Complains of choking when she swallows.  No real dysphagia to solids.  Coughing is her primary complaint when she swallows    Past Medical History:   Past Medical History:   Diagnosis Date    Asthma     Diabetes mellitus     Type 2    GERD (gastroesophageal reflux disease)     HH (hiatus hernia)     Hx of colonic polyps     Insomnia     Vitamin D deficiency        Past Surgical History:  Past Surgical History:   Procedure Laterality Date    CATARACT EXTRACTION      CHOLECYSTECTOMY      COLONOSCOPY  05/26/2011    Isolated area of ischemic colitis, Diverticulosis repeat exam in 5 years Dr. Sommer    COLONOSCOPY N/A 06/16/2017    Hyperplastic polyp at 20 cm, tics repeat exam in 10 years    ENDOSCOPY  09/18/2013    Benign hyperplastic polyp with moderate chronic active inflammation stomach, Mild gastritis repeat exam 3 years Dr. Christie    ENDOSCOPY N/A 06/01/2017    Multiple fundic gland polyps    HYSTERECTOMY      KNEE SURGERY Left     baker's cyst     KNEE SURGERY Left 09/2022    TUMOR EXCISION      Throat benign    UPPER GASTROINTESTINAL ENDOSCOPY  09/17/2013    small cardia polyp (benign hyperplastic),mild gastritis       Family History:  Family History   Problem Relation Age of Onset    Stroke Mother     Cancer Father     Colon cancer Neg Hx     Colon polyps Neg Hx        Social History:   reports that she has never smoked. She has never used smokeless tobacco. She reports that she does not drink alcohol and does not use drugs.    Medications:   Prior to Admission medications    Medication Sig Start Date End Date Taking? Authorizing Provider   ascorbic acid (VITAMIN C) 1000 MG tablet Take 1 tablet by mouth Daily.   Yes ProviderEddie MD   aspirin 81 MG EC tablet Take 1 tablet by mouth Daily.   Yes ProviderEddie MD   atorvastatin (LIPITOR) 80 MG tablet Take 1 tablet by mouth  Daily. 2/25/20  Yes Edward Holm MD   Biotin 1000 MCG tablet Take 1,000 Units by mouth.   Yes Eddie Cai MD   cetirizine (zyrTEC) 10 MG tablet Take 1 tablet by mouth Daily.   Yes Eddie Cai MD   diphenhydrAMINE (BENADRYL) 25 mg capsule Take 1 capsule by mouth At Night As Needed.   Yes Eddie Cai MD   empagliflozin (JARDIANCE) 10 MG tablet tablet Take  by mouth Daily.   Yes Eddie Cai MD   ezetimibe (Zetia) 10 MG tablet Take 1 tablet by mouth Daily. 5/15/20  Yes Zoila Kern APRN   fluticasone (FLONASE) 50 MCG/ACT nasal spray 2 sprays into the nostril(s) as directed by provider Daily. 4/25/22  Yes Macie Us APRN   glipizide (GLUCOTROL) 5 MG tablet Take 1 tablet by mouth. 1/2 tablet twice a day   Yes Eddie Cai MD   glucose monitor monitoring kit 1 each Take As Directed. Accu-chek monitor 8/19/20  Yes Zoila Kern APRN   pantoprazole (PROTONIX) 40 MG EC tablet Take 1 tablet by mouth Daily. 2/24/20  Yes Zoila Kern APRN   vitamin B-12 (CYANOCOBALAMIN) 1000 MCG tablet Take 1 tablet by mouth Daily.   Yes Eddie Cai MD   docusate sodium (COLACE) 50 MG capsule Take  by mouth 2 (Two) Times a Day.    Eddie Cai MD   Dulaglutide (Trulicity) 0.75 MG/0.5ML solution pen-injector Inject 0.75 mg under the skin into the appropriate area as directed 1 (One) Time Per Week.  Patient taking differently: Inject 1.5 mg under the skin into the appropriate area as directed 1 (One) Time Per Week. 4/3/20   Zoila Kern APRN   Dulaglutide (Trulicity) 3 MG/0.5ML solution pen-injector Inject 0.5 mL under the skin into the appropriate area as directed. 4/20/22   Eddie Cai MD   tiotropium (SPIRIVA) 18 MCG per inhalation capsule Place 1 capsule into inhaler and inhale.    Eddie Cai MD   vitamin D (ERGOCALCIFEROL) 1.25 MG (31910 UT) capsule capsule TAKE 1 CAPSULE BY MOUTH  "1 (ONE) TIME PER WEEK. 1/18/21   Zoila Kern, CAROL       Allergies:  Ciprofloxacin hcl, Codeine, Metformin and related, Augmentin [amoxicillin-pot clavulanate], and Sulfa antibiotics    Objective     Blood pressure 157/71, pulse 71, temperature 97 °F (36.1 °C), temperature source Temporal, resp. rate 18, height 165.1 cm (65\"), weight 65.3 kg (144 lb), SpO2 96%, not currently breastfeeding.    Physical Exam   Constitutional: Pt is oriented to person, place, and in no distress.   HENT: Mouth/Throat: Oropharynx is clear.   Cardiovascular: Normal rate, regular rhythm.    Pulmonary/Chest: Effort normal. No respiratory distress. No  wheezes.   Abdominal: Soft. Non-distended.  Skin: Skin is warm and dry.   Psychiatric: Mood, memory, affect and judgment appear normal.     Assessment & Plan     Diagnosis:  Oropharyngeal dysphagia    Anticipated Surgical Procedure:    Proceed with endoscopy    The following major R/B/A were discussed with the patient, however the list is not all inclusive . Risk:  Bleeding (immediate and delayed), perforation (rupture or tear), reaction to medication, missed lesion/cancer, pain during the procedure, infection, need for surgery, need for ostomy, need for mechanical ventilation (breathing machine), death.  Benefits: removal of polyp/tissue, burn/clip/or inject to stop bleeding, removal of foreign body, dilate any stricture.  Alternatives: Xray or CT, surgery, do nothing with associated risk   The patient was given time to ask question and received explanation, and agrees to proceed as per History and Physical.   No guarantee given or expressed.    EMR Dragon/transcription disclaimer: Much of this encounter note is an electronic transcription/translation of spoken language to printed text.  The electronic translation of spoken language may permit erroneous, or at times, nonsensical words or phrases to be inadvertently transcribed.  Although I have reviewed the note for such " errors, some may still exist.    Faraz Cardenas MD  08:50 CST  1/2/2024

## 2024-01-03 LAB
CYTO UR: NORMAL
LAB AP CASE REPORT: NORMAL
Lab: NORMAL
PATH REPORT.FINAL DX SPEC: NORMAL
PATH REPORT.GROSS SPEC: NORMAL
UREASE TISS QL: NEGATIVE

## 2024-01-11 ENCOUNTER — OFFICE VISIT (OUTPATIENT)
Dept: INTERNAL MEDICINE | Age: 79
End: 2024-01-11
Payer: MEDICARE

## 2024-01-11 VITALS
SYSTOLIC BLOOD PRESSURE: 120 MMHG | WEIGHT: 143 LBS | DIASTOLIC BLOOD PRESSURE: 68 MMHG | HEART RATE: 74 BPM | OXYGEN SATURATION: 98 % | BODY MASS INDEX: 23.82 KG/M2 | HEIGHT: 65 IN

## 2024-01-11 DIAGNOSIS — R09.82 POSTNASAL DRIP: ICD-10-CM

## 2024-01-11 DIAGNOSIS — J01.00 ACUTE NON-RECURRENT MAXILLARY SINUSITIS: Primary | ICD-10-CM

## 2024-01-11 DIAGNOSIS — R09.81 SINUS CONGESTION: ICD-10-CM

## 2024-01-11 DIAGNOSIS — R05.1 ACUTE COUGH: ICD-10-CM

## 2024-01-11 PROCEDURE — 1090F PRES/ABSN URINE INCON ASSESS: CPT | Performed by: INTERNAL MEDICINE

## 2024-01-11 PROCEDURE — G8427 DOCREV CUR MEDS BY ELIG CLIN: HCPCS | Performed by: INTERNAL MEDICINE

## 2024-01-11 PROCEDURE — 99213 OFFICE O/P EST LOW 20 MIN: CPT | Performed by: INTERNAL MEDICINE

## 2024-01-11 PROCEDURE — G8399 PT W/DXA RESULTS DOCUMENT: HCPCS | Performed by: INTERNAL MEDICINE

## 2024-01-11 PROCEDURE — 96372 THER/PROPH/DIAG INJ SC/IM: CPT | Performed by: INTERNAL MEDICINE

## 2024-01-11 PROCEDURE — 1123F ACP DISCUSS/DSCN MKR DOCD: CPT | Performed by: INTERNAL MEDICINE

## 2024-01-11 PROCEDURE — 1036F TOBACCO NON-USER: CPT | Performed by: INTERNAL MEDICINE

## 2024-01-11 PROCEDURE — G8420 CALC BMI NORM PARAMETERS: HCPCS | Performed by: INTERNAL MEDICINE

## 2024-01-11 PROCEDURE — G8484 FLU IMMUNIZE NO ADMIN: HCPCS | Performed by: INTERNAL MEDICINE

## 2024-01-11 RX ORDER — LANOLIN ALCOHOL/MO/W.PET/CERES
1000 CREAM (GRAM) TOPICAL DAILY
COMMUNITY

## 2024-01-11 RX ORDER — DOXYCYCLINE HYCLATE 100 MG
100 TABLET ORAL 2 TIMES DAILY
Qty: 14 TABLET | Refills: 0 | Status: SHIPPED | OUTPATIENT
Start: 2024-01-11 | End: 2024-01-18

## 2024-01-11 RX ORDER — METHYLPREDNISOLONE ACETATE 80 MG/ML
80 INJECTION, SUSPENSION INTRA-ARTICULAR; INTRALESIONAL; INTRAMUSCULAR; SOFT TISSUE ONCE
Status: COMPLETED | OUTPATIENT
Start: 2024-01-11 | End: 2024-01-11

## 2024-01-11 RX ADMIN — METHYLPREDNISOLONE ACETATE 80 MG: 80 INJECTION, SUSPENSION INTRA-ARTICULAR; INTRALESIONAL; INTRAMUSCULAR; SOFT TISSUE at 16:22

## 2024-01-11 ASSESSMENT — PATIENT HEALTH QUESTIONNAIRE - PHQ9
2. FEELING DOWN, DEPRESSED OR HOPELESS: 0
SUM OF ALL RESPONSES TO PHQ QUESTIONS 1-9: 0
1. LITTLE INTEREST OR PLEASURE IN DOING THINGS: 0
SUM OF ALL RESPONSES TO PHQ9 QUESTIONS 1 & 2: 0

## 2024-01-11 ASSESSMENT — ENCOUNTER SYMPTOMS
WHEEZING: 0
SINUS PAIN: 1
CHEST TIGHTNESS: 0
SINUS PRESSURE: 1
RHINORRHEA: 1
SORE THROAT: 0
ABDOMINAL PAIN: 0
COUGH: 1
CONSTIPATION: 0

## 2024-01-11 NOTE — PROGRESS NOTES
Chief Complaint   Patient presents with    Cough     Sinus drainage and congestion times 3 weeks      History of presenting illness:  Tracy Zamora is a78 y.o. female who presents today for follow up on her chronic medical conditions as noted below.    Patient Active Problem List    Diagnosis Date Noted    Right arm numbness 08/12/2022     Priority: High    Vaginal itching 11/14/2022     Priority: Medium    Dysuria 11/14/2022     Priority: Medium    S/P total knee arthroplasty, left 11/14/2022     Priority: Medium    Primary osteoarthritis of left knee 09/26/2022     Priority: Medium    COPD (chronic obstructive pulmonary disease) (HCC) 09/26/2022     Priority: Medium    TIA (transient ischemic attack) 07/28/2022     Priority: Medium    Numbness and tingling of right arm 07/27/2022     Priority: Medium    Right arm weakness 07/27/2022     Priority: Medium    HLD (hyperlipidemia) 05/15/2020     Priority: Medium    Intrinsic asthma with acute exacerbation 05/15/2020     Priority: Medium    Cough 10/25/2021    Acute bacterial sinusitis 10/25/2021    Osteopenia of left hip 02/09/2021     Overview Note:     2/2021 hip neck -1.7  4/2023 hip neck -1.7/-2.0 L spine nl      Type 2 diabetes mellitus without complication, without long-term current use of insulin ([facility]) 11/23/2020    RLS (restless legs syndrome) 11/23/2020    Vitamin D deficiency 11/23/2020    Nausea 08/22/2013    GERD (gastroesophageal reflux disease) 08/22/2013    History of Helicobacter pylori infection 08/22/2013    History of colon polyps 08/22/2013    Hiatal hernia 08/22/2013    Diverticulosis 08/22/2013     Past Medical History:   Diagnosis Date    Arthritis     Asthma     Diabetes mellitus (HCC)     GERD (gastroesophageal reflux disease)     Helicobacter Pylori (H. Pylori) Infection     Hyperlipidemia     Knee pain       Past Surgical History:   Procedure Laterality Date    CHOLECYSTECTOMY      COLONOSCOPY  2017    [facility]    ENDOSCOPY, COLON,

## 2024-01-26 PROBLEM — R05.9 COUGH: Status: RESOLVED | Noted: 2021-10-25 | Resolved: 2024-01-26

## 2024-01-31 DIAGNOSIS — E11.9 TYPE 2 DIABETES MELLITUS WITHOUT COMPLICATION, WITHOUT LONG-TERM CURRENT USE OF INSULIN (HCC): ICD-10-CM

## 2024-01-31 RX ORDER — BLOOD SUGAR DIAGNOSTIC
STRIP MISCELLANEOUS
Qty: 100 STRIP | Refills: 3 | Status: SHIPPED | OUTPATIENT
Start: 2024-01-31

## 2024-02-09 ENCOUNTER — OFFICE VISIT (OUTPATIENT)
Dept: INTERNAL MEDICINE | Age: 79
End: 2024-02-09
Payer: MEDICARE

## 2024-02-09 VITALS
BODY MASS INDEX: 24.3 KG/M2 | DIASTOLIC BLOOD PRESSURE: 72 MMHG | SYSTOLIC BLOOD PRESSURE: 140 MMHG | HEART RATE: 72 BPM | WEIGHT: 146 LBS

## 2024-02-09 DIAGNOSIS — R30.0 DYSURIA: Primary | ICD-10-CM

## 2024-02-09 PROCEDURE — 1123F ACP DISCUSS/DSCN MKR DOCD: CPT | Performed by: INTERNAL MEDICINE

## 2024-02-09 PROCEDURE — G8427 DOCREV CUR MEDS BY ELIG CLIN: HCPCS | Performed by: INTERNAL MEDICINE

## 2024-02-09 PROCEDURE — 1090F PRES/ABSN URINE INCON ASSESS: CPT | Performed by: INTERNAL MEDICINE

## 2024-02-09 PROCEDURE — G8484 FLU IMMUNIZE NO ADMIN: HCPCS | Performed by: INTERNAL MEDICINE

## 2024-02-09 PROCEDURE — 1036F TOBACCO NON-USER: CPT | Performed by: INTERNAL MEDICINE

## 2024-02-09 PROCEDURE — G8399 PT W/DXA RESULTS DOCUMENT: HCPCS | Performed by: INTERNAL MEDICINE

## 2024-02-09 PROCEDURE — 99213 OFFICE O/P EST LOW 20 MIN: CPT | Performed by: INTERNAL MEDICINE

## 2024-02-09 PROCEDURE — G8420 CALC BMI NORM PARAMETERS: HCPCS | Performed by: INTERNAL MEDICINE

## 2024-02-09 RX ORDER — PHENAZOPYRIDINE HYDROCHLORIDE 100 MG/1
100 TABLET, FILM COATED ORAL 3 TIMES DAILY PRN
Qty: 21 TABLET | Refills: 0 | Status: SHIPPED | OUTPATIENT
Start: 2024-02-09 | End: 2024-02-16

## 2024-02-09 RX ORDER — NITROFURANTOIN 25; 75 MG/1; MG/1
100 CAPSULE ORAL 2 TIMES DAILY
Qty: 20 CAPSULE | Refills: 0 | Status: SHIPPED | OUTPATIENT
Start: 2024-02-09 | End: 2024-02-19

## 2024-02-09 ASSESSMENT — ENCOUNTER SYMPTOMS
ABDOMINAL PAIN: 0
SORE THROAT: 0
BLOOD IN STOOL: 0
EYE DISCHARGE: 0
EYE ITCHING: 0
VOMITING: 0
SHORTNESS OF BREATH: 0
ABDOMINAL DISTENTION: 0
NAUSEA: 0
BACK PAIN: 0
TROUBLE SWALLOWING: 0
SINUS PRESSURE: 0
WHEEZING: 0

## 2024-02-09 NOTE — PROGRESS NOTES
[facility]  [facility]  [address] [place] [address] DRIVE  [address]  [place] [address]  Dept: [phone]  Dept Fax: [phone]  Loc: [phone]      Visit Date: 2/9/2024    Tracy Leija a 78 y.o. female who presents today for:  Chief Complaint   Patient presents with    Urinary Tract Infection         HPI:     Patient of Dr. Miner is in with urinary tract infection symptoms.  She is leaving for [place] in the morning she has been burning and irritated and having some bladder spasms today.    Past Medical History:   Diagnosis Date    Arthritis     Asthma     Diabetes mellitus (HCC)     GERD (gastroesophageal reflux disease)     Helicobacter Pylori (H. Pylori) Infection     Hyperlipidemia     Knee pain       Past Surgical History:   Procedure Laterality Date    CHOLECYSTECTOMY      COLONOSCOPY  2017    [facility]    ENDOSCOPY, COLON, DIAGNOSTIC      HYSTERECTOMY (CERVIX STATUS UNKNOWN)      [facility]    KNEE GANGLION SURGERY      NECK SURGERY      Tumor removal    TOTAL KNEE ARTHROPLASTY Left 9/26/2022    LEFT COMPLEX PRIMARY TOTAL KNEE ARTHROPLASTY performed by Davi Horton MD at [facility] OR    [facility]       Family History   Problem Relation Age of Onset    Stroke Mother     Diabetes Mother     Liver Cancer Father     Kidney Cancer Father     Breast Cancer Sister     Liver Cancer Maternal Grandfather     Colon Cancer Neg Hx     Colon Polyps Neg Hx        Social History     Tobacco Use    Smoking status: Never    Smokeless tobacco: Never   Substance Use Topics    Alcohol use: No      Current Outpatient Medications   Medication Sig Dispense Refill    ACCU-CHEK TINO PLUS strip TEST BLOOD SUGAR DAILY AS NEEDED 100 strip 3    vitamin B-12 (CYANOCOBALAMIN) 1000 MCG tablet Take 1 tablet by mouth daily      Ascorbic Acid (JOSH-C PO) Take by mouth      vitamin D (ERGOCALCIFEROL) 1.25 MG (66827 UT) CAPS capsule TAKE 1 CAPSULE BY MOUTH EVERY 14 DAYS 6

## 2024-02-15 ENCOUNTER — TELEPHONE (OUTPATIENT)
Dept: INTERNAL MEDICINE | Age: 79
End: 2024-02-15

## 2024-02-15 DIAGNOSIS — R30.0 DYSURIA: Primary | ICD-10-CM

## 2024-02-15 DIAGNOSIS — R30.0 DYSURIA: ICD-10-CM

## 2024-02-15 LAB
BACTERIA URNS QL MICRO: ABNORMAL /HPF
BILIRUB UR QL STRIP: NEGATIVE
CLARITY UR: CLEAR
COLOR UR: YELLOW
CRYSTALS URNS MICRO: ABNORMAL /HPF
EPI CELLS #/AREA URNS AUTO: 1 /HPF (ref 0–5)
GLUCOSE UR STRIP.AUTO-MCNC: =>1000 MG/DL
HGB UR STRIP.AUTO-MCNC: NEGATIVE MG/L
HYALINE CASTS #/AREA URNS AUTO: 1 /HPF (ref 0–8)
KETONES UR STRIP.AUTO-MCNC: ABNORMAL MG/DL
LEUKOCYTE ESTERASE UR QL STRIP.AUTO: NEGATIVE
NITRITE UR QL STRIP.AUTO: POSITIVE
PH UR STRIP.AUTO: 5.5 [PH] (ref 5–8)
PROT UR STRIP.AUTO-MCNC: NEGATIVE MG/DL
RBC #/AREA URNS AUTO: 1 /HPF (ref 0–4)
SP GR UR STRIP.AUTO: 1.04 (ref 1–1.03)
UROBILINOGEN UR STRIP.AUTO-MCNC: 0.2 E.U./DL
WBC #/AREA URNS AUTO: 4 /HPF (ref 0–5)

## 2024-02-15 NOTE — TELEPHONE ENCOUNTER
Pt seen last Friday for UTI and wants to know if she can leave a sample to make sure it has cleared up.

## 2024-02-17 LAB
BACTERIA UR CULT: ABNORMAL
BACTERIA UR CULT: ABNORMAL
ORGANISM: ABNORMAL

## 2024-02-19 RX ORDER — CEFUROXIME AXETIL 250 MG/1
250 TABLET ORAL 2 TIMES DAILY
Qty: 14 TABLET | Refills: 0 | Status: SHIPPED | OUTPATIENT
Start: 2024-02-19 | End: 2024-02-26

## 2024-02-19 NOTE — TELEPHONE ENCOUNTER
Patient called wanting to know her urine culture results due to the fact the Macrobid did not help. Patient is still having some discomfort when urinating.

## 2024-02-22 ENCOUNTER — OFFICE VISIT (OUTPATIENT)
Dept: INTERNAL MEDICINE | Age: 79
End: 2024-02-22
Payer: MEDICARE

## 2024-02-22 VITALS
HEIGHT: 65 IN | WEIGHT: 148.6 LBS | SYSTOLIC BLOOD PRESSURE: 120 MMHG | OXYGEN SATURATION: 94 % | DIASTOLIC BLOOD PRESSURE: 68 MMHG | HEART RATE: 77 BPM | BODY MASS INDEX: 24.76 KG/M2

## 2024-02-22 DIAGNOSIS — B96.89 UTI DUE TO KLEBSIELLA SPECIES: ICD-10-CM

## 2024-02-22 DIAGNOSIS — E11.9 TYPE 2 DIABETES MELLITUS WITHOUT COMPLICATION, WITHOUT LONG-TERM CURRENT USE OF INSULIN (HCC): Primary | ICD-10-CM

## 2024-02-22 DIAGNOSIS — J45.20 MILD INTERMITTENT ASTHMA WITHOUT COMPLICATION: ICD-10-CM

## 2024-02-22 DIAGNOSIS — E78.00 PURE HYPERCHOLESTEROLEMIA: ICD-10-CM

## 2024-02-22 DIAGNOSIS — E55.9 VITAMIN D DEFICIENCY: ICD-10-CM

## 2024-02-22 DIAGNOSIS — R10.2 SUPRAPUBIC PAIN: ICD-10-CM

## 2024-02-22 DIAGNOSIS — F43.9 SITUATIONAL STRESS: ICD-10-CM

## 2024-02-22 DIAGNOSIS — R39.15 URINARY URGENCY: ICD-10-CM

## 2024-02-22 DIAGNOSIS — E11.9 TYPE 2 DIABETES MELLITUS WITHOUT COMPLICATION, WITHOUT LONG-TERM CURRENT USE OF INSULIN (HCC): ICD-10-CM

## 2024-02-22 DIAGNOSIS — N39.0 UTI DUE TO KLEBSIELLA SPECIES: ICD-10-CM

## 2024-02-22 DIAGNOSIS — R30.0 DYSURIA: ICD-10-CM

## 2024-02-22 DIAGNOSIS — Z79.899 ON STATIN THERAPY: ICD-10-CM

## 2024-02-22 PROBLEM — J44.9 COPD (CHRONIC OBSTRUCTIVE PULMONARY DISEASE) (HCC): Chronic | Status: RESOLVED | Noted: 2022-09-26 | Resolved: 2024-02-22

## 2024-02-22 LAB
ALBUMIN SERPL-MCNC: 4.7 G/DL (ref 3.5–5.2)
ALP SERPL-CCNC: 93 U/L (ref 35–104)
ALT SERPL-CCNC: 24 U/L (ref 5–33)
ANION GAP SERPL CALCULATED.3IONS-SCNC: 12 MMOL/L (ref 7–19)
APPEARANCE FLUID: CLEAR
AST SERPL-CCNC: 21 U/L (ref 5–32)
BACTERIA URNS QL MICRO: ABNORMAL /HPF
BASOPHILS # BLD: 0 K/UL (ref 0–0.2)
BILIRUB SERPL-MCNC: 0.5 MG/DL (ref 0.2–1.2)
BILIRUB UR QL STRIP: NEGATIVE
BILIRUBIN, POC: NORMAL
BLOOD URINE, POC: NORMAL
BUN SERPL-MCNC: 21 MG/DL (ref 8–23)
CALCIUM SERPL-MCNC: 9.6 MG/DL (ref 8.8–10.2)
CHLORIDE SERPL-SCNC: 101 MMOL/L (ref 98–111)
CHOLEST SERPL-MCNC: 138 MG/DL (ref 160–199)
CLARITY UR: CLEAR
CLARITY, POC: CLEAR
CO2 SERPL-SCNC: 28 MMOL/L (ref 22–29)
COLOR UR: YELLOW
COLOR, POC: YELLOW
CREAT SERPL-MCNC: 0.8 MG/DL (ref 0.5–0.9)
CRYSTALS URNS MICRO: ABNORMAL /HPF
EOSINOPHIL # BLD: 0.2 K/UL (ref 0–0.6)
EOSINOPHIL NFR BLD: 2.5 % (ref 0–5)
EPI CELLS #/AREA URNS AUTO: 0 /HPF (ref 0–5)
ERYTHROCYTE [DISTWIDTH] IN BLOOD BY AUTOMATED COUNT: 12.9 % (ref 11.5–14.5)
GLUCOSE SERPL-MCNC: 100 MG/DL (ref 74–109)
GLUCOSE UR STRIP.AUTO-MCNC: 500 MG/DL
GLUCOSE URINE, POC: NORMAL
HBA1C MFR BLD: 7 % (ref 4–6)
HCT VFR BLD AUTO: 43 % (ref 37–47)
HDLC SERPL-MCNC: 52 MG/DL (ref 65–121)
HGB BLD-MCNC: 14.1 G/DL (ref 12–16)
HGB UR STRIP.AUTO-MCNC: NEGATIVE MG/L
HYALINE CASTS #/AREA URNS AUTO: 1 /HPF (ref 0–8)
IMM GRANULOCYTES # BLD: 0 K/UL
KETONES UR STRIP.AUTO-MCNC: NEGATIVE MG/DL
KETONES, POC: NORMAL
LDLC SERPL CALC-MCNC: 62 MG/DL
LEUKOCYTE EST, POC: NORMAL
LEUKOCYTE ESTERASE UR QL STRIP.AUTO: NEGATIVE
LYMPHOCYTES # BLD: 2.2 K/UL (ref 1.1–4.5)
LYMPHOCYTES NFR BLD: 34.4 % (ref 20–40)
MCH RBC QN AUTO: 29.4 PG (ref 27–31)
MCHC RBC AUTO-ENTMCNC: 32.8 G/DL (ref 33–37)
MCV RBC AUTO: 89.8 FL (ref 81–99)
MONOCYTES # BLD: 0.6 K/UL (ref 0–0.9)
MONOCYTES NFR BLD: 8.6 % (ref 0–10)
NEUTROPHILS # BLD: 3.4 K/UL (ref 1.5–7.5)
NEUTS SEG NFR BLD: 53.6 % (ref 50–65)
NITRITE UR QL STRIP.AUTO: NEGATIVE
NITRITE, POC: NORMAL
PH UR STRIP.AUTO: 5.5 [PH] (ref 5–8)
PH, POC: 5
PLATELET # BLD AUTO: 207 K/UL (ref 130–400)
PMV BLD AUTO: 9.1 FL (ref 9.4–12.3)
POTASSIUM SERPL-SCNC: 3.9 MMOL/L (ref 3.5–5)
PROT SERPL-MCNC: 6.9 G/DL (ref 6.6–8.7)
PROT UR STRIP.AUTO-MCNC: NEGATIVE MG/DL
PROTEIN, POC: NORMAL
RBC # BLD AUTO: 4.79 M/UL (ref 4.2–5.4)
RBC #/AREA URNS AUTO: 0 /HPF (ref 0–4)
SODIUM SERPL-SCNC: 141 MMOL/L (ref 136–145)
SP GR UR STRIP.AUTO: 1.03 (ref 1–1.03)
SPECIFIC GRAVITY, POC: 1.01
TRIGL SERPL-MCNC: 120 MG/DL (ref 0–149)
UROBILINOGEN UR STRIP.AUTO-MCNC: 0.2 E.U./DL
UROBILINOGEN, POC: 0.2
WBC # BLD AUTO: 6.4 K/UL (ref 4.8–10.8)
WBC #/AREA URNS AUTO: 8 /HPF (ref 0–5)

## 2024-02-22 PROCEDURE — G8427 DOCREV CUR MEDS BY ELIG CLIN: HCPCS | Performed by: INTERNAL MEDICINE

## 2024-02-22 PROCEDURE — 99214 OFFICE O/P EST MOD 30 MIN: CPT | Performed by: INTERNAL MEDICINE

## 2024-02-22 PROCEDURE — 1036F TOBACCO NON-USER: CPT | Performed by: INTERNAL MEDICINE

## 2024-02-22 PROCEDURE — G8399 PT W/DXA RESULTS DOCUMENT: HCPCS | Performed by: INTERNAL MEDICINE

## 2024-02-22 PROCEDURE — 1090F PRES/ABSN URINE INCON ASSESS: CPT | Performed by: INTERNAL MEDICINE

## 2024-02-22 PROCEDURE — 81002 URINALYSIS NONAUTO W/O SCOPE: CPT | Performed by: INTERNAL MEDICINE

## 2024-02-22 PROCEDURE — G8420 CALC BMI NORM PARAMETERS: HCPCS | Performed by: INTERNAL MEDICINE

## 2024-02-22 PROCEDURE — 1123F ACP DISCUSS/DSCN MKR DOCD: CPT | Performed by: INTERNAL MEDICINE

## 2024-02-22 PROCEDURE — G8484 FLU IMMUNIZE NO ADMIN: HCPCS | Performed by: INTERNAL MEDICINE

## 2024-02-22 RX ORDER — BUSPIRONE HYDROCHLORIDE 5 MG/1
5 TABLET ORAL 2 TIMES DAILY PRN
Qty: 30 TABLET | Refills: 0 | Status: SHIPPED | OUTPATIENT
Start: 2024-02-22 | End: 2024-03-23

## 2024-02-22 RX ORDER — CEFUROXIME AXETIL 250 MG/1
250 TABLET ORAL 2 TIMES DAILY
Qty: 6 TABLET | Refills: 0 | Status: SHIPPED | OUTPATIENT
Start: 2024-02-22 | End: 2024-02-25

## 2024-02-22 ASSESSMENT — ENCOUNTER SYMPTOMS
CONSTIPATION: 0
COUGH: 0
ABDOMINAL PAIN: 0
CHEST TIGHTNESS: 0
WHEEZING: 0
SORE THROAT: 0

## 2024-02-22 NOTE — PROGRESS NOTES
Procedures    Culture, Urine    CBC with Auto Differential    Urinalysis with Microscopic    POCT Urinalysis no Micro     New Prescriptions    BUSPIRONE (BUSPAR) 5 MG TABLET    Take 1 tablet by mouth 2 times daily as needed (anxiety)    CEFUROXIME (CEFTIN) 250 MG TABLET    Take 1 tablet by mouth 2 times daily for 3 days         No follow-ups on file.   There are no Patient Instructions on file for this visit.  EMR Dragon/transcription disclaimer:Significant part of this  encounter note is electronic transcription/translationof spoken language to printed text. The electronic translation of spoken language may be erroneous, or at times, nonsensical words or phrases may be inadvertently transcribed. Although I have reviewed the note for sucherrors, some may still exist.

## 2024-02-25 LAB
BACTERIA UR CULT: ABNORMAL
BACTERIA UR CULT: ABNORMAL
ORGANISM: ABNORMAL

## 2024-03-04 RX ORDER — EMPAGLIFLOZIN 10 MG/1
TABLET, FILM COATED ORAL
Qty: 90 TABLET | Refills: 1 | Status: SHIPPED | OUTPATIENT
Start: 2024-03-04

## 2024-03-04 NOTE — TELEPHONE ENCOUNTER
Tracy Zamora called to request a refill on her medication.      Last office visit : 2/22/2024   Next office visit : Visit date not found     Requested Prescriptions     Pending Prescriptions Disp Refills    JARDIANCE 10 MG tablet [Pharmacy Med Name: JARDIANCE TABS 10MG] 90 tablet 3     Sig: TAKE 1 TABLET DAILY            Lacy Branch MA

## 2024-03-07 RX ORDER — ERGOCALCIFEROL 1.25 MG/1
CAPSULE ORAL
Qty: 6 CAPSULE | Refills: 1 | Status: SHIPPED | OUTPATIENT
Start: 2024-03-07

## 2024-03-07 NOTE — TELEPHONE ENCOUNTER
Tracy Zamora called to request a refill on her medication.      Last office visit : 2/22/2024   Next office visit : Visit date not found     Requested Prescriptions     Pending Prescriptions Disp Refills    vitamin D (ERGOCALCIFEROL) 1.25 MG (04896 UT) CAPS capsule [Pharmacy Med Name: VIT D2 1.25 MG (50,000 UNIT 1.25 MG Capsule] 6 capsule 1     Sig: TAKE ONE CAPSULE BY MOUTH EVERY 14 DAYS            Lacy Branch MA

## 2024-04-10 RX ORDER — ESOMEPRAZOLE MAGNESIUM 40 MG/1
40 CAPSULE, DELAYED RELEASE ORAL
Qty: 90 CAPSULE | Refills: 3 | Status: SHIPPED | OUTPATIENT
Start: 2024-04-10

## 2024-04-10 RX ORDER — GLIPIZIDE 5 MG/1
TABLET ORAL
Qty: 180 TABLET | Refills: 3 | Status: SHIPPED | OUTPATIENT
Start: 2024-04-10

## 2024-04-24 ENCOUNTER — TELEPHONE (OUTPATIENT)
Dept: INTERNAL MEDICINE | Age: 79
End: 2024-04-24

## 2024-04-24 NOTE — TELEPHONE ENCOUNTER
Patient called needing an order for a screening mammogram placed for [facility] radiology. Patient stated that appointment is Friday 4/26/2024 at 11:30

## 2024-04-29 DIAGNOSIS — Z12.31 ENCOUNTER FOR SCREENING MAMMOGRAM FOR BREAST CANCER: ICD-10-CM

## 2024-05-25 DIAGNOSIS — E11.9 TYPE 2 DIABETES MELLITUS WITHOUT COMPLICATION, WITHOUT LONG-TERM CURRENT USE OF INSULIN (HCC): ICD-10-CM

## 2024-05-28 DIAGNOSIS — E11.9 TYPE 2 DIABETES MELLITUS WITHOUT COMPLICATION, WITHOUT LONG-TERM CURRENT USE OF INSULIN (HCC): ICD-10-CM

## 2024-05-28 RX ORDER — DULAGLUTIDE 1.5 MG/.5ML
INJECTION, SOLUTION SUBCUTANEOUS
Qty: 8 ML | Refills: 3 | Status: SHIPPED | OUTPATIENT
Start: 2024-05-28

## 2024-05-28 RX ORDER — DULAGLUTIDE 1.5 MG/.5ML
INJECTION, SOLUTION SUBCUTANEOUS
Qty: 8 ML | Refills: 3 | OUTPATIENT
Start: 2024-05-28

## 2024-05-28 NOTE — TELEPHONE ENCOUNTER
Tracy Zamora called to request a refill on her medication.      Last office visit : 2/22/2024   Next office visit : Visit date not found     Requested Prescriptions     Pending Prescriptions Disp Refills    dulaglutide (TRULICITY) 1.5 MG/0.5ML SC injection 8 mL 3     Sig: INJECT 0.5 ML INTO THE SKIN ONCE A WEEK            April Judy Madera MA

## 2024-06-17 RX ORDER — ATORVASTATIN CALCIUM 80 MG/1
TABLET, FILM COATED ORAL
Qty: 90 TABLET | Refills: 0 | Status: SHIPPED | OUTPATIENT
Start: 2024-06-17

## 2024-06-17 NOTE — TELEPHONE ENCOUNTER
Tracy Zamora called to request a refill on her medication.      Last office visit : 2/22/2024   Next office visit : Visit date not found     Requested Prescriptions     Pending Prescriptions Disp Refills    atorvastatin (LIPITOR) 80 MG tablet [Pharmacy Med Name: ATORVASTATIN TABS 80MG] 90 tablet 3     Sig: TAKE 1 TABLET DAILY            Lacy Branch MA

## 2024-08-02 ENCOUNTER — TELEPHONE (OUTPATIENT)
Dept: INTERNAL MEDICINE | Age: 79
End: 2024-08-02

## 2024-08-15 ENCOUNTER — OFFICE VISIT (OUTPATIENT)
Dept: INTERNAL MEDICINE | Age: 79
End: 2024-08-15
Payer: MEDICARE

## 2024-08-15 VITALS
HEIGHT: 65 IN | HEART RATE: 79 BPM | DIASTOLIC BLOOD PRESSURE: 64 MMHG | OXYGEN SATURATION: 98 % | SYSTOLIC BLOOD PRESSURE: 120 MMHG | WEIGHT: 149 LBS | BODY MASS INDEX: 24.83 KG/M2

## 2024-08-15 DIAGNOSIS — E55.9 VITAMIN D DEFICIENCY: ICD-10-CM

## 2024-08-15 DIAGNOSIS — Z00.00 MEDICARE ANNUAL WELLNESS VISIT, SUBSEQUENT: ICD-10-CM

## 2024-08-15 DIAGNOSIS — E78.00 PURE HYPERCHOLESTEROLEMIA: ICD-10-CM

## 2024-08-15 DIAGNOSIS — E11.9 TYPE 2 DIABETES MELLITUS WITHOUT COMPLICATION, WITHOUT LONG-TERM CURRENT USE OF INSULIN (HCC): ICD-10-CM

## 2024-08-15 DIAGNOSIS — R35.0 URINARY FREQUENCY: ICD-10-CM

## 2024-08-15 DIAGNOSIS — N34.2 INFECTIVE URETHRITIS: ICD-10-CM

## 2024-08-15 DIAGNOSIS — R35.0 URINARY FREQUENCY: Primary | ICD-10-CM

## 2024-08-15 DIAGNOSIS — R39.15 URINARY URGENCY: ICD-10-CM

## 2024-08-15 LAB
APPEARANCE FLUID: CLEAR
BACTERIA URNS QL MICRO: ABNORMAL /HPF
BILIRUB UR QL STRIP: NEGATIVE
BILIRUBIN, POC: NORMAL
BLOOD URINE, POC: NORMAL
CLARITY UR: ABNORMAL
CLARITY, POC: CLEAR
COLOR UR: YELLOW
COLOR, POC: YELLOW
CRYSTALS URNS MICRO: ABNORMAL /HPF
EPI CELLS #/AREA URNS AUTO: 0 /HPF (ref 0–5)
GLUCOSE UR STRIP.AUTO-MCNC: =>1000 MG/DL
GLUCOSE URINE, POC: NORMAL
HGB UR STRIP.AUTO-MCNC: NEGATIVE MG/L
HYALINE CASTS #/AREA URNS AUTO: 3 /HPF (ref 0–8)
KETONES UR STRIP.AUTO-MCNC: NEGATIVE MG/DL
KETONES, POC: NORMAL
LEUKOCYTE EST, POC: NORMAL
LEUKOCYTE ESTERASE UR QL STRIP.AUTO: ABNORMAL
NITRITE UR QL STRIP.AUTO: NEGATIVE
NITRITE, POC: NORMAL
PH UR STRIP.AUTO: 5 [PH] (ref 5–8)
PH, POC: 5
PROT UR STRIP.AUTO-MCNC: NEGATIVE MG/DL
PROTEIN, POC: NORMAL
RBC #/AREA URNS AUTO: 8 /HPF (ref 0–4)
SP GR UR STRIP.AUTO: 1.04 (ref 1–1.03)
SPECIFIC GRAVITY, POC: 1.01
UROBILINOGEN UR STRIP.AUTO-MCNC: 0.2 E.U./DL
UROBILINOGEN, POC: 0.2
WBC #/AREA URNS AUTO: 67 /HPF (ref 0–5)

## 2024-08-15 PROCEDURE — 1090F PRES/ABSN URINE INCON ASSESS: CPT | Performed by: INTERNAL MEDICINE

## 2024-08-15 PROCEDURE — G8399 PT W/DXA RESULTS DOCUMENT: HCPCS | Performed by: INTERNAL MEDICINE

## 2024-08-15 PROCEDURE — 3051F HG A1C>EQUAL 7.0%<8.0%: CPT | Performed by: INTERNAL MEDICINE

## 2024-08-15 PROCEDURE — G8427 DOCREV CUR MEDS BY ELIG CLIN: HCPCS | Performed by: INTERNAL MEDICINE

## 2024-08-15 PROCEDURE — 81002 URINALYSIS NONAUTO W/O SCOPE: CPT | Performed by: INTERNAL MEDICINE

## 2024-08-15 PROCEDURE — 99213 OFFICE O/P EST LOW 20 MIN: CPT | Performed by: INTERNAL MEDICINE

## 2024-08-15 PROCEDURE — G8420 CALC BMI NORM PARAMETERS: HCPCS | Performed by: INTERNAL MEDICINE

## 2024-08-15 PROCEDURE — 1123F ACP DISCUSS/DSCN MKR DOCD: CPT | Performed by: INTERNAL MEDICINE

## 2024-08-15 PROCEDURE — 1036F TOBACCO NON-USER: CPT | Performed by: INTERNAL MEDICINE

## 2024-08-15 RX ORDER — CEFUROXIME AXETIL 250 MG/1
250 TABLET ORAL 2 TIMES DAILY
Qty: 14 TABLET | Refills: 0 | Status: SHIPPED | OUTPATIENT
Start: 2024-08-15 | End: 2024-08-15

## 2024-08-15 RX ORDER — PHENAZOPYRIDINE HYDROCHLORIDE 100 MG/1
100 TABLET, FILM COATED ORAL 3 TIMES DAILY PRN
Qty: 8 TABLET | Refills: 0 | Status: SHIPPED | OUTPATIENT
Start: 2024-08-15 | End: 2024-08-15

## 2024-08-15 RX ORDER — CEFUROXIME AXETIL 250 MG/1
250 TABLET ORAL 2 TIMES DAILY
Qty: 14 TABLET | Refills: 0 | Status: SHIPPED | OUTPATIENT
Start: 2024-08-15 | End: 2024-08-22

## 2024-08-15 RX ORDER — PHENAZOPYRIDINE HYDROCHLORIDE 100 MG/1
100 TABLET, FILM COATED ORAL 3 TIMES DAILY PRN
Qty: 8 TABLET | Refills: 0 | Status: SHIPPED | OUTPATIENT
Start: 2024-08-15 | End: 2025-08-15

## 2024-08-15 SDOH — ECONOMIC STABILITY: FOOD INSECURITY: WITHIN THE PAST 12 MONTHS, THE FOOD YOU BOUGHT JUST DIDN'T LAST AND YOU DIDN'T HAVE MONEY TO GET MORE.: NEVER TRUE

## 2024-08-15 SDOH — ECONOMIC STABILITY: INCOME INSECURITY: HOW HARD IS IT FOR YOU TO PAY FOR THE VERY BASICS LIKE FOOD, HOUSING, MEDICAL CARE, AND HEATING?: NOT HARD AT ALL

## 2024-08-15 SDOH — ECONOMIC STABILITY: FOOD INSECURITY: WITHIN THE PAST 12 MONTHS, YOU WORRIED THAT YOUR FOOD WOULD RUN OUT BEFORE YOU GOT MONEY TO BUY MORE.: NEVER TRUE

## 2024-08-15 ASSESSMENT — ENCOUNTER SYMPTOMS
CHEST TIGHTNESS: 0
COUGH: 0
WHEEZING: 0
CONSTIPATION: 0
SORE THROAT: 0
ABDOMINAL PAIN: 0

## 2024-08-18 LAB
BACTERIA UR CULT: ABNORMAL
ORGANISM: ABNORMAL

## 2024-08-21 RX ORDER — CEFUROXIME AXETIL 250 MG/1
250 TABLET ORAL 2 TIMES DAILY
Qty: 6 TABLET | Refills: 0 | Status: SHIPPED | OUTPATIENT
Start: 2024-08-21 | End: 2024-08-24

## 2024-08-21 NOTE — TELEPHONE ENCOUNTER
Pt's urine culture came back and pt wants to know if she has been taking the right medication.  \"In her experience if she does take medication for 10 days, the infection will come back.\" She was prescribed Ceftin 250 mg for 7 days. If this is the correct medicine, she would like another 3-day supply sent in.

## 2024-08-21 NOTE — TELEPHONE ENCOUNTER
Tracy SERRATO Kimberlyjosselin called to request a refill on her medication.      Last office visit : 8/15/2024   Next office visit : 9/16/2024     Requested Prescriptions     Pending Prescriptions Disp Refills    cefUROXime (CEFTIN) 250 MG tablet 6 tablet 0     Sig: Take 1 tablet by mouth 2 times daily for 3 days            Lacy Branch MA     n/a

## 2024-08-26 RX ORDER — ERGOCALCIFEROL 1.25 MG/1
CAPSULE, LIQUID FILLED ORAL
Qty: 6 CAPSULE | Refills: 1 | Status: SHIPPED | OUTPATIENT
Start: 2024-08-26

## 2024-08-26 NOTE — TELEPHONE ENCOUNTER
Tracy Zamora called to request a refill on her medication.      Last office visit : 8/15/2024   Next office visit : 9/16/2024     Requested Prescriptions     Pending Prescriptions Disp Refills    vitamin D (ERGOCALCIFEROL) 1.25 MG (13599 UT) CAPS capsule [Pharmacy Med Name: VIT D2 1.25 MG (50,000 UNIT 1.25 MG Capsule] 6 capsule 1     Sig: TAKE ONE CAPSULE BY MOUTH EVERY 14 DAYS            Lacy Branch MA

## 2024-09-09 DIAGNOSIS — N34.2 INFECTIVE URETHRITIS: ICD-10-CM

## 2024-09-09 DIAGNOSIS — R39.15 URINARY URGENCY: ICD-10-CM

## 2024-09-09 DIAGNOSIS — E55.9 VITAMIN D DEFICIENCY: ICD-10-CM

## 2024-09-09 DIAGNOSIS — Z00.00 MEDICARE ANNUAL WELLNESS VISIT, SUBSEQUENT: ICD-10-CM

## 2024-09-09 DIAGNOSIS — R35.0 URINARY FREQUENCY: ICD-10-CM

## 2024-09-09 DIAGNOSIS — E78.00 PURE HYPERCHOLESTEROLEMIA: ICD-10-CM

## 2024-09-09 DIAGNOSIS — E11.9 TYPE 2 DIABETES MELLITUS WITHOUT COMPLICATION, WITHOUT LONG-TERM CURRENT USE OF INSULIN (HCC): ICD-10-CM

## 2024-09-09 LAB
25(OH)D3 SERPL-MCNC: 61.6 NG/ML
ALBUMIN SERPL-MCNC: 4.5 G/DL (ref 3.5–5.2)
ALP SERPL-CCNC: 97 U/L (ref 35–104)
ALT SERPL-CCNC: 20 U/L (ref 5–33)
ANION GAP SERPL CALCULATED.3IONS-SCNC: 9 MMOL/L (ref 7–19)
AST SERPL-CCNC: 17 U/L (ref 5–32)
BACTERIA URNS QL MICRO: ABNORMAL /HPF
BASOPHILS # BLD: 0 K/UL (ref 0–0.2)
BASOPHILS NFR BLD: 0.4 % (ref 0–1)
BILIRUB SERPL-MCNC: 0.6 MG/DL (ref 0.2–1.2)
BILIRUB UR QL STRIP: NEGATIVE
BUN SERPL-MCNC: 18 MG/DL (ref 8–23)
CALCIUM SERPL-MCNC: 9.2 MG/DL (ref 8.8–10.2)
CHLORIDE SERPL-SCNC: 107 MMOL/L (ref 98–111)
CHOLEST SERPL-MCNC: 128 MG/DL (ref 0–199)
CLARITY UR: CLEAR
CO2 SERPL-SCNC: 26 MMOL/L (ref 22–29)
COLOR UR: YELLOW
CREAT SERPL-MCNC: 0.6 MG/DL (ref 0.5–0.9)
CRYSTALS URNS MICRO: ABNORMAL /HPF
EOSINOPHIL # BLD: 0.2 K/UL (ref 0–0.6)
EOSINOPHIL NFR BLD: 3.1 % (ref 0–5)
EPI CELLS #/AREA URNS AUTO: 1 /HPF (ref 0–5)
ERYTHROCYTE [DISTWIDTH] IN BLOOD BY AUTOMATED COUNT: 12.4 % (ref 11.5–14.5)
GLUCOSE SERPL-MCNC: 144 MG/DL (ref 70–99)
GLUCOSE UR STRIP.AUTO-MCNC: =>1000 MG/DL
HBA1C MFR BLD: 7.3 % (ref 4–5.6)
HCT VFR BLD AUTO: 42.3 % (ref 37–47)
HDLC SERPL-MCNC: 37 MG/DL (ref 40–60)
HGB BLD-MCNC: 13.9 G/DL (ref 12–16)
HGB UR STRIP.AUTO-MCNC: NEGATIVE MG/L
HYALINE CASTS #/AREA URNS AUTO: 2 /HPF (ref 0–8)
IMM GRANULOCYTES # BLD: 0 K/UL
KETONES UR STRIP.AUTO-MCNC: NEGATIVE MG/DL
LDLC SERPL CALC-MCNC: 68 MG/DL
LEUKOCYTE ESTERASE UR QL STRIP.AUTO: NEGATIVE
LYMPHOCYTES # BLD: 1.4 K/UL (ref 1.1–4.5)
LYMPHOCYTES NFR BLD: 29.4 % (ref 20–40)
MCH RBC QN AUTO: 29.1 PG (ref 27–31)
MCHC RBC AUTO-ENTMCNC: 32.9 G/DL (ref 33–37)
MCV RBC AUTO: 88.7 FL (ref 81–99)
MONOCYTES # BLD: 0.4 K/UL (ref 0–0.9)
MONOCYTES NFR BLD: 9 % (ref 0–10)
NEUTROPHILS # BLD: 2.8 K/UL (ref 1.5–7.5)
NEUTS SEG NFR BLD: 57.7 % (ref 50–65)
NITRITE UR QL STRIP.AUTO: POSITIVE
PH UR STRIP.AUTO: 5.5 [PH] (ref 5–8)
PLATELET # BLD AUTO: 192 K/UL (ref 130–400)
PMV BLD AUTO: 9.3 FL (ref 9.4–12.3)
POTASSIUM SERPL-SCNC: 4 MMOL/L (ref 3.5–5)
PROT SERPL-MCNC: 6.9 G/DL (ref 6.4–8.3)
PROT UR STRIP.AUTO-MCNC: NEGATIVE MG/DL
RBC # BLD AUTO: 4.77 M/UL (ref 4.2–5.4)
RBC #/AREA URNS AUTO: 1 /HPF (ref 0–4)
SODIUM SERPL-SCNC: 142 MMOL/L (ref 136–145)
SP GR UR STRIP.AUTO: 1.03 (ref 1–1.03)
T4 FREE SERPL-MCNC: 1.03 NG/DL (ref 0.93–1.7)
TRIGL SERPL-MCNC: 113 MG/DL (ref 0–149)
TSH SERPL DL<=0.005 MIU/L-ACNC: 0.44 UIU/ML (ref 0.27–4.2)
UROBILINOGEN UR STRIP.AUTO-MCNC: 0.2 E.U./DL
WBC # BLD AUTO: 4.8 K/UL (ref 4.8–10.8)
WBC #/AREA URNS AUTO: 6 /HPF (ref 0–5)

## 2024-09-11 RX ORDER — EZETIMIBE 10 MG/1
TABLET ORAL
Qty: 90 TABLET | Refills: 1 | Status: SHIPPED | OUTPATIENT
Start: 2024-09-11

## 2024-09-11 RX ORDER — ATORVASTATIN CALCIUM 80 MG/1
TABLET, FILM COATED ORAL
Qty: 90 TABLET | Refills: 1 | Status: SHIPPED | OUTPATIENT
Start: 2024-09-11

## 2024-09-16 ENCOUNTER — OFFICE VISIT (OUTPATIENT)
Dept: INTERNAL MEDICINE | Age: 79
End: 2024-09-16
Payer: MEDICARE

## 2024-09-16 VITALS
TEMPERATURE: 97.7 F | BODY MASS INDEX: 24.49 KG/M2 | DIASTOLIC BLOOD PRESSURE: 72 MMHG | SYSTOLIC BLOOD PRESSURE: 122 MMHG | HEIGHT: 65 IN | HEART RATE: 76 BPM | WEIGHT: 147 LBS | OXYGEN SATURATION: 96 %

## 2024-09-16 DIAGNOSIS — E55.9 VITAMIN D DEFICIENCY: ICD-10-CM

## 2024-09-16 DIAGNOSIS — Z00.00 MEDICARE ANNUAL WELLNESS VISIT, SUBSEQUENT: Primary | ICD-10-CM

## 2024-09-16 DIAGNOSIS — G25.81 RLS (RESTLESS LEGS SYNDROME): ICD-10-CM

## 2024-09-16 DIAGNOSIS — S66.911S: ICD-10-CM

## 2024-09-16 DIAGNOSIS — J45.20 MILD INTERMITTENT ASTHMA WITHOUT COMPLICATION: ICD-10-CM

## 2024-09-16 DIAGNOSIS — E78.00 PURE HYPERCHOLESTEROLEMIA: ICD-10-CM

## 2024-09-16 DIAGNOSIS — E11.9 TYPE 2 DIABETES MELLITUS WITHOUT COMPLICATION, WITHOUT LONG-TERM CURRENT USE OF INSULIN (HCC): ICD-10-CM

## 2024-09-16 DIAGNOSIS — M85.852 OSTEOPENIA OF LEFT HIP: ICD-10-CM

## 2024-09-16 DIAGNOSIS — K21.9 GASTROESOPHAGEAL REFLUX DISEASE WITHOUT ESOPHAGITIS: ICD-10-CM

## 2024-09-16 PROCEDURE — G8399 PT W/DXA RESULTS DOCUMENT: HCPCS | Performed by: INTERNAL MEDICINE

## 2024-09-16 PROCEDURE — G8427 DOCREV CUR MEDS BY ELIG CLIN: HCPCS | Performed by: INTERNAL MEDICINE

## 2024-09-16 PROCEDURE — 1036F TOBACCO NON-USER: CPT | Performed by: INTERNAL MEDICINE

## 2024-09-16 PROCEDURE — 1090F PRES/ABSN URINE INCON ASSESS: CPT | Performed by: INTERNAL MEDICINE

## 2024-09-16 PROCEDURE — 3051F HG A1C>EQUAL 7.0%<8.0%: CPT | Performed by: INTERNAL MEDICINE

## 2024-09-16 PROCEDURE — G8420 CALC BMI NORM PARAMETERS: HCPCS | Performed by: INTERNAL MEDICINE

## 2024-09-16 PROCEDURE — G0439 PPPS, SUBSEQ VISIT: HCPCS | Performed by: INTERNAL MEDICINE

## 2024-09-16 PROCEDURE — 99214 OFFICE O/P EST MOD 30 MIN: CPT | Performed by: INTERNAL MEDICINE

## 2024-09-16 PROCEDURE — 1123F ACP DISCUSS/DSCN MKR DOCD: CPT | Performed by: INTERNAL MEDICINE

## 2024-09-16 ASSESSMENT — ENCOUNTER SYMPTOMS
WHEEZING: 0
ABDOMINAL PAIN: 0
CONSTIPATION: 0
COUGH: 0
SORE THROAT: 0

## 2024-09-16 ASSESSMENT — PATIENT HEALTH QUESTIONNAIRE - PHQ9
1. LITTLE INTEREST OR PLEASURE IN DOING THINGS: NOT AT ALL
SUM OF ALL RESPONSES TO PHQ QUESTIONS 1-9: 0
SUM OF ALL RESPONSES TO PHQ QUESTIONS 1-9: 0
SUM OF ALL RESPONSES TO PHQ9 QUESTIONS 1 & 2: 0
SUM OF ALL RESPONSES TO PHQ QUESTIONS 1-9: 0
SUM OF ALL RESPONSES TO PHQ QUESTIONS 1-9: 0
2. FEELING DOWN, DEPRESSED OR HOPELESS: NOT AT ALL

## 2024-09-16 ASSESSMENT — LIFESTYLE VARIABLES
HOW OFTEN DO YOU HAVE A DRINK CONTAINING ALCOHOL: NEVER
HOW MANY STANDARD DRINKS CONTAINING ALCOHOL DO YOU HAVE ON A TYPICAL DAY: PATIENT DOES NOT DRINK

## 2024-09-24 RX ORDER — EMPAGLIFLOZIN 10 MG/1
TABLET, FILM COATED ORAL
Qty: 90 TABLET | Refills: 1 | Status: SHIPPED | OUTPATIENT
Start: 2024-09-24

## 2024-09-25 ENCOUNTER — OFFICE VISIT (OUTPATIENT)
Dept: INTERNAL MEDICINE | Age: 79
End: 2024-09-25

## 2024-09-25 VITALS
BODY MASS INDEX: 24.66 KG/M2 | HEIGHT: 65 IN | WEIGHT: 148 LBS | SYSTOLIC BLOOD PRESSURE: 120 MMHG | TEMPERATURE: 99.1 F | HEART RATE: 78 BPM | OXYGEN SATURATION: 94 % | DIASTOLIC BLOOD PRESSURE: 82 MMHG

## 2024-09-25 DIAGNOSIS — R11.0 NAUSEA: ICD-10-CM

## 2024-09-25 DIAGNOSIS — H81.03 MENIERE'S DISEASE OF BOTH EARS: Primary | ICD-10-CM

## 2024-09-25 DIAGNOSIS — R42 VERTIGO: ICD-10-CM

## 2024-09-25 RX ORDER — METHYLPREDNISOLONE ACETATE 80 MG/ML
80 INJECTION, SUSPENSION INTRA-ARTICULAR; INTRALESIONAL; INTRAMUSCULAR; SOFT TISSUE ONCE
Status: COMPLETED | OUTPATIENT
Start: 2024-09-25 | End: 2024-09-25

## 2024-09-25 RX ORDER — PROMETHAZINE HYDROCHLORIDE 12.5 MG/1
12.5 TABLET ORAL 3 TIMES DAILY PRN
Qty: 12 TABLET | Refills: 0 | Status: SHIPPED | OUTPATIENT
Start: 2024-09-25 | End: 2024-10-02

## 2024-09-25 RX ADMIN — METHYLPREDNISOLONE ACETATE 80 MG: 80 INJECTION, SUSPENSION INTRA-ARTICULAR; INTRALESIONAL; INTRAMUSCULAR; SOFT TISSUE at 14:28

## 2024-09-25 ASSESSMENT — ENCOUNTER SYMPTOMS
SORE THROAT: 0
CHEST TIGHTNESS: 0
WHEEZING: 0
VOMITING: 1
NAUSEA: 1
CONSTIPATION: 0
ABDOMINAL PAIN: 0
COUGH: 0

## 2024-12-18 ENCOUNTER — OFFICE VISIT (OUTPATIENT)
Dept: INTERNAL MEDICINE | Age: 79
End: 2024-12-18
Payer: MEDICARE

## 2024-12-18 VITALS
WEIGHT: 147.8 LBS | HEIGHT: 65 IN | OXYGEN SATURATION: 96 % | SYSTOLIC BLOOD PRESSURE: 128 MMHG | DIASTOLIC BLOOD PRESSURE: 70 MMHG | BODY MASS INDEX: 24.62 KG/M2 | HEART RATE: 70 BPM

## 2024-12-18 DIAGNOSIS — E78.00 PURE HYPERCHOLESTEROLEMIA: ICD-10-CM

## 2024-12-18 DIAGNOSIS — E55.9 VITAMIN D DEFICIENCY: ICD-10-CM

## 2024-12-18 DIAGNOSIS — J45.20 MILD INTERMITTENT ASTHMA WITHOUT COMPLICATION: ICD-10-CM

## 2024-12-18 DIAGNOSIS — M85.852 OSTEOPENIA OF LEFT HIP: ICD-10-CM

## 2024-12-18 DIAGNOSIS — N30.10 INTERSTITIAL CYSTITIS: ICD-10-CM

## 2024-12-18 DIAGNOSIS — E11.9 TYPE 2 DIABETES MELLITUS WITHOUT COMPLICATION, WITHOUT LONG-TERM CURRENT USE OF INSULIN (HCC): Primary | ICD-10-CM

## 2024-12-18 DIAGNOSIS — G25.81 RLS (RESTLESS LEGS SYNDROME): ICD-10-CM

## 2024-12-18 LAB — HBA1C MFR BLD: 7.5 %

## 2024-12-18 PROCEDURE — G8420 CALC BMI NORM PARAMETERS: HCPCS | Performed by: INTERNAL MEDICINE

## 2024-12-18 PROCEDURE — G8427 DOCREV CUR MEDS BY ELIG CLIN: HCPCS | Performed by: INTERNAL MEDICINE

## 2024-12-18 PROCEDURE — 1123F ACP DISCUSS/DSCN MKR DOCD: CPT | Performed by: INTERNAL MEDICINE

## 2024-12-18 PROCEDURE — 1159F MED LIST DOCD IN RCRD: CPT | Performed by: INTERNAL MEDICINE

## 2024-12-18 PROCEDURE — 1036F TOBACCO NON-USER: CPT | Performed by: INTERNAL MEDICINE

## 2024-12-18 PROCEDURE — 83036 HEMOGLOBIN GLYCOSYLATED A1C: CPT | Performed by: INTERNAL MEDICINE

## 2024-12-18 PROCEDURE — G8484 FLU IMMUNIZE NO ADMIN: HCPCS | Performed by: INTERNAL MEDICINE

## 2024-12-18 PROCEDURE — G8399 PT W/DXA RESULTS DOCUMENT: HCPCS | Performed by: INTERNAL MEDICINE

## 2024-12-18 PROCEDURE — 99214 OFFICE O/P EST MOD 30 MIN: CPT | Performed by: INTERNAL MEDICINE

## 2024-12-18 PROCEDURE — 1090F PRES/ABSN URINE INCON ASSESS: CPT | Performed by: INTERNAL MEDICINE

## 2024-12-18 PROCEDURE — 3051F HG A1C>EQUAL 7.0%<8.0%: CPT | Performed by: INTERNAL MEDICINE

## 2024-12-18 ASSESSMENT — ENCOUNTER SYMPTOMS
CONSTIPATION: 0
WHEEZING: 0
COUGH: 0
ABDOMINAL PAIN: 0
SORE THROAT: 0
CHEST TIGHTNESS: 0

## 2024-12-18 NOTE — PROGRESS NOTES
Bilirubin 09/09/2024 0.6     Alkaline Phosphatase 09/09/2024 97     ALT 09/09/2024 20     AST 09/09/2024 17     Bacteria, UA 09/09/2024 4+     Crystals, UA 09/09/2024 NEG     Hyaline Casts, UA 09/09/2024 2     WBC, UA 09/09/2024 6 (H)     RBC, UA 09/09/2024 1     Epithelial Cells, UA 09/09/2024 1            ASSESSMENT/PLAN:      Type 2 diabetes  Hyperglycemia  Hemoglobin A1c is higher at 7.5-         Recommendations   Recommend following  1.  Trulicity dose decrease to 1.5     2.  Glipizide dosing change  Take 2.5 in a.m. and 2.5 pm  3.  Jardiance 10   4.  Close follow-up here with sugar readings   5.  Obtain labs today, orders placed           RLS (restless legs syndrome)  Has been well controlled/currently on no medications  rx made her constipated     Vitamin D deficiency  Lab results reviewed with patient  vitamin D level 61  Rx vit d 50,000 weekly         Interstitial cystitis  previously followed with Dr. Jameson, prior history of frequent UTIs.   RX pro/prebiotic  Repeat ua today post uti 8/2024        Primary insomnia-   Sleep hygiene recommendations discussed with patient     Pure hypercholesterolemia  Lab results reviewed and discussed with patient  Liver functions normal  LDL is 68  RX Zetia 10 mg daily  Rx Lipitor 80 mg daily  Recommend diet lower in saturated fats     Asthma  rx  dulera + spiriva ( never smoker)- uses prn  Monitor closely  During spring months also suggest patient add Xyzal or Zyrtec and uses Flonase no spray and carries albuterol for as needed use     Osteopenia       Osteopenia of left hip 02/09/2021     Overview Note:     2/2021 hip neck -1.7  4/2023 hip neck -1.7/-2.0 L spine nl            Recommendations at this time  #1 make sure  she takes her vitamin D supplement  #2 exercise, specifically weightbearing exercise is recommended     Repeat 3-4 yrs     Grief  Her son was murdered in 9/2023   Son also had metastatic cancer  States does not want counceling or RX  States coping

## 2024-12-23 ENCOUNTER — TELEPHONE (OUTPATIENT)
Dept: INTERNAL MEDICINE | Age: 79
End: 2024-12-23

## 2024-12-23 RX ORDER — AZITHROMYCIN 500 MG/1
500 TABLET, FILM COATED ORAL DAILY
Qty: 10 TABLET | Refills: 0 | Status: SHIPPED | OUTPATIENT
Start: 2024-12-23 | End: 2025-01-02

## 2024-12-23 NOTE — TELEPHONE ENCOUNTER
Patient is coughing with head and chest congestion that started over the weekend. Patient is in [place] with her  while he is having a heart procedure. She will be back later tonight. Can we call her in something to her pharmacy?

## 2025-01-07 ENCOUNTER — OFFICE VISIT (OUTPATIENT)
Dept: INTERNAL MEDICINE | Age: 80
End: 2025-01-07
Payer: MEDICARE

## 2025-01-07 VITALS
HEART RATE: 66 BPM | BODY MASS INDEX: 24.79 KG/M2 | SYSTOLIC BLOOD PRESSURE: 130 MMHG | HEIGHT: 65 IN | WEIGHT: 148.8 LBS | DIASTOLIC BLOOD PRESSURE: 76 MMHG | OXYGEN SATURATION: 98 %

## 2025-01-07 DIAGNOSIS — H81.03: ICD-10-CM

## 2025-01-07 DIAGNOSIS — H91.93 HEARING LOSS ASSOCIATED WITH SYNDROME OF BOTH EARS: Primary | ICD-10-CM

## 2025-01-07 PROCEDURE — G8420 CALC BMI NORM PARAMETERS: HCPCS | Performed by: INTERNAL MEDICINE

## 2025-01-07 PROCEDURE — 99213 OFFICE O/P EST LOW 20 MIN: CPT | Performed by: INTERNAL MEDICINE

## 2025-01-07 PROCEDURE — G8399 PT W/DXA RESULTS DOCUMENT: HCPCS | Performed by: INTERNAL MEDICINE

## 2025-01-07 PROCEDURE — M1308 PR FLU IMMUNIZE NO ADMIN: HCPCS | Performed by: INTERNAL MEDICINE

## 2025-01-07 PROCEDURE — 1090F PRES/ABSN URINE INCON ASSESS: CPT | Performed by: INTERNAL MEDICINE

## 2025-01-07 PROCEDURE — G8427 DOCREV CUR MEDS BY ELIG CLIN: HCPCS | Performed by: INTERNAL MEDICINE

## 2025-01-07 PROCEDURE — 1123F ACP DISCUSS/DSCN MKR DOCD: CPT | Performed by: INTERNAL MEDICINE

## 2025-01-07 PROCEDURE — 1036F TOBACCO NON-USER: CPT | Performed by: INTERNAL MEDICINE

## 2025-01-07 PROCEDURE — 1159F MED LIST DOCD IN RCRD: CPT | Performed by: INTERNAL MEDICINE

## 2025-01-07 RX ORDER — PREDNISONE 10 MG/1
10 TABLET ORAL 2 TIMES DAILY
Qty: 14 TABLET | Refills: 0 | Status: SHIPPED | OUTPATIENT
Start: 2025-01-07 | End: 2025-01-14

## 2025-01-07 RX ORDER — NEOMYCIN SULFATE, POLYMYXIN B SULFATE, HYDROCORTISONE 3.5; 10000; 1 MG/ML; [USP'U]/ML; MG/ML
3 SOLUTION/ DROPS AURICULAR (OTIC) 3 TIMES DAILY
COMMUNITY

## 2025-01-07 SDOH — ECONOMIC STABILITY: FOOD INSECURITY: WITHIN THE PAST 12 MONTHS, YOU WORRIED THAT YOUR FOOD WOULD RUN OUT BEFORE YOU GOT MONEY TO BUY MORE.: PATIENT DECLINED

## 2025-01-07 SDOH — ECONOMIC STABILITY: INCOME INSECURITY: HOW HARD IS IT FOR YOU TO PAY FOR THE VERY BASICS LIKE FOOD, HOUSING, MEDICAL CARE, AND HEATING?: PATIENT DECLINED

## 2025-01-07 SDOH — ECONOMIC STABILITY: FOOD INSECURITY: WITHIN THE PAST 12 MONTHS, THE FOOD YOU BOUGHT JUST DIDN'T LAST AND YOU DIDN'T HAVE MONEY TO GET MORE.: PATIENT DECLINED

## 2025-01-07 ASSESSMENT — PATIENT HEALTH QUESTIONNAIRE - PHQ9
SUM OF ALL RESPONSES TO PHQ QUESTIONS 1-9: 0
SUM OF ALL RESPONSES TO PHQ9 QUESTIONS 1 & 2: 0
2. FEELING DOWN, DEPRESSED OR HOPELESS: NOT AT ALL
SUM OF ALL RESPONSES TO PHQ QUESTIONS 1-9: 0
SUM OF ALL RESPONSES TO PHQ QUESTIONS 1-9: 0
1. LITTLE INTEREST OR PLEASURE IN DOING THINGS: NOT AT ALL
SUM OF ALL RESPONSES TO PHQ QUESTIONS 1-9: 0

## 2025-01-07 ASSESSMENT — ENCOUNTER SYMPTOMS
COUGH: 0
SORE THROAT: 0
CONSTIPATION: 0
ABDOMINAL PAIN: 0
WHEEZING: 0
CHEST TIGHTNESS: 0

## 2025-01-07 NOTE — PROGRESS NOTES
Chief Complaint   Patient presents with    Ear Fullness     Loss of hearing on Saturday      History of presenting illness:  Tracy Zamora is a79 y.o. female who presents today for follow up on her chronic medical conditions as noted below.    Patient Active Problem List    Diagnosis Date Noted    Right arm numbness 08/12/2022     Priority: High    Vaginal itching 11/14/2022     Priority: Medium    Dysuria 11/14/2022     Priority: Medium    S/P total knee arthroplasty, left 11/14/2022     Priority: Medium    Primary osteoarthritis of left knee 09/26/2022     Priority: Medium    TIA (transient ischemic attack) 07/28/2022     Priority: Medium    Numbness and tingling of right arm 07/27/2022     Priority: Medium    Right arm weakness 07/27/2022     Priority: Medium    HLD (hyperlipidemia) 05/15/2020     Priority: Medium    Intrinsic asthma with acute exacerbation 05/15/2020     Priority: Medium    Acute bacterial sinusitis 10/25/2021    Osteopenia of left hip 02/09/2021     Overview Note:     2/2021 hip neck -1.7  4/2023 hip neck -1.7/-2.0 L spine nl      Type 2 diabetes mellitus without complication, without long-term current use of insulin (HCC) 11/23/2020    RLS (restless legs syndrome) 11/23/2020    Vitamin D deficiency 11/23/2020    Nausea 08/22/2013    GERD (gastroesophageal reflux disease) 08/22/2013    History of Helicobacter pylori infection 08/22/2013    History of colon polyps 08/22/2013    Hiatal hernia 08/22/2013    Diverticulosis 08/22/2013     Past Medical History:   Diagnosis Date    Arthritis     Asthma     Diabetes mellitus (HCC)     GERD (gastroesophageal reflux disease)     Helicobacter Pylori (H. Pylori) Infection     Hyperlipidemia     Knee pain       Past Surgical History:   Procedure Laterality Date    CHOLECYSTECTOMY      COLONOSCOPY  2017    [facility]    ENDOSCOPY, COLON, DIAGNOSTIC      HYSTERECTOMY (CERVIX STATUS UNKNOWN)      ALEX BSO    KNEE GANGLION SURGERY      NECK SURGERY

## 2025-01-08 NOTE — TELEPHONE ENCOUNTER
----- Message from Kalee sent at 12/27/2021  1:06 PM CST -----  Subject: Message to Provider    QUESTIONS  Information for Provider? patient received a message stating that   glyburide is on the non preferred medications list and would like to know   if provider will give okay to switch to glipizide if so please contact   [phone] [facility].   ---------------------------------------------------------------------------  --------------  [address]  What is the best way for the office to contact you? OK to leave message on   voicemail  Preferred Call Back Phone Number? [phone]  ---------------------------------------------------------------------------  --------------  SCRIPT ANSWERS  Relationship to Patient?  Self 85.7

## 2025-02-10 NOTE — ADDENDUM NOTE
Addended by: Citlaly Rios on: 11/21/2022 04:35 PM     Modules accepted: Orders I spoke with pt.  She stated the /81,   Yesterday 123/83  , day before yesterday 120/78. She state that she only notes the BP and not the HR

## 2025-02-12 DIAGNOSIS — E11.9 TYPE 2 DIABETES MELLITUS WITHOUT COMPLICATION, WITHOUT LONG-TERM CURRENT USE OF INSULIN (HCC): ICD-10-CM

## 2025-02-12 RX ORDER — BLOOD SUGAR DIAGNOSTIC
STRIP MISCELLANEOUS
Qty: 300 EACH | Refills: 1 | Status: SHIPPED | OUTPATIENT
Start: 2025-02-12

## 2025-02-12 NOTE — TELEPHONE ENCOUNTER
Tracy Zamora called to request a refill on her medication.      Last office visit : 1/7/2025   Next office visit : 3/20/2025     Requested Prescriptions     Pending Prescriptions Disp Refills    blood glucose test strips (ACCU-CHEK TINO PLUS) strip [Pharmacy Med Name: ACCU-CHEK TINO PLUS TEST S IP] 300 each 1     Sig: TEST BLOOD SUGAR DAILY AS NEEDED            Lacy Branch MA

## 2025-02-17 ENCOUNTER — PATIENT MESSAGE (OUTPATIENT)
Dept: INTERNAL MEDICINE | Age: 80
End: 2025-02-17

## 2025-02-18 RX ORDER — EZETIMIBE 10 MG/1
TABLET ORAL
Qty: 90 TABLET | Refills: 1 | Status: SHIPPED | OUTPATIENT
Start: 2025-02-18

## 2025-02-18 RX ORDER — ATORVASTATIN CALCIUM 80 MG/1
TABLET, FILM COATED ORAL
Qty: 90 TABLET | Refills: 1 | Status: SHIPPED | OUTPATIENT
Start: 2025-02-18

## 2025-02-18 NOTE — TELEPHONE ENCOUNTER
Tracy Zamora called to request a refill on her medication.      Last office visit : 1/7/2025   Next office visit : 3/20/2025     Requested Prescriptions     Pending Prescriptions Disp Refills    ezetimibe (ZETIA) 10 MG tablet [Pharmacy Med Name: EZETIMIBE TABS 10MG] 90 tablet 1     Sig: TAKE 1 TABLET DAILY    atorvastatin (LIPITOR) 80 MG tablet [Pharmacy Med Name: ATORVASTATIN TABS 80MG] 90 tablet 1     Sig: TAKE 1 TABLET DAILY            Lacy Branch MA

## 2025-02-28 ENCOUNTER — OFFICE VISIT (OUTPATIENT)
Dept: INTERNAL MEDICINE | Age: 80
End: 2025-02-28

## 2025-02-28 VITALS
DIASTOLIC BLOOD PRESSURE: 68 MMHG | HEART RATE: 88 BPM | BODY MASS INDEX: 24.16 KG/M2 | OXYGEN SATURATION: 95 % | WEIGHT: 145 LBS | HEIGHT: 65 IN | SYSTOLIC BLOOD PRESSURE: 120 MMHG

## 2025-02-28 DIAGNOSIS — R05.1 ACUTE COUGH: ICD-10-CM

## 2025-02-28 DIAGNOSIS — J45.21 AB (ASTHMATIC BRONCHITIS), MILD INTERMITTENT, WITH ACUTE EXACERBATION: ICD-10-CM

## 2025-02-28 DIAGNOSIS — J21.9 ACUTE BRONCHITIS AND BRONCHIOLITIS: Primary | ICD-10-CM

## 2025-02-28 DIAGNOSIS — E11.9 TYPE 2 DIABETES MELLITUS WITHOUT COMPLICATION, WITHOUT LONG-TERM CURRENT USE OF INSULIN (HCC): ICD-10-CM

## 2025-02-28 DIAGNOSIS — J20.9 ACUTE BRONCHITIS AND BRONCHIOLITIS: Primary | ICD-10-CM

## 2025-02-28 RX ORDER — CEFUROXIME AXETIL 500 MG/1
500 TABLET ORAL 2 TIMES DAILY
Qty: 14 TABLET | Refills: 0 | Status: SHIPPED | OUTPATIENT
Start: 2025-02-28 | End: 2025-03-07

## 2025-02-28 RX ORDER — VIT A/VIT C/VIT E/ZINC/COPPER 2148-113
TABLET ORAL
COMMUNITY
Start: 2025-02-06

## 2025-02-28 RX ORDER — METHYLPREDNISOLONE ACETATE 80 MG/ML
80 INJECTION, SUSPENSION INTRA-ARTICULAR; INTRALESIONAL; INTRAMUSCULAR; SOFT TISSUE ONCE
Status: COMPLETED | OUTPATIENT
Start: 2025-02-28 | End: 2025-02-28

## 2025-02-28 RX ADMIN — METHYLPREDNISOLONE ACETATE 80 MG: 80 INJECTION, SUSPENSION INTRA-ARTICULAR; INTRALESIONAL; INTRAMUSCULAR; SOFT TISSUE at 14:00

## 2025-02-28 SDOH — ECONOMIC STABILITY: FOOD INSECURITY: WITHIN THE PAST 12 MONTHS, THE FOOD YOU BOUGHT JUST DIDN'T LAST AND YOU DIDN'T HAVE MONEY TO GET MORE.: PATIENT DECLINED

## 2025-02-28 SDOH — ECONOMIC STABILITY: FOOD INSECURITY: WITHIN THE PAST 12 MONTHS, YOU WORRIED THAT YOUR FOOD WOULD RUN OUT BEFORE YOU GOT MONEY TO BUY MORE.: PATIENT DECLINED

## 2025-02-28 ASSESSMENT — ENCOUNTER SYMPTOMS
SORE THROAT: 0
CONSTIPATION: 0
SINUS PRESSURE: 1
CHEST TIGHTNESS: 0
WHEEZING: 0
COUGH: 0
ABDOMINAL PAIN: 0
SINUS PAIN: 1

## 2025-02-28 NOTE — PROGRESS NOTES
Chief Complaint   Patient presents with    Congestion     HEAD CONGESTION STARTED LAST NO SORE THROAT   REFUSED TESTING      History of presenting illness:  Tracy Zamora is a79 y.o. female who presents today for follow up on her chronic medical conditions as noted below.    Patient Active Problem List    Diagnosis Date Noted    Right arm numbness 08/12/2022     Priority: High    Vaginal itching 11/14/2022     Priority: Medium    Dysuria 11/14/2022     Priority: Medium    S/P total knee arthroplasty, left 11/14/2022     Priority: Medium    Primary osteoarthritis of left knee 09/26/2022     Priority: Medium    TIA (transient ischemic attack) 07/28/2022     Priority: Medium    Numbness and tingling of right arm 07/27/2022     Priority: Medium    Right arm weakness 07/27/2022     Priority: Medium    HLD (hyperlipidemia) 05/15/2020     Priority: Medium    Intrinsic asthma with acute exacerbation 05/15/2020     Priority: Medium    Acute bacterial sinusitis 10/25/2021    Osteopenia of left hip 02/09/2021     Overview Note:     2/2021 hip neck -1.7  4/2023 hip neck -1.7/-2.0 L spine nl      Type 2 diabetes mellitus without complication, without long-term current use of insulin (HCC) 11/23/2020    RLS (restless legs syndrome) 11/23/2020    Vitamin D deficiency 11/23/2020    Nausea 08/22/2013    GERD (gastroesophageal reflux disease) 08/22/2013    History of Helicobacter pylori infection 08/22/2013    History of colon polyps 08/22/2013    Hiatal hernia 08/22/2013    Diverticulosis 08/22/2013     Past Medical History:   Diagnosis Date    Arthritis     Asthma     Diabetes mellitus (HCC)     GERD (gastroesophageal reflux disease)     Helicobacter Pylori (H. Pylori) Infection     Hyperlipidemia     Knee pain       Past Surgical History:   Procedure Laterality Date    CHOLECYSTECTOMY      COLONOSCOPY  2017    [facility]    ENDOSCOPY, COLON, DIAGNOSTIC      HYSTERECTOMY (CERVIX STATUS UNKNOWN)      ALEX BSO    KNEE GANGLION

## 2025-03-25 RX ORDER — EMPAGLIFLOZIN 10 MG/1
10 TABLET, FILM COATED ORAL DAILY
Qty: 90 TABLET | Refills: 0 | Status: SHIPPED | OUTPATIENT
Start: 2025-03-25

## 2025-03-25 NOTE — TELEPHONE ENCOUNTER
Tracy Zamora called to request a refill on her medication.      Last office visit : 2/28/2025   Next office visit : Visit date not found     Requested Prescriptions     Pending Prescriptions Disp Refills    JARDIANCE 10 MG tablet [Pharmacy Med Name: JARDIANCE TABS 10MG] 90 tablet 3     Sig: TAKE 1 TABLET DAILY            Lacy Branch MA

## 2025-04-04 RX ORDER — ESOMEPRAZOLE MAGNESIUM 40 MG/1
40 CAPSULE, DELAYED RELEASE ORAL
Qty: 90 CAPSULE | Refills: 3 | Status: SHIPPED | OUTPATIENT
Start: 2025-04-04

## 2025-04-25 ENCOUNTER — OFFICE VISIT (OUTPATIENT)
Dept: INTERNAL MEDICINE | Age: 80
End: 2025-04-25
Payer: MEDICARE

## 2025-04-25 VITALS
HEIGHT: 65 IN | BODY MASS INDEX: 25.02 KG/M2 | OXYGEN SATURATION: 97 % | DIASTOLIC BLOOD PRESSURE: 64 MMHG | HEART RATE: 78 BPM | SYSTOLIC BLOOD PRESSURE: 120 MMHG | WEIGHT: 150.2 LBS | TEMPERATURE: 98.6 F

## 2025-04-25 DIAGNOSIS — Z12.31 ENCOUNTER FOR SCREENING MAMMOGRAM FOR BREAST CANCER: ICD-10-CM

## 2025-04-25 DIAGNOSIS — J45.21 AB (ASTHMATIC BRONCHITIS), MILD INTERMITTENT, WITH ACUTE EXACERBATION: ICD-10-CM

## 2025-04-25 DIAGNOSIS — J21.9 ACUTE BRONCHITIS AND BRONCHIOLITIS: ICD-10-CM

## 2025-04-25 DIAGNOSIS — J20.9 ACUTE BRONCHITIS AND BRONCHIOLITIS: ICD-10-CM

## 2025-04-25 DIAGNOSIS — E11.9 TYPE 2 DIABETES MELLITUS WITHOUT COMPLICATION, WITHOUT LONG-TERM CURRENT USE OF INSULIN (HCC): ICD-10-CM

## 2025-04-25 DIAGNOSIS — R05.1 ACUTE COUGH: Primary | ICD-10-CM

## 2025-04-25 PROCEDURE — 1036F TOBACCO NON-USER: CPT | Performed by: INTERNAL MEDICINE

## 2025-04-25 PROCEDURE — 1123F ACP DISCUSS/DSCN MKR DOCD: CPT | Performed by: INTERNAL MEDICINE

## 2025-04-25 PROCEDURE — G8399 PT W/DXA RESULTS DOCUMENT: HCPCS | Performed by: INTERNAL MEDICINE

## 2025-04-25 PROCEDURE — 1159F MED LIST DOCD IN RCRD: CPT | Performed by: INTERNAL MEDICINE

## 2025-04-25 PROCEDURE — G8420 CALC BMI NORM PARAMETERS: HCPCS | Performed by: INTERNAL MEDICINE

## 2025-04-25 PROCEDURE — 1090F PRES/ABSN URINE INCON ASSESS: CPT | Performed by: INTERNAL MEDICINE

## 2025-04-25 PROCEDURE — G8427 DOCREV CUR MEDS BY ELIG CLIN: HCPCS | Performed by: INTERNAL MEDICINE

## 2025-04-25 RX ORDER — AZITHROMYCIN 250 MG/1
TABLET, FILM COATED ORAL
Qty: 6 TABLET | Refills: 0 | Status: SHIPPED | OUTPATIENT
Start: 2025-04-25 | End: 2025-05-05

## 2025-04-25 RX ORDER — METHYLPREDNISOLONE ACETATE 80 MG/ML
80 INJECTION, SUSPENSION INTRA-ARTICULAR; INTRALESIONAL; INTRAMUSCULAR; SOFT TISSUE ONCE
Status: COMPLETED | OUTPATIENT
Start: 2025-04-25 | End: 2025-04-25

## 2025-04-25 RX ADMIN — METHYLPREDNISOLONE ACETATE 80 MG: 80 INJECTION, SUSPENSION INTRA-ARTICULAR; INTRALESIONAL; INTRAMUSCULAR; SOFT TISSUE at 10:37

## 2025-04-25 ASSESSMENT — ENCOUNTER SYMPTOMS
WHEEZING: 0
SINUS PAIN: 1
COUGH: 1
RHINORRHEA: 1
SORE THROAT: 0
SINUS PRESSURE: 1
CHEST TIGHTNESS: 0
CONSTIPATION: 0
ABDOMINAL PAIN: 0

## 2025-04-25 NOTE — PROGRESS NOTES
Chief Complaint   Patient presents with    Other     Cough, drainage, and sore throat started this morning      History of presenting illness:  Tracy Zamora is a79 y.o. female who presents today for follow up on her chronic medical conditions as noted below.      Patient Active Problem List    Diagnosis Date Noted    Right arm numbness 08/12/2022     Priority: High    Vaginal itching 11/14/2022     Priority: Medium    Dysuria 11/14/2022     Priority: Medium    S/P total knee arthroplasty, left 11/14/2022     Priority: Medium    Primary osteoarthritis of left knee 09/26/2022     Priority: Medium    TIA (transient ischemic attack) 07/28/2022     Priority: Medium    Numbness and tingling of right arm 07/27/2022     Priority: Medium    Right arm weakness 07/27/2022     Priority: Medium    HLD (hyperlipidemia) 05/15/2020     Priority: Medium    Intrinsic asthma with acute exacerbation 05/15/2020     Priority: Medium    Acute bacterial sinusitis 10/25/2021    Osteopenia of left hip 02/09/2021     Overview Note:     2/2021 hip neck -1.7  4/2023 hip neck -1.7/-2.0 L spine nl      Type 2 diabetes mellitus without complication, without long-term current use of insulin (HCC) 11/23/2020    RLS (restless legs syndrome) 11/23/2020    Vitamin D deficiency 11/23/2020    Nausea 08/22/2013    GERD (gastroesophageal reflux disease) 08/22/2013    History of Helicobacter pylori infection 08/22/2013    History of colon polyps 08/22/2013    Hiatal hernia 08/22/2013    Diverticulosis 08/22/2013     Past Medical History:   Diagnosis Date    Arthritis     Asthma     Diabetes mellitus (HCC)     GERD (gastroesophageal reflux disease)     Helicobacter Pylori (H. Pylori) Infection     Hyperlipidemia     Knee pain       Past Surgical History:   Procedure Laterality Date    CHOLECYSTECTOMY      COLONOSCOPY  2017    [facility]    ENDOSCOPY, COLON, DIAGNOSTIC      HYSTERECTOMY (CERVIX STATUS UNKNOWN)      ALEX BSO    KNEE GANGLION SURGERY

## 2025-04-28 ENCOUNTER — TELEPHONE (OUTPATIENT)
Dept: INTERNAL MEDICINE | Age: 80
End: 2025-04-28

## 2025-04-28 DIAGNOSIS — J45.20 MILD INTERMITTENT ASTHMA WITHOUT COMPLICATION: ICD-10-CM

## 2025-04-28 DIAGNOSIS — M85.852 OSTEOPENIA OF LEFT HIP: ICD-10-CM

## 2025-04-28 DIAGNOSIS — N30.10 INTERSTITIAL CYSTITIS: ICD-10-CM

## 2025-04-28 DIAGNOSIS — G25.81 RLS (RESTLESS LEGS SYNDROME): ICD-10-CM

## 2025-04-28 DIAGNOSIS — E11.9 TYPE 2 DIABETES MELLITUS WITHOUT COMPLICATION, WITHOUT LONG-TERM CURRENT USE OF INSULIN (HCC): ICD-10-CM

## 2025-04-28 DIAGNOSIS — E55.9 VITAMIN D DEFICIENCY: ICD-10-CM

## 2025-04-28 DIAGNOSIS — E78.00 PURE HYPERCHOLESTEROLEMIA: ICD-10-CM

## 2025-04-28 LAB
25(OH)D3 SERPL-MCNC: 58.5 NG/ML
ALBUMIN SERPL-MCNC: 4.5 G/DL (ref 3.5–5.2)
ALP SERPL-CCNC: 85 U/L (ref 35–104)
ALT SERPL-CCNC: 24 U/L (ref 10–35)
ANION GAP SERPL CALCULATED.3IONS-SCNC: 10 MMOL/L (ref 8–16)
AST SERPL-CCNC: 20 U/L (ref 10–35)
BILIRUB SERPL-MCNC: 0.7 MG/DL (ref 0.2–1.2)
BUN SERPL-MCNC: 18 MG/DL (ref 8–23)
CALCIUM SERPL-MCNC: 9.3 MG/DL (ref 8.8–10.2)
CHLORIDE SERPL-SCNC: 102 MMOL/L (ref 98–107)
CHOLEST SERPL-MCNC: 136 MG/DL (ref 0–199)
CO2 SERPL-SCNC: 28 MMOL/L (ref 22–29)
CREAT SERPL-MCNC: 0.7 MG/DL (ref 0.5–0.9)
GLUCOSE SERPL-MCNC: 148 MG/DL (ref 70–99)
HBA1C MFR BLD: 7.7 % (ref 4–5.6)
HDLC SERPL-MCNC: 42 MG/DL (ref 40–60)
LDLC SERPL CALC-MCNC: 74 MG/DL
POTASSIUM SERPL-SCNC: 4.4 MMOL/L (ref 3.5–5.1)
PROT SERPL-MCNC: 6.9 G/DL (ref 6.4–8.3)
SODIUM SERPL-SCNC: 140 MMOL/L (ref 136–145)
TRIGL SERPL-MCNC: 99 MG/DL (ref 0–149)
TSH SERPL DL<=0.005 MIU/L-ACNC: 1.07 UIU/ML (ref 0.27–4.2)

## 2025-04-28 NOTE — TELEPHONE ENCOUNTER
[facility] radiology called to get an order for a screening mammogram for patient    Patient is scheduled for 4/29/2025    Fax number [phone]

## 2025-04-29 ENCOUNTER — OFFICE VISIT (OUTPATIENT)
Dept: INTERNAL MEDICINE | Age: 80
End: 2025-04-29
Payer: MEDICARE

## 2025-04-29 VITALS
DIASTOLIC BLOOD PRESSURE: 76 MMHG | HEART RATE: 90 BPM | SYSTOLIC BLOOD PRESSURE: 120 MMHG | OXYGEN SATURATION: 98 % | HEIGHT: 65 IN | WEIGHT: 148.2 LBS | BODY MASS INDEX: 24.69 KG/M2

## 2025-04-29 DIAGNOSIS — M85.852 OSTEOPENIA OF LEFT HIP: ICD-10-CM

## 2025-04-29 DIAGNOSIS — E11.65 UNCONTROLLED TYPE 2 DIABETES MELLITUS WITH HYPERGLYCEMIA (HCC): ICD-10-CM

## 2025-04-29 DIAGNOSIS — E78.00 PURE HYPERCHOLESTEROLEMIA: ICD-10-CM

## 2025-04-29 DIAGNOSIS — J21.9 ACUTE BRONCHITIS AND BRONCHIOLITIS: ICD-10-CM

## 2025-04-29 DIAGNOSIS — E55.9 VITAMIN D DEFICIENCY: ICD-10-CM

## 2025-04-29 DIAGNOSIS — J20.9 ACUTE BRONCHITIS AND BRONCHIOLITIS: ICD-10-CM

## 2025-04-29 DIAGNOSIS — E11.9 TYPE 2 DIABETES MELLITUS WITHOUT COMPLICATION, WITHOUT LONG-TERM CURRENT USE OF INSULIN (HCC): Primary | ICD-10-CM

## 2025-04-29 DIAGNOSIS — G25.81 RLS (RESTLESS LEGS SYNDROME): ICD-10-CM

## 2025-04-29 PROCEDURE — G8420 CALC BMI NORM PARAMETERS: HCPCS | Performed by: INTERNAL MEDICINE

## 2025-04-29 PROCEDURE — 99214 OFFICE O/P EST MOD 30 MIN: CPT | Performed by: INTERNAL MEDICINE

## 2025-04-29 PROCEDURE — 1123F ACP DISCUSS/DSCN MKR DOCD: CPT | Performed by: INTERNAL MEDICINE

## 2025-04-29 PROCEDURE — 1159F MED LIST DOCD IN RCRD: CPT | Performed by: INTERNAL MEDICINE

## 2025-04-29 PROCEDURE — 1036F TOBACCO NON-USER: CPT | Performed by: INTERNAL MEDICINE

## 2025-04-29 PROCEDURE — G8427 DOCREV CUR MEDS BY ELIG CLIN: HCPCS | Performed by: INTERNAL MEDICINE

## 2025-04-29 PROCEDURE — 3051F HG A1C>EQUAL 7.0%<8.0%: CPT | Performed by: INTERNAL MEDICINE

## 2025-04-29 PROCEDURE — G8399 PT W/DXA RESULTS DOCUMENT: HCPCS | Performed by: INTERNAL MEDICINE

## 2025-04-29 PROCEDURE — 1090F PRES/ABSN URINE INCON ASSESS: CPT | Performed by: INTERNAL MEDICINE

## 2025-04-29 ASSESSMENT — ENCOUNTER SYMPTOMS
COUGH: 1
SORE THROAT: 0
CONSTIPATION: 0
WHEEZING: 1
ABDOMINAL PAIN: 0
CHEST TIGHTNESS: 0

## 2025-04-29 NOTE — PROGRESS NOTES
Chief Complaint   Patient presents with    3 Month Follow-Up     History of presenting illness:  Tracy Zamroa is a79 y.o. female who presents today for follow up on her chronic medical conditions as noted below.    Patient Active Problem List    Diagnosis Date Noted    Right arm numbness 08/12/2022     Priority: High    Vaginal itching 11/14/2022     Priority: Medium    Dysuria 11/14/2022     Priority: Medium    S/P total knee arthroplasty, left 11/14/2022     Priority: Medium    Primary osteoarthritis of left knee 09/26/2022     Priority: Medium    TIA (transient ischemic attack) 07/28/2022     Priority: Medium    Numbness and tingling of right arm 07/27/2022     Priority: Medium    Right arm weakness 07/27/2022     Priority: Medium    HLD (hyperlipidemia) 05/15/2020     Priority: Medium    Intrinsic asthma with acute exacerbation 05/15/2020     Priority: Medium    Acute bacterial sinusitis 10/25/2021    Osteopenia of left hip 02/09/2021     Overview Note:     2/2021 hip neck -1.7  4/2023 hip neck -1.7/-2.0 L spine nl      Type 2 diabetes mellitus without complication, without long-term current use of insulin (HCC) 11/23/2020    RLS (restless legs syndrome) 11/23/2020    Vitamin D deficiency 11/23/2020    Nausea 08/22/2013    GERD (gastroesophageal reflux disease) 08/22/2013    History of Helicobacter pylori infection 08/22/2013    History of colon polyps 08/22/2013    Hiatal hernia 08/22/2013    Diverticulosis 08/22/2013     Past Medical History:   Diagnosis Date    Arthritis     Asthma     Diabetes mellitus (HCC)     GERD (gastroesophageal reflux disease)     Helicobacter Pylori (H. Pylori) Infection     Hyperlipidemia     Knee pain       Past Surgical History:   Procedure Laterality Date    CHOLECYSTECTOMY      COLONOSCOPY  2017    [facility]    ENDOSCOPY, COLON, DIAGNOSTIC      HYSTERECTOMY (CERVIX STATUS UNKNOWN)      ALEX BSO    KNEE GANGLION SURGERY      NECK SURGERY      Tumor removal    TOTAL KNEE

## 2025-05-01 ENCOUNTER — RESULTS FOLLOW-UP (OUTPATIENT)
Dept: INTERNAL MEDICINE | Age: 80
End: 2025-05-01

## 2025-05-01 DIAGNOSIS — Z12.31 ENCOUNTER FOR SCREENING MAMMOGRAM FOR BREAST CANCER: ICD-10-CM

## 2025-05-14 RX ORDER — ERGOCALCIFEROL 1.25 MG/1
CAPSULE, LIQUID FILLED ORAL
Qty: 6 CAPSULE | Refills: 1 | Status: SHIPPED | OUTPATIENT
Start: 2025-05-14

## 2025-05-14 NOTE — TELEPHONE ENCOUNTER
Tracy Zamora called to request a refill on her medication.      Last office visit : 4/29/2025   Next office visit : 7/30/2025     Requested Prescriptions     Pending Prescriptions Disp Refills    vitamin D (ERGOCALCIFEROL) 1.25 MG (23301 UT) CAPS capsule [Pharmacy Med Name: VIT D2 1.25 mg (50,000 U) 1.25 MG Capsule] 6 capsule 1     Sig: TAKE 1 CAPSULE BY MOUTH EVERY 14 DAYS            Lacy Branch MA

## 2025-06-01 ENCOUNTER — OFFICE VISIT (OUTPATIENT)
Age: 80
End: 2025-06-01

## 2025-06-01 VITALS
OXYGEN SATURATION: 96 % | SYSTOLIC BLOOD PRESSURE: 110 MMHG | DIASTOLIC BLOOD PRESSURE: 62 MMHG | HEIGHT: 65 IN | TEMPERATURE: 100 F | HEART RATE: 99 BPM | RESPIRATION RATE: 20 BRPM | BODY MASS INDEX: 24.43 KG/M2 | WEIGHT: 146.6 LBS

## 2025-06-01 DIAGNOSIS — R81 GLYCOSURIA: ICD-10-CM

## 2025-06-01 DIAGNOSIS — N76.0 ACUTE VAGINITIS: ICD-10-CM

## 2025-06-01 DIAGNOSIS — R30.0 DYSURIA: ICD-10-CM

## 2025-06-01 DIAGNOSIS — N30.01 ACUTE CYSTITIS WITH HEMATURIA: Primary | ICD-10-CM

## 2025-06-01 LAB
BILIRUBIN, POC: ABNORMAL
BLOOD URINE, POC: ABNORMAL
CHP ED QC CHECK: NORMAL
CLARITY, POC: CLEAR
COLOR, POC: YELLOW
GLUCOSE BLD-MCNC: 172 MG/DL
GLUCOSE URINE, POC: >=1000 MG/DL
KETONES, POC: ABNORMAL MG/DL
LEUKOCYTE EST, POC: ABNORMAL
NITRITE, POC: ABNORMAL
PH, POC: 5.5
PROTEIN, POC: ABNORMAL MG/DL
SPECIFIC GRAVITY, POC: 1.02
UROBILINOGEN, POC: 0.2 MG/DL

## 2025-06-01 RX ORDER — FLUCONAZOLE 150 MG/1
TABLET ORAL
Qty: 3 TABLET | Refills: 0 | Status: SHIPPED | OUTPATIENT
Start: 2025-06-01

## 2025-06-01 RX ORDER — NYSTATIN AND TRIAMCINOLONE ACETONIDE 100000; 1 [USP'U]/G; MG/G
CREAM TOPICAL
Qty: 15 G | Refills: 0 | Status: SHIPPED | OUTPATIENT
Start: 2025-06-01

## 2025-06-01 RX ORDER — CEPHALEXIN 500 MG/1
500 CAPSULE ORAL 3 TIMES DAILY
Qty: 21 CAPSULE | Refills: 0 | Status: SHIPPED | OUTPATIENT
Start: 2025-06-01 | End: 2025-06-08

## 2025-06-04 ENCOUNTER — RESULTS FOLLOW-UP (OUTPATIENT)
Age: 80
End: 2025-06-04

## 2025-06-04 DIAGNOSIS — N76.0 ACUTE VAGINITIS: ICD-10-CM

## 2025-06-04 LAB
BACTERIA UR CULT: ABNORMAL
BACTERIA UR CULT: ABNORMAL
ORGANISM: ABNORMAL

## 2025-06-04 RX ORDER — NYSTATIN AND TRIAMCINOLONE ACETONIDE 100000; 1 [USP'U]/G; MG/G
CREAM TOPICAL
Qty: 60 G | Refills: 0 | Status: SHIPPED | OUTPATIENT
Start: 2025-06-04

## 2025-06-04 NOTE — TELEPHONE ENCOUNTER
Tracy Zamora called to request a refill on her medication.      Last office visit : 4/29/2025   Next office visit : 7/30/2025     Requested Prescriptions     Pending Prescriptions Disp Refills    nystatin-triamcinolone (MYCOLOG II) 054910-2.1 UNIT/GM-% cream 60 g 0     Sig: Apply topically 2 times daily to external vaginal area x 7days.            Lacy Branch MA

## 2025-06-05 RX ORDER — GLIPIZIDE 5 MG/1
5 TABLET ORAL 2 TIMES DAILY
Qty: 180 TABLET | Refills: 1 | Status: SHIPPED | OUTPATIENT
Start: 2025-06-05

## 2025-06-05 NOTE — TELEPHONE ENCOUNTER
Tracy Zamora called to request a refill on her medication.      Last office visit : 4/29/2025   Next office visit : 7/30/2025     Requested Prescriptions     Pending Prescriptions Disp Refills    glipiZIDE (GLUCOTROL) 5 MG tablet [Pharmacy Med Name: GLIPIZIDE TABS 5MG] 180 tablet 3     Sig: TAKE 1 TABLET TWICE A DAY            Lacy Branch MA

## 2025-06-09 ENCOUNTER — OFFICE VISIT (OUTPATIENT)
Dept: INTERNAL MEDICINE | Age: 80
End: 2025-06-09
Payer: MEDICARE

## 2025-06-09 VITALS
SYSTOLIC BLOOD PRESSURE: 120 MMHG | OXYGEN SATURATION: 98 % | HEART RATE: 75 BPM | WEIGHT: 146 LBS | BODY MASS INDEX: 24.32 KG/M2 | DIASTOLIC BLOOD PRESSURE: 68 MMHG | HEIGHT: 65 IN

## 2025-06-09 DIAGNOSIS — R30.0 DYSURIA: ICD-10-CM

## 2025-06-09 DIAGNOSIS — N39.0 UTI DUE TO KLEBSIELLA SPECIES: ICD-10-CM

## 2025-06-09 DIAGNOSIS — N34.2 INFECTIVE URETHRITIS: Primary | ICD-10-CM

## 2025-06-09 DIAGNOSIS — B96.89 UTI DUE TO KLEBSIELLA SPECIES: ICD-10-CM

## 2025-06-09 LAB
APPEARANCE FLUID: CLEAR
BILIRUBIN, POC: NORMAL
BLOOD URINE, POC: NORMAL
CLARITY, POC: CLEAR
COLOR, POC: YELLOW
GLUCOSE URINE, POC: 2000 MG/DL
KETONES, POC: NORMAL MG/DL
LEUKOCYTE EST, POC: NORMAL
NITRITE, POC: POSITIVE
PH, POC: 5
PROTEIN, POC: NORMAL MG/DL
SPECIFIC GRAVITY, POC: 1.01
UROBILINOGEN, POC: 0.2 MG/DL

## 2025-06-09 PROCEDURE — 99213 OFFICE O/P EST LOW 20 MIN: CPT | Performed by: INTERNAL MEDICINE

## 2025-06-09 PROCEDURE — 1123F ACP DISCUSS/DSCN MKR DOCD: CPT | Performed by: INTERNAL MEDICINE

## 2025-06-09 PROCEDURE — 81002 URINALYSIS NONAUTO W/O SCOPE: CPT | Performed by: INTERNAL MEDICINE

## 2025-06-09 PROCEDURE — G8420 CALC BMI NORM PARAMETERS: HCPCS | Performed by: INTERNAL MEDICINE

## 2025-06-09 PROCEDURE — G8427 DOCREV CUR MEDS BY ELIG CLIN: HCPCS | Performed by: INTERNAL MEDICINE

## 2025-06-09 PROCEDURE — 1090F PRES/ABSN URINE INCON ASSESS: CPT | Performed by: INTERNAL MEDICINE

## 2025-06-09 PROCEDURE — 1159F MED LIST DOCD IN RCRD: CPT | Performed by: INTERNAL MEDICINE

## 2025-06-09 PROCEDURE — G8399 PT W/DXA RESULTS DOCUMENT: HCPCS | Performed by: INTERNAL MEDICINE

## 2025-06-09 PROCEDURE — 1036F TOBACCO NON-USER: CPT | Performed by: INTERNAL MEDICINE

## 2025-06-09 RX ORDER — CEFDINIR 300 MG/1
300 CAPSULE ORAL 2 TIMES DAILY
Qty: 14 CAPSULE | Refills: 0 | Status: SHIPPED | OUTPATIENT
Start: 2025-06-09 | End: 2025-06-16

## 2025-06-09 RX ORDER — PHENAZOPYRIDINE HYDROCHLORIDE 200 MG/1
200 TABLET, FILM COATED ORAL 3 TIMES DAILY PRN
Qty: 6 TABLET | Refills: 0 | Status: SHIPPED | OUTPATIENT
Start: 2025-06-09 | End: 2025-06-11

## 2025-06-09 ASSESSMENT — ENCOUNTER SYMPTOMS
WHEEZING: 0
CONSTIPATION: 0
CHEST TIGHTNESS: 0
COUGH: 0
ABDOMINAL PAIN: 0
SORE THROAT: 0

## 2025-06-09 NOTE — PROGRESS NOTES
Chief Complaint   Patient presents with    Urinary Urgency     On Saturday  finished 7 day course of treatment and still not better   Urgent care on 6/1/25     History of presenting illness:  Tracy Zamora is a79 y.o. female who presents today for follow up on her chronic medical conditions as noted below.    Patient Active Problem List    Diagnosis Date Noted    Right arm numbness 08/12/2022     Priority: High    Vaginal itching 11/14/2022     Priority: Medium    Dysuria 11/14/2022     Priority: Medium    S/P total knee arthroplasty, left 11/14/2022     Priority: Medium    Primary osteoarthritis of left knee 09/26/2022     Priority: Medium    TIA (transient ischemic attack) 07/28/2022     Priority: Medium    Numbness and tingling of right arm 07/27/2022     Priority: Medium    Right arm weakness 07/27/2022     Priority: Medium    HLD (hyperlipidemia) 05/15/2020     Priority: Medium    Intrinsic asthma with acute exacerbation 05/15/2020     Priority: Medium    Acute bacterial sinusitis 10/25/2021    Osteopenia of left hip 02/09/2021     Overview Note:     2/2021 hip neck -1.7  4/2023 hip neck -1.7/-2.0 L spine nl      Type 2 diabetes mellitus without complication, without long-term current use of insulin (HCC) 11/23/2020    RLS (restless legs syndrome) 11/23/2020    Vitamin D deficiency 11/23/2020    Nausea 08/22/2013    GERD (gastroesophageal reflux disease) 08/22/2013    History of Helicobacter pylori infection 08/22/2013    History of colon polyps 08/22/2013    Hiatal hernia 08/22/2013    Diverticulosis 08/22/2013     Past Medical History:   Diagnosis Date    Arthritis     Asthma     Diabetes mellitus (HCC)     GERD (gastroesophageal reflux disease)     Helicobacter Pylori (H. Pylori) Infection     Hyperlipidemia     Knee pain       Past Surgical History:   Procedure Laterality Date    CHOLECYSTECTOMY      COLONOSCOPY  2017    [facility]    ENDOSCOPY, COLON, DIAGNOSTIC      HYSTERECTOMY (CERVIX STATUS

## 2025-06-12 RX ORDER — PHENAZOPYRIDINE HYDROCHLORIDE 100 MG/1
100 TABLET, FILM COATED ORAL 3 TIMES DAILY PRN
Qty: 21 TABLET | Refills: 0 | Status: SHIPPED | OUTPATIENT
Start: 2025-06-12 | End: 2025-06-19

## 2025-06-16 ENCOUNTER — OFFICE VISIT (OUTPATIENT)
Dept: INTERNAL MEDICINE | Age: 80
End: 2025-06-16
Payer: MEDICARE

## 2025-06-16 VITALS
OXYGEN SATURATION: 96 % | DIASTOLIC BLOOD PRESSURE: 76 MMHG | HEART RATE: 82 BPM | HEIGHT: 65 IN | BODY MASS INDEX: 24.03 KG/M2 | SYSTOLIC BLOOD PRESSURE: 130 MMHG | WEIGHT: 144.2 LBS

## 2025-06-16 DIAGNOSIS — B96.89 UTI DUE TO KLEBSIELLA SPECIES: ICD-10-CM

## 2025-06-16 DIAGNOSIS — N39.0 UTI DUE TO KLEBSIELLA SPECIES: ICD-10-CM

## 2025-06-16 DIAGNOSIS — N34.2 INFECTIVE URETHRITIS: Primary | ICD-10-CM

## 2025-06-16 DIAGNOSIS — R30.0 DYSURIA: ICD-10-CM

## 2025-06-16 DIAGNOSIS — R39.15 URINARY URGENCY: ICD-10-CM

## 2025-06-16 DIAGNOSIS — N34.2 INFECTIVE URETHRITIS: ICD-10-CM

## 2025-06-16 LAB
BACTERIA URNS QL MICRO: ABNORMAL /HPF
BILIRUB UR QL STRIP: ABNORMAL
CLARITY UR: CLEAR
COLOR UR: ABNORMAL
CRYSTALS URNS MICRO: ABNORMAL /HPF
GLUCOSE UR STRIP.AUTO-MCNC: =>1000 MG/DL
HGB UR STRIP.AUTO-MCNC: NEGATIVE MG/L
HYALINE CASTS #/AREA URNS LPF: ABNORMAL /LPF (ref 0–5)
KETONES UR STRIP.AUTO-MCNC: NEGATIVE MG/DL
LEUKOCYTE ESTERASE UR QL STRIP.AUTO: ABNORMAL
NITRITE UR QL STRIP.AUTO: POSITIVE
PH UR STRIP.AUTO: 5 [PH] (ref 5–8)
PROT UR STRIP.AUTO-MCNC: ABNORMAL MG/DL
SP GR UR STRIP.AUTO: 1.04 (ref 1–1.03)
SQUAMOUS #/AREA URNS HPF: ABNORMAL /HPF
UROBILINOGEN UR STRIP.AUTO-MCNC: 1 E.U./DL
WBC #/AREA URNS HPF: ABNORMAL /HPF (ref 0–5)
YEAST #/AREA URNS HPF: PRESENT /HPF

## 2025-06-16 PROCEDURE — G8420 CALC BMI NORM PARAMETERS: HCPCS | Performed by: INTERNAL MEDICINE

## 2025-06-16 PROCEDURE — 1159F MED LIST DOCD IN RCRD: CPT | Performed by: INTERNAL MEDICINE

## 2025-06-16 PROCEDURE — G8427 DOCREV CUR MEDS BY ELIG CLIN: HCPCS | Performed by: INTERNAL MEDICINE

## 2025-06-16 PROCEDURE — 99213 OFFICE O/P EST LOW 20 MIN: CPT | Performed by: INTERNAL MEDICINE

## 2025-06-16 PROCEDURE — 1123F ACP DISCUSS/DSCN MKR DOCD: CPT | Performed by: INTERNAL MEDICINE

## 2025-06-16 PROCEDURE — 1090F PRES/ABSN URINE INCON ASSESS: CPT | Performed by: INTERNAL MEDICINE

## 2025-06-16 PROCEDURE — 1036F TOBACCO NON-USER: CPT | Performed by: INTERNAL MEDICINE

## 2025-06-16 PROCEDURE — G8399 PT W/DXA RESULTS DOCUMENT: HCPCS | Performed by: INTERNAL MEDICINE

## 2025-06-16 RX ORDER — ESTRADIOL 0.1 MG/G
CREAM VAGINAL
Qty: 42.5 G | Refills: 1 | Status: SHIPPED | OUTPATIENT
Start: 2025-06-16 | End: 2025-06-16 | Stop reason: SDUPTHER

## 2025-06-16 RX ORDER — ESTRADIOL 0.1 MG/G
CREAM VAGINAL
Qty: 42.5 G | Refills: 1 | Status: SHIPPED | OUTPATIENT
Start: 2025-06-16

## 2025-06-16 ASSESSMENT — ENCOUNTER SYMPTOMS
SORE THROAT: 0
CONSTIPATION: 0
ABDOMINAL PAIN: 0
WHEEZING: 0
CHEST TIGHTNESS: 0
COUGH: 0

## 2025-06-16 NOTE — PROGRESS NOTES
results abx was given:  RX was given  -     cefdinir (OMNICEF) 300 MG capsule; Take 1 capsule by mouth 2 times daily for 7 days  -     phenazopyridine (PYRIDIUM) 200 MG tablet; Take 1 tablet by mouth 3 times daily as needed for Pain    Cont ++ sx  Has been very stressed  UA today negative  States in past has ho interstitial cystitis-  Used DMSO  about 12 yrs ago and it helped her    Will obtain cx on UA today    Suggest use estrace pea size topically vulvar area- rx to pt    She will call and make appt with uroogy dr Todd at [place] re interstitial cystitis  No orders of the defined types were placed in this encounter.    New Prescriptions    ESTRADIOL (ESTRACE VAGINAL) 0.1 MG/GM VAGINAL CREAM    Use pea size amount twice weekly vulvar area as directed         No follow-ups on file.   There are no Patient Instructions on file for this visit.  EMR Dragon/transcription disclaimer:Significant part of this  encounter note is electronic transcription/translationof spoken language to printed text. The electronic translation of spoken language may be erroneous, or at times, nonsensical words or phrases may be inadvertently transcribed. Although I have reviewed the note for sucherrors, some may still exist.

## 2025-06-17 ENCOUNTER — RESULTS FOLLOW-UP (OUTPATIENT)
Dept: INTERNAL MEDICINE | Age: 80
End: 2025-06-17

## 2025-06-18 ENCOUNTER — TELEPHONE (OUTPATIENT)
Dept: INTERNAL MEDICINE | Age: 80
End: 2025-06-18

## 2025-06-18 RX ORDER — FLUCONAZOLE 150 MG/1
150 TABLET ORAL DAILY
Qty: 3 TABLET | Refills: 0 | Status: SHIPPED | OUTPATIENT
Start: 2025-06-18 | End: 2025-06-21

## 2025-06-18 RX ORDER — EMPAGLIFLOZIN 10 MG/1
10 TABLET, FILM COATED ORAL DAILY
Qty: 90 TABLET | Refills: 1 | Status: SHIPPED | OUTPATIENT
Start: 2025-06-18

## 2025-06-18 NOTE — TELEPHONE ENCOUNTER
Urinalysis shows presence of yeast, no bacterial growth Besides recommendations that I gave her last office visit suggest Diflucan 150 daily x 3 days     Med pended

## 2025-06-18 NOTE — TELEPHONE ENCOUNTER
Tracy Zamora called to request a refill on her medication.      Last office visit : 6/16/2025   Next office visit : 7/30/2025     Requested Prescriptions     Pending Prescriptions Disp Refills    JARDIANCE 10 MG tablet [Pharmacy Med Name: JARDIANCE TABS 10MG] 90 tablet 3     Sig: TAKE 1 TABLET DAILY            Lacy Branch MA

## 2025-06-20 LAB
BACTERIA UR CULT: ABNORMAL
ORGANISM: ABNORMAL
ORGANISM: ABNORMAL

## 2025-07-11 ENCOUNTER — TELEPHONE (OUTPATIENT)
Dept: INTERNAL MEDICINE | Age: 80
End: 2025-07-11

## 2025-07-11 NOTE — TELEPHONE ENCOUNTER
Great toe infection on the left side, toe nail discoloration, toe does hurt, and has been on going for more than 1 week. Has been soaking it in hot water, when she went to see her  they told her that the nail is lifted from the nail bed. Pt is wanting to see Dr. Estrada. Is this Ok, she will need a referral.

## 2025-07-18 ENCOUNTER — TELEPHONE (OUTPATIENT)
Dept: INTERNAL MEDICINE | Age: 80
End: 2025-07-18

## 2025-07-18 DIAGNOSIS — L23.7 POISON IVY DERMATITIS: Primary | ICD-10-CM

## 2025-07-18 RX ORDER — METHYLPREDNISOLONE 4 MG/1
TABLET ORAL
Qty: 1 KIT | Refills: 0 | Status: SHIPPED | OUTPATIENT
Start: 2025-07-18 | End: 2025-07-24

## 2025-07-30 ENCOUNTER — OFFICE VISIT (OUTPATIENT)
Dept: INTERNAL MEDICINE | Age: 80
End: 2025-07-30
Payer: MEDICARE

## 2025-07-30 VITALS
OXYGEN SATURATION: 98 % | HEART RATE: 75 BPM | SYSTOLIC BLOOD PRESSURE: 110 MMHG | WEIGHT: 145 LBS | DIASTOLIC BLOOD PRESSURE: 78 MMHG | RESPIRATION RATE: 18 BRPM | BODY MASS INDEX: 24.16 KG/M2 | HEIGHT: 65 IN

## 2025-07-30 DIAGNOSIS — J45.20 MILD INTERMITTENT ASTHMA WITHOUT COMPLICATION: ICD-10-CM

## 2025-07-30 DIAGNOSIS — H81.03 MENIERE'S DISEASE OF BOTH EARS: ICD-10-CM

## 2025-07-30 DIAGNOSIS — Z86.73 HISTORY OF STROKE: ICD-10-CM

## 2025-07-30 DIAGNOSIS — E55.9 VITAMIN D DEFICIENCY: ICD-10-CM

## 2025-07-30 DIAGNOSIS — E78.00 PURE HYPERCHOLESTEROLEMIA: ICD-10-CM

## 2025-07-30 DIAGNOSIS — B35.1 ONYCHOMYCOSIS: ICD-10-CM

## 2025-07-30 DIAGNOSIS — G25.81 RLS (RESTLESS LEGS SYNDROME): ICD-10-CM

## 2025-07-30 DIAGNOSIS — E11.9 TYPE 2 DIABETES MELLITUS WITHOUT COMPLICATION, WITHOUT LONG-TERM CURRENT USE OF INSULIN (HCC): Primary | ICD-10-CM

## 2025-07-30 LAB — HBA1C MFR BLD: 6.5 %

## 2025-07-30 PROCEDURE — G8399 PT W/DXA RESULTS DOCUMENT: HCPCS | Performed by: INTERNAL MEDICINE

## 2025-07-30 PROCEDURE — 83036 HEMOGLOBIN GLYCOSYLATED A1C: CPT | Performed by: INTERNAL MEDICINE

## 2025-07-30 PROCEDURE — 1090F PRES/ABSN URINE INCON ASSESS: CPT | Performed by: INTERNAL MEDICINE

## 2025-07-30 PROCEDURE — 99214 OFFICE O/P EST MOD 30 MIN: CPT | Performed by: INTERNAL MEDICINE

## 2025-07-30 PROCEDURE — 1123F ACP DISCUSS/DSCN MKR DOCD: CPT | Performed by: INTERNAL MEDICINE

## 2025-07-30 PROCEDURE — 1036F TOBACCO NON-USER: CPT | Performed by: INTERNAL MEDICINE

## 2025-07-30 PROCEDURE — G8420 CALC BMI NORM PARAMETERS: HCPCS | Performed by: INTERNAL MEDICINE

## 2025-07-30 PROCEDURE — G8427 DOCREV CUR MEDS BY ELIG CLIN: HCPCS | Performed by: INTERNAL MEDICINE

## 2025-07-30 PROCEDURE — 1159F MED LIST DOCD IN RCRD: CPT | Performed by: INTERNAL MEDICINE

## 2025-07-30 PROCEDURE — 3044F HG A1C LEVEL LT 7.0%: CPT | Performed by: INTERNAL MEDICINE

## 2025-07-30 ASSESSMENT — ENCOUNTER SYMPTOMS
WHEEZING: 0
SORE THROAT: 0
CONSTIPATION: 0
CHEST TIGHTNESS: 0
COUGH: 0
ABDOMINAL PAIN: 0

## 2025-07-30 NOTE — PROGRESS NOTES
Chief Complaint   Patient presents with    3 Month Follow-Up    Toe Pain     Has an appt with Dr. Estrada in 2 weeks     History of presenting illness:  Tracy Zamora is a79 y.o. female who presents today for follow up on her chronic medical conditions as noted below.      Patient Active Problem List    Diagnosis Date Noted    Right arm numbness 08/12/2022     Priority: High    Vaginal itching 11/14/2022     Priority: Medium    Dysuria 11/14/2022     Priority: Medium    S/P total knee arthroplasty, left 11/14/2022     Priority: Medium    Primary osteoarthritis of left knee 09/26/2022     Priority: Medium    TIA (transient ischemic attack) 07/28/2022     Priority: Medium    Numbness and tingling of right arm 07/27/2022     Priority: Medium    Right arm weakness 07/27/2022     Priority: Medium    HLD (hyperlipidemia) 05/15/2020     Priority: Medium    Intrinsic asthma with acute exacerbation 05/15/2020     Priority: Medium    History of stroke 07/30/2025    Acute bacterial sinusitis 10/25/2021    Osteopenia of left hip 02/09/2021     Overview Note:     2/2021 hip neck -1.7  4/2023 hip neck -1.7/-2.0 L spine nl      Type 2 diabetes mellitus without complication, without long-term current use of insulin (HCC) 11/23/2020    RLS (restless legs syndrome) 11/23/2020    Vitamin D deficiency 11/23/2020    Nausea 08/22/2013    GERD (gastroesophageal reflux disease) 08/22/2013    History of Helicobacter pylori infection 08/22/2013    History of colon polyps 08/22/2013    Hiatal hernia 08/22/2013    Diverticulosis 08/22/2013     Past Medical History:   Diagnosis Date    Arthritis     Asthma     Diabetes mellitus (HCC)     GERD (gastroesophageal reflux disease)     Helicobacter Pylori (H. Pylori) Infection     Hyperlipidemia     Knee pain       Past Surgical History:   Procedure Laterality Date    CHOLECYSTECTOMY      COLONOSCOPY  2017    [facility]    ENDOSCOPY, COLON, DIAGNOSTIC      HYSTERECTOMY (CERVIX STATUS UNKNOWN)

## 2025-08-05 ENCOUNTER — TELEPHONE (OUTPATIENT)
Age: 80
End: 2025-08-05
Payer: MEDICARE

## 2025-08-05 RX ORDER — ERGOCALCIFEROL 1.25 MG/1
CAPSULE, LIQUID FILLED ORAL
Qty: 6 CAPSULE | Refills: 1 | Status: SHIPPED | OUTPATIENT
Start: 2025-08-05

## 2025-08-28 RX ORDER — EZETIMIBE 10 MG/1
10 TABLET ORAL DAILY
Qty: 90 TABLET | Refills: 1 | Status: SHIPPED | OUTPATIENT
Start: 2025-08-28

## (undated) DEVICE — TOTAL TRAY, 16FR 10ML SIL FOLEY, URN: Brand: MEDLINE

## (undated) DEVICE — MSK O2 MD CONCENTR A/ LF 7FT 1P/U

## (undated) DEVICE — CUFF,BP,DISP,1 TUBE,ADULT,HP: Brand: MEDLINE

## (undated) DEVICE — GOWN,PREVENTION PLUS,XL,ST,24/CS: Brand: MEDLINE

## (undated) DEVICE — Device: Brand: POWER-FLO®

## (undated) DEVICE — Device: Brand: DEFENDO AIR/WATER/SUCTION AND BIOPSY VALVE

## (undated) DEVICE — SOLUTION IV 100ML 0.9% SOD CHL PLAS CONT USP VIAFLX 1 PER

## (undated) DEVICE — TBG SMPL FLTR LINE NASL 02/C02 A/ BX/100

## (undated) DEVICE — ROYAL SILK SURGICAL GOWN, XXL: Brand: CONVERTORS

## (undated) DEVICE — CONMED SCOPE SAVER BITE BLOCK, 20X27 MM: Brand: SCOPE SAVER

## (undated) DEVICE — NON-WOVEN ADHESIVE WOUND DRESSING: Brand: PRIMAPORE ADHESIVE DRESSING 30*10CM

## (undated) DEVICE — ZIMMER® STERILE DISPOSABLE TOURNIQUET CUFF WITH PLC, DUAL PORT, SINGLE BLADDER, 34 IN. (86 CM)

## (undated) DEVICE — 3M™ IOBAN™ 2 ANTIMICROBIAL INCISE DRAPE 6650EZ: Brand: IOBAN™ 2

## (undated) DEVICE — ENDOGATOR AUXILIARY WATER JET CONNECTOR: Brand: ENDOGATOR

## (undated) DEVICE — THE CHANNEL CLEANING BRUSH IS A NYLON FLEXI BRUSH ATTACHED TO A FLEXIBLE PLASTIC SHEATH DESIGNED TO SAFELY REMOVE DEBRIS FROM FLEXIBLE ENDOSCOPES.

## (undated) DEVICE — GLOVE SURG SZ 75 CRM LTX FREE POLYISOPRENE POLYMER BEAD ANTI

## (undated) DEVICE — 3M™ STERI-DRAPE™ INSTRUMENT POUCH 1018: Brand: STERI-DRAPE™

## (undated) DEVICE — SOLUTION IV IRRIG POUR BRL 0.9% SODIUM CHL 2F7124

## (undated) DEVICE — GLOVE SURG SZ 85 CRM LTX FREE POLYISOPRENE POLYMER BEAD ANTI

## (undated) DEVICE — SENSR O2 OXIMAX FNGR A/ 18IN NONSTR

## (undated) DEVICE — THE SINGLE USE ETRAP – POLYP TRAP IS USED FOR SUCTION RETRIEVAL OF ENDOSCOPICALLY REMOVED POLYPS.: Brand: ETRAP

## (undated) DEVICE — SURGICAL PROCEDURE PACK KNEE TOT DBD CDS LOURDES HOSP LF

## (undated) DEVICE — SYSTEM SKIN CLSR 22CM DERMBND PRINEO

## (undated) DEVICE — DUAL CUT SAGITTAL BLADE

## (undated) DEVICE — SUTURE VCRL SZ 2-0 L27IN ABSRB UD L26MM SH 1/2 CIR J417H

## (undated) DEVICE — UNDERGLOVE SURG SZ 8 FNGR THK0.21MIL GRN LTX BEAD CUF

## (undated) DEVICE — YANKAUER,BULB TIP WITH VENT: Brand: ARGYLE

## (undated) DEVICE — CHLORAPREP 26ML ORANGE

## (undated) DEVICE — FRCP BIOP ENDO CAPTURA/PRO SERR SPK 2.8MM 230CM

## (undated) DEVICE — SOLUTION IRRIG 3000ML 0.9% SOD CHL USP UROMATIC PLAS CONT

## (undated) DEVICE — GLOVE SURG SZ 85 L12IN FNGR THK79MIL GRN LTX FREE

## (undated) DEVICE — SUTURE VCRL SZ 1 L18IN ABSRB UD L36MM CT-1 1/2 CIR J841D

## (undated) DEVICE — SUTURE VCRL SZ 2-0 L36IN ABSRB UD L36MM CT-1 1/2 CIR J945H

## (undated) DEVICE — GLOVE SURG SZ 8 L12IN FNGR THK79MIL GRN LTX FREE

## (undated) DEVICE — FAN SPRAY KIT: Brand: PULSAVAC®

## (undated) DEVICE — BIPOLAR SEALER 23-112-1 AQM 6.0: Brand: AQUAMANTYS ®

## (undated) DEVICE — FRCP BIOP COLD ENDOJAW ALLGTR W/NDL 2.8X2300MM BLU

## (undated) DEVICE — SHEET,DRAPE,53X77,STERILE: Brand: MEDLINE

## (undated) DEVICE — SNAR POLYP SENSATION MICRO OVL 13 240X40

## (undated) DEVICE — C-ARM: Brand: UNBRANDED